# Patient Record
Sex: FEMALE | Race: WHITE | NOT HISPANIC OR LATINO | Employment: PART TIME | URBAN - METROPOLITAN AREA
[De-identification: names, ages, dates, MRNs, and addresses within clinical notes are randomized per-mention and may not be internally consistent; named-entity substitution may affect disease eponyms.]

---

## 2017-01-16 RX ORDER — TRAZODONE HYDROCHLORIDE 100 MG/1
TABLET ORAL
Qty: 180 TAB | Refills: 0 | Status: SHIPPED | OUTPATIENT
Start: 2017-01-16 | End: 2017-04-13 | Stop reason: SDUPTHER

## 2017-04-13 RX ORDER — TRAZODONE HYDROCHLORIDE 100 MG/1
TABLET ORAL
Qty: 180 TAB | Refills: 0 | Status: SHIPPED | OUTPATIENT
Start: 2017-04-13 | End: 2017-06-28 | Stop reason: SDUPTHER

## 2017-04-13 NOTE — TELEPHONE ENCOUNTER
Was the patient seen in the last year in this department? Yes     Does patient have an active prescription for medications requested? No     Received Request Via: Pharmacy      Pt met protocol?: Yes, OV 10/16.

## 2017-06-28 RX ORDER — TRAZODONE HYDROCHLORIDE 100 MG/1
TABLET ORAL
Qty: 180 TAB | Refills: 0 | Status: SHIPPED | OUTPATIENT
Start: 2017-06-28 | End: 2017-09-24 | Stop reason: SDUPTHER

## 2017-07-25 RX ORDER — SIMVASTATIN 20 MG
TABLET ORAL
Qty: 90 TAB | Refills: 0 | Status: SHIPPED | OUTPATIENT
Start: 2017-07-25 | End: 2017-10-23 | Stop reason: SDUPTHER

## 2017-07-25 NOTE — TELEPHONE ENCOUNTER
Was the patient seen in the last year in this department? Yes     Does patient have an active prescription for medications requested? No     Received Request Via: Pharmacy      Pt met protocol?: Yes, last ov 10/19/16   Lab Results   Component Value Date/Time    [facility] 76 07/29/2016 07:20 AM

## 2017-09-26 RX ORDER — TRAZODONE HYDROCHLORIDE 100 MG/1
TABLET ORAL
Qty: 180 TAB | Refills: 0 | Status: SHIPPED | OUTPATIENT
Start: 2017-09-26 | End: 2018-03-03 | Stop reason: SDUPTHER

## 2017-10-25 RX ORDER — SIMVASTATIN 20 MG
TABLET ORAL
Qty: 30 TAB | Refills: 0 | Status: SHIPPED | OUTPATIENT
Start: 2017-10-25 | End: 2017-11-20 | Stop reason: SDUPTHER

## 2017-10-25 NOTE — TELEPHONE ENCOUNTER
Pt was told 9/17 to make appt and that has not been done. Please advise pt only 30 days will be sent to pharmacy and next request will be denied.

## 2017-10-31 ENCOUNTER — HOSPITAL ENCOUNTER (OUTPATIENT)
Dept: RADIOLOGY | Facility: MEDICAL CENTER | Age: 57
End: 2017-10-31
Attending: NURSE PRACTITIONER
Payer: COMMERCIAL

## 2017-10-31 DIAGNOSIS — Z12.31 VISIT FOR SCREENING MAMMOGRAM: ICD-10-CM

## 2017-10-31 PROCEDURE — G0202 SCR MAMMO BI INCL CAD: HCPCS

## 2017-11-20 ENCOUNTER — OFFICE VISIT (OUTPATIENT)
Dept: MEDICAL GROUP | Facility: PHYSICIAN GROUP | Age: 57
End: 2017-11-20
Payer: COMMERCIAL

## 2017-11-20 VITALS
SYSTOLIC BLOOD PRESSURE: 126 MMHG | HEIGHT: 67 IN | OXYGEN SATURATION: 95 % | RESPIRATION RATE: 12 BRPM | HEART RATE: 80 BPM | WEIGHT: 118 LBS | BODY MASS INDEX: 18.52 KG/M2 | DIASTOLIC BLOOD PRESSURE: 72 MMHG | TEMPERATURE: 98.7 F

## 2017-11-20 DIAGNOSIS — F51.01 PRIMARY INSOMNIA: ICD-10-CM

## 2017-11-20 DIAGNOSIS — E78.2 MIXED HYPERLIPIDEMIA: ICD-10-CM

## 2017-11-20 DIAGNOSIS — M16.10 HIP ARTHRITIS: ICD-10-CM

## 2017-11-20 DIAGNOSIS — Z12.11 SCREENING FOR COLON CANCER: ICD-10-CM

## 2017-11-20 PROCEDURE — 99203 OFFICE O/P NEW LOW 30 MIN: CPT | Performed by: INTERNAL MEDICINE

## 2017-11-20 RX ORDER — SIMVASTATIN 20 MG
20 TABLET ORAL
Qty: 90 TAB | Refills: 2 | Status: SHIPPED | OUTPATIENT
Start: 2017-11-20 | End: 2018-09-09 | Stop reason: SDUPTHER

## 2017-11-20 ASSESSMENT — PATIENT HEALTH QUESTIONNAIRE - PHQ9: CLINICAL INTERPRETATION OF PHQ2 SCORE: 0

## 2017-11-20 NOTE — PROGRESS NOTES
PRIMARY CARE CLINIC NEW PATIENT H&P  Chief Complaint   Patient presents with   • Medication Refill     simvastatin     History of Present Illness     Insomnia  Has cut back on trazodone to 1 tablet of 100 mg dose at night since she started taking 2 tramadol at night to help with her hip pain.     Hip arthritis  Had an injection 2 years ago but it didn't work for longer than a day. Dr. Salgado prescribes her tramadol (takes 4 a day to be able to function pain free).     Hyperlipidemia  Just resumed statin again this year after her cholesterol increased, had been on a statin prior to this as well.     Current Outpatient Prescriptions   Medication Sig Dispense Refill   • simvastatin (ZOCOR) 20 MG Tab Take 1 Tab by mouth every bedtime. TAKE 1 TAB BY MOUTH EVERY EVENING. 90 Tab 2   • trazodone (DESYREL) 100 MG Tab TAKE 2 TABLETS BY MOUTH AT BEDTIME (Patient taking differently: once at HS) 180 Tab 0   • tramadol (ULTRAM) 50 MG Tab Take 1-2 Tabs by mouth every four hours as needed. 90 Tab 0   • magnesium gluconate (MAG-G) 500 MG tablet Take 500 mg by mouth 3 times a day.     • Calcium Carbonate-Vitamin D (CALCIUM + D PO) Take  by mouth.     • ibuprofen (MOTRIN) 800 MG TABS Take 1 Tab by mouth every 8 hours as needed for Mild Pain. 60 Each 3   • Coenzyme Q10 (CO Q 10 PO) Take  by mouth.     • Multiple Vitamin (MULTI-VITAMIN PO) Take  by mouth.     • fluticasone (CUTIVATE) 0.05 % cream Apply  to affected area(s) 2 times a day. Patient to apply to affected area BID 1 Tube 0     No current facility-administered medications for this visit.        Past Medical History:   Diagnosis Date   • Acute midline thoracic back pain 10/19/2016   • Hyperlipidemia 6/17/2013   • Insomnia    • Menopause    • Osteopenia      Past Surgical History:   Procedure Laterality Date   • UMBILICAL HERNIA REPAIR  10/21/2009    Performed by FLORIAN BHANDARI at [facility]   • OTHER ORTHOPEDIC SURGERY      hammertoe correction bilat   •  "OTHER ORTHOPEDIC SURGERY      lt arm   • ULNA ORIF      lt     Social History   Substance Use Topics   • Smoking status: Former Smoker     Packs/day: 0.25     Years: 15.00     Types: Cigarettes     Quit date: 8/12/1999   • Smokeless tobacco: Never Used   • Alcohol use No      Comment: former heavy use, 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       Family History   Problem Relation Age of Onset   • Adopted: Yes     Family Status   Relation Status   • Mother Other   • Father Other     Allergies: Patient has no known allergies.    ROS  Constitutional: Negative for fatigue/generalized weakness.   HEENT: Negative for  vision changes, hearing changes    Respiratory: Negative for shortness of breath  Cardiovascular: Negative for chest pain, palpitations  Gastrointestinal: Negative for blood in stool, constipation, diarrhea  Genitourinary: Negative for dysuria, polyuria  Musculoskeletal: positive for back and joint pain  Skin: Negative for rash  Neurological: Negative for numbness, tingling  Psychiatric/Behavioral: Negative for depression     Objective   Blood pressure 126/72, pulse 80, temperature 37.1 °C (98.7 °F), resp. rate 12, height 1.702 m (5' 7\"), weight 53.5 kg (118 lb), SpO2 95 %. Body mass index is 18.48 kg/m².    General: Alert, oriented. In no acute distress   HEET: EOMI, PERRL, conjunctiva non-injected, sclera non-icteric.  Nares patent with no significant congestion or drainage.  Marry pinnae, external auditory canals, TM pearly gray with normal light reflex bilaterally.Oral mucous membranes pink and moist with no lesions.  Neck: supple with no cervical, subclavicular lymphadenopathy, JVD, palpable thyroid nodules   Lungs: clear to auscultation bilaterally with good excursion.  CV: regular rate and rhythm.  Abdomen soft, non-distended, non-tender with normal bowel sounds. No hepatosplenomegaly, no masses palpated  Skin: " no lesions. Warm, dry   Psychiatric: appropriate mood and affect     Assessment and Plan   The following treatment plan was discussed     1. Primary insomnia  Stable on 1 tablet of trazodone 100 mg qHS.     2. Hip arthritis  Follows with Dr. Sena, has received injection of left hip without relief 2 years ago. Dr. Sena prescribes her tramadol and she requires about 4 a day to function.     3. Mixed hyperlipidemia  Lipids well controlled on current dose of statin. Will refill.   - simvastatin (ZOCOR) 20 MG Tab; Take 1 Tab by mouth every bedtime. TAKE 1 TAB BY MOUTH EVERY EVENING.  Dispense: 90 Tab; Refill: 2    4. Screening for colon cancer  Patient knows she is due 6/2018 and would like a referral now so she has time to select an in network provider.   - REFERRAL TO GI FOR COLONOSCOPY    Return in about 4 months (around 3/20/2018) for pap.    Health Maintenance      Health Maintenance Due   Topic Date Due   • PAP SMEAR  06/18/2016       Kalpesh Hylton MD  Internal Medicine  [facility]

## 2017-11-20 NOTE — ASSESSMENT & PLAN NOTE
Has cut back on trazodone to 1 tablet of 100 mg dose at night since she started taking 2 tramadol at night to help with her hip pain.

## 2017-11-20 NOTE — ASSESSMENT & PLAN NOTE
Just resumed statin again this year after her cholesterol increased, had been on a statin prior to this as well.

## 2017-11-20 NOTE — ASSESSMENT & PLAN NOTE
Had an injection 2 years ago but it didn't work for longer than a day. Dr. Salgado prescribes her tramadol (takes 4 a day to be able to function pain free).

## 2017-11-22 DIAGNOSIS — R52 PAIN: ICD-10-CM

## 2017-11-22 RX ORDER — TRAMADOL HYDROCHLORIDE 50 MG/1
50-100 TABLET ORAL EVERY 4 HOURS PRN
Qty: 90 TAB | Refills: 0
Start: 2017-11-22

## 2017-11-22 RX ORDER — SIMVASTATIN 20 MG
TABLET ORAL
Qty: 90 TAB | Refills: 0 | Status: SHIPPED | OUTPATIENT
Start: 2017-11-22 | End: 2018-01-17

## 2017-11-22 NOTE — TELEPHONE ENCOUNTER
Pt states she has not seen Dr Salgado in over a year and was told by their office to have filled by her primary

## 2017-11-27 DIAGNOSIS — R52 PAIN: ICD-10-CM

## 2017-11-27 RX ORDER — TRAMADOL HYDROCHLORIDE 50 MG/1
50-100 TABLET ORAL EVERY 4 HOURS PRN
Qty: 45 TAB | Refills: 0 | Status: ON HOLD
Start: 2017-11-27 | End: 2018-11-29

## 2017-11-27 NOTE — TELEPHONE ENCOUNTER
Inform pt that refill is for #45 only and that she needs to return to Dr Salgado for further refills.

## 2018-01-17 ENCOUNTER — OFFICE VISIT (OUTPATIENT)
Dept: MEDICAL GROUP | Facility: PHYSICIAN GROUP | Age: 58
End: 2018-01-17
Payer: COMMERCIAL

## 2018-01-17 VITALS
TEMPERATURE: 98.4 F | HEART RATE: 103 BPM | RESPIRATION RATE: 12 BRPM | HEIGHT: 67 IN | SYSTOLIC BLOOD PRESSURE: 110 MMHG | DIASTOLIC BLOOD PRESSURE: 70 MMHG | BODY MASS INDEX: 18.52 KG/M2 | WEIGHT: 118 LBS | OXYGEN SATURATION: 95 %

## 2018-01-17 DIAGNOSIS — M16.10 HIP ARTHRITIS: ICD-10-CM

## 2018-01-17 DIAGNOSIS — Z00.00 ROUTINE ADULT HEALTH MAINTENANCE: ICD-10-CM

## 2018-01-17 PROCEDURE — 99212 OFFICE O/P EST SF 10 MIN: CPT | Performed by: INTERNAL MEDICINE

## 2018-01-17 NOTE — ASSESSMENT & PLAN NOTE
Has been having ongoing left hip pain. Was getting tramadol from Dr. Salgado but now that practice isn't handling chronic pain medications. Has had a hip injection before but it didn't help. Dr. Salgado did discuss hip replacement with her but she's not prepared for that option yet. Has been taking several advil daily and also tramadol. Takes 2-3 tablets of tramadol a day. Takes 4-8 advil a day.

## 2018-01-19 ENCOUNTER — HOSPITAL ENCOUNTER (OUTPATIENT)
Dept: LAB | Facility: MEDICAL CENTER | Age: 58
End: 2018-01-19
Attending: INTERNAL MEDICINE
Payer: COMMERCIAL

## 2018-01-19 DIAGNOSIS — M16.10 HIP ARTHRITIS: ICD-10-CM

## 2018-01-19 DIAGNOSIS — Z00.00 ROUTINE ADULT HEALTH MAINTENANCE: ICD-10-CM

## 2018-01-19 LAB
25(OH)D3 SERPL-MCNC: 39 NG/ML (ref 30–100)
ALBUMIN SERPL BCP-MCNC: 4 G/DL (ref 3.2–4.9)
ALBUMIN/GLOB SERPL: 1.8 G/DL
ALP SERPL-CCNC: 36 U/L (ref 30–99)
ALT SERPL-CCNC: 18 U/L (ref 2–50)
ANION GAP SERPL CALC-SCNC: 4 MMOL/L (ref 0–11.9)
AST SERPL-CCNC: 23 U/L (ref 12–45)
BASOPHILS # BLD AUTO: 1.3 % (ref 0–1.8)
BASOPHILS # BLD: 0.06 K/UL (ref 0–0.12)
BILIRUB SERPL-MCNC: 0.5 MG/DL (ref 0.1–1.5)
BUN SERPL-MCNC: 16 MG/DL (ref 8–22)
CALCIUM SERPL-MCNC: 8.5 MG/DL (ref 8.5–10.5)
CHLORIDE SERPL-SCNC: 106 MMOL/L (ref 96–112)
CHOLEST SERPL-MCNC: 187 MG/DL (ref 100–199)
CO2 SERPL-SCNC: 29 MMOL/L (ref 20–33)
CREAT SERPL-MCNC: 0.8 MG/DL (ref 0.5–1.4)
EOSINOPHIL # BLD AUTO: 0.33 K/UL (ref 0–0.51)
EOSINOPHIL NFR BLD: 7.2 % (ref 0–6.9)
ERYTHROCYTE [DISTWIDTH] IN BLOOD BY AUTOMATED COUNT: 46.7 FL (ref 35.9–50)
GLOBULIN SER CALC-MCNC: 2.2 G/DL (ref 1.9–3.5)
GLUCOSE SERPL-MCNC: 84 MG/DL (ref 65–99)
HCT VFR BLD AUTO: 39.5 % (ref 37–47)
HDLC SERPL-MCNC: 73 MG/DL
HGB BLD-MCNC: 12.7 G/DL (ref 12–16)
IMM GRANULOCYTES # BLD AUTO: 0.01 K/UL (ref 0–0.11)
IMM GRANULOCYTES NFR BLD AUTO: 0.2 % (ref 0–0.9)
LDLC SERPL CALC-MCNC: 100 MG/DL
LYMPHOCYTES # BLD AUTO: 1.22 K/UL (ref 1–4.8)
LYMPHOCYTES NFR BLD: 26.8 % (ref 22–41)
MCH RBC QN AUTO: 28.9 PG (ref 27–33)
MCHC RBC AUTO-ENTMCNC: 32.2 G/DL (ref 33.6–35)
MCV RBC AUTO: 89.8 FL (ref 81.4–97.8)
MONOCYTES # BLD AUTO: 0.51 K/UL (ref 0–0.85)
MONOCYTES NFR BLD AUTO: 11.2 % (ref 0–13.4)
NEUTROPHILS # BLD AUTO: 2.43 K/UL (ref 2–7.15)
NEUTROPHILS NFR BLD: 53.3 % (ref 44–72)
NRBC # BLD AUTO: 0 K/UL
NRBC BLD-RTO: 0 /100 WBC
PLATELET # BLD AUTO: 262 K/UL (ref 164–446)
PMV BLD AUTO: 10.9 FL (ref 9–12.9)
POTASSIUM SERPL-SCNC: 4.4 MMOL/L (ref 3.6–5.5)
PROT SERPL-MCNC: 6.2 G/DL (ref 6–8.2)
RBC # BLD AUTO: 4.4 M/UL (ref 4.2–5.4)
SODIUM SERPL-SCNC: 139 MMOL/L (ref 135–145)
TRIGL SERPL-MCNC: 68 MG/DL (ref 0–149)
WBC # BLD AUTO: 4.6 K/UL (ref 4.8–10.8)

## 2018-01-19 PROCEDURE — 85025 COMPLETE CBC W/AUTO DIFF WBC: CPT

## 2018-01-19 PROCEDURE — 36415 COLL VENOUS BLD VENIPUNCTURE: CPT

## 2018-01-19 PROCEDURE — 82306 VITAMIN D 25 HYDROXY: CPT

## 2018-01-19 PROCEDURE — 80061 LIPID PANEL: CPT

## 2018-01-19 PROCEDURE — 80053 COMPREHEN METABOLIC PANEL: CPT

## 2018-03-05 RX ORDER — TRAZODONE HYDROCHLORIDE 100 MG/1
TABLET ORAL
Qty: 180 TAB | Refills: 1 | Status: ON HOLD | OUTPATIENT
Start: 2018-03-05 | End: 2018-11-29

## 2018-07-18 ENCOUNTER — APPOINTMENT (OUTPATIENT)
Dept: ADMISSIONS | Facility: MEDICAL CENTER | Age: 58
End: 2018-07-18
Attending: ORTHOPAEDIC SURGERY
Payer: COMMERCIAL

## 2018-07-18 DIAGNOSIS — Z01.810 PRE-OPERATIVE CARDIOVASCULAR EXAMINATION: ICD-10-CM

## 2018-07-18 LAB — EKG IMPRESSION: NORMAL

## 2018-07-18 RX ORDER — IBUPROFEN 200 MG
600 TABLET ORAL EVERY 6 HOURS PRN
Status: ON HOLD | COMMUNITY
End: 2018-11-29

## 2018-07-18 NOTE — OR NURSING
"Preadmit appointment: \" Preparing for your Procedure information\" sheet given to patient with verbal and written instructions. Patient instructed to continue prescribed medications through the day before surgery, instructed to take the following medications the day of surgery per anesthesia protocol: Tramadol  "

## 2018-07-23 ENCOUNTER — HOSPITAL ENCOUNTER (OUTPATIENT)
Facility: MEDICAL CENTER | Age: 58
End: 2018-07-23
Attending: ORTHOPAEDIC SURGERY | Admitting: ORTHOPAEDIC SURGERY
Payer: COMMERCIAL

## 2018-07-23 VITALS
WEIGHT: 113.54 LBS | DIASTOLIC BLOOD PRESSURE: 71 MMHG | RESPIRATION RATE: 14 BRPM | TEMPERATURE: 97.2 F | BODY MASS INDEX: 17.21 KG/M2 | HEIGHT: 68 IN | OXYGEN SATURATION: 92 % | SYSTOLIC BLOOD PRESSURE: 108 MMHG

## 2018-07-23 PROCEDURE — 160029 HCHG SURGERY MINUTES - 1ST 30 MINS LEVEL 4: Performed by: ORTHOPAEDIC SURGERY

## 2018-07-23 PROCEDURE — 160002 HCHG RECOVERY MINUTES (STAT): Performed by: ORTHOPAEDIC SURGERY

## 2018-07-23 PROCEDURE — C1713 ANCHOR/SCREW BN/BN,TIS/BN: HCPCS | Performed by: ORTHOPAEDIC SURGERY

## 2018-07-23 PROCEDURE — 160022 HCHG BLOCK: Performed by: ORTHOPAEDIC SURGERY

## 2018-07-23 PROCEDURE — 160009 HCHG ANES TIME/MIN: Performed by: ORTHOPAEDIC SURGERY

## 2018-07-23 PROCEDURE — 500028 HCHG ARTHROWAND TURBOVAC 3.5/90 SUCT.: Performed by: ORTHOPAEDIC SURGERY

## 2018-07-23 PROCEDURE — 502581 HCHG PACK, SHOULDER ARTHROSCOPY: Performed by: ORTHOPAEDIC SURGERY

## 2018-07-23 PROCEDURE — 501838 HCHG SUTURE GENERAL: Performed by: ORTHOPAEDIC SURGERY

## 2018-07-23 PROCEDURE — 160035 HCHG PACU - 1ST 60 MINS PHASE I: Performed by: ORTHOPAEDIC SURGERY

## 2018-07-23 PROCEDURE — 160025 RECOVERY II MINUTES (STATS): Performed by: ORTHOPAEDIC SURGERY

## 2018-07-23 PROCEDURE — 700111 HCHG RX REV CODE 636 W/ 250 OVERRIDE (IP)

## 2018-07-23 PROCEDURE — 500123 HCHG BOVIE, CONTROL W/BLADE: Performed by: ORTHOPAEDIC SURGERY

## 2018-07-23 PROCEDURE — 160048 HCHG OR STATISTICAL LEVEL 1-5: Performed by: ORTHOPAEDIC SURGERY

## 2018-07-23 PROCEDURE — 700101 HCHG RX REV CODE 250

## 2018-07-23 PROCEDURE — 700105 HCHG RX REV CODE 258: Performed by: ORTHOPAEDIC SURGERY

## 2018-07-23 PROCEDURE — 700102 HCHG RX REV CODE 250 W/ 637 OVERRIDE(OP)

## 2018-07-23 PROCEDURE — 160046 HCHG PACU - 1ST 60 MINS PHASE II: Performed by: ORTHOPAEDIC SURGERY

## 2018-07-23 PROCEDURE — A9270 NON-COVERED ITEM OR SERVICE: HCPCS

## 2018-07-23 PROCEDURE — 160036 HCHG PACU - EA ADDL 30 MINS PHASE I: Performed by: ORTHOPAEDIC SURGERY

## 2018-07-23 PROCEDURE — 500152 HCHG CANNULA, TIB TUNNEL: Performed by: ORTHOPAEDIC SURGERY

## 2018-07-23 PROCEDURE — 160041 HCHG SURGERY MINUTES - EA ADDL 1 MIN LEVEL 4: Performed by: ORTHOPAEDIC SURGERY

## 2018-07-23 DEVICE — SUTURE ANCHOR 2.8MM: Type: IMPLANTABLE DEVICE | Status: FUNCTIONAL

## 2018-07-23 RX ORDER — ACETAMINOPHEN 500 MG
TABLET ORAL
Status: COMPLETED
Start: 2018-07-23 | End: 2018-07-23

## 2018-07-23 RX ORDER — BUPIVACAINE HYDROCHLORIDE 5 MG/ML
INJECTION, SOLUTION EPIDURAL; INTRACAUDAL
Status: DISCONTINUED
Start: 2018-07-23 | End: 2018-07-23 | Stop reason: HOSPADM

## 2018-07-23 RX ORDER — MIDAZOLAM HYDROCHLORIDE 1 MG/ML
INJECTION INTRAMUSCULAR; INTRAVENOUS
Status: DISCONTINUED
Start: 2018-07-23 | End: 2018-07-23 | Stop reason: HOSPADM

## 2018-07-23 RX ORDER — CELECOXIB 200 MG/1
CAPSULE ORAL
Status: COMPLETED
Start: 2018-07-23 | End: 2018-07-23

## 2018-07-23 RX ORDER — ONDANSETRON 2 MG/ML
INJECTION INTRAMUSCULAR; INTRAVENOUS
Status: COMPLETED
Start: 2018-07-23 | End: 2018-07-23

## 2018-07-23 RX ORDER — SODIUM CHLORIDE, SODIUM LACTATE, POTASSIUM CHLORIDE, CALCIUM CHLORIDE 600; 310; 30; 20 MG/100ML; MG/100ML; MG/100ML; MG/100ML
INJECTION, SOLUTION INTRAVENOUS
Status: DISCONTINUED | OUTPATIENT
Start: 2018-07-23 | End: 2018-07-23 | Stop reason: HOSPADM

## 2018-07-23 RX ORDER — BUPIVACAINE HYDROCHLORIDE AND EPINEPHRINE 2.5; 5 MG/ML; UG/ML
INJECTION, SOLUTION EPIDURAL; INFILTRATION; INTRACAUDAL; PERINEURAL
Status: DISCONTINUED | OUTPATIENT
Start: 2018-07-23 | End: 2018-07-23 | Stop reason: HOSPADM

## 2018-07-23 RX ADMIN — ONDANSETRON 4 MG: 2 INJECTION INTRAMUSCULAR; INTRAVENOUS at 15:56

## 2018-07-23 RX ADMIN — CELECOXIB 400 MG: 200 CAPSULE ORAL at 11:53

## 2018-07-23 RX ADMIN — SODIUM CHLORIDE, POTASSIUM CHLORIDE, SODIUM LACTATE AND CALCIUM CHLORIDE: 600; 310; 30; 20 INJECTION, SOLUTION INTRAVENOUS at 11:47

## 2018-07-23 RX ADMIN — ACETAMINOPHEN 1000 MG: 500 TABLET, COATED ORAL at 11:53

## 2018-07-23 ASSESSMENT — PAIN SCALES - GENERAL
PAINLEVEL_OUTOF10: 0
PAINLEVEL_OUTOF10: 5

## 2018-07-23 NOTE — OR NURSING
1522: To PACU post left shoulder arthroscopy w/ interscalene block. Pt is extubated, breathing is spontaneous and unlabored. Unable to sense touch to hand/fingers or move same. Denies pain or nausea.  1545: No change.   1555: C/o nausea, see MAR. Report given to Jeanmarie MIJARES.

## 2018-07-23 NOTE — OR NURSING
2961- Report received from Yosi MIJARES.  Pt denies pain, c/o mild nausea.  Pt was medicated for nausea, will continue to monitor. L shoulder dressing cdi. Pt denies sensation to touch L hand, unable to wiggle L fingers.  Fingers warm and pink with brisk cap refill.  updated via phone.  8163- Report to Christina MIJRAES in stage 2.

## 2018-07-23 NOTE — DISCHARGE INSTRUCTIONS
:   ACTIVITY: Rest and take it easy for the first 24 hours.  A responsible adult is recommended to remain with you during that time.  It is normal to feel sleepy.  We encourage you to not do anything that requires balance, judgment or coordination.    MILD FLU-LIKE SYMPTOMS ARE NORMAL. YOU MAY EXPERIENCE GENERALIZED MUSCLE ACHES, THROAT IRRITATION, HEADACHE AND/OR SOME NAUSEA.    FOR 24 HOURS DO NOT:  Drive, operate machinery or run household appliances.  Drink beer or alcoholic beverages.   Make important decisions or sign legal documents.    SPECIAL INSTRUCTIONS: No weight bearing to operative extremity. Ice and elevate. Wear immobilizer at all times (except showering or performing codman exercises).    DIET: To avoid nausea, slowly advance diet as tolerated, avoiding spicy or greasy foods for the first day.  Add more substantial food to your diet according to your physician's instructions.   INCREASE FLUIDS AND FIBER TO AVOID CONSTIPATION.    SURGICAL DRESSING/BATHING: Keep dressings clean and dry. May remove dressings post op day # 2 (Wednesday) and shower with wounds uncovered. Apply band-aids after shower. Do not soak or submerge incisions for 3 weeks.    FOLLOW-UP APPOINTMENT:  A follow-up appointment should be arranged with your doctor in 7-10 days; call to schedule.    You should CALL YOUR PHYSICIAN if you develop:  Fever greater than 101 degrees F.  Pain not relieved by medication, or persistent nausea or vomiting.  Excessive bleeding (blood soaking through dressing) or unexpected drainage from the wound.  Extreme redness or swelling around the incision site, drainage of pus or foul smelling drainage.  Inability to urinate or empty your bladder within 8 hours.    You should call 911 if you develop problems with breathing or chest pain.    If you are unable to contact your doctor or surgical center, you should go to the nearest emergency room or urgent care center.      Physician's telephone #: Dr. Collins  [phone]    If any questions arise, call your doctor.  If your doctor is not available, please feel free to call the Surgical Center at [phone].  The Center is open Monday through Friday from 7AM to 7PM.      You can also call the HEALTH HOTLINE open 24 hours/day, 7 days/week and speak to a nurse at [phone], or toll free at [phone].    A registered nurse may call you a few days after your surgery to see how you are doing after your procedure.    MEDICATIONS: Resume taking daily medication.  Take prescribed pain medication with food.  If no medication is prescribed, you may take non-aspirin pain medication if needed.  PAIN MEDICATION CAN BE VERY CONSTIPATING.  Take a stool softener or laxative such as senokot, pericolace, or milk of magnesia if needed.    Prescriptions at home.      Last pain medication given: none.    If your physician has prescribed pain medication that includes Acetaminophen (Tylenol), do not take additional Acetaminophen (Tylenol) while taking the prescribed medication.    Peripheral Nerve Block Discharge Instructions from Same Day Surgery and Inpatient :    What to Expect - Upper Extremity  · You may experience numbness and weakness in shoulder, arm and hand  on the same side as your surgery  · This is normal. For some people, this may be an unpleasant sensation. Be very careful with your numb limb  · Ask for help when you need it  Shoulder Surgery Side Effects  · In addition to numbness and weakness you may experience other symptoms  · Other nerves that are close to those nerves injected can also be affected by local anesthesia  · You may experience a hoarseness in your voice  · Your breathing may feel different  · You may also notice drooping of your eyelid, pupil constriction, and decreased sweating, on the side of your surgery  · All of these side effects are normal and will resolve when the local anesthetic wears off   Prevent Injury  · Protect the limb like a  "baby  · Beware of exposing your limb to extreme heat or cold or trauma  · The limb may be injured without you noticing because it is numb  · Keep the limb elevated whenever possible  · Do not sleep on the limb  · Change the position of the limb regularly  · Avoid putting pressure on your surgical limb  Pain Control  · The initial block on the day of surgery will make your extremity feel \"numb\"  · Any consecutive injection including prior to discharge from the hospital will make your extremity feel \"numb\"  · You may feel an aching or burning when the local anesthesia starts to wear off  · Take pain pills as prescribed by your surgeon  · Call your surgeon or anesthesiologist if you do not have adequate pain control        Depression / Suicide Risk    As you are discharged from this [facility] facility, it is important to learn how to keep safe from harming yourself.    Recognize the warning signs:  · Abrupt changes in personality, positive or negative- including increase in energy   · Giving away possessions  · Change in eating patterns- significant weight changes-  positive or negative  · Change in sleeping patterns- unable to sleep or sleeping all the time   · Unwillingness or inability to communicate  · Depression  · Unusual sadness, discouragement and loneliness  · Talk of wanting to die  · Neglect of personal appearance   · Rebelliousness- reckless behavior  · Withdrawal from people/activities they love  · Confusion- inability to concentrate     If you or a loved one observes any of these behaviors or has concerns about self-harm, here's what you can do:  · Talk about it- your feelings and reasons for harming yourself  · Remove any means that you might use to hurt yourself (examples: pills, rope, extension cords, firearm)  · Get professional help from the community (Mental Health, Substance Abuse, psychological counseling)  · Do not be alone:Call your Safe Contact- someone whom you trust who will be there for " you.  · Call your local CRISIS HOTLINE 272-1950 or 586-062-7581  · Call your local Children's Mobile Crisis Response Team [place] (899) 767-4480 or www.Instablogs  · Call the toll free National Suicide Prevention Hotlines   · National Suicide Prevention Lifeline 030-526-JPKZ (5492)  National [employer] Line Network 800-SUICIDE (981-5998)

## 2018-07-23 NOTE — OP REPORT
DATE OF SERVICE:  07/23/2018    SURGEON:  Tuan Collins MD    ASSISTANT:  Beltran Damico PA-C    PREOPERATIVE DIAGNOSES:  1.  Left shoulder massive retracted rotator cuff including the subscapularis,   supraspinatus and infraspinatus.  2.  Left shoulder long head biceps tendon tear.  3.  Left shoulder subacromial impingement.  4.  Left shoulder superior labral anterior and posterior tear with remained   biceps stump.  5.  Left shoulder subacromial impingement.    POSTOPERATIVE DIAGNOSES:  2.  Left shoulder long head biceps tendon tear.  3.  Left shoulder subacromial impingement.  4.  Left shoulder superior labral anterior and posterior tear with remained   biceps stump.  5.  Left shoulder subacromial impingement.    PROCEDURES:  1.  Left shoulder open subscapularis tendon repair.  2.  Left shoulder open biceps tenodesis.  3.  Left shoulder arthroscopy with rotator cuff repair of the supra and   infraspinatus.  4.  Left shoulder arthroscopy with subacromial decompression.  5.  Left shoulder arthroscopy with limited debridement of glenohumeral joint.    ANESTHESIOLOGIST:  Jass Castañeda MD    ANESTHETIC:  General endotracheal anesthesia along with interscalene block for   postoperative pain control.    INDICATIONS:  The patient fell probably 8 months ago and sustained an injury   to her shoulder.  She tried to manage just nonoperatively, but continued to   have pain and never really regained her function, long over biceps tore more   recently.  She elected to proceed with operative intervention.  She really   wants her biceps long head retrieved and repaired.    PHYSICAL EXAMINATION:  GENERAL:  The patient appears stated age.  PSYCHIATRIC:  Mood is appropriate.  She is alert and oriented.  HEENT:  Normal.  PULMONARY:  Respirations unlabored.  MUSCULOSKELETAL:  The left shoulder revealed good range of motion, no evidence   of instability.    DESCRIPTION OF PROCEDURE:  The arm was then prepped and draped in the  usual   sterile fashion.  She was carefully placed in modified beach chair prior to   that.  Eyes, head and neck were maintained in neutral position and protected.    A posterior portal was established with knife and blunt trocar in the   glenohumeral space.  She had a retracted subscapularis tear to about the level   of joint.  It was relatively mobile.  The tissue was of pretty good   condition.  She also had a large supraspinatus tendon tear and infraspinatus   tear with some retraction.  The long head of the biceps had long stump into   the joint.  This was debrided back with a shaver.  The labrum was smoothed   out.  She had some very minor early articular cartilage wear.  The remainder   of the joint was normal.  At that point, I focused my attention on the   anterior aspect of the shoulder.  A deltopectoral approach retracted the   cephalic vein laterally and was able to identify the biceps retracted down and   freed up.  I pulled up it into the bottom part of the groove, roughened up   the groove, placed a Q-Fix 2.8 anchor and then did running locking stitches   and tied at appropriate tension pulling the biceps up and dissected.  The   subscap was pulled off and I roughened up the lesser tuberosity, placed 2   Q-Fix 2.8 anchors and did 4 Jaylen Chris sutures to repair the subscap on the   lesser tuberosity.  At that point, I really did not have access to the top of   the shoulder through the deltopectoral incision.  I elected to proceed with   arthroscopy.  I then closed loosely the deltopectoral interval and then   proceeded with an arthroscopy.  I went to the subacromial space.  She had a   hooked acromion and acromioplasty with a 5.5 resector.  I then mobilized and   roughened up the rotator cuff tear that was back into the infraspinatus.  It   had somewhat of a medial split.  This was mobilized and then the tuberosity   was debrided to bleeding bone, leaving the cortical shell.  I placed 3 more   Q-Fix  2.8 and 5 simple mattress posteriorly.  These were all tied securely   which pulled the rotator cuff onto the tuberosity.  At that point, I was happy   with the procedure.  I lavaged out the joint, suctioned out the fluid and the   bony debris.  I closed the portals with 3-0 Prolene in interrupted fashion,   closed the skin with 2-0 Vicryl and a 3-0 subcuticular Prolene.  Mastisol,   Steri-Strips, Xeroform, 4x4s, Medipore tape and UltraSling were applied.    ESTIMATED BLOOD LOSS:  Minimal.    COMPLICATIONS:  None.    Beltran Damico PA-C, was medically necessary for the case.  He helped with   instrumentation, retraction, positioning, anchor insertion, suture management   and closure.  Without his help, the case would not have been as technically   successful.    COMPONENTS USED:  Six [employer]hew Q-Fix 2.8 anchors.       ____________________________________     MD XAVIER GONZALES / JERRICA    DD:  07/23/2018 15:41:10  DT:  07/23/2018 16:18:23    D#:  [id number]  Job#:  [id number]

## 2018-07-23 NOTE — OR NURSING
1111: Brought patient back to pre-op and assumed care.  1202: Patient allergies and NPO status verified, home medication reconciliation completed and belongings secured. Patient verbalizes understanding of pain scale, expected course of stay and plan of care. Surgical site verified with patient. IV access established. Sequentials placed on legs.

## 2018-09-09 DIAGNOSIS — E78.2 MIXED HYPERLIPIDEMIA: ICD-10-CM

## 2018-09-11 RX ORDER — SIMVASTATIN 20 MG
TABLET ORAL
Qty: 90 TAB | Refills: 1 | Status: ON HOLD | OUTPATIENT
Start: 2018-09-11 | End: 2018-11-29

## 2018-09-11 NOTE — TELEPHONE ENCOUNTER
Was the patient seen in the last year in this department? Yes    Does patient have an active prescription for medications requested? No     Received Request Via: Pharmacy    Pt met protocol?: Yes     Last OV 01/2018    Lab Results  Component Value Date/Time   CHOLSTRLTOT 187 01/19/2018 0921   TRIGLYCERIDE 68 01/19/2018 0921   HDL 73 01/19/2018 0921    (H) 01/19/2018 0921

## 2018-09-11 NOTE — TELEPHONE ENCOUNTER
Pt has had OV within the 12 month protocol and lipid panel is current. 6 month supply sent to pharmacy.   Lab Results   Component Value Date/Time    CHOLSTRLTOT 187 01/19/2018 09:21 AM     (H) 01/19/2018 09:21 AM    HDL 73 01/19/2018 09:21 AM    TRIGLYCERIDE 68 01/19/2018 09:21 AM       Lab Results   Component Value Date/Time    SODIUM 139 01/19/2018 09:21 AM    POTASSIUM 4.4 01/19/2018 09:21 AM    CHLORIDE 106 01/19/2018 09:21 AM    CO2 29 01/19/2018 09:21 AM    GLUCOSE 84 01/19/2018 09:21 AM    BUN 16 01/19/2018 09:21 AM    CREATININE 0.80 01/19/2018 09:21 AM    CREATININE 0.78 10/11/2011 01:52 PM    BUNCREATRAT 18 07/29/2016 07:20 AM    BUNCREATRAT 18 10/11/2011 01:52 PM     Lab Results   Component Value Date/Time    ALKPHOSPHAT 36 01/19/2018 09:21 AM    ASTSGOT 23 01/19/2018 09:21 AM    ALTSGPT 18 01/19/2018 09:21 AM    TBILIRUBIN 0.5 01/19/2018 09:21 AM

## 2018-11-01 ENCOUNTER — HOSPITAL ENCOUNTER (OUTPATIENT)
Dept: RADIOLOGY | Facility: MEDICAL CENTER | Age: 58
End: 2018-11-01
Attending: INTERNAL MEDICINE
Payer: COMMERCIAL

## 2018-11-01 DIAGNOSIS — Z12.31 VISIT FOR SCREENING MAMMOGRAM: ICD-10-CM

## 2018-11-01 PROCEDURE — 77067 SCR MAMMO BI INCL CAD: CPT

## 2018-11-19 DIAGNOSIS — Z01.810 PRE-OPERATIVE CARDIOVASCULAR EXAMINATION: ICD-10-CM

## 2018-11-19 DIAGNOSIS — Z01.812 PRE-OPERATIVE LABORATORY EXAMINATION: ICD-10-CM

## 2018-11-19 LAB
ANION GAP SERPL CALC-SCNC: 9 MMOL/L (ref 0–11.9)
APPEARANCE UR: CLEAR
BASOPHILS # BLD AUTO: 0.9 % (ref 0–1.8)
BASOPHILS # BLD: 0.06 K/UL (ref 0–0.12)
BILIRUB UR QL STRIP.AUTO: NEGATIVE
BUN SERPL-MCNC: 13 MG/DL (ref 8–22)
CALCIUM SERPL-MCNC: 9.4 MG/DL (ref 8.5–10.5)
CHLORIDE SERPL-SCNC: 106 MMOL/L (ref 96–112)
CO2 SERPL-SCNC: 23 MMOL/L (ref 20–33)
COLOR UR: YELLOW
CREAT SERPL-MCNC: 0.78 MG/DL (ref 0.5–1.4)
EKG IMPRESSION: NORMAL
EOSINOPHIL # BLD AUTO: 0.13 K/UL (ref 0–0.51)
EOSINOPHIL NFR BLD: 1.9 % (ref 0–6.9)
ERYTHROCYTE [DISTWIDTH] IN BLOOD BY AUTOMATED COUNT: 45 FL (ref 35.9–50)
GLUCOSE SERPL-MCNC: 86 MG/DL (ref 65–99)
GLUCOSE UR STRIP.AUTO-MCNC: NEGATIVE MG/DL
HCT VFR BLD AUTO: 37.5 % (ref 37–47)
HGB BLD-MCNC: 12.2 G/DL (ref 12–16)
HIV 1+2 AB+HIV1 P24 AG SERPL QL IA: NON REACTIVE
IMM GRANULOCYTES # BLD AUTO: 0.01 K/UL (ref 0–0.11)
IMM GRANULOCYTES NFR BLD AUTO: 0.1 % (ref 0–0.9)
KETONES UR STRIP.AUTO-MCNC: NEGATIVE MG/DL
LEUKOCYTE ESTERASE UR QL STRIP.AUTO: NEGATIVE
LYMPHOCYTES # BLD AUTO: 1.73 K/UL (ref 1–4.8)
LYMPHOCYTES NFR BLD: 25.7 % (ref 22–41)
MCH RBC QN AUTO: 28.2 PG (ref 27–33)
MCHC RBC AUTO-ENTMCNC: 32.5 G/DL (ref 33.6–35)
MCV RBC AUTO: 86.8 FL (ref 81.4–97.8)
MICRO URNS: NORMAL
MONOCYTES # BLD AUTO: 0.61 K/UL (ref 0–0.85)
MONOCYTES NFR BLD AUTO: 9.1 % (ref 0–13.4)
NEUTROPHILS # BLD AUTO: 4.19 K/UL (ref 2–7.15)
NEUTROPHILS NFR BLD: 62.3 % (ref 44–72)
NITRITE UR QL STRIP.AUTO: NEGATIVE
NRBC # BLD AUTO: 0 K/UL
NRBC BLD-RTO: 0 /100 WBC
PH UR STRIP.AUTO: 5.5 [PH]
PLATELET # BLD AUTO: 273 K/UL (ref 164–446)
PMV BLD AUTO: 10.8 FL (ref 9–12.9)
POTASSIUM SERPL-SCNC: 4.1 MMOL/L (ref 3.6–5.5)
PROT UR QL STRIP: NEGATIVE MG/DL
RBC # BLD AUTO: 4.32 M/UL (ref 4.2–5.4)
RBC UR QL AUTO: NEGATIVE
SCCMEC + MECA PNL NOSE NAA+PROBE: NEGATIVE
SCCMEC + MECA PNL NOSE NAA+PROBE: POSITIVE
SODIUM SERPL-SCNC: 138 MMOL/L (ref 135–145)
SP GR UR STRIP.AUTO: 1.02
UROBILINOGEN UR STRIP.AUTO-MCNC: 0.2 MG/DL
WBC # BLD AUTO: 6.7 K/UL (ref 4.8–10.8)

## 2018-11-19 PROCEDURE — 87641 MR-STAPH DNA AMP PROBE: CPT

## 2018-11-19 PROCEDURE — 81003 URINALYSIS AUTO W/O SCOPE: CPT

## 2018-11-19 PROCEDURE — 36415 COLL VENOUS BLD VENIPUNCTURE: CPT

## 2018-11-19 PROCEDURE — 93010 ELECTROCARDIOGRAM REPORT: CPT | Performed by: INTERNAL MEDICINE

## 2018-11-19 PROCEDURE — 87389 HIV-1 AG W/HIV-1&-2 AB AG IA: CPT

## 2018-11-19 PROCEDURE — 87640 STAPH A DNA AMP PROBE: CPT

## 2018-11-19 PROCEDURE — 80048 BASIC METABOLIC PNL TOTAL CA: CPT

## 2018-11-19 PROCEDURE — 93005 ELECTROCARDIOGRAM TRACING: CPT

## 2018-11-19 PROCEDURE — 85025 COMPLETE CBC W/AUTO DIFF WBC: CPT

## 2018-11-19 NOTE — DISCHARGE PLANNING
DISCHARGE PLANNING NOTE - TOTAL JOINT     Procedure: Procedure(s):  HIP ARTHROPLASTY ANTERIOR TOTAL  Procedure Date: 11/29/2018  Insurance:  Payor: [facility] / Plan: [facility] INDIVIDUAL/FAMILY PPO / Product Type: *No Product type* /   Equipment currently available at home? none  Steps into the home? 2  Steps within the home? 15 total with landing   Toilet height? Standard  Type of shower? walk-in shower  Who will be with you during your recovery? spouse  Is Outpatient Physical Therapy set up after surgery? No   Did you take the Total Joint Class and where? No     Plan: Anticipate home with .  Recommended pt getting walker at care chest or picking up before surgery. Also recommended shower chair and toilet seat riser. 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

## 2018-11-19 NOTE — OR NURSING
Patient here for pre-admit appointment. Labs and testing done as ordered. Patient educated as what to expect if MRSA nasal swab result is positive vs. negative.

## 2018-11-29 ENCOUNTER — HOSPITAL ENCOUNTER (INPATIENT)
Facility: MEDICAL CENTER | Age: 58
LOS: 1 days | DRG: 470 | End: 2018-11-29
Attending: ORTHOPAEDIC SURGERY | Admitting: ORTHOPAEDIC SURGERY
Payer: COMMERCIAL

## 2018-11-29 ENCOUNTER — APPOINTMENT (OUTPATIENT)
Dept: RADIOLOGY | Facility: MEDICAL CENTER | Age: 58
DRG: 470 | End: 2018-11-29
Attending: ORTHOPAEDIC SURGERY
Payer: COMMERCIAL

## 2018-11-29 VITALS
WEIGHT: 113.98 LBS | DIASTOLIC BLOOD PRESSURE: 80 MMHG | RESPIRATION RATE: 17 BRPM | TEMPERATURE: 99.1 F | SYSTOLIC BLOOD PRESSURE: 120 MMHG | HEART RATE: 92 BPM | OXYGEN SATURATION: 95 % | HEIGHT: 68 IN | BODY MASS INDEX: 17.27 KG/M2

## 2018-11-29 DIAGNOSIS — G89.18 POST-OP PAIN: ICD-10-CM

## 2018-11-29 PROCEDURE — A9270 NON-COVERED ITEM OR SERVICE: HCPCS

## 2018-11-29 PROCEDURE — 97161 PT EVAL LOW COMPLEX 20 MIN: CPT

## 2018-11-29 PROCEDURE — 700102 HCHG RX REV CODE 250 W/ 637 OVERRIDE(OP)

## 2018-11-29 PROCEDURE — A6402 STERILE GAUZE <= 16 SQ IN: HCPCS | Performed by: ORTHOPAEDIC SURGERY

## 2018-11-29 PROCEDURE — 160002 HCHG RECOVERY MINUTES (STAT): Performed by: ORTHOPAEDIC SURGERY

## 2018-11-29 PROCEDURE — G8979 MOBILITY GOAL STATUS: HCPCS | Mod: CI

## 2018-11-29 PROCEDURE — 700101 HCHG RX REV CODE 250: Performed by: STUDENT IN AN ORGANIZED HEALTH CARE EDUCATION/TRAINING PROGRAM

## 2018-11-29 PROCEDURE — 700111 HCHG RX REV CODE 636 W/ 250 OVERRIDE (IP): Performed by: STUDENT IN AN ORGANIZED HEALTH CARE EDUCATION/TRAINING PROGRAM

## 2018-11-29 PROCEDURE — 160036 HCHG PACU - EA ADDL 30 MINS PHASE I: Performed by: ORTHOPAEDIC SURGERY

## 2018-11-29 PROCEDURE — 700101 HCHG RX REV CODE 250

## 2018-11-29 PROCEDURE — 160009 HCHG ANES TIME/MIN: Performed by: ORTHOPAEDIC SURGERY

## 2018-11-29 PROCEDURE — 500002 HCHG ADHESIVE, DERMABOND: Performed by: ORTHOPAEDIC SURGERY

## 2018-11-29 PROCEDURE — 160035 HCHG PACU - 1ST 60 MINS PHASE I: Performed by: ORTHOPAEDIC SURGERY

## 2018-11-29 PROCEDURE — A9270 NON-COVERED ITEM OR SERVICE: HCPCS | Performed by: ANESTHESIOLOGY

## 2018-11-29 PROCEDURE — 700102 HCHG RX REV CODE 250 W/ 637 OVERRIDE(OP): Performed by: STUDENT IN AN ORGANIZED HEALTH CARE EDUCATION/TRAINING PROGRAM

## 2018-11-29 PROCEDURE — A9270 NON-COVERED ITEM OR SERVICE: HCPCS | Performed by: STUDENT IN AN ORGANIZED HEALTH CARE EDUCATION/TRAINING PROGRAM

## 2018-11-29 PROCEDURE — 700111 HCHG RX REV CODE 636 W/ 250 OVERRIDE (IP)

## 2018-11-29 PROCEDURE — 501838 HCHG SUTURE GENERAL: Performed by: ORTHOPAEDIC SURGERY

## 2018-11-29 PROCEDURE — 160031 HCHG SURGERY MINUTES - 1ST 30 MINS LEVEL 5: Performed by: ORTHOPAEDIC SURGERY

## 2018-11-29 PROCEDURE — 770001 HCHG ROOM/CARE - MED/SURG/GYN PRIV*

## 2018-11-29 PROCEDURE — 160042 HCHG SURGERY MINUTES - EA ADDL 1 MIN LEVEL 5: Performed by: ORTHOPAEDIC SURGERY

## 2018-11-29 PROCEDURE — G8980 MOBILITY D/C STATUS: HCPCS | Mod: CI

## 2018-11-29 PROCEDURE — 700102 HCHG RX REV CODE 250 W/ 637 OVERRIDE(OP): Performed by: ANESTHESIOLOGY

## 2018-11-29 PROCEDURE — 502000 HCHG MISC OR IMPLANTS RC 0278: Performed by: ORTHOPAEDIC SURGERY

## 2018-11-29 PROCEDURE — 160048 HCHG OR STATISTICAL LEVEL 1-5: Performed by: ORTHOPAEDIC SURGERY

## 2018-11-29 PROCEDURE — G8978 MOBILITY CURRENT STATUS: HCPCS | Mod: CI

## 2018-11-29 PROCEDURE — 700111 HCHG RX REV CODE 636 W/ 250 OVERRIDE (IP): Performed by: ANESTHESIOLOGY

## 2018-11-29 PROCEDURE — 700105 HCHG RX REV CODE 258: Performed by: STUDENT IN AN ORGANIZED HEALTH CARE EDUCATION/TRAINING PROGRAM

## 2018-11-29 PROCEDURE — 502578 HCHG PACK, TOTAL HIP: Performed by: ORTHOPAEDIC SURGERY

## 2018-11-29 PROCEDURE — 0SRB03Z REPLACEMENT OF LEFT HIP JOINT WITH CERAMIC SYNTHETIC SUBSTITUTE, OPEN APPROACH: ICD-10-PCS | Performed by: ORTHOPAEDIC SURGERY

## 2018-11-29 DEVICE — IMPLANT REF THREADED HOLE COVER (1EA): Type: IMPLANTABLE DEVICE | Site: HIP | Status: FUNCTIONAL

## 2018-11-29 DEVICE — STEM POLAR CEMENTLESS WITH COLLAR 4 (1EA): Type: IMPLANTABLE DEVICE | Site: HIP | Status: FUNCTIONAL

## 2018-11-29 DEVICE — IMPLANTABLE DEVICE: Type: IMPLANTABLE DEVICE | Site: HIP | Status: FUNCTIONAL

## 2018-11-29 DEVICE — IMPLANT REF SPHER HEAD SCREW 35MM (1EA): Type: IMPLANTABLE DEVICE | Site: HIP | Status: FUNCTIONAL

## 2018-11-29 DEVICE — IMPLANT R3 3 HOLE ACET SHELL 54MM (1EA): Type: IMPLANTABLE DEVICE | Site: HIP | Status: FUNCTIONAL

## 2018-11-29 DEVICE — CABLE ACC 2.0MM COCR W/CLAMP: Type: IMPLANTABLE DEVICE | Site: HIP | Status: FUNCTIONAL

## 2018-11-29 DEVICE — IMPLANT REF SPHER HEAD SCREW 30MM (1EA): Type: IMPLANTABLE DEVICE | Site: HIP | Status: FUNCTIONAL

## 2018-11-29 RX ORDER — MAGNESIUM HYDROXIDE 1200 MG/15ML
LIQUID ORAL
Status: COMPLETED | OUTPATIENT
Start: 2018-11-29 | End: 2018-11-29

## 2018-11-29 RX ORDER — OXYCODONE HYDROCHLORIDE 10 MG/1
10 TABLET ORAL EVERY 4 HOURS PRN
Status: DISCONTINUED | OUTPATIENT
Start: 2018-11-29 | End: 2018-11-30 | Stop reason: HOSPADM

## 2018-11-29 RX ORDER — DOCUSATE SODIUM 100 MG/1
100 CAPSULE, LIQUID FILLED ORAL 2 TIMES DAILY
Status: DISCONTINUED | OUTPATIENT
Start: 2018-11-29 | End: 2018-11-30 | Stop reason: HOSPADM

## 2018-11-29 RX ORDER — OXYCODONE HYDROCHLORIDE 5 MG/1
5 TABLET ORAL EVERY 4 HOURS PRN
Qty: 40 TAB | Refills: 0 | Status: SHIPPED | OUTPATIENT
Start: 2018-11-29 | End: 2018-11-29

## 2018-11-29 RX ORDER — BISACODYL 10 MG
10 SUPPOSITORY, RECTAL RECTAL
Status: DISCONTINUED | OUTPATIENT
Start: 2018-11-29 | End: 2018-11-30 | Stop reason: HOSPADM

## 2018-11-29 RX ORDER — KETOROLAC TROMETHAMINE 30 MG/ML
30 INJECTION, SOLUTION INTRAMUSCULAR; INTRAVENOUS EVERY 6 HOURS
Status: DISCONTINUED | OUTPATIENT
Start: 2018-11-29 | End: 2018-11-30 | Stop reason: HOSPADM

## 2018-11-29 RX ORDER — OXYCODONE HYDROCHLORIDE 5 MG/1
5 TABLET ORAL EVERY 4 HOURS PRN
Status: DISCONTINUED | OUTPATIENT
Start: 2018-11-29 | End: 2018-11-30 | Stop reason: HOSPADM

## 2018-11-29 RX ORDER — CELECOXIB 200 MG/1
200 CAPSULE ORAL ONCE
Status: COMPLETED | OUTPATIENT
Start: 2018-11-29 | End: 2018-11-29

## 2018-11-29 RX ORDER — HYDROMORPHONE HYDROCHLORIDE 1 MG/ML
0.5 INJECTION, SOLUTION INTRAMUSCULAR; INTRAVENOUS; SUBCUTANEOUS
Status: DISCONTINUED | OUTPATIENT
Start: 2018-11-29 | End: 2018-11-30 | Stop reason: HOSPADM

## 2018-11-29 RX ORDER — ACETAMINOPHEN 500 MG
1000 TABLET ORAL ONCE
Status: COMPLETED | OUTPATIENT
Start: 2018-11-29 | End: 2018-11-29

## 2018-11-29 RX ORDER — ENEMA 19; 7 G/133ML; G/133ML
1 ENEMA RECTAL
Status: DISCONTINUED | OUTPATIENT
Start: 2018-11-29 | End: 2018-11-30 | Stop reason: HOSPADM

## 2018-11-29 RX ORDER — POLYETHYLENE GLYCOL 3350 17 G/17G
1 POWDER, FOR SOLUTION ORAL 2 TIMES DAILY PRN
Status: DISCONTINUED | OUTPATIENT
Start: 2018-11-29 | End: 2018-11-30 | Stop reason: HOSPADM

## 2018-11-29 RX ORDER — SODIUM CHLORIDE, SODIUM LACTATE, POTASSIUM CHLORIDE, CALCIUM CHLORIDE 600; 310; 30; 20 MG/100ML; MG/100ML; MG/100ML; MG/100ML
INJECTION, SOLUTION INTRAVENOUS ONCE
Status: COMPLETED | OUTPATIENT
Start: 2018-11-29 | End: 2018-11-29

## 2018-11-29 RX ORDER — HALOPERIDOL 5 MG/ML
1 INJECTION INTRAMUSCULAR EVERY 6 HOURS PRN
Status: DISCONTINUED | OUTPATIENT
Start: 2018-11-29 | End: 2018-11-30 | Stop reason: HOSPADM

## 2018-11-29 RX ORDER — MEPERIDINE HYDROCHLORIDE 25 MG/ML
INJECTION INTRAMUSCULAR; INTRAVENOUS; SUBCUTANEOUS
Status: COMPLETED
Start: 2018-11-29 | End: 2018-11-29

## 2018-11-29 RX ORDER — AMOXICILLIN 250 MG
1 CAPSULE ORAL
Status: DISCONTINUED | OUTPATIENT
Start: 2018-11-29 | End: 2018-11-30 | Stop reason: HOSPADM

## 2018-11-29 RX ORDER — MEPERIDINE HYDROCHLORIDE 25 MG/ML
6.25 INJECTION INTRAMUSCULAR; INTRAVENOUS; SUBCUTANEOUS
Status: COMPLETED | OUTPATIENT
Start: 2018-11-29 | End: 2018-11-29

## 2018-11-29 RX ORDER — HYDROMORPHONE HYDROCHLORIDE 1 MG/ML
0.1 INJECTION, SOLUTION INTRAMUSCULAR; INTRAVENOUS; SUBCUTANEOUS
Status: DISCONTINUED | OUTPATIENT
Start: 2018-11-29 | End: 2018-11-29 | Stop reason: HOSPADM

## 2018-11-29 RX ORDER — TAMSULOSIN HYDROCHLORIDE 0.4 MG/1
0.4 CAPSULE ORAL
Status: DISCONTINUED | OUTPATIENT
Start: 2018-11-29 | End: 2018-11-30 | Stop reason: HOSPADM

## 2018-11-29 RX ORDER — ONDANSETRON 2 MG/ML
4 INJECTION INTRAMUSCULAR; INTRAVENOUS
Status: DISCONTINUED | OUTPATIENT
Start: 2018-11-29 | End: 2018-11-29 | Stop reason: HOSPADM

## 2018-11-29 RX ORDER — SODIUM CHLORIDE, SODIUM LACTATE, POTASSIUM CHLORIDE, CALCIUM CHLORIDE 600; 310; 30; 20 MG/100ML; MG/100ML; MG/100ML; MG/100ML
INJECTION, SOLUTION INTRAVENOUS CONTINUOUS
Status: DISCONTINUED | OUTPATIENT
Start: 2018-11-29 | End: 2018-11-29 | Stop reason: HOSPADM

## 2018-11-29 RX ORDER — DEXAMETHASONE SODIUM PHOSPHATE 4 MG/ML
10 INJECTION, SOLUTION INTRA-ARTICULAR; INTRALESIONAL; INTRAMUSCULAR; INTRAVENOUS; SOFT TISSUE ONCE
Status: DISCONTINUED | OUTPATIENT
Start: 2018-11-30 | End: 2018-11-30 | Stop reason: HOSPADM

## 2018-11-29 RX ORDER — CHLORPROMAZINE HYDROCHLORIDE 10 MG/1
25 TABLET, FILM COATED ORAL EVERY 6 HOURS PRN
Status: DISCONTINUED | OUTPATIENT
Start: 2018-11-29 | End: 2018-11-30 | Stop reason: HOSPADM

## 2018-11-29 RX ORDER — HALOPERIDOL 5 MG/ML
1 INJECTION INTRAMUSCULAR
Status: DISCONTINUED | OUTPATIENT
Start: 2018-11-29 | End: 2018-11-29 | Stop reason: HOSPADM

## 2018-11-29 RX ORDER — OXYCODONE HYDROCHLORIDE 5 MG/1
10 TABLET ORAL
Status: DISCONTINUED | OUTPATIENT
Start: 2018-11-29 | End: 2018-11-29 | Stop reason: HOSPADM

## 2018-11-29 RX ORDER — ONDANSETRON 2 MG/ML
4 INJECTION INTRAMUSCULAR; INTRAVENOUS EVERY 4 HOURS PRN
Status: DISCONTINUED | OUTPATIENT
Start: 2018-11-29 | End: 2018-11-30 | Stop reason: HOSPADM

## 2018-11-29 RX ORDER — TRAZODONE HYDROCHLORIDE 150 MG/1
150 TABLET ORAL NIGHTLY
Status: DISCONTINUED | OUTPATIENT
Start: 2018-11-29 | End: 2018-11-30 | Stop reason: HOSPADM

## 2018-11-29 RX ORDER — LIDOCAINE HYDROCHLORIDE 10 MG/ML
INJECTION, SOLUTION INFILTRATION; PERINEURAL
Status: COMPLETED
Start: 2018-11-29 | End: 2018-11-29

## 2018-11-29 RX ORDER — DEXTROSE, SODIUM CHLORIDE, SODIUM LACTATE, POTASSIUM CHLORIDE, AND CALCIUM CHLORIDE 5; .6; .31; .03; .02 G/100ML; G/100ML; G/100ML; G/100ML; G/100ML
INJECTION, SOLUTION INTRAVENOUS CONTINUOUS
Status: DISPENSED | OUTPATIENT
Start: 2018-11-29 | End: 2018-11-29

## 2018-11-29 RX ORDER — KETOROLAC TROMETHAMINE 30 MG/ML
INJECTION, SOLUTION INTRAMUSCULAR; INTRAVENOUS
Status: DISCONTINUED | OUTPATIENT
Start: 2018-11-29 | End: 2018-11-29 | Stop reason: HOSPADM

## 2018-11-29 RX ORDER — GABAPENTIN 300 MG/1
300 CAPSULE ORAL ONCE
Status: COMPLETED | OUTPATIENT
Start: 2018-11-29 | End: 2018-11-29

## 2018-11-29 RX ORDER — TRAMADOL HYDROCHLORIDE 50 MG/1
50 TABLET ORAL EVERY 4 HOURS PRN
Status: DISCONTINUED | OUTPATIENT
Start: 2018-11-29 | End: 2018-11-30 | Stop reason: HOSPADM

## 2018-11-29 RX ORDER — HYDROMORPHONE HYDROCHLORIDE 1 MG/ML
0.4 INJECTION, SOLUTION INTRAMUSCULAR; INTRAVENOUS; SUBCUTANEOUS
Status: DISCONTINUED | OUTPATIENT
Start: 2018-11-29 | End: 2018-11-29 | Stop reason: HOSPADM

## 2018-11-29 RX ORDER — TRAZODONE HYDROCHLORIDE 100 MG/1
150 TABLET ORAL NIGHTLY
Status: ON HOLD | COMMUNITY
End: 2018-11-29

## 2018-11-29 RX ORDER — ACETAMINOPHEN 650 MG
TABLET, EXTENDED RELEASE ORAL
Status: DISCONTINUED | OUTPATIENT
Start: 2018-11-29 | End: 2018-11-29 | Stop reason: HOSPADM

## 2018-11-29 RX ORDER — SCOLOPAMINE TRANSDERMAL SYSTEM 1 MG/1
1 PATCH, EXTENDED RELEASE TRANSDERMAL
Status: DISCONTINUED | OUTPATIENT
Start: 2018-11-29 | End: 2018-11-30 | Stop reason: HOSPADM

## 2018-11-29 RX ORDER — OXYCODONE HYDROCHLORIDE 5 MG/1
5 TABLET ORAL
Status: DISCONTINUED | OUTPATIENT
Start: 2018-11-29 | End: 2018-11-29 | Stop reason: HOSPADM

## 2018-11-29 RX ORDER — LORAZEPAM 2 MG/ML
0.5 INJECTION INTRAMUSCULAR ONCE
Status: COMPLETED | OUTPATIENT
Start: 2018-11-29 | End: 2018-11-29

## 2018-11-29 RX ORDER — CELECOXIB 200 MG/1
200 CAPSULE ORAL 2 TIMES DAILY WITH MEALS
Status: DISCONTINUED | OUTPATIENT
Start: 2018-11-30 | End: 2018-11-30 | Stop reason: HOSPADM

## 2018-11-29 RX ORDER — IPRATROPIUM BROMIDE AND ALBUTEROL SULFATE 2.5; .5 MG/3ML; MG/3ML
3 SOLUTION RESPIRATORY (INHALATION)
Status: DISCONTINUED | OUTPATIENT
Start: 2018-11-29 | End: 2018-11-29 | Stop reason: HOSPADM

## 2018-11-29 RX ORDER — OXYCODONE HCL 5 MG/5 ML
5 SOLUTION, ORAL ORAL
Status: DISCONTINUED | OUTPATIENT
Start: 2018-11-29 | End: 2018-11-29 | Stop reason: HOSPADM

## 2018-11-29 RX ORDER — BUPIVACAINE HYDROCHLORIDE AND EPINEPHRINE 2.5; 5 MG/ML; UG/ML
INJECTION, SOLUTION INFILTRATION; PERINEURAL
Status: DISCONTINUED | OUTPATIENT
Start: 2018-11-29 | End: 2018-11-29 | Stop reason: HOSPADM

## 2018-11-29 RX ORDER — SCOLOPAMINE TRANSDERMAL SYSTEM 1 MG/1
PATCH, EXTENDED RELEASE TRANSDERMAL
Status: COMPLETED
Start: 2018-11-29 | End: 2018-11-29

## 2018-11-29 RX ORDER — CEFAZOLIN SODIUM 2 G/100ML
2 INJECTION, SOLUTION INTRAVENOUS EVERY 8 HOURS
Status: DISCONTINUED | OUTPATIENT
Start: 2018-11-29 | End: 2018-11-30 | Stop reason: HOSPADM

## 2018-11-29 RX ORDER — OXYCODONE HCL 5 MG/5 ML
10 SOLUTION, ORAL ORAL
Status: DISCONTINUED | OUTPATIENT
Start: 2018-11-29 | End: 2018-11-29 | Stop reason: HOSPADM

## 2018-11-29 RX ORDER — ONDANSETRON 4 MG/1
8 TABLET, ORALLY DISINTEGRATING ORAL EVERY 8 HOURS PRN
Status: DISCONTINUED | OUTPATIENT
Start: 2018-11-29 | End: 2018-11-30 | Stop reason: HOSPADM

## 2018-11-29 RX ORDER — HYDROMORPHONE HYDROCHLORIDE 1 MG/ML
0.2 INJECTION, SOLUTION INTRAMUSCULAR; INTRAVENOUS; SUBCUTANEOUS
Status: DISCONTINUED | OUTPATIENT
Start: 2018-11-29 | End: 2018-11-29 | Stop reason: HOSPADM

## 2018-11-29 RX ORDER — ACETAMINOPHEN 500 MG
1000 TABLET ORAL EVERY 6 HOURS
Status: DISCONTINUED | OUTPATIENT
Start: 2018-11-29 | End: 2018-11-30 | Stop reason: HOSPADM

## 2018-11-29 RX ORDER — DEXAMETHASONE SODIUM PHOSPHATE 4 MG/ML
4 INJECTION, SOLUTION INTRA-ARTICULAR; INTRALESIONAL; INTRAMUSCULAR; INTRAVENOUS; SOFT TISSUE
Status: DISCONTINUED | OUTPATIENT
Start: 2018-11-29 | End: 2018-11-30 | Stop reason: HOSPADM

## 2018-11-29 RX ORDER — CHLORPROMAZINE HYDROCHLORIDE 25 MG/ML
25 INJECTION INTRAMUSCULAR EVERY 6 HOURS PRN
Status: DISCONTINUED | OUTPATIENT
Start: 2018-11-29 | End: 2018-11-30 | Stop reason: HOSPADM

## 2018-11-29 RX ORDER — DIPHENHYDRAMINE HYDROCHLORIDE 50 MG/ML
25 INJECTION INTRAMUSCULAR; INTRAVENOUS EVERY 6 HOURS PRN
Status: DISCONTINUED | OUTPATIENT
Start: 2018-11-29 | End: 2018-11-30 | Stop reason: HOSPADM

## 2018-11-29 RX ORDER — SIMVASTATIN 20 MG
20 TABLET ORAL EVERY EVENING
Status: DISCONTINUED | OUTPATIENT
Start: 2018-11-29 | End: 2018-11-30 | Stop reason: HOSPADM

## 2018-11-29 RX ORDER — DIPHENHYDRAMINE HCL 25 MG
25 TABLET ORAL EVERY 6 HOURS PRN
Status: DISCONTINUED | OUTPATIENT
Start: 2018-11-29 | End: 2018-11-30 | Stop reason: HOSPADM

## 2018-11-29 RX ORDER — HYDROCODONE BITARTRATE AND ACETAMINOPHEN 7.5; 325 MG/1; MG/1
1-2 TABLET ORAL EVERY 4 HOURS PRN
Qty: 40 TAB | Refills: 0 | Status: SHIPPED | OUTPATIENT
Start: 2018-11-29 | End: 2018-12-09

## 2018-11-29 RX ORDER — AMOXICILLIN 250 MG
1 CAPSULE ORAL NIGHTLY
Status: DISCONTINUED | OUTPATIENT
Start: 2018-11-29 | End: 2018-11-30 | Stop reason: HOSPADM

## 2018-11-29 RX ADMIN — SODIUM CHLORIDE, SODIUM LACTATE, POTASSIUM CHLORIDE, CALCIUM CHLORIDE: 600; 310; 30; 20 INJECTION, SOLUTION INTRAVENOUS at 06:14

## 2018-11-29 RX ADMIN — KETOROLAC TROMETHAMINE 30 MG: 30 INJECTION, SOLUTION INTRAMUSCULAR at 15:26

## 2018-11-29 RX ADMIN — TRANEXAMIC ACID 1000 MG: 100 INJECTION, SOLUTION INTRAVENOUS at 09:54

## 2018-11-29 RX ADMIN — FENTANYL CITRATE 50 MCG: 50 INJECTION, SOLUTION INTRAMUSCULAR; INTRAVENOUS at 09:35

## 2018-11-29 RX ADMIN — CELECOXIB 200 MG: 200 CAPSULE ORAL at 06:14

## 2018-11-29 RX ADMIN — ACETAMINOPHEN 1000 MG: 500 TABLET, FILM COATED ORAL at 06:14

## 2018-11-29 RX ADMIN — OXYCODONE HYDROCHLORIDE 5 MG: 5 TABLET ORAL at 15:27

## 2018-11-29 RX ADMIN — MEPERIDINE HYDROCHLORIDE 6.5 MG: 25 INJECTION INTRAMUSCULAR; INTRAVENOUS; SUBCUTANEOUS at 09:43

## 2018-11-29 RX ADMIN — MEPERIDINE HYDROCHLORIDE 6.5 MG: 25 INJECTION INTRAMUSCULAR; INTRAVENOUS; SUBCUTANEOUS at 09:17

## 2018-11-29 RX ADMIN — MEPERIDINE HYDROCHLORIDE 6.5 MG: 25 INJECTION INTRAMUSCULAR; INTRAVENOUS; SUBCUTANEOUS at 09:37

## 2018-11-29 RX ADMIN — GABAPENTIN 300 MG: 300 CAPSULE ORAL at 06:14

## 2018-11-29 RX ADMIN — LORAZEPAM 0.5 MG: 2 INJECTION INTRAMUSCULAR; INTRAVENOUS at 10:05

## 2018-11-29 RX ADMIN — Medication 0.5 ML: at 06:15

## 2018-11-29 RX ADMIN — MEPERIDINE HYDROCHLORIDE 6.5 MG: 25 INJECTION INTRAMUSCULAR; INTRAVENOUS; SUBCUTANEOUS at 09:30

## 2018-11-29 RX ADMIN — CEFAZOLIN SODIUM 2 G: 2 INJECTION, SOLUTION INTRAVENOUS at 15:31

## 2018-11-29 RX ADMIN — FENTANYL CITRATE 50 MCG: 50 INJECTION, SOLUTION INTRAMUSCULAR; INTRAVENOUS at 09:46

## 2018-11-29 RX ADMIN — LIDOCAINE HYDROCHLORIDE 0.5 ML: 10 INJECTION, SOLUTION INFILTRATION; PERINEURAL at 06:15

## 2018-11-29 ASSESSMENT — COGNITIVE AND FUNCTIONAL STATUS - GENERAL
CLIMB 3 TO 5 STEPS WITH RAILING: A LITTLE
DAILY ACTIVITIY SCORE: 24
WALKING IN HOSPITAL ROOM: A LITTLE
MOVING FROM LYING ON BACK TO SITTING ON SIDE OF FLAT BED: A LITTLE
TURNING FROM BACK TO SIDE WHILE IN FLAT BAD: A LITTLE
SUGGESTED CMS G CODE MODIFIER MOBILITY: CJ
MOBILITY SCORE: 20
CLIMB 3 TO 5 STEPS WITH RAILING: A LITTLE
MOBILITY SCORE: 21
MOVING TO AND FROM BED TO CHAIR: A LITTLE
SUGGESTED CMS G CODE MODIFIER DAILY ACTIVITY: CH
MOVING FROM LYING ON BACK TO SITTING ON SIDE OF FLAT BED: A LITTLE
SUGGESTED CMS G CODE MODIFIER MOBILITY: CJ

## 2018-11-29 ASSESSMENT — PAIN SCALES - GENERAL
PAINLEVEL_OUTOF10: 4
PAINLEVEL_OUTOF10: 1
PAINLEVEL_OUTOF10: 3
PAINLEVEL_OUTOF10: 5
PAINLEVEL_OUTOF10: 0
PAINLEVEL_OUTOF10: 4
PAINLEVEL_OUTOF10: 3
PAINLEVEL_OUTOF10: 4
PAINLEVEL_OUTOF10: 0

## 2018-11-29 ASSESSMENT — LIFESTYLE VARIABLES
EVER_SMOKED: YES
ALCOHOL_USE: NO

## 2018-11-29 ASSESSMENT — COPD QUESTIONNAIRES
DO YOU EVER COUGH UP ANY MUCUS OR PHLEGM?: NO/ONLY WITH OCCASIONAL COLDS OR INFECTIONS
DURING THE PAST 4 WEEKS HOW MUCH DID YOU FEEL SHORT OF BREATH: NONE/LITTLE OF THE TIME
COPD SCREENING SCORE: 4
HAVE YOU SMOKED AT LEAST 100 CIGARETTES IN YOUR ENTIRE LIFE: YES

## 2018-11-29 ASSESSMENT — GAIT ASSESSMENTS
DISTANCE (FEET): 160
GAIT LEVEL OF ASSIST: STAND BY ASSIST
ASSISTIVE DEVICE: FRONT WHEEL WALKER
DEVIATION: DECREASED BASE OF SUPPORT;DECREASED HEEL STRIKE;DECREASED TOE OFF;OTHER (COMMENT)

## 2018-11-29 ASSESSMENT — PATIENT HEALTH QUESTIONNAIRE - PHQ9
SUM OF ALL RESPONSES TO PHQ9 QUESTIONS 1 AND 2: 0
1. LITTLE INTEREST OR PLEASURE IN DOING THINGS: NOT AT ALL
2. FEELING DOWN, DEPRESSED, IRRITABLE, OR HOPELESS: NOT AT ALL

## 2018-11-29 NOTE — OP REPORT
DIAGNOSIS: Osteoarthritis, left hip.    PROCEDURE: left Total hip arthroplasty.    ANESTHESIA: General.    COMPLICATIONS: None.    SURGEON: Julian Mendes MD.    ASSISTANT: Prasanna Mcfarlane    INDICATIONS: This is a patient with severe osteoarthritis causing pain,   having failed conservative treatments.    DESCRIPTION OF PROCEDURE: Patient was identified in the preop area, site was   marked, taken back to the operating room and underwent general anesthesia.   left lower extremity was prepped and draped in sterile manner. Preoperative   timeout was held and antibiotics were given. Incision was made coming off the  ASIS. Soft tissue dissected down to fascia. Fascia was split in line with   the tensor. Tensor was retracted laterally. Deep fascia was incised and   vessels were ligated. A capsulotomy was performed and then an osteotomy of   the femoral neck. The acetabulum was then reamed up to a 54 and a 54 R3 cluster   hole cup by Smith and Nephew was placed. A ceramic liner was placed for a 36 head.   Osteophytes were debrided and then the femur was externally rotated and   extended. This was then broached up to a size 4.  2 cables were placed due to a crack in the bone, which was very thin.  4 standard offset polar stem  by Smith and Nephew was placed. Final trialing showed equal leg lengths with  a +4 head, the +4 36 Ceramic head by Smith and Nephew was placed. Wound was   soaked with dilute Betadine solution and was injected with Marcaine. Vicryl   was used for the fascia, Monocryl soft tissue skin and Dermabond for the final  skin layer. Patient was woken up, taken back to PACU, will be weightbear as   tolerated.

## 2018-11-29 NOTE — OR NURSING
"Patient arrived in PACU shivering; medicated Demerol as ordered.  No results except patient states \"it is a little better\".  Dr Jones notified and order received for muscle relaxant (Lorazapam).  "

## 2018-11-30 NOTE — THERAPY
"Physical Therapy Evaluation completed.   Bed Mobility:  Supine to Sit:  (found up in acosta with nsg)  Transfers: Sit to Stand: Stand by Assist  Gait: Level Of Assist: Stand by Assist with Front-Wheel Walker       Plan of Care: Patient with no further skilled PT needs in the acute care setting at this time  Discharge Recommendations: Equipment: No Equipment Needed unless pt is unable to borrow FWW;     After initial evaluation and pt education pt has no further acute PT needs. She is able to demonstrate hallway ambulation with FWW as well as stairs with SBA with use of rail. SHe reports no concerns with functional abilty to dc to home once medically cleared and reports family will be able to assit wtih IADLs as needed. Would recommend continued PT after dc to home post acute/subacute healing for optimal functional progression if functional deficits or ROM limitations are not resolved at that time.     See \"Rehab Therapy-Acute\" Patient Summary Report for complete documentation.     "

## 2018-11-30 NOTE — DISCHARGE SUMMARY
Patient was admitted for a Total Hip Arthroplasty.  Had no complications during the surgery. Did well post-operatively.               There are no active hospital problems to display for this patient.      Uneventful hospital course.     Medication List      START taking these medications      Instructions   HYDROcodone-acetaminophen 7.5-325 MG per tablet  Commonly known as:  NORCO   Take 1-2 Tabs by mouth every four hours as needed for up to 10 days.  Dose:  1-2 Tab        STOP taking these medications    CALCIUM + D PO     CO Q 10 PO     ibuprofen 200 MG Tabs  Commonly known as:  MOTRIN     magnesium gluconate 500 MG tablet  Commonly known as:  MAG-G     MULTI-VITAMIN PO     mupirocin 2 % Oint  Commonly known as:  BACTROBAN     simvastatin 20 MG Tabs  Commonly known as:  ZOCOR     tramadol 50 MG Tabs  Commonly known as:  ULTRAM     traZODone 100 MG Tabs  Commonly known as:  DESYREL            Patient will be discharged home and follow up with Dr. Mendes clinic in 2 weeks, for which the patient already has scheduled.

## 2018-11-30 NOTE — CARE PLAN
Problem: Safety  Goal: Will remain free from injury    Intervention: Provide assistance with mobility   Staff present for pt ambulation. Pt has been up ambulating in acosta for with PT      Problem: Infection  Goal: Will remain free from infection    Intervention: Assess signs and symptoms of infection  Pt temp is in normal range. Incision site is pink, dressing dry and intact. No complaints of pain

## 2018-11-30 NOTE — PROGRESS NOTES
"Patient seen and examined  No complaint    Blood pressure 120/80, pulse 92, temperature 37.3 °C (99.1 °F), temperature source Temporal, resp. rate 17, height 1.727 m (5' 8\"), weight 51.7 kg (113 lb 15.7 oz), SpO2 95 %, not currently breastfeeding.          No acute distress  Dressing clean dry and intact  Neurovascularly intact      Plan:  Doing well  Discharge home.            "

## 2018-11-30 NOTE — DISCHARGE INSTRUCTIONS
Discharge Instructions    Discharged to home by car with relative. Discharged via wheelchair, hospital escort: Yes.  Special equipment needed: Not Applicable    Be sure to schedule a follow-up appointment with your primary care doctor or any specialists as instructed.     Discharge Plan:   Influenza Vaccine Indication: Not indicated: Previously immunized this influenza season and > 8 years of age    I understand that a diet low in cholesterol, fat, and sodium is recommended for good health. Unless I have been given specific instructions below for another diet, I accept this instruction as my diet prescription.   Other diet: Regular diet as tolerated    Special Instructions: Discharge instructions for the Orthopedic Patient    *Follow up with Dr. Mendes at scheduled appointment  *Weight bearing as tolerated                     *Activity as tolerated  *Use assistive device for all activity  *Continue exercises provided by physical therapy  *Elevate leg as needed  *Ice as needed (20 minutes every 1-2 hours)  *Keep dressing in place until 12/5/2018 postoperative day #5   *Starting 12/5/2018 remove dressing and shower. Do not soak or scrub incision, after shower pat dry and leave open to air.  *No soaking of the incision; no baths, hot tubs, or swimming until cleared by doctor  * Aspirin 81mg twice a day for blood clot prevention        *Take medications as prescribed by doctor  *Call doctor’s office with any questions or concerns     Follow up with Primary Care Physician within 2 weeks of discharge to home, regarding:  Review of medications and diagnostic testing.  Surveillance for medical complications.  Workup and treatment of osteoporosis, if appropriate.     -Is this a Joint Replacement patient? Yes   Total Joint Hip Replacement Discharge Instructions    Pain  - The goal is to slowly wean off the prescription pain medicine.  - Ice can be used for pain control.  20 minutes at a time is recommended, and never directly  against your skin or incision.  - Most patients are off the pain pills by 3 weeks; others may require a low level of pain medications for many months. If your pain continues to be severe, follow up with your physician.  Infection  Deep hip joint infections that require removal of the prostheses occur in less than 0.1% of patients. Lesser infections in the skin (cellulites) are more common and much more easily treated.  - Keep the incision as clean and dry as possible.  - Always wash your hands before touching your incision.  - Skin infections tend to develop around 7-10 days after surgery, most can be treated with oral antibiotics.  - Dental Care should be delayed for 3 months after surgery, your surgeon recommends taking a dose of antibiotics 1 hour prior to any dental procedure.  After 2 years, most surgeons recommend antibiotics only before an extensive procedure.  Ask your surgeon what he recommends.  - Signs and symptoms of infection can include:  low grade fever, redness, pain, swelling and drainage from your incision.  Notify your surgeon immediately if you develop any of these symptoms.  Post op Disturbances  - Bowel habits - constipation is extremely common and is caused by a combination of anesthesia, lack of mobility and pain medicine.  Use stool softeners or laxatives if necessary. It is important not to ignore this problem, as bowel obstructions can be a serious complication after joint replacement surgery.  - Mood/Energy Level - Many patients experience a lack of energy and endurance for up to 2-3 months after surgery.  Some may also feel down and can even become depressed.  This is likely due to the postoperative anemia, change in activity level, lack of sleep, pain medicine and just the emotional reaction to the surgery itself that is a big disruption in a person’s life.  This usually passes.  If symptoms persist, follow up with your primary physician.  - Returning to work - Your surgeon will give  you more specific instructions.  Generally, if you work a sedentary job requiring little standing or walking, most patients may return within 2-6 weeks.  Manual labor jobs involving walking, lifting and standing may take 3-4 months.  Your surgeon’s office can provide a release to part-time or light duty work early on in your recovery and progress you to full duty as able.  - Driving - You can begin driving an automatic shift car in 4 to 8 weeks, provided you are no longer taking narcotic pain medication. If you have a stick-shift car and your right hip was replaced, do not begin driving until your doctor says you can.   - Avoiding falls -  throw rugs and tack down loose carpeting.  Be aware of floor hazards such as pets, small objects or uneven surfaces.   -  Airport Metal Detectors - The sensitivity of metal detectors varies and it is likely that your prosthesis will cause an alarm. Inform the  that you have an artificial joint.  Diet  - Resume your normal diet as tolerated.  - It is important to achieve a healthy nutritional status by eating a well balanced diet on a regular basis.  - Your physician may recommend that you take iron and vitamin supplements.   - Continue to drink plenty of fluids.  Shower/Bathing  - You may shower as soon as you get home from the hospital unless otherwise instructed.  - Keep your incision out of water.  To keep the incision dry when showering, cover it with a plastic bag or plastic wrap.  - Pat incision dry if it gets wet.  Don’t rub.  - Do not submerge in a bath until staples are out and the incision is completely healed. (Approximately 6-8 weeks after surgery).  Dressing Change:  Procedure (if recommended by your physician)  - Wash hands.  - Open all dressing change materials.  - Remove old dressing and discard.  - Inspect incision for redness, increase in clear drainage, yellow/green drainage, odor and surrounding skin hot to touch.  -  ABD (large  gauze) pad by one corner and lay over the incision.  Be careful not to touch the inside of the dressing that will lay over the incision.  - Secure in place as instructed (Ace wrap or tape).    Swelling/Bruising  - Swelling is normal after hip replacement and can involve the thigh, knee, calf and foot.  - Swelling can last from 3-6 months.  - Elevate your leg higher than your heart while reclining.  The first week you are home you should elevate your leg an equal amount of time, as you are active.    - Anti-inflammatory pills can be taken once you have stopped the blood thinners.  - The swelling is usually worse after you go home since you are upright for longer periods of time.  - Bruising is common and can involve the entire leg including the thigh, calf and even foot.  Bruising often does not appear until after you arrive home and it can be quite dramatic- purple, black, green.  The bruising you can see is not usually concerning and will subside without any treatment.      Blood Clot Prevention  Blood clots in the legs and the less common, but frightening, clots that travel to the lungs are a real focus of our preventative. Most patients are at standard risk for them, but those patients who are at higher risk include people who have had previous clots, a family history of clotting, smoking, diabetes, obesity, advanced age, use of estrogen and a sedentary lifestyle.    - Signs of blood clots in legs - Swelling in thigh, calf or ankle that does not go down with elevation.  Pain, heat and tenderness in calf, back of calf or groin area.  NOTE: blood clots can occur in either leg.  - You have been receiving anticoagulant therapy (blood thinners) in the hospital and you may be instructed to continue at home depending on your risk factors.  - Your risk for developing a clot continues for up to 2-3 months after surgery.  You should avoid prolonged sitting and dehydration during that time (long air trips and car trips).   If you do take a trip during this time, please get up and move around every 1- 1.5 hours.  - If you are prescribed blood thinning medication for home, follow instructions as directed. (Handouts provided if applicable).      Activity    Once you get home, you should stay active. The key is not to overdo it! While you can expect some good days and some bad days, you should notice a gradual improvement over time you should notice a gradual improvement and a gradual increase in your endurance over the next 6 to 12 months.    - Weight Bearing - If you have undergone cemented or hybrid hip replacement, you can put some weight on the leg immediately using a cane or walker, and you should continue to use some support for 4 to 6 weeks to help the muscles recover.   - Sleeping Positions - Sleep on your back with your legs slightly apart or on your side with a regular pillow between your knees. Be sure to use the pillow for at least 6 weeks, or until your doctor says you can do without it. Sleeping on your stomach should be all right  - Sitting - For at least the first 3 months, sit only in chairs that have arms. Do not sit on low chairs, low stools, or reclining chairs. Do not cross your legs at the knees. The physical therapist will show you how to sit and stand from a chair, keeping your affected leg out in front of you. Get up and move around on a regular basis--at least once every hour.  - Walking - Walk as much as you like once your doctor gives you the go-ahead, but remember that walking is no substitute for your prescribed exercises. Walking with a pair of trekking poles is helpful and adds as much as 40% to the exercise you get when you walk  - Therapy may be needed in some cases, to strengthen your muscles and improve your gait (walking pattern).  This decision will be made at your post-operative appointment.  Follow your therapist recommended post-operative exercises (handout provided by Therapist).  - Swimming is  also recommended; you can begin as soon as the sutures have been removed and the wound is healed, approximately 6 to 8 weeks after surgery. Using a pair of training fins may make swimming a more enjoyable and effective exercise.  - Other activities - Lower impact activities are preferred.  If you have specific questions, consult your Surgeon.    - Sexual activity - Your surgeon can tell you when it should be safe to resume sexual activity.      When to Call the Doctor   Call the physician if:   - Fever over 100.5? F  - Increased pain, drainage, redness, odor or heat around the incision area  - Shaking chills  - Increased knee pain with activity and rest  - Increased pain in calf, tenderness or redness above or below the knee  - Increased swelling of calf, ankle, foot  - Sudden increased shortness of breath, sudden onset of chest pain, localized chest pain with coughing  - Incision opening  Or, if there are any questions or concerns about medications or care.       -Is this patient being discharged with medication to prevent blood clots?  Yes, Aspirin Aspirin, ASA oral tablets  What is this medicine?  ASPIRIN (AS pir in) is a pain reliever. It is used to treat mild pain and fever. This medicine is also used as directed by a doctor to prevent and to treat heart attacks, to prevent strokes, and to treat arthritis or inflammation.  This medicine may be used for other purposes; ask your health care provider or pharmacist if you have questions.  COMMON BRAND NAME(S): Aspir-Low, Aspir-Sana, Aspirtab, Kaycee Advanced Aspirin, Kaycee Aspirin, Kaycee Aspirin Extra Strength, Kaycee Aspirin Plus, Kaycee Extra Strength, Kaycee Extra Strength Plus, Kaycee Genuine Aspirin, Kaycee Womens Aspirin, Bufferin, Bufferin Extra Strength, Bufferin Low Dose  What should I tell my health care provider before I take this medicine?  They need to know if you have any of these conditions:  -anemia  -asthma  -bleeding problems  -child with chickenpox,  the flu, or other viral infection  -diabetes  -gout  -if you frequently drink alcohol containing drinks  -kidney disease  -liver disease  -low level of vitamin K  -lupus  -smoke tobacco  -stomach ulcers or other problems  -an unusual or allergic reaction to aspirin, tartrazine dye, other medicines, dyes, or preservatives  -pregnant or trying to get pregnant  -breast-feeding  How should I use this medicine?  Take this medicine by mouth with a glass of water. Follow the directions on the package or prescription label. You can take this medicine with or without food. If it upsets your stomach, take it with food. Do not take your medicine more often than directed.  Talk to your pediatrician regarding the use of this medicine in children. While this drug may be prescribed for children as young as 12 years of age for selected conditions, precautions do apply. Children and teenagers should not use this medicine to treat chicken pox or flu symptoms unless directed by a doctor.  Patients over 65 years old may have a stronger reaction and need a smaller dose.  Overdosage: If you think you have taken too much of this medicine contact a poison control center or emergency room at once.  NOTE: This medicine is only for you. Do not share this medicine with others.  What if I miss a dose?  If you are taking this medicine on a regular schedule and miss a dose, take it as soon as you can. If it is almost time for your next dose, take only that dose. Do not take double or extra doses.  What may interact with this medicine?  Do not take this medicine with any of the following medications:  -cidofovir  -ketorolac  -probenecid  This medicine may also interact with the following medications:  -alcohol  -alendronate  -bismuth subsalicylate  -flavocoxid  -herbal supplements like feverfew, garlic, petra, ginkgo biloba, horse chestnut  -medicines for diabetes or glaucoma like acetazolamide, methazolamide  -medicines for gout  -medicines that  treat or prevent blood clots like enoxaparin, heparin, ticlopidine, warfarin  -other aspirin and aspirin-like medicines  -NSAIDs, medicines for pain and inflammation, like ibuprofen or naproxen  -pemetrexed  -sulfinpyrazone  -varicella live vaccine  This list may not describe all possible interactions. Give your health care provider a list of all the medicines, herbs, non-prescription drugs, or dietary supplements you use. Also tell them if you smoke, drink alcohol, or use illegal drugs. Some items may interact with your medicine.  What should I watch for while using this medicine?  If you are treating yourself for pain, tell your doctor or health care professional if the pain lasts more than 10 days, if it gets worse, or if there is a new or different kind of pain. Tell your doctor if you see redness or swelling. Also, check with your doctor if you have a fever that lasts for more than 3 days. Only take this medicine to prevent heart attacks or blood clotting if prescribed by your doctor or health care professional.  Do not take aspirin or aspirin-like medicines with this medicine. Too much aspirin can be dangerous. Always read the labels carefully.  This medicine can irritate your stomach or cause bleeding problems. Do not smoke cigarettes or drink alcohol while taking this medicine. Do not lie down for 30 minutes after taking this medicine to prevent irritation to your throat.  If you are scheduled for any medical or dental procedure, tell your healthcare provider that you are taking this medicine. You may need to stop taking this medicine before the procedure.  This medicine may be used to treat migraines. If you take migraine medicines for 10 or more days a month, your migraines may get worse. Keep a diary of headache days and medicine use. Contact your healthcare professional if your migraine attacks occur more frequently.  What side effects may I notice from receiving this medicine?  Side effects that you  should report to your doctor or health care professional as soon as possible:  -allergic reactions like skin rash, itching or hives, swelling of the face, lips, or tongue  -breathing problems  -changes in hearing, ringing in the ears  -confusion  -general ill feeling or flu-like symptoms  -pain on swallowing  -redness, blistering, peeling or loosening of the skin, including inside the mouth or nose  -signs and symptoms of bleeding such as bloody or black, tarry stools; red or dark-brown urine; spitting up blood or brown material that looks like coffee grounds; red spots on the skin; unusual bruising or bleeding from the eye, gums, or nose  -trouble passing urine or change in the amount of urine  -unusually weak or tired  -yellowing of the eyes or skin  Side effects that usually do not require medical attention (report to your doctor or health care professional if they continue or are bothersome):  -diarrhea or constipation  -headache  -nausea, vomiting  -stomach gas, heartburn  This list may not describe all possible side effects. Call your doctor for medical advice about side effects. You may report side effects to FDA at 6-983-FDA-0059.  Where should I keep my medicine?  Keep out of the reach of children.  Store at room temperature between 15 and 30 degrees C (59 and 86 degrees F). Protect from heat and moisture. Do not use this medicine if it has a strong vinegar smell. Throw away any unused medicine after the expiration date.  NOTE: This sheet is a summary. It may not cover all possible information. If you have questions about this medicine, talk to your doctor, pharmacist, or health care provider.  © 2018 [employer]/Gold Standard (2014-08-19 11:30:31)      · Is patient discharged on Warfarin / Coumadin?   No     Depression / Suicide Risk    As you are discharged from this [facility] facility, it is important to learn how to keep safe from harming yourself.    Recognize the warning signs:  · Abrupt changes in  personality, positive or negative- including increase in energy   · Giving away possessions  · Change in eating patterns- significant weight changes-  positive or negative  · Change in sleeping patterns- unable to sleep or sleeping all the time   · Unwillingness or inability to communicate  · Depression  · Unusual sadness, discouragement and loneliness  · Talk of wanting to die  · Neglect of personal appearance   · Rebelliousness- reckless behavior  · Withdrawal from people/activities they love  · Confusion- inability to concentrate     If you or a loved one observes any of these behaviors or has concerns about self-harm, here's what you can do:  · Talk about it- your feelings and reasons for harming yourself  · Remove any means that you might use to hurt yourself (examples: pills, rope, extension cords, firearm)  · Get professional help from the community (Mental Health, Substance Abuse, psychological counseling)  · Do not be alone:Call your Safe Contact- someone whom you trust who will be there for you.  · Call your local CRISIS HOTLINE 215-4333 or 681-259-9087  · Call your local Children's Mobile Crisis Response Team [place] (300) 683-1408 or www.[employer]  · Call the toll free National Suicide Prevention Hotlines   · National Suicide Prevention Lifeline 066-031-ZOSY (4052)  · National Hope Line Network 800-SUICIDE (362-4866)

## 2018-11-30 NOTE — DISCHARGE PLANNING
Doing well, mobilizing with nursing and seen by PT.  Wants to go home.  Advised Dr. Mendes per RN.  Has FWW at home/no DME needs.

## 2018-12-04 NOTE — ADDENDUM NOTE
Encounter addended by: India Agarwal on: 12/4/2018 12:29 PM<BR>    Actions taken: Flowsheet accepted

## 2018-12-11 RX ORDER — TRAZODONE HYDROCHLORIDE 100 MG/1
TABLET ORAL
Qty: 180 TAB | Refills: 0 | Status: SHIPPED | OUTPATIENT
Start: 2018-12-11 | End: 2019-01-10 | Stop reason: SDUPTHER

## 2018-12-11 NOTE — TELEPHONE ENCOUNTER
Was the patient seen in the last year in this department? Yes    Does patient have an active prescription for medications requested? No     Received Request Via: Pharmacy      Pt met protocol?: Yes, medication dcd at pt discharge 11/30/18, ov 1/18

## 2018-12-15 ENCOUNTER — HOSPITAL ENCOUNTER (EMERGENCY)
Facility: MEDICAL CENTER | Age: 58
End: 2018-12-15
Attending: EMERGENCY MEDICINE
Payer: COMMERCIAL

## 2018-12-15 ENCOUNTER — APPOINTMENT (OUTPATIENT)
Dept: RADIOLOGY | Facility: MEDICAL CENTER | Age: 58
End: 2018-12-15
Attending: EMERGENCY MEDICINE
Payer: COMMERCIAL

## 2018-12-15 VITALS
WEIGHT: 112.43 LBS | RESPIRATION RATE: 19 BRPM | HEART RATE: 82 BPM | TEMPERATURE: 99.5 F | BODY MASS INDEX: 17.04 KG/M2 | DIASTOLIC BLOOD PRESSURE: 93 MMHG | SYSTOLIC BLOOD PRESSURE: 140 MMHG | HEIGHT: 68 IN | OXYGEN SATURATION: 94 %

## 2018-12-15 DIAGNOSIS — S72.112A CLOSED DISPLACED FRACTURE OF GREATER TROCHANTER OF LEFT FEMUR, INITIAL ENCOUNTER (HCC): ICD-10-CM

## 2018-12-15 PROCEDURE — 73502 X-RAY EXAM HIP UNI 2-3 VIEWS: CPT | Mod: LT

## 2018-12-15 PROCEDURE — 99284 EMERGENCY DEPT VISIT MOD MDM: CPT

## 2018-12-15 ASSESSMENT — PAIN SCALES - GENERAL: PAINLEVEL_OUTOF10: 6

## 2018-12-15 ASSESSMENT — PAIN DESCRIPTION - DESCRIPTORS: DESCRIPTORS: TEARING

## 2018-12-16 NOTE — DISCHARGE INSTRUCTIONS
You were seen in the emergency department after pain when descending from stairs.  Your physical exam was concerning for possible fracture.  Your x-ray shows a fracture of your greater trochanter of your left hip.  The case was discussed with orthopedics and you should follow-up with Dr. Mendes on Monday.  You may do toe-touch weightbearing to your left hip.    For pain you can take ibuprofen (Motrin), 600mg every 6 hours as needed for pain (take with food to avoid GI upset). You can also take acetaminophen (Tylenol), 1000mg every 8 hours as needed for pain. Do not take more than 3000mg of acetaminophen in any 24 hour period.  Taking these medications regularly during the day can be very effective in controlling pain.    Please return to the emergency department or seek medical attention if you develop:  Loss of sensation to the leg, worsening severe pain, other falls or injuries, or any other concerning findings.

## 2018-12-16 NOTE — ED NOTES
Pt was  admitted for a Total Hip Arthroplasty, the 29 th of this month.  As per surgery report she developed no sequela during the procedure, and faired well post-operatively.  She presents today complaining of frequent pain exacerbations.  Specifically, earlier this evening while descending a flight of stairs she heard a pop and developed acute onset of severe pain.

## 2018-12-16 NOTE — ED PROVIDER NOTES
ED Provider Note        History obtained from: Patient, medical record    CHIEF COMPLAINT  Chief Complaint   Patient presents with   • Hip Pain       HPI  Kemi Silva is a 58 y.o. female who presents with left hip pain.  The patient reports that she was descending stairs earlier today when she had a pop sensation and sudden pain to her left hip.  She reports difficulty ambulating due to pain.  She denies any fall or other injuries.  She recently did have a hip arthroplasty with Dr. Mendes.  She has been recovering well from the procedure until this occurred.  Denies any paresthesias, chest pain, shortness of breath, leg swelling.    REVIEW OF SYSTEMS    CONSTITUTIONAL:  No fever.  CARDIOVASCULAR:  No chest discomfort.  RESPIRATORY:  No pleuritic chest pain.  GASTROINTESTINAL:  No abdominal pain.    See HPI for further details.       PAST MEDICAL HISTORY  Past Medical History:   Diagnosis Date   • Acute midline thoracic back pain 10/19/2016   • Anesthesia 11/19/2018    post op nausea   • Arthritis     osteoarthritis   • Hip pain     left   • Hyperlipidemia 6/17/2013   • Insomnia    • Menopause    • Osteopenia        FAMILY HISTORY  Family History   Problem Relation Age of Onset   • Adopted: Yes       SOCIAL HISTORY   reports that she quit smoking about 19 years ago. Her smoking use included Cigarettes. She has a 5.00 pack-year smoking history. She has never used smokeless tobacco. She reports that she does not drink alcohol or use drugs.    SURGICAL HISTORY  Past Surgical History:   Procedure Laterality Date   • HIP ARTH ANTERIOR TOTAL Left 11/29/2018    Procedure: HIP ARTHROPLASTY ANTERIOR TOTAL;  Surgeon: Julian Mendes M.D.;  Location: SURGERY [facility];  Service: Orthopedics   • SHOULDER DECOMPRESSION ARTHROSCOPIC Left 7/23/2018    Procedure: SHOULDER DECOMPRESSION ARTHROSCOPIC- SUBACROMIAL;  Surgeon: Tuan Collins M.D.;  Location: SURGERY [facility];  Service: Orthopedics   • SHOULDER  "ARTHROSCOPY W/ ROTATOR CUFF REPAIR  7/23/2018    Procedure: SHOULDER ARTHROSCOPY W/ ROTATOR CUFF REPAIR- WITH OPEN BICEP TENODESIS AND SUBCAPULAR REPAIR;  Surgeon: Tuan Collins M.D.;  Location: SURGERY [facility];  Service: Orthopedics   • ULNA ORIF Left 2010    and radius   • UMBILICAL HERNIA REPAIR  10/21/2009    Performed by FLORIAN BHANDARI at SURGERY SAME DAY [facility]   • OTHER ORTHOPEDIC SURGERY Bilateral 2009    hammertoe correction bilat       CURRENT MEDICATIONS  Home Medications    **Home medications have not yet been reviewed for this encounter**         ALLERGIES  No Known Allergies    PHYSICAL EXAM  VITAL SIGNS: /89   Pulse 95   Temp 37.1 °C (98.7 °F) (Temporal)   Resp 18   Ht 1.727 m (5' 8\")   Wt 51 kg (112 lb 7 oz)   SpO2 97%   BMI 17.10 kg/m²    Gen: alert, no acute distress  HENT: ATNC  Eyes: normal conjuctiva  Resp: No resipiratory distress.   CV: No JVD   Abd: Non-distended, nontender  Extremities: No deformity.  Left leg without deformity.  No pain on passive range of motion of hip in flexion/extension.  Hip pain induced with rotation of the hip.  No knee or ankle pain.  Distal CSM intact.  Tenderness to palpation over lateral aspect of hip.          RADIOLOGY/PROCEDURES  DX-HIP-COMPLETE - UNILATERAL 2+ LEFT   Final Result      1.  LEFT hip prosthesis, normally located.   2.  Apparent acute fracture of the LEFT greater trochanter, extending into the intertrochanteric region, mildly displaced.              COURSE & MEDICAL DECISION MAKING  Pertinent Labs & Imaging studies reviewed. (See chart for details)    Patient presents with hip pain, atraumatic with a pop concerning for possible hip fracture.  She has a history of osteoporosis with recent hip surgery.  Will obtain x-rays.    Patient's x-rays demonstrated mildly displaced fracture of the greater trochanter of the left hip.  The case was discussed with Dr Sanchez, who is covering for Dr. Mendes.  He reports that the " patient may return home if she is able to do toe-touch weightbearing and her pain is adequately controlled.  This was discussed with the patient and she prefers to go home rather than admit for pain control.  She reports she has sufficient pain medications at home, and will be discharged in the care of her family.    FINAL IMPRESSION  1. Closed displaced fracture of greater trochanter of left femur, initial encounter ([facility])

## 2018-12-16 NOTE — ED NOTES
Pt given discharge paperwork, pt verbalized udnerstanding all information given. Pt ambulated out of the ER w/ FWW and family.

## 2018-12-28 ENCOUNTER — HOSPITAL ENCOUNTER (OUTPATIENT)
Dept: RADIOLOGY | Facility: MEDICAL CENTER | Age: 58
End: 2018-12-28
Attending: ORTHOPAEDIC SURGERY
Payer: COMMERCIAL

## 2018-12-28 DIAGNOSIS — M81.0 OSTEOPOROSIS, SENILE: ICD-10-CM

## 2018-12-28 PROCEDURE — 77080 DXA BONE DENSITY AXIAL: CPT

## 2019-01-10 ENCOUNTER — OFFICE VISIT (OUTPATIENT)
Dept: MEDICAL GROUP | Facility: PHYSICIAN GROUP | Age: 59
End: 2019-01-10
Payer: COMMERCIAL

## 2019-01-10 VITALS
DIASTOLIC BLOOD PRESSURE: 76 MMHG | SYSTOLIC BLOOD PRESSURE: 122 MMHG | HEART RATE: 90 BPM | OXYGEN SATURATION: 94 % | TEMPERATURE: 99.9 F | WEIGHT: 114.6 LBS | HEIGHT: 67 IN | RESPIRATION RATE: 12 BRPM | BODY MASS INDEX: 17.99 KG/M2

## 2019-01-10 DIAGNOSIS — E78.2 MIXED HYPERLIPIDEMIA: ICD-10-CM

## 2019-01-10 DIAGNOSIS — M85.80 OSTEOPENIA, UNSPECIFIED LOCATION: ICD-10-CM

## 2019-01-10 DIAGNOSIS — F51.01 PRIMARY INSOMNIA: ICD-10-CM

## 2019-01-10 DIAGNOSIS — F41.9 ANXIETY: ICD-10-CM

## 2019-01-10 PROCEDURE — 99214 OFFICE O/P EST MOD 30 MIN: CPT | Performed by: INTERNAL MEDICINE

## 2019-01-10 RX ORDER — IBANDRONATE SODIUM 150 MG/1
150 TABLET, FILM COATED ORAL
Qty: 3 TAB | Refills: 1 | Status: SHIPPED | OUTPATIENT
Start: 2019-01-10 | End: 2019-07-04 | Stop reason: SDUPTHER

## 2019-01-10 RX ORDER — SIMVASTATIN 20 MG
20 TABLET ORAL DAILY
Refills: 1 | COMMUNITY
Start: 2018-12-11 | End: 2019-01-10 | Stop reason: SDUPTHER

## 2019-01-10 RX ORDER — TRAZODONE HYDROCHLORIDE 100 MG/1
TABLET ORAL
Qty: 135 TAB | Refills: 1 | Status: SHIPPED | OUTPATIENT
Start: 2019-01-10 | End: 2019-04-29

## 2019-01-10 RX ORDER — SIMVASTATIN 20 MG
20 TABLET ORAL DAILY
Qty: 90 TAB | Refills: 1 | Status: SHIPPED | OUTPATIENT
Start: 2019-01-10 | End: 2019-08-28 | Stop reason: SDUPTHER

## 2019-01-10 RX ORDER — ALPRAZOLAM 0.25 MG/1
TABLET ORAL
Qty: 6 TAB | Refills: 0 | Status: SHIPPED | OUTPATIENT
Start: 2019-01-10 | End: 2019-04-10

## 2019-01-10 ASSESSMENT — PATIENT HEALTH QUESTIONNAIRE - PHQ9: CLINICAL INTERPRETATION OF PHQ2 SCORE: 0

## 2019-01-10 NOTE — ASSESSMENT & PLAN NOTE
Had a recent hip replacement 11/2018 and then fractured that femur while coming down the stairs. She has been taking a tramadol a night and is on crutches but planning to return to work. Recently had a bone density scan with her orthopaedic surgeon (Dr. Mendes). Had been on boniva at some time but she had come off of it since her bone density had improved. Her osteopenia has worsened on most recent bone density. She thinks she was last on boniva about 8-9 years ago.

## 2019-01-10 NOTE — ASSESSMENT & PLAN NOTE
Is an only child and her 96 yo mother lives in [place]. She visits her once a month. She will take 1/2 tablet of xanax 0.5 mg about once a month. Has tried deep breathing. Since her hip fracture she also hasn't been able to exercise as much which is a helpful stress relief for her.

## 2019-01-10 NOTE — PROGRESS NOTES
PRIMARY CARE CLINIC FOLLOW UP VISIT  Chief Complaint   Patient presents with   • Insomnia     f/v   • Osteopenia     f/v     History of Present Illness     Osteopenia  Had a recent hip replacement 11/2018 and then fractured that femur while coming down the stairs. She has been taking a tramadol a night and is on crutches but planning to return to work. Recently had a bone density scan with her orthopaedic surgeon (Dr. Mendes). Had been on boniva at some time but she had come off of it since her bone density had improved. Her osteopenia has worsened on most recent bone density. She thinks she was last on boniva about 8-9 years ago.     Insomnia  Takes 1.5 tablets of trazodone every night.     Anxiety  Is an only child and her 98 yo mother lives in [place]. She visits her once a month. She will take 1/2 tablet of xanax 0.5 mg about once a month. Has tried deep breathing. Since her hip fracture she also hasn't been able to exercise as much which is a helpful stress relief for her.     Hyperlipidemia  Is adopted and family history is unclear. However, she is on simvastatin and is inquiring about a CT cardiac score.     Current Outpatient Prescriptions   Medication Sig Dispense Refill   • ibandronate (BONIVA) 150 MG tablet Take 1 Tab by mouth every 30 days. 3 Tab 1   • traZODone (DESYREL) 100 MG Tab Take 1.5 tabs at bedtime 135 Tab 1   • simvastatin (ZOCOR) 20 MG Tab Take 1 Tab by mouth every day. 90 Tab 1   • ALPRAZolam (XANAX) 0.25 MG Tab Take once a month as needed 6 Tab 0     No current facility-administered medications for this visit.      Past Medical History:   Diagnosis Date   • Acute midline thoracic back pain 10/19/2016   • Anesthesia 11/19/2018    post op nausea   • Arthritis     osteoarthritis   • Hip pain     left   • Hyperlipidemia 6/17/2013   • Insomnia    • Menopause    • Osteopenia      Past Surgical History:   Procedure Laterality Date   • HIP ARTH ANTERIOR TOTAL Left 11/29/2018    Procedure: HIP  "ARTHROPLASTY ANTERIOR TOTAL;  Surgeon: Julian Mendes M.D.;  Location: SURGERY [facility];  Service: Orthopedics   • SHOULDER DECOMPRESSION ARTHROSCOPIC Left 7/23/2018    Procedure: SHOULDER DECOMPRESSION ARTHROSCOPIC- SUBACROMIAL;  Surgeon: Tuan Collins M.D.;  Location: SURGERY [facility];  Service: Orthopedics   • SHOULDER ARTHROSCOPY W/ ROTATOR CUFF REPAIR  7/23/2018    Procedure: SHOULDER ARTHROSCOPY W/ ROTATOR CUFF REPAIR- WITH OPEN BICEP TENODESIS AND SUBCAPULAR REPAIR;  Surgeon: Tuan Collins M.D.;  Location: SURGERY [facility];  Service: Orthopedics   • ULNA ORIF Left 2010    and radius   • UMBILICAL HERNIA REPAIR  10/21/2009    Performed by FLORIAN BHANDARI at SURGERY SAME DAY [facility]   • OTHER ORTHOPEDIC SURGERY Bilateral 2009    hammertoe correction bilat     Social History   Substance Use Topics   • Smoking status: Former Smoker     Packs/day: 0.25     Years: 20.00     Types: Cigarettes     Quit date: 8/12/1999   • Smokeless tobacco: Never Used   • Alcohol use No      Comment: former heavy use, 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       Family History   Problem Relation Age of Onset   • Adopted: Yes     Family Status   Relation Status   • Mo Other   • Fa Other     Allergies: Patient has no known allergies.    ROS  As per HPI above. All other systems reviewed and negative.        Objective   Blood pressure 122/76, pulse 90, temperature 37.7 °C (99.9 °F), temperature source Temporal, resp. rate 12, height 1.702 m (5' 7\"), weight 52 kg (114 lb 9.6 oz), SpO2 94 %, not currently breastfeeding. Body mass index is 17.95 kg/m².    General: alert and oriented, pleasant, cooperative  HEENT: Normocephalic, atraumatic. No thyroid masses. Oropharynx clear without exudate or injection.   Cardiovascular: regular rate and rhythm, normal S1/S2  Pulmonary: lungs clear to auscultation " bilaterally  Psychiatric: appropriate mood and affect. Good insight and appropriate judgment     Assessment and Plan   The following treatment plan was discussed     1. Primary insomnia  - traZODone (DESYREL) 100 MG Tab; Take 1.5 tabs at bedtime  Dispense: 135 Tab; Refill: 1    2. Osteopenia, unspecified location  Given recent fracture and worsening bone density, resume boniva and recheck dexa in 3 years or so.   - ibandronate (BONIVA) 150 MG tablet; Take 1 Tab by mouth every 30 days.  Dispense: 3 Tab; Refill: 1    3. Mixed hyperlipidemia  Check CT cardiac score, continue simvastatin for now.   - simvastatin (ZOCOR) 20 MG Tab; Take 1 Tab by mouth every day.  Dispense: 90 Tab; Refill: 1  - CT-CARDIAC SCORING; Future    4. Anxiety  Uses xanax sparingly, only about 0.25 mg monthly. Did discuss to be careful and not use concurrently with tramadol and she understands the risks. Says she is working to wean off of tramadol as she continues to heal from her recent fracture.   - ALPRAZolam (XANAX) 0.25 MG Tab; Take once a month as needed  Dispense: 6 Tab; Refill: 0      Healthcare maintenance     There are no preventive care reminders to display for this patient.    Return in about 6 months (around 7/10/2019).    Kalpesh Hylton MD  Internal Medicine  [facility]

## 2019-01-10 NOTE — ASSESSMENT & PLAN NOTE
Is adopted and family history is unclear. However, she is on simvastatin and is inquiring about a CT cardiac score.

## 2019-01-23 ENCOUNTER — HOSPITAL ENCOUNTER (OUTPATIENT)
Dept: RADIOLOGY | Facility: MEDICAL CENTER | Age: 59
End: 2019-01-23
Attending: INTERNAL MEDICINE
Payer: COMMERCIAL

## 2019-01-23 DIAGNOSIS — E78.2 MIXED HYPERLIPIDEMIA: ICD-10-CM

## 2019-01-23 PROCEDURE — 4410556 CT-CARDIAC SCORING

## 2019-04-29 RX ORDER — TRAZODONE HYDROCHLORIDE 100 MG/1
TABLET ORAL
Qty: 135 TAB | Refills: 1 | Status: SHIPPED | OUTPATIENT
Start: 2019-04-29 | End: 2019-08-28 | Stop reason: SDUPTHER

## 2019-06-01 DIAGNOSIS — F41.9 ANXIETY: ICD-10-CM

## 2019-06-03 RX ORDER — ALPRAZOLAM 0.25 MG/1
TABLET ORAL
Qty: 6 TAB | Refills: 0 | Status: SHIPPED
Start: 2019-06-03 | End: 2019-06-18 | Stop reason: SDUPTHER

## 2019-06-03 NOTE — TELEPHONE ENCOUNTER
Was the patient seen in the last year in this department? Yes (01/10/19)    Does patient have an active prescription for medications requested? No     Received Request Via: Pharmacy (In-Basket)

## 2019-06-18 ENCOUNTER — OFFICE VISIT (OUTPATIENT)
Dept: MEDICAL GROUP | Facility: PHYSICIAN GROUP | Age: 59
End: 2019-06-18
Payer: COMMERCIAL

## 2019-06-18 VITALS
SYSTOLIC BLOOD PRESSURE: 128 MMHG | WEIGHT: 118 LBS | OXYGEN SATURATION: 96 % | BODY MASS INDEX: 18.52 KG/M2 | HEIGHT: 67 IN | HEART RATE: 90 BPM | DIASTOLIC BLOOD PRESSURE: 74 MMHG | RESPIRATION RATE: 14 BRPM | TEMPERATURE: 99 F

## 2019-06-18 DIAGNOSIS — R00.0 HEART RATE FAST: ICD-10-CM

## 2019-06-18 DIAGNOSIS — E78.2 MIXED HYPERLIPIDEMIA: ICD-10-CM

## 2019-06-18 DIAGNOSIS — F41.9 ANXIETY: ICD-10-CM

## 2019-06-18 PROCEDURE — 93000 ELECTROCARDIOGRAM COMPLETE: CPT | Performed by: INTERNAL MEDICINE

## 2019-06-18 PROCEDURE — 99214 OFFICE O/P EST MOD 30 MIN: CPT | Performed by: INTERNAL MEDICINE

## 2019-06-18 RX ORDER — ALPRAZOLAM 0.25 MG/1
0.25 TABLET ORAL NIGHTLY PRN
Qty: 30 TAB | Refills: 0 | Status: SHIPPED | OUTPATIENT
Start: 2019-06-18 | End: 2019-07-18

## 2019-06-18 NOTE — ASSESSMENT & PLAN NOTE
"Has been noting that her heart rates are running up to 80-90s when they used to be resting heart rates in the 60s prior. 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 Feels like she does get tired. Has a hip surgery last fall and then a fracture after a fall. 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 Her son was arrested and incarcerated last week before the symptoms started. However, since she had a her hip surgery fall 2018 and then a subsequent fracture she's been having these symptoms.   "

## 2019-06-18 NOTE — PROGRESS NOTES
"PRIMARY CARE CLINIC FOLLOW UP VISIT  Chief Complaint   Patient presents with   • Tachycardia     heart rate concerns      History of Present Illness     Heart rate fast  Has been noting that her heart rates are running up to 80-90s when they used to be resting heart rates in the 60s prior. 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 Has a hip surgery last fall and then a fracture after a fall. 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 Her son was arrested and incarcerated last week before the symptoms started. However, since she had a her hip surgery fall 2018 and then a subsequent fracture she's been having these symptoms.     Anxiety  Requesting another refill of her xanax 0.25 mg, only takes it during the night when she does.     Current Outpatient Prescriptions   Medication Sig Dispense Refill   • ALPRAZolam (XANAX) 0.25 MG Tab Take 1 Tab by mouth at bedtime as needed for Sleep for up to 30 days. 30 Tab 0   • traZODone (DESYREL) 100 MG Tab Take 1.5 tablets by moth at bedtime 135 Tab 1   • ibandronate (BONIVA) 150 MG tablet Take 1 Tab by mouth every 30 days. 3 Tab 1   • simvastatin (ZOCOR) 20 MG Tab Take 1 Tab by mouth every day. 90 Tab 1     No current facility-administered medications for this visit.      Past Medical History:   Diagnosis Date   • Acute midline thoracic back pain 10/19/2016   • Anesthesia 11/19/2018    post op nausea   • Arthritis     osteoarthritis   • Hip pain     left   • Hyperlipidemia 6/17/2013   • Insomnia    • Menopause    • Osteopenia      Past Surgical History:   Procedure Laterality Date   • HIP ARTH ANTERIOR TOTAL Left 11/29/2018    Procedure: HIP ARTHROPLASTY ANTERIOR TOTAL;  Surgeon: Julian Mendes M.D.;  Location: SURGERY [facility];  Service: Orthopedics   • SHOULDER DECOMPRESSION ARTHROSCOPIC Left 7/23/2018    Procedure: SHOULDER DECOMPRESSION ARTHROSCOPIC- SUBACROMIAL;  Surgeon: Tuan Collins M.D.;  Location: " "SURGERY [facility];  Service: Orthopedics   • SHOULDER ARTHROSCOPY W/ ROTATOR CUFF REPAIR  7/23/2018    Procedure: SHOULDER ARTHROSCOPY W/ ROTATOR CUFF REPAIR- WITH OPEN BICEP TENODESIS AND SUBCAPULAR REPAIR;  Surgeon: Tuan Collins M.D.;  Location: SURGERY [facility];  Service: Orthopedics   • ULNA ORIF Left 2010    and radius   • UMBILICAL HERNIA REPAIR  10/21/2009    Performed by FLORIAN BHANDARI at SURGERY SAME DAY [facility]   • OTHER ORTHOPEDIC SURGERY Bilateral 2009    hammertoe correction bilat     Social History   Substance Use Topics   • Smoking status: Former Smoker     Packs/day: 0.25     Years: 20.00     Types: Cigarettes     Quit date: 8/12/1999   • Smokeless tobacco: Never Used   • Alcohol use No      Comment: former heavy use, 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       Family History   Problem Relation Age of Onset   • Adopted: Yes     Family Status   Relation Status   • Mo Other   • Fa Other     Allergies: Patient has no known allergies.    ROS  As per HPI above. All other systems reviewed and negative.        Objective   /74 (BP Cuff Size: Small adult)   Pulse 90   Temp 37.2 °C (99 °F)   Resp 14   Ht 1.702 m (5' 7\")   Wt 53.5 kg (118 lb)   SpO2 96%  Body mass index is 18.48 kg/m².    General: alert and oriented, pleasant, cooperative  HEENT: Normocephalic, atraumatic.   Cardiovascular: regular rate and rhythm, normal S1/S2  Pulmonary: lungs clear to auscultation bilaterally  Lymphatics: no cervical or supraclavicular lymphadenopathy   Skin: warm and dry, no lesions or rashes  Psychiatric: appropriate mood and affect. Good insight and appropriate judgment     Assessment and Plan   The following treatment plan was discussed     1. Heart rate fast  Her EKG was reviewed and demonstrated normal sinus rhythm, offered reassurance. Her resting heart rate is likely higher secondary to " stress, anxiety (she has been more anxious). Discussed stress management, adequate hydration.   - EKG; Future    2. Mixed hyperlipidemia  - Comp Metabolic Panel; Future  - CBC WITH DIFFERENTIAL; Future  - Lipid Profile; Future  - VITAMIN D,25 HYDROXY; Future    3. Anxiety   reviewed, last filled 6 tablets on 6/3/2019.   - ALPRAZolam (XANAX) 0.25 MG Tab; Take 1 Tab by mouth at bedtime as needed for Sleep for up to 30 days.  Dispense: 30 Tab; Refill: 0    Healthcare maintenance     Health Maintenance Due   Topic Date Due   • HEPATITIS C SCREENING  1960     No Follow-up on file.    Kalpesh Hylton MD  Internal Medicine  [facility]

## 2019-06-28 ENCOUNTER — HOSPITAL ENCOUNTER (OUTPATIENT)
Dept: LAB | Facility: MEDICAL CENTER | Age: 59
End: 2019-06-28
Attending: INTERNAL MEDICINE
Payer: COMMERCIAL

## 2019-06-28 DIAGNOSIS — E78.2 MIXED HYPERLIPIDEMIA: ICD-10-CM

## 2019-06-28 LAB
25(OH)D3 SERPL-MCNC: 36 NG/ML (ref 30–100)
ALBUMIN SERPL BCP-MCNC: 3.9 G/DL (ref 3.2–4.9)
ALBUMIN/GLOB SERPL: 1.5 G/DL
ALP SERPL-CCNC: 46 U/L (ref 30–99)
ALT SERPL-CCNC: 17 U/L (ref 2–50)
ANION GAP SERPL CALC-SCNC: 7 MMOL/L (ref 0–11.9)
AST SERPL-CCNC: 25 U/L (ref 12–45)
BASOPHILS # BLD AUTO: 0.9 % (ref 0–1.8)
BASOPHILS # BLD: 0.04 K/UL (ref 0–0.12)
BILIRUB SERPL-MCNC: 0.4 MG/DL (ref 0.1–1.5)
BUN SERPL-MCNC: 15 MG/DL (ref 8–22)
CALCIUM SERPL-MCNC: 9.3 MG/DL (ref 8.5–10.5)
CHLORIDE SERPL-SCNC: 106 MMOL/L (ref 96–112)
CHOLEST SERPL-MCNC: 180 MG/DL (ref 100–199)
CO2 SERPL-SCNC: 28 MMOL/L (ref 20–33)
CREAT SERPL-MCNC: 0.84 MG/DL (ref 0.5–1.4)
EOSINOPHIL # BLD AUTO: 0.2 K/UL (ref 0–0.51)
EOSINOPHIL NFR BLD: 4.4 % (ref 0–6.9)
ERYTHROCYTE [DISTWIDTH] IN BLOOD BY AUTOMATED COUNT: 45.7 FL (ref 35.9–50)
FASTING STATUS PATIENT QL REPORTED: NORMAL
GLOBULIN SER CALC-MCNC: 2.6 G/DL (ref 1.9–3.5)
GLUCOSE SERPL-MCNC: 90 MG/DL (ref 65–99)
HCT VFR BLD AUTO: 40.4 % (ref 37–47)
HDLC SERPL-MCNC: 68 MG/DL
HGB BLD-MCNC: 13.1 G/DL (ref 12–16)
IMM GRANULOCYTES # BLD AUTO: 0.01 K/UL (ref 0–0.11)
IMM GRANULOCYTES NFR BLD AUTO: 0.2 % (ref 0–0.9)
LDLC SERPL CALC-MCNC: 97 MG/DL
LYMPHOCYTES # BLD AUTO: 1.22 K/UL (ref 1–4.8)
LYMPHOCYTES NFR BLD: 26.9 % (ref 22–41)
MCH RBC QN AUTO: 28.6 PG (ref 27–33)
MCHC RBC AUTO-ENTMCNC: 32.4 G/DL (ref 33.6–35)
MCV RBC AUTO: 88.2 FL (ref 81.4–97.8)
MONOCYTES # BLD AUTO: 0.51 K/UL (ref 0–0.85)
MONOCYTES NFR BLD AUTO: 11.2 % (ref 0–13.4)
NEUTROPHILS # BLD AUTO: 2.56 K/UL (ref 2–7.15)
NEUTROPHILS NFR BLD: 56.4 % (ref 44–72)
NRBC # BLD AUTO: 0 K/UL
NRBC BLD-RTO: 0 /100 WBC
PLATELET # BLD AUTO: 283 K/UL (ref 164–446)
PMV BLD AUTO: 11 FL (ref 9–12.9)
POTASSIUM SERPL-SCNC: 4.2 MMOL/L (ref 3.6–5.5)
PROT SERPL-MCNC: 6.5 G/DL (ref 6–8.2)
RBC # BLD AUTO: 4.58 M/UL (ref 4.2–5.4)
SODIUM SERPL-SCNC: 141 MMOL/L (ref 135–145)
TRIGL SERPL-MCNC: 75 MG/DL (ref 0–149)
WBC # BLD AUTO: 4.5 K/UL (ref 4.8–10.8)

## 2019-06-28 PROCEDURE — 80053 COMPREHEN METABOLIC PANEL: CPT

## 2019-06-28 PROCEDURE — 85025 COMPLETE CBC W/AUTO DIFF WBC: CPT

## 2019-06-28 PROCEDURE — 82306 VITAMIN D 25 HYDROXY: CPT

## 2019-06-28 PROCEDURE — 80061 LIPID PANEL: CPT

## 2019-06-28 PROCEDURE — 36415 COLL VENOUS BLD VENIPUNCTURE: CPT

## 2019-07-04 DIAGNOSIS — M85.80 OSTEOPENIA, UNSPECIFIED LOCATION: ICD-10-CM

## 2019-07-05 RX ORDER — IBANDRONATE SODIUM 150 MG/1
150 TABLET, FILM COATED ORAL
Qty: 4 TAB | Refills: 1 | Status: SHIPPED | OUTPATIENT
Start: 2019-07-05 | End: 2019-08-28 | Stop reason: SDUPTHER

## 2019-07-05 NOTE — TELEPHONE ENCOUNTER
Was the patient seen in the last year in this department? Yes    Does patient have an active prescription for medications requested? No     Received Request Via: Pharmacy      Pt met protocol?: Yes   Pt last ov 6/2019   Lab Results   Component Value Date/Time    SODIUM 141 06/28/2019 06:43 AM    POTASSIUM 4.2 06/28/2019 06:43 AM    CHLORIDE 106 06/28/2019 06:43 AM    CO2 28 06/28/2019 06:43 AM    GLUCOSE 90 06/28/2019 06:43 AM    BUN 15 06/28/2019 06:43 AM    CREATININE 0.84 06/28/2019 06:43 AM    CREATININE 0.78 10/11/2011 01:52 PM    BUNCREATRAT 18 07/29/2016 07:20 AM    BUNCREATRAT 18 10/11/2011 01:52 PM

## 2019-08-28 ENCOUNTER — OFFICE VISIT (OUTPATIENT)
Dept: MEDICAL GROUP | Facility: PHYSICIAN GROUP | Age: 59
End: 2019-08-28
Payer: COMMERCIAL

## 2019-08-28 VITALS
HEART RATE: 63 BPM | OXYGEN SATURATION: 93 % | RESPIRATION RATE: 18 BRPM | WEIGHT: 119 LBS | TEMPERATURE: 98 F | HEIGHT: 68 IN | DIASTOLIC BLOOD PRESSURE: 66 MMHG | BODY MASS INDEX: 18.04 KG/M2 | SYSTOLIC BLOOD PRESSURE: 100 MMHG

## 2019-08-28 DIAGNOSIS — M25.559 GREATER TROCHANTERIC PAIN SYNDROME: ICD-10-CM

## 2019-08-28 DIAGNOSIS — E78.2 MIXED HYPERLIPIDEMIA: ICD-10-CM

## 2019-08-28 DIAGNOSIS — Z12.11 SCREENING FOR COLORECTAL CANCER: ICD-10-CM

## 2019-08-28 DIAGNOSIS — F41.9 ANXIETY: ICD-10-CM

## 2019-08-28 DIAGNOSIS — M85.80 OSTEOPENIA, UNSPECIFIED LOCATION: ICD-10-CM

## 2019-08-28 DIAGNOSIS — Z12.12 SCREENING FOR COLORECTAL CANCER: ICD-10-CM

## 2019-08-28 PROCEDURE — 99214 OFFICE O/P EST MOD 30 MIN: CPT | Performed by: FAMILY MEDICINE

## 2019-08-28 RX ORDER — IBANDRONATE SODIUM 150 MG/1
150 TABLET, FILM COATED ORAL
Qty: 4 TAB | Refills: 1 | Status: ON HOLD | OUTPATIENT
Start: 2019-08-28 | End: 2019-11-21

## 2019-08-28 RX ORDER — SIMVASTATIN 20 MG
20 TABLET ORAL DAILY
Qty: 90 TAB | Refills: 3 | Status: ON HOLD | OUTPATIENT
Start: 2019-08-28 | End: 2019-11-21

## 2019-08-28 RX ORDER — ALPRAZOLAM 0.25 MG/1
0.25 TABLET ORAL NIGHTLY PRN
Qty: 15 TAB | Refills: 0 | Status: SHIPPED | OUTPATIENT
Start: 2019-08-28 | End: 2019-10-30 | Stop reason: SDUPTHER

## 2019-08-28 RX ORDER — ALPRAZOLAM 0.25 MG/1
0.25 TABLET ORAL NIGHTLY PRN
COMMUNITY
End: 2019-08-28 | Stop reason: SDUPTHER

## 2019-08-28 RX ORDER — TRAZODONE HYDROCHLORIDE 100 MG/1
TABLET ORAL
Qty: 135 TAB | Refills: 3 | Status: SHIPPED | OUTPATIENT
Start: 2019-08-28 | End: 2019-09-16 | Stop reason: SDUPTHER

## 2019-08-28 NOTE — PROGRESS NOTES
"cc: anxiety       Subjective:     Kemi Silva is a 59 y.o. female presenting for the following:     Anxiety and insomnia- patient is currently caring for her 97-year-old mother who does not live in [place].  She did previously use Xanax 0.25 mg very occasionally, when traveling etc., but was given 30 tablets of the last 2 months to help deal with the stressful situation.  She only takes these at nighttime when she cannot stop worrying and sleep.  She has never had any falls or sleep walking with this medication.  She does not take it more than 3 times a week.    Patient with history of left hip fracture but did have a complicated recovery.  She has had some chronic pain in the hip since then. She is active and has been doing physical therapy without much improvement in this pain.  She is still able to function but cannot walk as long as she used to.  The pain is decreasing the amount that she would exercise.     Review of systems:  All others reviewed and are negative.       Current Outpatient Medications:   •  ALPRAZolam (XANAX) 0.25 MG Tab, Take 1 Tab by mouth at bedtime as needed for Sleep for up to 30 days., Disp: 15 Tab, Rfl: 0  •  traZODone (DESYREL) 100 MG Tab, Take 1.5 tablets by moth at bedtime, Disp: 135 Tab, Rfl: 3  •  simvastatin (ZOCOR) 20 MG Tab, Take 1 Tab by mouth every day., Disp: 90 Tab, Rfl: 3  •  ibandronate (BONIVA) 150 MG tablet, Take 1 Tab by mouth every 30 days., Disp: 4 Tab, Rfl: 1    Allergies, past medical history, past surgical history, family history, social history reviewed and updated    Objective:     Vitals: /66 (BP Location: Left arm, Patient Position: Sitting, BP Cuff Size: Adult)   Pulse 63   Temp 36.7 °C (98 °F) (Temporal)   Resp 18   Ht 1.727 m (5' 8\")   Wt 54 kg (119 lb)   SpO2 93%   BMI 18.09 kg/m²   General: Alert, pleasant, NAD  Heart: Regular rate and rhythm.  S1 and S2 normal.  No murmurs appreciated.  Respiratory: Normal respiratory effort.  Clear to " auscultation bilaterally.  Musculoskeletal: Left hip without deformities.  Well-healed surgical scar.  Tender to palpation over posterior greater trochanter.  Neurological:  CN2-12 grossly intact  Psych:  Affect is normal, judgement is good, memory is intact, grooming is appropriate.    Assessment/Plan:     Kemi was seen today for medication refill.    Diagnoses and all orders for this visit:    Screening for colorectal cancer: Patient agrees to screening for colon cancer.  Currently without symptoms- no abdominal pain, changes in bowel habit, weight loss, blood in stool, or melena. Denies history of polyps.   -     OCCULT BLOOD FECES IMMUNOASSAY (FIT); Future    Anxiety: Patient takes this only as needed.  She is not having side effect with this.  She is counseled today on the addictive nature of these medications and is aware to not take them and drink or drive.  -     ALPRAZolam (XANAX) 0.25 MG Tab; Take 1 Tab by mouth at bedtime as needed for Sleep for up to 30 days.    Mixed hyperlipidemia: Chronic well-controlled problem.  No side effect with simvastatin.  -     simvastatin (ZOCOR) 20 MG Tab; Take 1 Tab by mouth every day.    Osteopenia, unspecified location: Patient had been on a bisphosphonate for about 5 years and then off for about 2 before breaking her hip.  She was then restarted on Boniva.  -     ibandronate (BONIVA) 150 MG tablet; Take 1 Tab by mouth every 30 days.    Greater trochanteric pain syndrome: Pain consistent with greater trochanteric pain syndrome.  Patient is currently going to physical therapy.  If no improvement she may return to clinic for    Other orders  -     traZODone (DESYREL) 100 MG Tab; Take 1.5 tablets by moth at bedtime      Return in about 6 months (around 2/28/2020), or if symptoms worsen or fail to improve.

## 2019-09-05 ENCOUNTER — HOSPITAL ENCOUNTER (OUTPATIENT)
Facility: MEDICAL CENTER | Age: 59
End: 2019-09-05
Attending: FAMILY MEDICINE
Payer: COMMERCIAL

## 2019-09-05 PROCEDURE — 82274 ASSAY TEST FOR BLOOD FECAL: CPT

## 2019-09-06 DIAGNOSIS — Z12.11 SCREENING FOR COLORECTAL CANCER: ICD-10-CM

## 2019-09-06 DIAGNOSIS — Z12.12 SCREENING FOR COLORECTAL CANCER: ICD-10-CM

## 2019-09-06 LAB — HEMOCCULT STL QL IA: NEGATIVE

## 2019-09-16 ENCOUNTER — TELEPHONE (OUTPATIENT)
Dept: MEDICAL GROUP | Facility: PHYSICIAN GROUP | Age: 59
End: 2019-09-16

## 2019-09-16 RX ORDER — TRAZODONE HYDROCHLORIDE 100 MG/1
200 TABLET ORAL EVERY EVENING
Qty: 180 TAB | Refills: 3 | Status: ON HOLD | OUTPATIENT
Start: 2019-09-16 | End: 2019-11-21

## 2019-09-16 NOTE — TELEPHONE ENCOUNTER
Patient states that she is taking 2 trazodone instead 1.5 at night. She will need a new prescription for 2 at night. Please advise.

## 2019-10-14 ENCOUNTER — HOSPITAL ENCOUNTER (OUTPATIENT)
Dept: RADIOLOGY | Facility: MEDICAL CENTER | Age: 59
End: 2019-10-14
Attending: ORTHOPAEDIC SURGERY
Payer: COMMERCIAL

## 2019-10-14 DIAGNOSIS — M25.552 LEFT HIP PAIN: ICD-10-CM

## 2019-10-14 PROCEDURE — 73700 CT LOWER EXTREMITY W/O DYE: CPT | Mod: LT

## 2019-10-30 ENCOUNTER — OFFICE VISIT (OUTPATIENT)
Dept: MEDICAL GROUP | Facility: PHYSICIAN GROUP | Age: 59
End: 2019-10-30
Payer: COMMERCIAL

## 2019-10-30 VITALS
HEART RATE: 67 BPM | DIASTOLIC BLOOD PRESSURE: 70 MMHG | OXYGEN SATURATION: 96 % | WEIGHT: 123 LBS | SYSTOLIC BLOOD PRESSURE: 126 MMHG | BODY MASS INDEX: 19.3 KG/M2 | HEIGHT: 67 IN | TEMPERATURE: 98.3 F

## 2019-10-30 DIAGNOSIS — M25.552 CHRONIC LEFT HIP PAIN: ICD-10-CM

## 2019-10-30 DIAGNOSIS — Z12.31 ENCOUNTER FOR SCREENING MAMMOGRAM FOR BREAST CANCER: ICD-10-CM

## 2019-10-30 DIAGNOSIS — F41.9 ANXIETY: ICD-10-CM

## 2019-10-30 DIAGNOSIS — Z00.00 WELL ADULT EXAM: Primary | ICD-10-CM

## 2019-10-30 DIAGNOSIS — G89.29 CHRONIC LEFT HIP PAIN: ICD-10-CM

## 2019-10-30 PROBLEM — R00.0 HEART RATE FAST: Status: RESOLVED | Noted: 2019-06-18 | Resolved: 2019-10-30

## 2019-10-30 PROCEDURE — 99396 PREV VISIT EST AGE 40-64: CPT | Performed by: FAMILY MEDICINE

## 2019-10-30 RX ORDER — ALPRAZOLAM 0.25 MG/1
0.25 TABLET ORAL NIGHTLY PRN
Qty: 20 TAB | Refills: 0 | Status: ON HOLD | OUTPATIENT
Start: 2019-10-30 | End: 2019-11-21

## 2019-10-30 NOTE — PROGRESS NOTES
"cc: Annual exam      Subjective:     Kemi Silva is a 59 y.o. female presenting for the following:     Patient feels well today.  Is taking her simvastatin and Boniva regularly.  Does take trazodone at night. Patient denies any chest pain, shortness of breath, palpitations, swelling, or lightheadedness.    Left hip fracture: patient with continued pain in greater trochanter, and it was decided that she would have another surgery due to difficulty with healing.  She is excited about this as she has been in pain and has been limiting her functioning since the initial incident. No changes or worsening to this recently. No SOB, CP, history of DVT.     Anxiety: averaging 5 xanax per month.  She only takes this when she is having panic attack or really can't sleep.  Denies any oversedation, nighttime activity.    Review of systems:  All others reviewed and are negative.       Current Outpatient Medications:   •  ALPRAZolam (XANAX) 0.25 MG Tab, Take 1 Tab by mouth at bedtime as needed for Sleep for up to 30 days., Disp: 20 Tab, Rfl: 0  •  traZODone (DESYREL) 100 MG Tab, Take 2 Tabs by mouth every evening., Disp: 180 Tab, Rfl: 3  •  simvastatin (ZOCOR) 20 MG Tab, Take 1 Tab by mouth every day., Disp: 90 Tab, Rfl: 3  •  ibandronate (BONIVA) 150 MG tablet, Take 1 Tab by mouth every 30 days., Disp: 4 Tab, Rfl: 1    Allergies, past medical history, past surgical history, family history, social history reviewed and updated    Objective:     Vitals: /70 (BP Location: Left arm, Patient Position: Sitting)   Pulse 67   Temp 36.8 °C (98.3 °F) (Temporal)   Ht 1.702 m (5' 7\")   Wt 55.8 kg (123 lb)   SpO2 96%   BMI 19.26 kg/m²   General: Alert, pleasant, NAD  HEENT: Normocephalic.   EOMI, no icterus or pallor.   Neck supple.  No thyromegaly or masses palpated. No cervical or supraclavicular lymphadenopathy.  Heart: Regular rate and rhythm.  S1 and S2 normal.  No murmurs appreciated.  Respiratory: Normal respiratory " effort.  Clear to auscultation bilaterally.  Skin: Warm, dry, no rashes.  Musculoskeletal: Moves all extremities well.  Extremities: No leg edema.    Neurological: No tremors,  CN2-12 grossly intact  Psych:  Affect is normal,  grooming is appropriate.    Assessment/Plan:     Kemi was seen today for establish care.    Diagnoses and all orders for this visit:    Chronic hip pain: Patient can forward to revision surgery.  Low risk surgical patient.    Anxiety: Only takes very occasionally.  Patient is well aware of the risks of addiction and oversedation.  Does not take with alcohol or drive will refill medication.  Patient not interested in a daily medication  -     ALPRAZolam (XANAX) 0.25 MG Tab; Take 1 Tab by mouth at bedtime as needed for Sleep for up to 30 days.    Encounter for screening mammogram for breast cancer Denies breast pain or masses, breast skin changes, or nipple discharge.   -     MA-SCREENING MAMMO BILAT W/TOMOSYNTHESIS W/CAD; Future      Return in about 6 months (around 4/30/2020), or if symptoms worsen or fail to improve.

## 2019-11-04 ENCOUNTER — APPOINTMENT (OUTPATIENT)
Dept: ADMISSIONS | Facility: MEDICAL CENTER | Age: 59
DRG: 482 | End: 2019-11-04
Payer: COMMERCIAL

## 2019-11-06 PROBLEM — M25.552 CHRONIC LEFT HIP PAIN: Status: ACTIVE | Noted: 2019-11-06

## 2019-11-06 PROBLEM — G89.29 CHRONIC LEFT HIP PAIN: Status: ACTIVE | Noted: 2019-11-06

## 2019-11-11 ENCOUNTER — TELEPHONE (OUTPATIENT)
Dept: MEDICAL GROUP | Facility: PHYSICIAN GROUP | Age: 59
End: 2019-11-11

## 2019-11-11 NOTE — TELEPHONE ENCOUNTER
ALPRAZolam (XANAX) 0.25 MG Tab [[id number]]     Order Details   Dose: 0.25 mg Route: Oral Frequency: NIGHTLY PRN for Sleep   Dispense Quantity: 20 Tab Refills: 0 Fills remaining: --           Sig: Take 1 Tab by mouth at bedtime as needed for Sleep for up to 30 days.        Pt is requesting to get script filled in [facility]. She attempted to walk the hard copy and they did not accept it. Can I call it in for her? (after we check )

## 2019-11-11 NOTE — TELEPHONE ENCOUNTER
Last seen by PCP 1/18. Will send 6 months to pharmacy.    
Discontinue Regimen: \\n
Continue Regimen: Aczone (pt using as a spot treatment)\\nDoxycycline 20mg BID
Otc Regimen: Panoxyl 10% Acne Wash, daily to back in the shower
Detail Level: Zone

## 2019-11-18 DIAGNOSIS — Z01.812 PRE-OPERATIVE LABORATORY EXAMINATION: ICD-10-CM

## 2019-11-18 LAB
ANION GAP SERPL CALC-SCNC: 8 MMOL/L (ref 0–11.9)
BASOPHILS # BLD AUTO: 0.5 % (ref 0–1.8)
BASOPHILS # BLD: 0.03 K/UL (ref 0–0.12)
BUN SERPL-MCNC: 11 MG/DL (ref 8–22)
CALCIUM SERPL-MCNC: 9.1 MG/DL (ref 8.5–10.5)
CHLORIDE SERPL-SCNC: 106 MMOL/L (ref 96–112)
CO2 SERPL-SCNC: 26 MMOL/L (ref 20–33)
CREAT SERPL-MCNC: 0.76 MG/DL (ref 0.5–1.4)
EOSINOPHIL # BLD AUTO: 0.1 K/UL (ref 0–0.51)
EOSINOPHIL NFR BLD: 1.5 % (ref 0–6.9)
ERYTHROCYTE [DISTWIDTH] IN BLOOD BY AUTOMATED COUNT: 44.9 FL (ref 35.9–50)
GLUCOSE SERPL-MCNC: 88 MG/DL (ref 65–99)
HCT VFR BLD AUTO: 38.9 % (ref 37–47)
HGB BLD-MCNC: 12.6 G/DL (ref 12–16)
HIV 1+2 AB+HIV1 P24 AG SERPL QL IA: NON REACTIVE
IMM GRANULOCYTES # BLD AUTO: 0.02 K/UL (ref 0–0.11)
IMM GRANULOCYTES NFR BLD AUTO: 0.3 % (ref 0–0.9)
LYMPHOCYTES # BLD AUTO: 1.29 K/UL (ref 1–4.8)
LYMPHOCYTES NFR BLD: 19.6 % (ref 22–41)
MCH RBC QN AUTO: 28.9 PG (ref 27–33)
MCHC RBC AUTO-ENTMCNC: 32.4 G/DL (ref 33.6–35)
MCV RBC AUTO: 89.2 FL (ref 81.4–97.8)
MONOCYTES # BLD AUTO: 0.63 K/UL (ref 0–0.85)
MONOCYTES NFR BLD AUTO: 9.6 % (ref 0–13.4)
NEUTROPHILS # BLD AUTO: 4.5 K/UL (ref 2–7.15)
NEUTROPHILS NFR BLD: 68.5 % (ref 44–72)
NRBC # BLD AUTO: 0 K/UL
NRBC BLD-RTO: 0 /100 WBC
PLATELET # BLD AUTO: 235 K/UL (ref 164–446)
PMV BLD AUTO: 10.1 FL (ref 9–12.9)
POTASSIUM SERPL-SCNC: 4.3 MMOL/L (ref 3.6–5.5)
RBC # BLD AUTO: 4.36 M/UL (ref 4.2–5.4)
SCCMEC + MECA PNL NOSE NAA+PROBE: NEGATIVE
SCCMEC + MECA PNL NOSE NAA+PROBE: POSITIVE
SODIUM SERPL-SCNC: 140 MMOL/L (ref 135–145)
WBC # BLD AUTO: 6.6 K/UL (ref 4.8–10.8)

## 2019-11-18 PROCEDURE — 87641 MR-STAPH DNA AMP PROBE: CPT

## 2019-11-18 PROCEDURE — 87389 HIV-1 AG W/HIV-1&-2 AB AG IA: CPT

## 2019-11-18 PROCEDURE — 36415 COLL VENOUS BLD VENIPUNCTURE: CPT

## 2019-11-18 PROCEDURE — 87640 STAPH A DNA AMP PROBE: CPT

## 2019-11-18 PROCEDURE — 80048 BASIC METABOLIC PNL TOTAL CA: CPT

## 2019-11-18 PROCEDURE — 85025 COMPLETE CBC W/AUTO DIFF WBC: CPT

## 2019-11-18 RX ORDER — MAGNESIUM OXIDE 400 MG/1
400 TABLET ORAL DAILY
Status: ON HOLD | COMMUNITY
End: 2019-11-21

## 2019-11-20 ENCOUNTER — ANESTHESIA EVENT (OUTPATIENT)
Dept: SURGERY | Facility: MEDICAL CENTER | Age: 59
DRG: 482 | End: 2019-11-20
Payer: COMMERCIAL

## 2019-11-21 ENCOUNTER — APPOINTMENT (OUTPATIENT)
Dept: RADIOLOGY | Facility: MEDICAL CENTER | Age: 59
DRG: 482 | End: 2019-11-21
Attending: ORTHOPAEDIC SURGERY
Payer: COMMERCIAL

## 2019-11-21 ENCOUNTER — ANESTHESIA (OUTPATIENT)
Dept: SURGERY | Facility: MEDICAL CENTER | Age: 59
DRG: 482 | End: 2019-11-21
Payer: COMMERCIAL

## 2019-11-21 ENCOUNTER — HOSPITAL ENCOUNTER (INPATIENT)
Facility: MEDICAL CENTER | Age: 59
LOS: 1 days | DRG: 482 | End: 2019-11-21
Attending: ORTHOPAEDIC SURGERY | Admitting: ORTHOPAEDIC SURGERY
Payer: COMMERCIAL

## 2019-11-21 VITALS
OXYGEN SATURATION: 95 % | DIASTOLIC BLOOD PRESSURE: 52 MMHG | HEART RATE: 88 BPM | SYSTOLIC BLOOD PRESSURE: 102 MMHG | RESPIRATION RATE: 18 BRPM | BODY MASS INDEX: 17.94 KG/M2 | HEIGHT: 68 IN | TEMPERATURE: 97.5 F | WEIGHT: 118.39 LBS

## 2019-11-21 DIAGNOSIS — G89.29 CHRONIC LEFT HIP PAIN: ICD-10-CM

## 2019-11-21 DIAGNOSIS — M25.552 CHRONIC LEFT HIP PAIN: ICD-10-CM

## 2019-11-21 PROCEDURE — 700102 HCHG RX REV CODE 250 W/ 637 OVERRIDE(OP): Performed by: ANESTHESIOLOGY

## 2019-11-21 PROCEDURE — 700101 HCHG RX REV CODE 250: Performed by: ANESTHESIOLOGY

## 2019-11-21 PROCEDURE — 700102 HCHG RX REV CODE 250 W/ 637 OVERRIDE(OP): Performed by: ORTHOPAEDIC SURGERY

## 2019-11-21 PROCEDURE — C1713 ANCHOR/SCREW BN/BN,TIS/BN: HCPCS | Performed by: ORTHOPAEDIC SURGERY

## 2019-11-21 PROCEDURE — 700101 HCHG RX REV CODE 250: Performed by: ORTHOPAEDIC SURGERY

## 2019-11-21 PROCEDURE — 72170 X-RAY EXAM OF PELVIS: CPT

## 2019-11-21 PROCEDURE — 160046 HCHG PACU - 1ST 60 MINS PHASE II: Performed by: ORTHOPAEDIC SURGERY

## 2019-11-21 PROCEDURE — 160025 RECOVERY II MINUTES (STATS): Performed by: ORTHOPAEDIC SURGERY

## 2019-11-21 PROCEDURE — 160002 HCHG RECOVERY MINUTES (STAT): Performed by: ORTHOPAEDIC SURGERY

## 2019-11-21 PROCEDURE — 160036 HCHG PACU - EA ADDL 30 MINS PHASE I: Performed by: ORTHOPAEDIC SURGERY

## 2019-11-21 PROCEDURE — 160041 HCHG SURGERY MINUTES - EA ADDL 1 MIN LEVEL 4: Performed by: ORTHOPAEDIC SURGERY

## 2019-11-21 PROCEDURE — 700111 HCHG RX REV CODE 636 W/ 250 OVERRIDE (IP): Performed by: ANESTHESIOLOGY

## 2019-11-21 PROCEDURE — 700105 HCHG RX REV CODE 258: Performed by: ORTHOPAEDIC SURGERY

## 2019-11-21 PROCEDURE — 700111 HCHG RX REV CODE 636 W/ 250 OVERRIDE (IP)

## 2019-11-21 PROCEDURE — A6402 STERILE GAUZE <= 16 SQ IN: HCPCS | Performed by: ORTHOPAEDIC SURGERY

## 2019-11-21 PROCEDURE — 500002 HCHG ADHESIVE, DERMABOND: Performed by: ORTHOPAEDIC SURGERY

## 2019-11-21 PROCEDURE — 501838 HCHG SUTURE GENERAL: Performed by: ORTHOPAEDIC SURGERY

## 2019-11-21 PROCEDURE — 700111 HCHG RX REV CODE 636 W/ 250 OVERRIDE (IP): Performed by: ORTHOPAEDIC SURGERY

## 2019-11-21 PROCEDURE — 97161 PT EVAL LOW COMPLEX 20 MIN: CPT

## 2019-11-21 PROCEDURE — A4314 CATH W/DRAINAGE 2-WAY LATEX: HCPCS | Performed by: ORTHOPAEDIC SURGERY

## 2019-11-21 PROCEDURE — 306637 HCHG MISC ORTHO ITEM RC 0274

## 2019-11-21 PROCEDURE — A9270 NON-COVERED ITEM OR SERVICE: HCPCS | Performed by: ORTHOPAEDIC SURGERY

## 2019-11-21 PROCEDURE — 160035 HCHG PACU - 1ST 60 MINS PHASE I: Performed by: ORTHOPAEDIC SURGERY

## 2019-11-21 PROCEDURE — A9270 NON-COVERED ITEM OR SERVICE: HCPCS | Performed by: ANESTHESIOLOGY

## 2019-11-21 PROCEDURE — 160029 HCHG SURGERY MINUTES - 1ST 30 MINS LEVEL 4: Performed by: ORTHOPAEDIC SURGERY

## 2019-11-21 PROCEDURE — 0QH704Z INSERTION OF INTERNAL FIXATION DEVICE INTO LEFT UPPER FEMUR, OPEN APPROACH: ICD-10-PCS | Performed by: ORTHOPAEDIC SURGERY

## 2019-11-21 PROCEDURE — 0QP704Z REMOVAL OF INTERNAL FIXATION DEVICE FROM LEFT UPPER FEMUR, OPEN APPROACH: ICD-10-PCS | Performed by: ORTHOPAEDIC SURGERY

## 2019-11-21 PROCEDURE — 160009 HCHG ANES TIME/MIN: Performed by: ORTHOPAEDIC SURGERY

## 2019-11-21 PROCEDURE — 502578 HCHG PACK, TOTAL HIP: Performed by: ORTHOPAEDIC SURGERY

## 2019-11-21 PROCEDURE — 160048 HCHG OR STATISTICAL LEVEL 1-5: Performed by: ORTHOPAEDIC SURGERY

## 2019-11-21 DEVICE — SCREW SN 3.5X20MM CRTX ST LP - (2SFX5+2DRX8+1SFSCX5=31): Type: IMPLANTABLE DEVICE | Site: HIP | Status: FUNCTIONAL

## 2019-11-21 DEVICE — SCREW SN 3.5X28MM CRTX ST LP - (2SFX5+1SFSCX5=15): Type: IMPLANTABLE DEVICE | Site: HIP | Status: FUNCTIONAL

## 2019-11-21 DEVICE — SCREW SN 3.5X10MM CRTX ST LP - (2SFX5+2DRX8+1SFSCX5=31): Type: IMPLANTABLE DEVICE | Site: HIP | Status: FUNCTIONAL

## 2019-11-21 DEVICE — IMPLANTABLE DEVICE: Type: IMPLANTABLE DEVICE | Site: HIP | Status: FUNCTIONAL

## 2019-11-21 RX ORDER — AMOXICILLIN 250 MG
1 CAPSULE ORAL
Status: CANCELLED | OUTPATIENT
Start: 2019-11-21

## 2019-11-21 RX ORDER — VANCOMYCIN HYDROCHLORIDE 1 G/20ML
INJECTION, POWDER, LYOPHILIZED, FOR SOLUTION INTRAVENOUS PRN
Status: DISCONTINUED | OUTPATIENT
Start: 2019-11-21 | End: 2019-11-21 | Stop reason: SURG

## 2019-11-21 RX ORDER — LIDOCAINE HYDROCHLORIDE 10 MG/ML
INJECTION, SOLUTION EPIDURAL; INFILTRATION; INTRACAUDAL; PERINEURAL
Status: COMPLETED
Start: 2019-11-21 | End: 2019-11-21

## 2019-11-21 RX ORDER — IBANDRONATE SODIUM 150 MG/1
150 TABLET, FILM COATED ORAL
Status: CANCELLED | OUTPATIENT
Start: 2019-11-21

## 2019-11-21 RX ORDER — DEXAMETHASONE SODIUM PHOSPHATE 4 MG/ML
4 INJECTION, SOLUTION INTRA-ARTICULAR; INTRALESIONAL; INTRAMUSCULAR; INTRAVENOUS; SOFT TISSUE
Status: CANCELLED | OUTPATIENT
Start: 2019-11-21

## 2019-11-21 RX ORDER — BISACODYL 10 MG
10 SUPPOSITORY, RECTAL RECTAL
Status: CANCELLED | OUTPATIENT
Start: 2019-11-21

## 2019-11-21 RX ORDER — SODIUM CHLORIDE, SODIUM LACTATE, POTASSIUM CHLORIDE, CALCIUM CHLORIDE 600; 310; 30; 20 MG/100ML; MG/100ML; MG/100ML; MG/100ML
INJECTION, SOLUTION INTRAVENOUS CONTINUOUS
Status: DISCONTINUED | OUTPATIENT
Start: 2019-11-21 | End: 2019-11-21 | Stop reason: HOSPADM

## 2019-11-21 RX ORDER — HYDROMORPHONE HYDROCHLORIDE 1 MG/ML
0.1 INJECTION, SOLUTION INTRAMUSCULAR; INTRAVENOUS; SUBCUTANEOUS
Status: DISCONTINUED | OUTPATIENT
Start: 2019-11-21 | End: 2019-11-21 | Stop reason: HOSPADM

## 2019-11-21 RX ORDER — CEFAZOLIN SODIUM 2 G/100ML
2 INJECTION, SOLUTION INTRAVENOUS EVERY 8 HOURS
Status: CANCELLED | OUTPATIENT
Start: 2019-11-21 | End: 2019-11-22

## 2019-11-21 RX ORDER — CHLORPROMAZINE HYDROCHLORIDE 25 MG/ML
25 INJECTION INTRAMUSCULAR EVERY 6 HOURS PRN
Status: CANCELLED | OUTPATIENT
Start: 2019-11-21

## 2019-11-21 RX ORDER — HALOPERIDOL 5 MG/ML
1 INJECTION INTRAMUSCULAR
Status: DISCONTINUED | OUTPATIENT
Start: 2019-11-21 | End: 2019-11-21 | Stop reason: HOSPADM

## 2019-11-21 RX ORDER — OXYCODONE HCL 5 MG/5 ML
10 SOLUTION, ORAL ORAL
Status: COMPLETED | OUTPATIENT
Start: 2019-11-21 | End: 2019-11-21

## 2019-11-21 RX ORDER — GABAPENTIN 300 MG/1
600 CAPSULE ORAL
Status: COMPLETED | OUTPATIENT
Start: 2019-11-21 | End: 2019-11-21

## 2019-11-21 RX ORDER — CEFAZOLIN SODIUM 2 G/100ML
2 INJECTION, SOLUTION INTRAVENOUS
Status: DISCONTINUED | OUTPATIENT
Start: 2019-11-21 | End: 2019-11-21 | Stop reason: HOSPADM

## 2019-11-21 RX ORDER — POLYETHYLENE GLYCOL 3350 17 G/17G
1 POWDER, FOR SOLUTION ORAL 2 TIMES DAILY PRN
Status: CANCELLED | OUTPATIENT
Start: 2019-11-21

## 2019-11-21 RX ORDER — CHLORPROMAZINE HYDROCHLORIDE 25 MG/1
25 TABLET, FILM COATED ORAL EVERY 6 HOURS PRN
Status: CANCELLED | OUTPATIENT
Start: 2019-11-21

## 2019-11-21 RX ORDER — GABAPENTIN 300 MG/1
300 CAPSULE ORAL 3 TIMES DAILY
Status: CANCELLED | OUTPATIENT
Start: 2019-11-21

## 2019-11-21 RX ORDER — MEPERIDINE HYDROCHLORIDE 25 MG/ML
6.25 INJECTION INTRAMUSCULAR; INTRAVENOUS; SUBCUTANEOUS
Status: DISCONTINUED | OUTPATIENT
Start: 2019-11-21 | End: 2019-11-21 | Stop reason: HOSPADM

## 2019-11-21 RX ORDER — CEFAZOLIN SODIUM 1 G/3ML
INJECTION, POWDER, FOR SOLUTION INTRAMUSCULAR; INTRAVENOUS PRN
Status: DISCONTINUED | OUTPATIENT
Start: 2019-11-21 | End: 2019-11-21 | Stop reason: SURG

## 2019-11-21 RX ORDER — DIPHENHYDRAMINE HYDROCHLORIDE 50 MG/ML
25 INJECTION INTRAMUSCULAR; INTRAVENOUS EVERY 6 HOURS PRN
Status: CANCELLED | OUTPATIENT
Start: 2019-11-21

## 2019-11-21 RX ORDER — HYDROMORPHONE HYDROCHLORIDE 1 MG/ML
0.2 INJECTION, SOLUTION INTRAMUSCULAR; INTRAVENOUS; SUBCUTANEOUS
Status: DISCONTINUED | OUTPATIENT
Start: 2019-11-21 | End: 2019-11-21 | Stop reason: HOSPADM

## 2019-11-21 RX ORDER — HYDROCODONE BITARTRATE AND ACETAMINOPHEN 5; 325 MG/1; MG/1
1-2 TABLET ORAL EVERY 4 HOURS PRN
Qty: 30 TAB | Refills: 0 | Status: SHIPPED | OUTPATIENT
Start: 2019-11-21 | End: 2019-11-28

## 2019-11-21 RX ORDER — DEXAMETHASONE SODIUM PHOSPHATE 4 MG/ML
INJECTION, SOLUTION INTRA-ARTICULAR; INTRALESIONAL; INTRAMUSCULAR; INTRAVENOUS; SOFT TISSUE PRN
Status: DISCONTINUED | OUTPATIENT
Start: 2019-11-21 | End: 2019-11-21 | Stop reason: SURG

## 2019-11-21 RX ORDER — OXYCODONE HYDROCHLORIDE 10 MG/1
10 TABLET ORAL
Status: CANCELLED | OUTPATIENT
Start: 2019-11-21

## 2019-11-21 RX ORDER — OXYCODONE HCL 5 MG/5 ML
5 SOLUTION, ORAL ORAL
Status: COMPLETED | OUTPATIENT
Start: 2019-11-21 | End: 2019-11-21

## 2019-11-21 RX ORDER — SIMVASTATIN 20 MG
20 TABLET ORAL DAILY
Status: CANCELLED | OUTPATIENT
Start: 2019-11-21

## 2019-11-21 RX ORDER — DOCUSATE SODIUM 100 MG/1
100 CAPSULE, LIQUID FILLED ORAL 2 TIMES DAILY
Status: CANCELLED | OUTPATIENT
Start: 2019-11-21

## 2019-11-21 RX ORDER — TRAZODONE HYDROCHLORIDE 100 MG/1
200 TABLET ORAL EVERY EVENING
Status: CANCELLED | OUTPATIENT
Start: 2019-11-21

## 2019-11-21 RX ORDER — ONDANSETRON 2 MG/ML
INJECTION INTRAMUSCULAR; INTRAVENOUS PRN
Status: DISCONTINUED | OUTPATIENT
Start: 2019-11-21 | End: 2019-11-21 | Stop reason: SURG

## 2019-11-21 RX ORDER — TRAMADOL HYDROCHLORIDE 50 MG/1
50 TABLET ORAL EVERY 4 HOURS PRN
Qty: 40 TAB | Refills: 0 | Status: SHIPPED | OUTPATIENT
Start: 2019-11-21 | End: 2019-12-01

## 2019-11-21 RX ORDER — TRAMADOL HYDROCHLORIDE 50 MG/1
50 TABLET ORAL EVERY 4 HOURS PRN
Status: CANCELLED | OUTPATIENT
Start: 2019-11-21

## 2019-11-21 RX ORDER — DIPHENHYDRAMINE HYDROCHLORIDE 50 MG/ML
12.5 INJECTION INTRAMUSCULAR; INTRAVENOUS
Status: DISCONTINUED | OUTPATIENT
Start: 2019-11-21 | End: 2019-11-21 | Stop reason: HOSPADM

## 2019-11-21 RX ORDER — DEXAMETHASONE SODIUM PHOSPHATE 4 MG/ML
10 INJECTION, SOLUTION INTRA-ARTICULAR; INTRALESIONAL; INTRAMUSCULAR; INTRAVENOUS; SOFT TISSUE ONCE
Status: CANCELLED | OUTPATIENT
Start: 2019-11-22 | End: 2019-11-22

## 2019-11-21 RX ORDER — LABETALOL HYDROCHLORIDE 5 MG/ML
5 INJECTION, SOLUTION INTRAVENOUS
Status: DISCONTINUED | OUTPATIENT
Start: 2019-11-21 | End: 2019-11-21 | Stop reason: HOSPADM

## 2019-11-21 RX ORDER — HYDRALAZINE HYDROCHLORIDE 20 MG/ML
5 INJECTION INTRAMUSCULAR; INTRAVENOUS
Status: DISCONTINUED | OUTPATIENT
Start: 2019-11-21 | End: 2019-11-21 | Stop reason: HOSPADM

## 2019-11-21 RX ORDER — ENEMA 19; 7 G/133ML; G/133ML
1 ENEMA RECTAL
Status: CANCELLED | OUTPATIENT
Start: 2019-11-21

## 2019-11-21 RX ORDER — CELECOXIB 200 MG/1
200 CAPSULE ORAL ONCE
Status: COMPLETED | OUTPATIENT
Start: 2019-11-21 | End: 2019-11-21

## 2019-11-21 RX ORDER — AMOXICILLIN 250 MG
1 CAPSULE ORAL NIGHTLY
Status: CANCELLED | OUTPATIENT
Start: 2019-11-21

## 2019-11-21 RX ORDER — ACETAMINOPHEN 500 MG
1000 TABLET ORAL EVERY 6 HOURS
Status: CANCELLED | OUTPATIENT
Start: 2019-11-21 | End: 2019-11-26

## 2019-11-21 RX ORDER — DIPHENHYDRAMINE HCL 25 MG
25 TABLET ORAL EVERY 6 HOURS PRN
Status: CANCELLED | OUTPATIENT
Start: 2019-11-21

## 2019-11-21 RX ORDER — ALPRAZOLAM 0.25 MG/1
0.25 TABLET ORAL NIGHTLY PRN
Status: CANCELLED | OUTPATIENT
Start: 2019-11-21

## 2019-11-21 RX ORDER — TRANEXAMIC ACID 100 MG/ML
INJECTION, SOLUTION INTRAVENOUS PRN
Status: DISCONTINUED | OUTPATIENT
Start: 2019-11-21 | End: 2019-11-21 | Stop reason: SURG

## 2019-11-21 RX ORDER — HYDROMORPHONE HYDROCHLORIDE 1 MG/ML
0.5 INJECTION, SOLUTION INTRAMUSCULAR; INTRAVENOUS; SUBCUTANEOUS
Status: CANCELLED | OUTPATIENT
Start: 2019-11-21

## 2019-11-21 RX ORDER — SCOLOPAMINE TRANSDERMAL SYSTEM 1 MG/1
1 PATCH, EXTENDED RELEASE TRANSDERMAL
Status: CANCELLED | OUTPATIENT
Start: 2019-11-21

## 2019-11-21 RX ORDER — DIPHENHYDRAMINE HCL 25 MG
25 TABLET ORAL NIGHTLY PRN
Status: CANCELLED | OUTPATIENT
Start: 2019-11-22

## 2019-11-21 RX ORDER — BUPIVACAINE HYDROCHLORIDE AND EPINEPHRINE 2.5; 5 MG/ML; UG/ML
INJECTION, SOLUTION EPIDURAL; INFILTRATION; INTRACAUDAL; PERINEURAL
Status: DISCONTINUED | OUTPATIENT
Start: 2019-11-21 | End: 2019-11-21 | Stop reason: HOSPADM

## 2019-11-21 RX ORDER — ONDANSETRON 2 MG/ML
4 INJECTION INTRAMUSCULAR; INTRAVENOUS EVERY 4 HOURS PRN
Status: CANCELLED | OUTPATIENT
Start: 2019-11-21

## 2019-11-21 RX ORDER — HYDROMORPHONE HYDROCHLORIDE 2 MG/ML
INJECTION, SOLUTION INTRAMUSCULAR; INTRAVENOUS; SUBCUTANEOUS PRN
Status: DISCONTINUED | OUTPATIENT
Start: 2019-11-21 | End: 2019-11-21 | Stop reason: SURG

## 2019-11-21 RX ORDER — MIDAZOLAM HYDROCHLORIDE 1 MG/ML
INJECTION INTRAMUSCULAR; INTRAVENOUS PRN
Status: DISCONTINUED | OUTPATIENT
Start: 2019-11-21 | End: 2019-11-21 | Stop reason: SURG

## 2019-11-21 RX ORDER — HALOPERIDOL 5 MG/ML
1 INJECTION INTRAMUSCULAR EVERY 6 HOURS PRN
Status: CANCELLED | OUTPATIENT
Start: 2019-11-21

## 2019-11-21 RX ORDER — LIDOCAINE HYDROCHLORIDE 10 MG/ML
INJECTION, SOLUTION EPIDURAL; INFILTRATION; INTRACAUDAL; PERINEURAL
Status: DISCONTINUED
Start: 2019-11-21 | End: 2019-11-21 | Stop reason: HOSPADM

## 2019-11-21 RX ORDER — KETOROLAC TROMETHAMINE 30 MG/ML
INJECTION, SOLUTION INTRAMUSCULAR; INTRAVENOUS
Status: DISCONTINUED | OUTPATIENT
Start: 2019-11-21 | End: 2019-11-21 | Stop reason: HOSPADM

## 2019-11-21 RX ORDER — HYDROMORPHONE HYDROCHLORIDE 1 MG/ML
0.4 INJECTION, SOLUTION INTRAMUSCULAR; INTRAVENOUS; SUBCUTANEOUS
Status: DISCONTINUED | OUTPATIENT
Start: 2019-11-21 | End: 2019-11-21 | Stop reason: HOSPADM

## 2019-11-21 RX ORDER — ONDANSETRON 2 MG/ML
4 INJECTION INTRAMUSCULAR; INTRAVENOUS
Status: COMPLETED | OUTPATIENT
Start: 2019-11-21 | End: 2019-11-21

## 2019-11-21 RX ORDER — KETAMINE HYDROCHLORIDE 50 MG/ML
INJECTION, SOLUTION INTRAMUSCULAR; INTRAVENOUS PRN
Status: DISCONTINUED | OUTPATIENT
Start: 2019-11-21 | End: 2019-11-21 | Stop reason: SURG

## 2019-11-21 RX ORDER — MIDAZOLAM HYDROCHLORIDE 1 MG/ML
1 INJECTION INTRAMUSCULAR; INTRAVENOUS
Status: DISCONTINUED | OUTPATIENT
Start: 2019-11-21 | End: 2019-11-21 | Stop reason: HOSPADM

## 2019-11-21 RX ORDER — MAGNESIUM OXIDE 400 MG/1
400 TABLET ORAL DAILY
Status: CANCELLED | OUTPATIENT
Start: 2019-11-21

## 2019-11-21 RX ORDER — OXYCODONE HYDROCHLORIDE 5 MG/1
5 TABLET ORAL
Status: CANCELLED | OUTPATIENT
Start: 2019-11-21

## 2019-11-21 RX ORDER — ACETAMINOPHEN 500 MG
1000 TABLET ORAL ONCE
Status: COMPLETED | OUTPATIENT
Start: 2019-11-21 | End: 2019-11-21

## 2019-11-21 RX ADMIN — SODIUM CHLORIDE, POTASSIUM CHLORIDE, SODIUM LACTATE AND CALCIUM CHLORIDE: 600; 310; 30; 20 INJECTION, SOLUTION INTRAVENOUS at 06:39

## 2019-11-21 RX ADMIN — CELECOXIB 200 MG: 200 CAPSULE ORAL at 06:11

## 2019-11-21 RX ADMIN — TRANEXAMIC ACID 1000 MG: 100 INJECTION, SOLUTION INTRAVENOUS at 08:11

## 2019-11-21 RX ADMIN — CEFAZOLIN 2 G: 330 INJECTION, POWDER, FOR SOLUTION INTRAMUSCULAR; INTRAVENOUS at 07:04

## 2019-11-21 RX ADMIN — TRANEXAMIC ACID 1000 MG: 100 INJECTION, SOLUTION INTRAVENOUS at 07:04

## 2019-11-21 RX ADMIN — FENTANYL CITRATE 25 MCG: 50 INJECTION, SOLUTION INTRAMUSCULAR; INTRAVENOUS at 09:27

## 2019-11-21 RX ADMIN — LIDOCAINE HYDROCHLORIDE 0.3 ML: 10 INJECTION, SOLUTION EPIDURAL; INFILTRATION; INTRACAUDAL; PERINEURAL at 06:39

## 2019-11-21 RX ADMIN — FENTANYL CITRATE 25 MCG: 50 INJECTION, SOLUTION INTRAMUSCULAR; INTRAVENOUS at 09:14

## 2019-11-21 RX ADMIN — ONDANSETRON 4 MG: 2 INJECTION INTRAMUSCULAR; INTRAVENOUS at 12:42

## 2019-11-21 RX ADMIN — FENTANYL CITRATE 25 MCG: 50 INJECTION, SOLUTION INTRAMUSCULAR; INTRAVENOUS at 09:05

## 2019-11-21 RX ADMIN — HYDROMORPHONE HYDROCHLORIDE 1 MG: 2 INJECTION, SOLUTION INTRAMUSCULAR; INTRAVENOUS; SUBCUTANEOUS at 07:04

## 2019-11-21 RX ADMIN — ONDANSETRON 4 MG: 2 INJECTION INTRAMUSCULAR; INTRAVENOUS at 07:44

## 2019-11-21 RX ADMIN — PROPOFOL 50 MG: 10 INJECTION, EMULSION INTRAVENOUS at 07:40

## 2019-11-21 RX ADMIN — DEXAMETHASONE SODIUM PHOSPHATE 4 MG: 4 INJECTION, SOLUTION INTRA-ARTICULAR; INTRALESIONAL; INTRAMUSCULAR; INTRAVENOUS; SOFT TISSUE at 07:04

## 2019-11-21 RX ADMIN — ACETAMINOPHEN 1000 MG: 500 TABLET ORAL at 06:11

## 2019-11-21 RX ADMIN — FENTANYL CITRATE 25 MCG: 50 INJECTION, SOLUTION INTRAMUSCULAR; INTRAVENOUS at 10:12

## 2019-11-21 RX ADMIN — Medication 0.3 ML: at 06:39

## 2019-11-21 RX ADMIN — OXYCODONE HYDROCHLORIDE 10 MG: 5 SOLUTION ORAL at 09:06

## 2019-11-21 RX ADMIN — KETAMINE HYDROCHLORIDE 25 MG: 50 INJECTION, SOLUTION INTRAMUSCULAR; INTRAVENOUS at 07:04

## 2019-11-21 RX ADMIN — GABAPENTIN 600 MG: 300 CAPSULE ORAL at 06:11

## 2019-11-21 RX ADMIN — VANCOMYCIN HYDROCHLORIDE 1 G: 1 INJECTION, POWDER, LYOPHILIZED, FOR SOLUTION INTRAVENOUS at 06:50

## 2019-11-21 RX ADMIN — TRANEXAMIC ACID 1000 MG: 100 INJECTION, SOLUTION INTRAVENOUS at 09:46

## 2019-11-21 RX ADMIN — MIDAZOLAM 2 MG: 1 INJECTION INTRAMUSCULAR; INTRAVENOUS at 07:00

## 2019-11-21 RX ADMIN — PROPOFOL 150 MG: 10 INJECTION, EMULSION INTRAVENOUS at 07:04

## 2019-11-21 RX ADMIN — EPHEDRINE SULFATE 20 MG: 50 INJECTION, SOLUTION INTRAVENOUS at 08:00

## 2019-11-21 ASSESSMENT — PAIN SCALES - GENERAL: PAIN_LEVEL: 2

## 2019-11-21 ASSESSMENT — GAIT ASSESSMENTS
ASSISTIVE DEVICE: FRONT WHEEL WALKER
GAIT LEVEL OF ASSIST: SUPERVISED
DISTANCE (FEET): 40

## 2019-11-21 ASSESSMENT — COGNITIVE AND FUNCTIONAL STATUS - GENERAL
MOBILITY SCORE: 24
SUGGESTED CMS G CODE MODIFIER MOBILITY: CH

## 2019-11-21 NOTE — ANESTHESIA PREPROCEDURE EVALUATION
54 yo for Left RUDDY    Relevant Problems   Other   (+) Acute midline thoracic back pain   (+) Anxiety   (+) Chronic left hip pain   (+) Hyperlipidemia   (+) Insomnia   (+) Menopause   (+) Osteopenia       Physical Exam    Airway   Mallampati: II  TM distance: >3 FB  Neck ROM: full       Cardiovascular - normal exam  Rhythm: regular  Rate: normal  (-) murmur     Dental - normal exam         Pulmonary - normal exam  Breath sounds clear to auscultation     Abdominal   (+) scaphoid  Abdomen: soft  Bowel sounds: normal   Neurological - normal exam               Anesthesia Plan    ASA 2       Plan - general       Airway plan will be LMA        Induction: intravenous    Postoperative Plan: Postoperative administration of opioids is intended.    Pertinent diagnostic labs and testing reviewed    Informed Consent:    Anesthetic plan and risks discussed with patient.    Use of blood products discussed with: patient whom consented to blood products.

## 2019-11-21 NOTE — THERAPY
"Physical Therapy Evaluation    Bed Mobility:  Supine to Sit: Independent  Transfers: Sit to Stand: Independent  Gait: Level Of Assist: Supervised with ADIA x40ft in the PACU hallway   Plan of Care: no further acute care PT warranted  Discharge Recommendations: Pt has all necessary DME and is safe to discharge home with her spouse    pt admitted for an elective surgery for hardware removal to the [facility] after having a THR 1 year ago. She was evaluated in the PACU due to being ready for discharge from recovery. On arrival she was awake and alert, spouse at the bedside. Pt did not demonstrate any movement impairments that warrant ongoing PT in the acute setting. She will likely return to her baseline spontaneously with adequate healing time. Pt has all DME and is safe to return home from a PT standpoint    Tara Madera, PT  Pager [phone]    See \"Rehab Therapy-Acute\" Patient Summary Report for complete documentation.     "

## 2019-11-21 NOTE — DISCHARGE INSTRUCTIONS
ACTIVITY: Rest and take it easy for the first 24 hours.  A responsible adult is recommended to remain with you during that time.  It is normal to feel sleepy.  We encourage you to not do anything that requires balance, judgment or coordination.    MILD FLU-LIKE SYMPTOMS ARE NORMAL. YOU MAY EXPERIENCE GENERALIZED MUSCLE ACHES, THROAT IRRITATION, HEADACHE AND/OR SOME NAUSEA.    FOR 24 HOURS DO NOT:  Drive, operate machinery or run household appliances.  Drink beer or alcoholic beverages.   Make important decisions or sign legal documents.    SPECIAL INSTRUCTIONS:   Weight bearing as tolerated on operative extremity   Remove dressing in 7 days    DIET: To avoid nausea, slowly advance diet as tolerated, avoiding spicy or greasy foods for the first day.  Add more substantial food to your diet according to your physician's instructions.  INCREASE FLUIDS AND FIBER TO AVOID CONSTIPATION.    SURGICAL DRESSING/BATHING: Remove dressing in 7 days. You may shower.    FOLLOW-UP APPOINTMENT:  A follow-up appointment should be arranged with your doctor; call to schedule.    You should CALL YOUR PHYSICIAN if you develop:  Fever greater than 101 degrees F.  Pain not relieved by medication, or persistent nausea or vomiting.  Excessive bleeding (blood soaking through dressing) or unexpected drainage from the wound.  Extreme redness or swelling around the incision site, drainage of pus or foul smelling drainage.  Inability to urinate or empty your bladder within 8 hours.  Problems with breathing or chest pain.    You should call 911 if you develop problems with breathing or chest pain.  If you are unable to contact your doctor or surgical center, you should go to the nearest emergency room or urgent care center.  Physician's telephone #: [phone]    If any questions arise, call your doctor.  If your doctor is not available, please feel free to call the Surgical Center at [phone].  The Center is open Monday through Friday  from 7AM to 7PM.  You can also call the HEALTH HOTLINE open 24 hours/day, 7 days/week and speak to a nurse at [phone], or toll free at [phone].    A registered nurse may call you a few days after your surgery to see how you are doing after your procedure.    MEDICATIONS: Resume taking daily medication.  Take prescribed pain medication with food.  If no medication is prescribed, you may take non-aspirin pain medication if needed.  PAIN MEDICATION CAN BE VERY CONSTIPATING.  Take a stool softener or laxative such as senokot, pericolace, or milk of magnesia if needed.    Prescription given for Ultram and Norco.  Last pain medication (10 mg oxycodone) given at 9:06 am.     If your physician has prescribed pain medication that includes Acetaminophen (Tylenol), do not take additional Acetaminophen (Tylenol) while taking the prescribed medication.    Depression / Suicide Risk    As you are discharged from this [facility] facility, it is important to learn how to keep safe from harming yourself.    Recognize the warning signs:  · Abrupt changes in personality, positive or negative- including increase in energy   · Giving away possessions  · Change in eating patterns- significant weight changes-  positive or negative  · Change in sleeping patterns- unable to sleep or sleeping all the time   · Unwillingness or inability to communicate  · Depression  · Unusual sadness, discouragement and loneliness  · Talk of wanting to die  · Neglect of personal appearance   · Rebelliousness- reckless behavior  · Withdrawal from people/activities they love  · Confusion- inability to concentrate     If you or a loved one observes any of these behaviors or has concerns about self-harm, here's what you can do:  · Talk about it- your feelings and reasons for harming yourself  · Remove any means that you might use to hurt yourself (examples: pills, rope, extension cords, firearm)  · Get professional help from the community (Mental  Health, Substance Abuse, psychological counseling)  · Do not be alone:Call your Safe Contact- someone whom you trust who will be there for you.  · Call your local CRISIS HOTLINE 711-7357 or 318-005-8597  · Call your local Children's Mobile Crisis Response Team [place] (519) 948-9103 or www.[employer]  · Call the toll free National Suicide Prevention Hotlines   · National Suicide Prevention Lifeline 012-627-ZJXN (6665)  · National Hope Line Network 800-SUICIDE (223-2669)

## 2019-11-21 NOTE — ANESTHESIA POSTPROCEDURE EVALUATION
Patient: Kemi Silva    Procedure Summary     Date:  11/21/19 Room / Location:  [place] [address] / SURGERY [facility]    Anesthesia Start:  0700 Anesthesia Stop:  0849    Procedure:  ORIF, HIP greater trochanter (Left Hip) Diagnosis:  (Fracture of the left greater  trochanter)    Surgeon:  Julian Mendes M.D. Responsible Provider:  Jass Castañeda M.D.    Anesthesia Type:  general ASA Status:  2          Final Anesthesia Type: general  Last vitals  BP   Blood Pressure: 101/65, NIBP: 123/80    Temp   36.2 °C (97.1 °F)    Pulse   Pulse: 61   Resp   (!) 5    SpO2   100 %      Anesthesia Post Evaluation    Patient location during evaluation: PACU  Patient participation: complete - patient participated  Level of consciousness: sleepy but conscious  Pain score: 2    Airway patency: patent  Anesthetic complications: no  Cardiovascular status: hemodynamically stable  Respiratory status: acceptable  Hydration status: euvolemic    PONV: none

## 2019-11-21 NOTE — ANESTHESIA TIME REPORT
Anesthesia Start and Stop Event Times     Date Time Event    11/21/2019 0641 Ready for Procedure     0700 Anesthesia Start     0849 Anesthesia Stop        Responsible Staff  11/21/19    Name Role Begin End    Jass Castañeda M.D. Anesth 0700 0849        Preop Diagnosis (Free Text):  Pre-op Diagnosis     Fracture of the left greater trochanter        Preop Diagnosis (Codes):    Post op Diagnosis  Displaced fracture of greater trochanter of left femur, initial encounter for closed fracture      Premium Reason  Non-Premium    Comments:

## 2019-11-21 NOTE — ANESTHESIA PROCEDURE NOTES
Airway  Date/Time: 11/21/2019 7:05 AM  Performed by: Jass Castañeda M.D.  Authorized by: Jass Castañeda M.D.     Location:  OR  Urgency:  Elective  Indications for Airway Management:  Anesthesia  Spontaneous Ventilation: absent    Sedation Level:  Deep  Preoxygenated: Yes    MILS Maintained Throughout: No    Mask Difficulty Assessment:  1 - vent by mask  Final Airway Type:  Supraglottic airway  Final Supraglottic Airway:  Standard LMA  SGA Size:  3  Number of Attempts at Approach:  1

## 2019-11-21 NOTE — PROGRESS NOTES
1247- Patient arrived in PACU II alert and oriented. Vital signs are within normal limits for the patient. Patient reports pain 4/10 and it is tolerable at this time. Dressing is clean, dry, and intact. Discussed discharge plan of care and patient expressed understanding. All needs met at this time.    1255- Provided patient is with discharge education with , Myron, at bedside. All questions answered.    1300- Patient feels ready to be discharged and meets discharge criteria set by Dr. Mendes. Patient is going to get dressed.    1310- PIV removed and patient discharged home via wheelchair.

## 2019-11-21 NOTE — OR NURSING
updated by phone.Belongings taken from locker and placed on gurney with pt. 1-bag and 1 walker.Glasses on pt.  
Abnormal labs called and faxed to dr michael  
Amb with PT and walker, steady gait.Dr Mendes in. OK to DC home.  
Taking PO juice. LR @ 100 ml hr. Ice to left hip.  
The pt has eaten box lunch. PT has been called to see pt here in PACU.  
No

## 2020-02-21 DIAGNOSIS — Z01.812 PRE-OPERATIVE LABORATORY EXAMINATION: Primary | ICD-10-CM

## 2020-02-21 DIAGNOSIS — Z01.810 PRE-OPERATIVE CARDIOVASCULAR EXAMINATION: ICD-10-CM

## 2020-02-21 LAB
ANION GAP SERPL CALC-SCNC: 8 MMOL/L (ref 0–11.9)
BASOPHILS # BLD AUTO: 0.7 % (ref 0–1.8)
BASOPHILS # BLD: 0.05 K/UL (ref 0–0.12)
BUN SERPL-MCNC: 19 MG/DL (ref 8–22)
CALCIUM SERPL-MCNC: 9 MG/DL (ref 8.5–10.5)
CHLORIDE SERPL-SCNC: 104 MMOL/L (ref 96–112)
CO2 SERPL-SCNC: 26 MMOL/L (ref 20–33)
CREAT SERPL-MCNC: 0.86 MG/DL (ref 0.5–1.4)
EOSINOPHIL # BLD AUTO: 0.22 K/UL (ref 0–0.51)
EOSINOPHIL NFR BLD: 3.2 % (ref 0–6.9)
ERYTHROCYTE [DISTWIDTH] IN BLOOD BY AUTOMATED COUNT: 47.8 FL (ref 35.9–50)
GLUCOSE SERPL-MCNC: 106 MG/DL (ref 65–99)
HCT VFR BLD AUTO: 36.7 % (ref 37–47)
HGB BLD-MCNC: 12 G/DL (ref 12–16)
IMM GRANULOCYTES # BLD AUTO: 0.03 K/UL (ref 0–0.11)
IMM GRANULOCYTES NFR BLD AUTO: 0.4 % (ref 0–0.9)
LYMPHOCYTES # BLD AUTO: 1.35 K/UL (ref 1–4.8)
LYMPHOCYTES NFR BLD: 19.9 % (ref 22–41)
MCH RBC QN AUTO: 28 PG (ref 27–33)
MCHC RBC AUTO-ENTMCNC: 32.7 G/DL (ref 33.6–35)
MCV RBC AUTO: 85.5 FL (ref 81.4–97.8)
MONOCYTES # BLD AUTO: 0.63 K/UL (ref 0–0.85)
MONOCYTES NFR BLD AUTO: 9.3 % (ref 0–13.4)
NEUTROPHILS # BLD AUTO: 4.5 K/UL (ref 2–7.15)
NEUTROPHILS NFR BLD: 66.5 % (ref 44–72)
NRBC # BLD AUTO: 0 K/UL
NRBC BLD-RTO: 0 /100 WBC
PLATELET # BLD AUTO: 249 K/UL (ref 164–446)
PMV BLD AUTO: 10 FL (ref 9–12.9)
POTASSIUM SERPL-SCNC: 4 MMOL/L (ref 3.6–5.5)
RBC # BLD AUTO: 4.29 M/UL (ref 4.2–5.4)
SODIUM SERPL-SCNC: 138 MMOL/L (ref 135–145)
WBC # BLD AUTO: 6.8 K/UL (ref 4.8–10.8)

## 2020-02-21 PROCEDURE — 80048 BASIC METABOLIC PNL TOTAL CA: CPT

## 2020-02-21 PROCEDURE — 93005 ELECTROCARDIOGRAM TRACING: CPT

## 2020-02-21 PROCEDURE — 36415 COLL VENOUS BLD VENIPUNCTURE: CPT

## 2020-02-21 PROCEDURE — 85025 COMPLETE CBC W/AUTO DIFF WBC: CPT

## 2020-02-21 RX ORDER — M-VIT,TX,IRON,MINS/CALC/FOLIC 27MG-0.4MG
1 TABLET ORAL EVERY EVENING
COMMUNITY

## 2020-02-21 RX ORDER — TRAMADOL HYDROCHLORIDE 50 MG/1
50 TABLET ORAL EVERY 4 HOURS PRN
COMMUNITY
End: 2020-10-28

## 2020-02-21 RX ORDER — SIMVASTATIN 20 MG
20 TABLET ORAL NIGHTLY
COMMUNITY
End: 2020-09-11 | Stop reason: SDUPTHER

## 2020-02-21 RX ORDER — IBUPROFEN 200 MG
800 TABLET ORAL EVERY 6 HOURS PRN
COMMUNITY

## 2020-02-21 RX ORDER — TRAZODONE HYDROCHLORIDE 100 MG/1
100 TABLET ORAL NIGHTLY PRN
COMMUNITY
End: 2020-09-11 | Stop reason: SDUPTHER

## 2020-02-22 LAB — EKG IMPRESSION: NORMAL

## 2020-02-22 PROCEDURE — 93010 ELECTROCARDIOGRAM REPORT: CPT | Performed by: INTERNAL MEDICINE

## 2020-03-05 ENCOUNTER — ANESTHESIA (OUTPATIENT)
Dept: SURGERY | Facility: MEDICAL CENTER | Age: 60
DRG: 481 | End: 2020-03-05
Payer: COMMERCIAL

## 2020-03-05 ENCOUNTER — APPOINTMENT (OUTPATIENT)
Dept: RADIOLOGY | Facility: MEDICAL CENTER | Age: 60
DRG: 481 | End: 2020-03-05
Attending: ORTHOPAEDIC SURGERY
Payer: COMMERCIAL

## 2020-03-05 ENCOUNTER — ANESTHESIA EVENT (OUTPATIENT)
Dept: SURGERY | Facility: MEDICAL CENTER | Age: 60
DRG: 481 | End: 2020-03-05
Payer: COMMERCIAL

## 2020-03-05 ENCOUNTER — HOSPITAL ENCOUNTER (INPATIENT)
Facility: MEDICAL CENTER | Age: 60
LOS: 1 days | DRG: 481 | End: 2020-03-05
Attending: ORTHOPAEDIC SURGERY | Admitting: ORTHOPAEDIC SURGERY
Payer: COMMERCIAL

## 2020-03-05 VITALS
TEMPERATURE: 98.4 F | DIASTOLIC BLOOD PRESSURE: 76 MMHG | OXYGEN SATURATION: 94 % | BODY MASS INDEX: 16.77 KG/M2 | HEIGHT: 68 IN | SYSTOLIC BLOOD PRESSURE: 133 MMHG | HEART RATE: 93 BPM | WEIGHT: 110.67 LBS | RESPIRATION RATE: 18 BRPM

## 2020-03-05 DIAGNOSIS — M25.552 CHRONIC LEFT HIP PAIN: ICD-10-CM

## 2020-03-05 DIAGNOSIS — G89.29 CHRONIC LEFT HIP PAIN: ICD-10-CM

## 2020-03-05 PROCEDURE — 500367 HCHG DRAIN KIT, HEMOVAC: Performed by: ORTHOPAEDIC SURGERY

## 2020-03-05 PROCEDURE — 700111 HCHG RX REV CODE 636 W/ 250 OVERRIDE (IP): Performed by: STUDENT IN AN ORGANIZED HEALTH CARE EDUCATION/TRAINING PROGRAM

## 2020-03-05 PROCEDURE — 500447 HCHG DRESSING, TEGADERM 8X12: Performed by: ORTHOPAEDIC SURGERY

## 2020-03-05 PROCEDURE — 700101 HCHG RX REV CODE 250: Performed by: ORTHOPAEDIC SURGERY

## 2020-03-05 PROCEDURE — 501838 HCHG SUTURE GENERAL: Performed by: ORTHOPAEDIC SURGERY

## 2020-03-05 PROCEDURE — 700105 HCHG RX REV CODE 258: Performed by: ORTHOPAEDIC SURGERY

## 2020-03-05 PROCEDURE — 0QP704Z REMOVAL OF INTERNAL FIXATION DEVICE FROM LEFT UPPER FEMUR, OPEN APPROACH: ICD-10-PCS | Performed by: ORTHOPAEDIC SURGERY

## 2020-03-05 PROCEDURE — 700102 HCHG RX REV CODE 250 W/ 637 OVERRIDE(OP): Performed by: ORTHOPAEDIC SURGERY

## 2020-03-05 PROCEDURE — A9270 NON-COVERED ITEM OR SERVICE: HCPCS | Performed by: STUDENT IN AN ORGANIZED HEALTH CARE EDUCATION/TRAINING PROGRAM

## 2020-03-05 PROCEDURE — 0QS704Z REPOSITION LEFT UPPER FEMUR WITH INTERNAL FIXATION DEVICE, OPEN APPROACH: ICD-10-PCS | Performed by: ORTHOPAEDIC SURGERY

## 2020-03-05 PROCEDURE — 160035 HCHG PACU - 1ST 60 MINS PHASE I: Performed by: ORTHOPAEDIC SURGERY

## 2020-03-05 PROCEDURE — 700102 HCHG RX REV CODE 250 W/ 637 OVERRIDE(OP): Performed by: STUDENT IN AN ORGANIZED HEALTH CARE EDUCATION/TRAINING PROGRAM

## 2020-03-05 PROCEDURE — A9270 NON-COVERED ITEM OR SERVICE: HCPCS | Performed by: ORTHOPAEDIC SURGERY

## 2020-03-05 PROCEDURE — 160029 HCHG SURGERY MINUTES - 1ST 30 MINS LEVEL 4: Performed by: ORTHOPAEDIC SURGERY

## 2020-03-05 PROCEDURE — 72170 X-RAY EXAM OF PELVIS: CPT

## 2020-03-05 PROCEDURE — C1713 ANCHOR/SCREW BN/BN,TIS/BN: HCPCS | Performed by: ORTHOPAEDIC SURGERY

## 2020-03-05 PROCEDURE — 700111 HCHG RX REV CODE 636 W/ 250 OVERRIDE (IP): Performed by: ORTHOPAEDIC SURGERY

## 2020-03-05 PROCEDURE — 500891 HCHG PACK, ORTHO MAJOR: Performed by: ORTHOPAEDIC SURGERY

## 2020-03-05 PROCEDURE — A6223 GAUZE >16<=48 NO W/SAL W/O B: HCPCS | Performed by: ORTHOPAEDIC SURGERY

## 2020-03-05 PROCEDURE — 306637 HCHG MISC ORTHO ITEM RC 0274

## 2020-03-05 PROCEDURE — 160025 RECOVERY II MINUTES (STATS): Performed by: ORTHOPAEDIC SURGERY

## 2020-03-05 PROCEDURE — 160036 HCHG PACU - EA ADDL 30 MINS PHASE I: Performed by: ORTHOPAEDIC SURGERY

## 2020-03-05 PROCEDURE — 160046 HCHG PACU - 1ST 60 MINS PHASE II: Performed by: ORTHOPAEDIC SURGERY

## 2020-03-05 PROCEDURE — 502000 HCHG MISC OR IMPLANTS RC 0278: Performed by: ORTHOPAEDIC SURGERY

## 2020-03-05 PROCEDURE — 160041 HCHG SURGERY MINUTES - EA ADDL 1 MIN LEVEL 4: Performed by: ORTHOPAEDIC SURGERY

## 2020-03-05 PROCEDURE — 160009 HCHG ANES TIME/MIN: Performed by: ORTHOPAEDIC SURGERY

## 2020-03-05 PROCEDURE — 500423 HCHG DRESSING, ABD COMBINE: Performed by: ORTHOPAEDIC SURGERY

## 2020-03-05 PROCEDURE — 700101 HCHG RX REV CODE 250: Performed by: STUDENT IN AN ORGANIZED HEALTH CARE EDUCATION/TRAINING PROGRAM

## 2020-03-05 PROCEDURE — 160002 HCHG RECOVERY MINUTES (STAT): Performed by: ORTHOPAEDIC SURGERY

## 2020-03-05 PROCEDURE — 160048 HCHG OR STATISTICAL LEVEL 1-5: Performed by: ORTHOPAEDIC SURGERY

## 2020-03-05 PROCEDURE — 500382 HCHG DRAIN, PENROSE 1X18: Performed by: ORTHOPAEDIC SURGERY

## 2020-03-05 DEVICE — IMPLANTABLE DEVICE: Type: IMPLANTABLE DEVICE | Site: HIP | Status: FUNCTIONAL

## 2020-03-05 DEVICE — POSITIONING PIN THRD CERCLAGE - 4.5MM: Type: IMPLANTABLE DEVICE | Site: HIP | Status: FUNCTIONAL

## 2020-03-05 DEVICE — CABLE CERCLAGE 1.7 X 780MM: Type: IMPLANTABLE DEVICE | Site: HIP | Status: FUNCTIONAL

## 2020-03-05 RX ORDER — CELECOXIB 200 MG/1
200 CAPSULE ORAL 2 TIMES DAILY
Status: CANCELLED | OUTPATIENT
Start: 2020-03-05 | End: 2020-03-10

## 2020-03-05 RX ORDER — DIPHENHYDRAMINE HCL 25 MG
25 TABLET ORAL EVERY 6 HOURS PRN
Status: CANCELLED | OUTPATIENT
Start: 2020-03-05

## 2020-03-05 RX ORDER — ONDANSETRON 2 MG/ML
INJECTION INTRAMUSCULAR; INTRAVENOUS PRN
Status: DISCONTINUED | OUTPATIENT
Start: 2020-03-05 | End: 2020-03-05 | Stop reason: SURG

## 2020-03-05 RX ORDER — ALPRAZOLAM 0.25 MG/1
0.25 TABLET ORAL NIGHTLY PRN
Status: ON HOLD | COMMUNITY
End: 2020-03-31

## 2020-03-05 RX ORDER — OXYCODONE HYDROCHLORIDE 5 MG/1
5 TABLET ORAL EVERY 4 HOURS PRN
Qty: 42 TAB | Refills: 0 | Status: SHIPPED | OUTPATIENT
Start: 2020-03-05 | End: 2020-03-12

## 2020-03-05 RX ORDER — OXYCODONE HYDROCHLORIDE 10 MG/1
10 TABLET ORAL
Status: CANCELLED | OUTPATIENT
Start: 2020-03-05

## 2020-03-05 RX ORDER — GABAPENTIN 300 MG/1
600 CAPSULE ORAL ONCE
Status: COMPLETED | OUTPATIENT
Start: 2020-03-05 | End: 2020-03-05

## 2020-03-05 RX ORDER — DEXAMETHASONE SODIUM PHOSPHATE 4 MG/ML
4 INJECTION, SOLUTION INTRA-ARTICULAR; INTRALESIONAL; INTRAMUSCULAR; INTRAVENOUS; SOFT TISSUE
Status: CANCELLED | OUTPATIENT
Start: 2020-03-05

## 2020-03-05 RX ORDER — CHLORPROMAZINE HYDROCHLORIDE 25 MG/1
25 TABLET, FILM COATED ORAL EVERY 6 HOURS PRN
Status: CANCELLED | OUTPATIENT
Start: 2020-03-05

## 2020-03-05 RX ORDER — MEPERIDINE HYDROCHLORIDE 25 MG/ML
12.5 INJECTION INTRAMUSCULAR; INTRAVENOUS; SUBCUTANEOUS
Status: DISCONTINUED | OUTPATIENT
Start: 2020-03-05 | End: 2020-03-05 | Stop reason: HOSPADM

## 2020-03-05 RX ORDER — SODIUM CHLORIDE, SODIUM LACTATE, POTASSIUM CHLORIDE, CALCIUM CHLORIDE 600; 310; 30; 20 MG/100ML; MG/100ML; MG/100ML; MG/100ML
INJECTION, SOLUTION INTRAVENOUS CONTINUOUS
Status: DISCONTINUED | OUTPATIENT
Start: 2020-03-05 | End: 2020-03-05 | Stop reason: HOSPADM

## 2020-03-05 RX ORDER — AMOXICILLIN 250 MG
1 CAPSULE ORAL
Status: CANCELLED | OUTPATIENT
Start: 2020-03-05

## 2020-03-05 RX ORDER — CELECOXIB 200 MG/1
200 CAPSULE ORAL ONCE
Status: COMPLETED | OUTPATIENT
Start: 2020-03-05 | End: 2020-03-05

## 2020-03-05 RX ORDER — ACETAMINOPHEN 500 MG
1000 TABLET ORAL EVERY 6 HOURS
Status: CANCELLED | OUTPATIENT
Start: 2020-03-05 | End: 2020-03-10

## 2020-03-05 RX ORDER — OXYCODONE HCL 5 MG/5 ML
5 SOLUTION, ORAL ORAL
Status: COMPLETED | OUTPATIENT
Start: 2020-03-05 | End: 2020-03-05

## 2020-03-05 RX ORDER — HYDROMORPHONE HYDROCHLORIDE 1 MG/ML
0.2 INJECTION, SOLUTION INTRAMUSCULAR; INTRAVENOUS; SUBCUTANEOUS
Status: DISCONTINUED | OUTPATIENT
Start: 2020-03-05 | End: 2020-03-05 | Stop reason: HOSPADM

## 2020-03-05 RX ORDER — DIPHENHYDRAMINE HYDROCHLORIDE 50 MG/ML
25 INJECTION INTRAMUSCULAR; INTRAVENOUS EVERY 6 HOURS PRN
Status: CANCELLED | OUTPATIENT
Start: 2020-03-05

## 2020-03-05 RX ORDER — DEXAMETHASONE SODIUM PHOSPHATE 4 MG/ML
10 INJECTION, SOLUTION INTRA-ARTICULAR; INTRALESIONAL; INTRAMUSCULAR; INTRAVENOUS; SOFT TISSUE ONCE
Status: CANCELLED | OUTPATIENT
Start: 2020-03-06 | End: 2020-03-06

## 2020-03-05 RX ORDER — BUPIVACAINE HYDROCHLORIDE 2.5 MG/ML
INJECTION, SOLUTION EPIDURAL; INFILTRATION; INTRACAUDAL
Status: DISCONTINUED | OUTPATIENT
Start: 2020-03-05 | End: 2020-03-05 | Stop reason: HOSPADM

## 2020-03-05 RX ORDER — HYDROMORPHONE HYDROCHLORIDE 1 MG/ML
0.5 INJECTION, SOLUTION INTRAMUSCULAR; INTRAVENOUS; SUBCUTANEOUS
Status: CANCELLED | OUTPATIENT
Start: 2020-03-05

## 2020-03-05 RX ORDER — ACETAMINOPHEN 500 MG
500 TABLET ORAL ONCE
Status: COMPLETED | OUTPATIENT
Start: 2020-03-05 | End: 2020-03-05

## 2020-03-05 RX ORDER — MAGNESIUM SULFATE HEPTAHYDRATE 500 MG/ML
INJECTION, SOLUTION INTRAMUSCULAR; INTRAVENOUS PRN
Status: DISCONTINUED | OUTPATIENT
Start: 2020-03-05 | End: 2020-03-05 | Stop reason: SURG

## 2020-03-05 RX ORDER — POLYETHYLENE GLYCOL 3350 17 G/17G
1 POWDER, FOR SOLUTION ORAL 2 TIMES DAILY PRN
Status: CANCELLED | OUTPATIENT
Start: 2020-03-05

## 2020-03-05 RX ORDER — AMOXICILLIN 250 MG
1 CAPSULE ORAL NIGHTLY
Status: CANCELLED | OUTPATIENT
Start: 2020-03-05

## 2020-03-05 RX ORDER — ONDANSETRON 2 MG/ML
4 INJECTION INTRAMUSCULAR; INTRAVENOUS EVERY 4 HOURS PRN
Status: CANCELLED | OUTPATIENT
Start: 2020-03-05

## 2020-03-05 RX ORDER — ENEMA 19; 7 G/133ML; G/133ML
1 ENEMA RECTAL
Status: CANCELLED | OUTPATIENT
Start: 2020-03-05

## 2020-03-05 RX ORDER — HYDROMORPHONE HYDROCHLORIDE 1 MG/ML
0.1 INJECTION, SOLUTION INTRAMUSCULAR; INTRAVENOUS; SUBCUTANEOUS
Status: DISCONTINUED | OUTPATIENT
Start: 2020-03-05 | End: 2020-03-05 | Stop reason: HOSPADM

## 2020-03-05 RX ORDER — KETOROLAC TROMETHAMINE 30 MG/ML
INJECTION, SOLUTION INTRAMUSCULAR; INTRAVENOUS
Status: DISCONTINUED | OUTPATIENT
Start: 2020-03-05 | End: 2020-03-05 | Stop reason: HOSPADM

## 2020-03-05 RX ORDER — IBANDRONATE SODIUM 150 MG/1
150 TABLET, FILM COATED ORAL
Status: DISCONTINUED | OUTPATIENT
Start: 2020-03-05 | End: 2020-03-05 | Stop reason: HOSPADM

## 2020-03-05 RX ORDER — OXYCODONE HCL 5 MG/5 ML
10 SOLUTION, ORAL ORAL
Status: COMPLETED | OUTPATIENT
Start: 2020-03-05 | End: 2020-03-05

## 2020-03-05 RX ORDER — TRANEXAMIC ACID 100 MG/ML
INJECTION, SOLUTION INTRAVENOUS PRN
Status: DISCONTINUED | OUTPATIENT
Start: 2020-03-05 | End: 2020-03-05 | Stop reason: SURG

## 2020-03-05 RX ORDER — CEFAZOLIN SODIUM 1 G/3ML
INJECTION, POWDER, FOR SOLUTION INTRAMUSCULAR; INTRAVENOUS PRN
Status: DISCONTINUED | OUTPATIENT
Start: 2020-03-05 | End: 2020-03-05 | Stop reason: SURG

## 2020-03-05 RX ORDER — DIPHENHYDRAMINE HCL 25 MG
25 TABLET ORAL NIGHTLY PRN
Status: CANCELLED | OUTPATIENT
Start: 2020-03-06

## 2020-03-05 RX ORDER — PHENYLEPHRINE HCL IN 0.9% NACL 0.5 MG/5ML
SYRINGE (ML) INTRAVENOUS PRN
Status: DISCONTINUED | OUTPATIENT
Start: 2020-03-05 | End: 2020-03-05 | Stop reason: SURG

## 2020-03-05 RX ORDER — SCOLOPAMINE TRANSDERMAL SYSTEM 1 MG/1
1 PATCH, EXTENDED RELEASE TRANSDERMAL
Status: CANCELLED | OUTPATIENT
Start: 2020-03-05

## 2020-03-05 RX ORDER — LIDOCAINE HYDROCHLORIDE 20 MG/ML
INJECTION, SOLUTION EPIDURAL; INFILTRATION; INTRACAUDAL; PERINEURAL PRN
Status: DISCONTINUED | OUTPATIENT
Start: 2020-03-05 | End: 2020-03-05 | Stop reason: SURG

## 2020-03-05 RX ORDER — BISACODYL 10 MG
10 SUPPOSITORY, RECTAL RECTAL
Status: CANCELLED | OUTPATIENT
Start: 2020-03-05

## 2020-03-05 RX ORDER — ALPRAZOLAM 0.25 MG/1
0.25 TABLET ORAL NIGHTLY PRN
Status: DISCONTINUED | OUTPATIENT
Start: 2020-03-05 | End: 2020-03-05 | Stop reason: HOSPADM

## 2020-03-05 RX ORDER — CEFAZOLIN SODIUM 2 G/100ML
2 INJECTION, SOLUTION INTRAVENOUS ONCE
Status: DISCONTINUED | OUTPATIENT
Start: 2020-03-05 | End: 2020-03-05 | Stop reason: HOSPADM

## 2020-03-05 RX ORDER — GABAPENTIN 300 MG/1
300 CAPSULE ORAL 3 TIMES DAILY
Status: CANCELLED | OUTPATIENT
Start: 2020-03-05

## 2020-03-05 RX ORDER — TRAZODONE HYDROCHLORIDE 100 MG/1
100 TABLET ORAL NIGHTLY PRN
Status: DISCONTINUED | OUTPATIENT
Start: 2020-03-05 | End: 2020-03-05 | Stop reason: HOSPADM

## 2020-03-05 RX ORDER — ONDANSETRON 2 MG/ML
4 INJECTION INTRAMUSCULAR; INTRAVENOUS
Status: DISCONTINUED | OUTPATIENT
Start: 2020-03-05 | End: 2020-03-05 | Stop reason: HOSPADM

## 2020-03-05 RX ORDER — CEFAZOLIN SODIUM 2 G/100ML
2 INJECTION, SOLUTION INTRAVENOUS EVERY 8 HOURS
Status: CANCELLED | OUTPATIENT
Start: 2020-03-05 | End: 2020-03-06

## 2020-03-05 RX ORDER — DIPHENHYDRAMINE HYDROCHLORIDE 50 MG/ML
12.5 INJECTION INTRAMUSCULAR; INTRAVENOUS
Status: DISCONTINUED | OUTPATIENT
Start: 2020-03-05 | End: 2020-03-05 | Stop reason: HOSPADM

## 2020-03-05 RX ORDER — HYDROMORPHONE HYDROCHLORIDE 1 MG/ML
0.4 INJECTION, SOLUTION INTRAMUSCULAR; INTRAVENOUS; SUBCUTANEOUS
Status: DISCONTINUED | OUTPATIENT
Start: 2020-03-05 | End: 2020-03-05 | Stop reason: HOSPADM

## 2020-03-05 RX ORDER — IBANDRONATE SODIUM 150 MG/1
150 TABLET, FILM COATED ORAL
COMMUNITY
Start: 2019-12-21 | End: 2020-06-08

## 2020-03-05 RX ORDER — DEXAMETHASONE SODIUM PHOSPHATE 4 MG/ML
INJECTION, SOLUTION INTRA-ARTICULAR; INTRALESIONAL; INTRAMUSCULAR; INTRAVENOUS; SOFT TISSUE PRN
Status: DISCONTINUED | OUTPATIENT
Start: 2020-03-05 | End: 2020-03-05 | Stop reason: SURG

## 2020-03-05 RX ORDER — SIMVASTATIN 20 MG
20 TABLET ORAL NIGHTLY
Status: DISCONTINUED | OUTPATIENT
Start: 2020-03-05 | End: 2020-03-05 | Stop reason: HOSPADM

## 2020-03-05 RX ORDER — CHLORPROMAZINE HYDROCHLORIDE 25 MG/ML
25 INJECTION INTRAMUSCULAR EVERY 6 HOURS PRN
Status: CANCELLED | OUTPATIENT
Start: 2020-03-05

## 2020-03-05 RX ORDER — SODIUM CHLORIDE 9 MG/ML
INJECTION, SOLUTION INTRAMUSCULAR; INTRAVENOUS; SUBCUTANEOUS
Status: DISCONTINUED | OUTPATIENT
Start: 2020-03-05 | End: 2020-03-05 | Stop reason: HOSPADM

## 2020-03-05 RX ORDER — TRAMADOL HYDROCHLORIDE 50 MG/1
50 TABLET ORAL EVERY 4 HOURS PRN
Status: CANCELLED | OUTPATIENT
Start: 2020-03-05

## 2020-03-05 RX ORDER — OXYCODONE HYDROCHLORIDE 5 MG/1
5 TABLET ORAL
Status: CANCELLED | OUTPATIENT
Start: 2020-03-05

## 2020-03-05 RX ORDER — DOCUSATE SODIUM 100 MG/1
100 CAPSULE, LIQUID FILLED ORAL 2 TIMES DAILY
Status: CANCELLED | OUTPATIENT
Start: 2020-03-05

## 2020-03-05 RX ORDER — MIDAZOLAM HYDROCHLORIDE 1 MG/ML
INJECTION INTRAMUSCULAR; INTRAVENOUS PRN
Status: DISCONTINUED | OUTPATIENT
Start: 2020-03-05 | End: 2020-03-05 | Stop reason: SURG

## 2020-03-05 RX ORDER — HALOPERIDOL 5 MG/ML
1 INJECTION INTRAMUSCULAR EVERY 6 HOURS PRN
Status: CANCELLED | OUTPATIENT
Start: 2020-03-05

## 2020-03-05 RX ORDER — HALOPERIDOL 5 MG/ML
1 INJECTION INTRAMUSCULAR
Status: DISCONTINUED | OUTPATIENT
Start: 2020-03-05 | End: 2020-03-05 | Stop reason: HOSPADM

## 2020-03-05 RX ADMIN — MEPERIDINE HYDROCHLORIDE 12.5 MG: 25 INJECTION INTRAMUSCULAR; INTRAVENOUS; SUBCUTANEOUS at 17:13

## 2020-03-05 RX ADMIN — FENTANYL CITRATE 25 MCG: 50 INJECTION, SOLUTION INTRAMUSCULAR; INTRAVENOUS at 16:30

## 2020-03-05 RX ADMIN — SODIUM CHLORIDE, POTASSIUM CHLORIDE, SODIUM LACTATE AND CALCIUM CHLORIDE: 600; 310; 30; 20 INJECTION, SOLUTION INTRAVENOUS at 12:50

## 2020-03-05 RX ADMIN — FENTANYL CITRATE 25 MCG: 50 INJECTION, SOLUTION INTRAMUSCULAR; INTRAVENOUS at 16:49

## 2020-03-05 RX ADMIN — MEPERIDINE HYDROCHLORIDE 12.5 MG: 25 INJECTION INTRAMUSCULAR; INTRAVENOUS; SUBCUTANEOUS at 16:27

## 2020-03-05 RX ADMIN — FENTANYL CITRATE 50 MCG: 50 INJECTION, SOLUTION INTRAMUSCULAR; INTRAVENOUS at 14:18

## 2020-03-05 RX ADMIN — PROPOFOL 150 MG: 10 INJECTION, EMULSION INTRAVENOUS at 14:18

## 2020-03-05 RX ADMIN — EPHEDRINE SULFATE 10 MG: 50 INJECTION, SOLUTION INTRAVENOUS at 14:48

## 2020-03-05 RX ADMIN — CELECOXIB 200 MG: 200 CAPSULE ORAL at 12:26

## 2020-03-05 RX ADMIN — FENTANYL CITRATE 25 MCG: 50 INJECTION, SOLUTION INTRAMUSCULAR; INTRAVENOUS at 16:23

## 2020-03-05 RX ADMIN — HYDROMORPHONE HYDROCHLORIDE 0.4 MG: 1 INJECTION, SOLUTION INTRAMUSCULAR; INTRAVENOUS; SUBCUTANEOUS at 16:56

## 2020-03-05 RX ADMIN — CEFAZOLIN 2 G: 330 INJECTION, POWDER, FOR SOLUTION INTRAMUSCULAR; INTRAVENOUS at 14:24

## 2020-03-05 RX ADMIN — MIDAZOLAM HYDROCHLORIDE 2 MG: 1 INJECTION, SOLUTION INTRAMUSCULAR; INTRAVENOUS at 14:13

## 2020-03-05 RX ADMIN — ONDANSETRON 4 MG: 2 INJECTION INTRAMUSCULAR; INTRAVENOUS at 15:39

## 2020-03-05 RX ADMIN — LIDOCAINE HYDROCHLORIDE 60 MG: 20 INJECTION, SOLUTION EPIDURAL; INFILTRATION; INTRACAUDAL at 14:18

## 2020-03-05 RX ADMIN — FENTANYL CITRATE 25 MCG: 50 INJECTION, SOLUTION INTRAMUSCULAR; INTRAVENOUS at 16:29

## 2020-03-05 RX ADMIN — GABAPENTIN 600 MG: 300 CAPSULE ORAL at 12:26

## 2020-03-05 RX ADMIN — ACETAMINOPHEN 500 MG: 500 TABLET ORAL at 12:26

## 2020-03-05 RX ADMIN — MAGNESIUM SULFATE HEPTAHYDRATE 2 G: 500 INJECTION, SOLUTION INTRAMUSCULAR; INTRAVENOUS at 14:27

## 2020-03-05 RX ADMIN — OXYCODONE HYDROCHLORIDE 10 MG: 5 SOLUTION ORAL at 16:29

## 2020-03-05 RX ADMIN — FENTANYL CITRATE 50 MCG: 50 INJECTION, SOLUTION INTRAMUSCULAR; INTRAVENOUS at 15:53

## 2020-03-05 RX ADMIN — DEXAMETHASONE SODIUM PHOSPHATE 4 MG: 4 INJECTION, SOLUTION INTRA-ARTICULAR; INTRALESIONAL; INTRAMUSCULAR; INTRAVENOUS; SOFT TISSUE at 14:33

## 2020-03-05 RX ADMIN — TRANEXAMIC ACID 1000 MG: 100 INJECTION, SOLUTION INTRAVENOUS at 16:48

## 2020-03-05 RX ADMIN — FENTANYL CITRATE 50 MCG: 50 INJECTION, SOLUTION INTRAMUSCULAR; INTRAVENOUS at 17:08

## 2020-03-05 RX ADMIN — Medication 100 MCG: at 14:53

## 2020-03-05 RX ADMIN — FENTANYL CITRATE 100 MCG: 50 INJECTION, SOLUTION INTRAMUSCULAR; INTRAVENOUS at 14:35

## 2020-03-05 RX ADMIN — EPHEDRINE SULFATE 10 MG: 50 INJECTION, SOLUTION INTRAVENOUS at 14:40

## 2020-03-05 RX ADMIN — TRANEXAMIC ACID 1000 MG: 100 INJECTION, SOLUTION INTRAVENOUS at 14:24

## 2020-03-05 ASSESSMENT — FIBROSIS 4 INDEX: FIB4 SCORE: 1.51

## 2020-03-05 NOTE — ANESTHESIA PREPROCEDURE EVALUATION
Relevant Problems   No relevant active problems       Physical Exam    Airway   Mallampati: I  TM distance: >3 FB  Neck ROM: full       Cardiovascular - normal exam  Rhythm: regular  Rate: normal  (-) murmur     Dental - normal exam         Pulmonary - normal exam  Breath sounds clear to auscultation     Abdominal    Neurological - normal exam                 Anesthesia Plan    ASA 2       Plan - general       Airway plan will be ETT        Induction: intravenous    Postoperative Plan: Postoperative administration of opioids is intended.    Pertinent diagnostic labs and testing reviewed    Informed Consent:    Anesthetic plan and risks discussed with patient.    Use of blood products discussed with: patient whom consented to blood products.

## 2020-03-06 NOTE — OP REPORT
DATE OF SERVICE:  03/05/2020    INDICATIONS:  Patient with nonunion of greater trochanter fracture, left side.    PREOPERATIVE DIAGNOSIS:  Nonunion, left greater trochanter fracture.    POSTOPERATIVE DIAGNOSIS:  Nonunion, left greater trochanter fracture.    PROCEDURE:  ORIF of greater trochanter fracture.    ANESTHESIA:  General.    COMPLICATIONS:  None.    SURGEON:  Julian Mendes MD    ASSISTANT:  Rony Savage MD    DESCRIPTION OF PROCEDURE:  Patient was identified in the preoperative area,   site was marked, taken back to the operating room and underwent general   anesthesia.  Left lower extremity prepped and draped in sterile manner.    Preoperative timeout was held and antibiotics were given.  The old incision   was excised.  Soft tissue dissected down to fascia.  Fascia was split in line   with the IT band and gluteus fascia.  All the synovitis was removed and then   the greater trochanter plate that was loose was removed.  There was a fibrous   union that was taken down and then the fracture edges were freshened up and   brought together with the Synthes greater trochanter plate.  Three cables were   used as well as two  screws, showed very good fixation of the fracture.    Wound was then soaked with dilute Betadine solution and was thoroughly   irrigated out.  Vicryl was used for the fascia, Monocryl for soft tissue and   skin and Dermabond for the final skin layer.  Patient was woken up, taken back   to PACU, will be on greater trochanter precautions, weightbear as tolerated.       ____________________________________     Julian Mendes MD    JJ / NTS    DD:  03/05/2020 16:06:25  DT:  03/05/2020 17:38:05    D#:  [id number]  Job#:  [id number]

## 2020-03-06 NOTE — OR NURSING
Arrived from PACU  vss DRSG TO L Hip clean and dry. D/c orders received. IV dc'd. Pt changed into clothing with assistance. Pt up and ambulated to BR, steady gait, voided adequately.      1736  Discharge instructions given as well as pain management handout; pt and family verbalized understanding and questions answered. Patient states ready to d/c home. Prescriptions given. Pt dc'd in w/c with CNA in stable condition.

## 2020-03-06 NOTE — DISCHARGE INSTRUCTIONS
ACTIVITY: Rest and take it easy for the first 24 hours.  A responsible adult is recommended to remain with you during that time.  It is normal to feel sleepy.  We encourage you to not do anything that requires balance, judgment or coordination.    MILD FLU-LIKE SYMPTOMS ARE NORMAL. YOU MAY EXPERIENCE GENERALIZED MUSCLE ACHES, THROAT IRRITATION, HEADACHE AND/OR SOME NAUSEA.    FOR 24 HOURS DO NOT:  Drive, operate machinery or run household appliances.  Drink beer or alcoholic beverages.   Make important decisions or sign legal documents.        DIET: To avoid nausea, slowly advance diet as tolerated, avoiding spicy or greasy foods for the first day.  Add more substantial food to your diet according to your physician's instructions. INCREASE FLUIDS AND FIBER TO AVOID CONSTIPATION.    SURGICAL DRESSING/BATHING: follow MD instructions    FOLLOW-UP APPOINTMENT:  A follow-up appointment should be arranged with your doctor in 1-2 weeks; call to schedule.    You should CALL YOUR PHYSICIAN if you develop:  Fever greater than 101 degrees F.  Pain not relieved by medication, or persistent nausea or vomiting.  Excessive bleeding (blood soaking through dressing) or unexpected drainage from the wound.  Extreme redness or swelling around the incision site, drainage of pus or foul smelling drainage.  Inability to urinate or empty your bladder within 8 hours.  Problems with breathing or chest pain.    You should call 911 if you develop problems with breathing or chest pain.  If you are unable to contact your doctor or surgical center, you should go to the nearest emergency room or urgent care center.  Physician's telephone #: [phone]    If any questions arise, call your doctor.  If your doctor is not available, please feel free to call the Surgical Center at {Surgical Dept Numbers:[id number]}.  The Center is open Monday through Friday from 7AM to 7PM.  You can also call the [employer] HOTLINE open 24 hours/day, 7 days/week and speak  to a nurse at [phone], or toll free at [phone].    A registered nurse may call you a few days after your surgery to see how you are doing after your procedure.    MEDICATIONS: Resume taking daily medication.  Take prescribed pain medication with food.  If no medication is prescribed, you may take non-aspirin pain medication if needed.  PAIN MEDICATION CAN BE VERY CONSTIPATING.  Take a stool softener or laxative such as senokot, pericolace, or milk of magnesia if needed.    Prescription given to family.  Last pain medication given at 4:29    If your physician has prescribed pain medication that includes Acetaminophen (Tylenol), do not take additional Acetaminophen (Tylenol) while taking the prescribed medication.    Depression / Suicide Risk    As you are discharged from this [facility] facility, it is important to learn how to keep safe from harming yourself.    Recognize the warning signs:  · Abrupt changes in personality, positive or negative- including increase in energy   · Giving away possessions  · Change in eating patterns- significant weight changes-  positive or negative  · Change in sleeping patterns- unable to sleep or sleeping all the time   · Unwillingness or inability to communicate  · Depression  · Unusual sadness, discouragement and loneliness  · Talk of wanting to die  · Neglect of personal appearance   · Rebelliousness- reckless behavior  · Withdrawal from people/activities they love  · Confusion- inability to concentrate     If you or a loved one observes any of these behaviors or has concerns about self-harm, here's what you can do:  · Talk about it- your feelings and reasons for harming yourself  · Remove any means that you might use to hurt yourself (examples: pills, rope, extension cords, firearm)  · Get professional help from the community (Mental Health, Substance Abuse, psychological counseling)  · Do not be alone:Call your Safe Contact- someone whom you trust who will be  there for you.  · Call your local CRISIS HOTLINE 338-7942 or 417-354-9416  · Call your local Children's Mobile Crisis Response Team [place] (084) 749-5647 or www.[employer]  · Call the toll free National Suicide Prevention Hotlines   · National Suicide Prevention Lifeline 205-104-JHQZ (0044)  · National [employer] Line Network 800-SUICIDE (606-7005)

## 2020-03-06 NOTE — ANESTHESIA TIME REPORT
Anesthesia Start and Stop Event Times     Date Time Event    3/5/2020 1403 Ready for Procedure     1412 Anesthesia Start     1604 Anesthesia Stop        Responsible Staff  03/05/20    Name Role Begin End    Joaquin Jennings M.D. Anesth 1412 1604        Preop Diagnosis (Free Text):  Pre-op Diagnosis     GREATER TROCHANTERIC BURSITIS LEFT        Preop Diagnosis (Codes):    Post op Diagnosis  Trochanteric fracture of femur (HCC)      Premium Reason  A. 3PM - 7AM    Comments:

## 2020-03-06 NOTE — ANESTHESIA POSTPROCEDURE EVALUATION
Patient: Kemi Silva    Procedure Summary     Date:  03/05/20 Room / Location:  [place] [address] / SURGERY [facility]    Anesthesia Start:  1412 Anesthesia Stop:  1604    Procedure:  ORIF, HIP (Left Hip) Diagnosis:  (GREATER TROCHANTERIC BURSITIS LEFT)    Surgeon:  Julian Mendes M.D. Responsible Provider:  Joaquin Jennings M.D.    Anesthesia Type:  general ASA Status:  2          Final Anesthesia Type: general  Last vitals  BP   Blood Pressure: 133/76    Temp   36.9 °C (98.4 °F)    Pulse   Pulse: 93   Resp   18    SpO2   94 %      Anesthesia Post Evaluation    Patient location during evaluation: PACU  Patient participation: complete - patient participated  Level of consciousness: awake and alert    Airway patency: patent  Anesthetic complications: no  Cardiovascular status: hemodynamically stable  Respiratory status: acceptable  Hydration status: euvolemic    PONV: none           Nurse Pain Score: 2 (NPRS)

## 2020-03-31 ENCOUNTER — HOSPITAL ENCOUNTER (INPATIENT)
Facility: MEDICAL CENTER | Age: 60
LOS: 3 days | DRG: 464 | End: 2020-04-03
Attending: ORTHOPAEDIC SURGERY | Admitting: ORTHOPAEDIC SURGERY
Payer: COMMERCIAL

## 2020-03-31 ENCOUNTER — ANESTHESIA EVENT (OUTPATIENT)
Dept: SURGERY | Facility: MEDICAL CENTER | Age: 60
DRG: 464 | End: 2020-03-31
Payer: COMMERCIAL

## 2020-03-31 ENCOUNTER — APPOINTMENT (OUTPATIENT)
Dept: RADIOLOGY | Facility: MEDICAL CENTER | Age: 60
DRG: 464 | End: 2020-03-31
Attending: ORTHOPAEDIC SURGERY
Payer: COMMERCIAL

## 2020-03-31 ENCOUNTER — ANESTHESIA (OUTPATIENT)
Dept: SURGERY | Facility: MEDICAL CENTER | Age: 60
DRG: 464 | End: 2020-03-31
Payer: COMMERCIAL

## 2020-03-31 LAB
ALBUMIN SERPL BCP-MCNC: 2.9 G/DL (ref 3.2–4.9)
ALBUMIN/GLOB SERPL: 1.1 G/DL
ALP SERPL-CCNC: 89 U/L (ref 30–99)
ALT SERPL-CCNC: 16 U/L (ref 2–50)
ANION GAP SERPL CALC-SCNC: 9 MMOL/L (ref 7–16)
AST SERPL-CCNC: 17 U/L (ref 12–45)
BILIRUB SERPL-MCNC: <0.2 MG/DL (ref 0.1–1.5)
BUN SERPL-MCNC: 13 MG/DL (ref 8–22)
CALCIUM SERPL-MCNC: 8.1 MG/DL (ref 8.5–10.5)
CHLORIDE SERPL-SCNC: 102 MMOL/L (ref 96–112)
CO2 SERPL-SCNC: 24 MMOL/L (ref 20–33)
CREAT SERPL-MCNC: 0.74 MG/DL (ref 0.5–1.4)
GLOBULIN SER CALC-MCNC: 2.7 G/DL (ref 1.9–3.5)
GLUCOSE SERPL-MCNC: 184 MG/DL (ref 65–99)
GRAM STN SPEC: NORMAL
GRAM STN SPEC: NORMAL
POTASSIUM SERPL-SCNC: 4.7 MMOL/L (ref 3.6–5.5)
PROT SERPL-MCNC: 5.6 G/DL (ref 6–8.2)
SIGNIFICANT IND 70042: NORMAL
SIGNIFICANT IND 70042: NORMAL
SITE SITE: NORMAL
SITE SITE: NORMAL
SODIUM SERPL-SCNC: 135 MMOL/L (ref 135–145)
SOURCE SOURCE: NORMAL
SOURCE SOURCE: NORMAL

## 2020-03-31 PROCEDURE — 0JBM0ZZ EXCISION OF LEFT UPPER LEG SUBCUTANEOUS TISSUE AND FASCIA, OPEN APPROACH: ICD-10-PCS | Performed by: ORTHOPAEDIC SURGERY

## 2020-03-31 PROCEDURE — 700101 HCHG RX REV CODE 250: Performed by: ORTHOPAEDIC SURGERY

## 2020-03-31 PROCEDURE — 160036 HCHG PACU - EA ADDL 30 MINS PHASE I: Performed by: ORTHOPAEDIC SURGERY

## 2020-03-31 PROCEDURE — 160048 HCHG OR STATISTICAL LEVEL 1-5: Performed by: ORTHOPAEDIC SURGERY

## 2020-03-31 PROCEDURE — 160028 HCHG SURGERY MINUTES - 1ST 30 MINS LEVEL 3: Performed by: ORTHOPAEDIC SURGERY

## 2020-03-31 PROCEDURE — 87075 CULTR BACTERIA EXCEPT BLOOD: CPT

## 2020-03-31 PROCEDURE — 700101 HCHG RX REV CODE 250: Performed by: ANESTHESIOLOGY

## 2020-03-31 PROCEDURE — 160039 HCHG SURGERY MINUTES - EA ADDL 1 MIN LEVEL 3: Performed by: ORTHOPAEDIC SURGERY

## 2020-03-31 PROCEDURE — 87015 SPECIMEN INFECT AGNT CONCNTJ: CPT

## 2020-03-31 PROCEDURE — 160009 HCHG ANES TIME/MIN: Performed by: ORTHOPAEDIC SURGERY

## 2020-03-31 PROCEDURE — A9270 NON-COVERED ITEM OR SERVICE: HCPCS | Performed by: ANESTHESIOLOGY

## 2020-03-31 PROCEDURE — 700105 HCHG RX REV CODE 258: Performed by: ORTHOPAEDIC SURGERY

## 2020-03-31 PROCEDURE — 160002 HCHG RECOVERY MINUTES (STAT): Performed by: ORTHOPAEDIC SURGERY

## 2020-03-31 PROCEDURE — 500891 HCHG PACK, ORTHO MAJOR: Performed by: ORTHOPAEDIC SURGERY

## 2020-03-31 PROCEDURE — 500447 HCHG DRESSING, TEGADERM 8X12: Performed by: ORTHOPAEDIC SURGERY

## 2020-03-31 PROCEDURE — A6402 STERILE GAUZE <= 16 SQ IN: HCPCS | Performed by: ORTHOPAEDIC SURGERY

## 2020-03-31 PROCEDURE — 501838 HCHG SUTURE GENERAL: Performed by: ORTHOPAEDIC SURGERY

## 2020-03-31 PROCEDURE — 87205 SMEAR GRAM STAIN: CPT

## 2020-03-31 PROCEDURE — 700105 HCHG RX REV CODE 258: Performed by: ANESTHESIOLOGY

## 2020-03-31 PROCEDURE — 700111 HCHG RX REV CODE 636 W/ 250 OVERRIDE (IP): Performed by: ANESTHESIOLOGY

## 2020-03-31 PROCEDURE — 160035 HCHG PACU - 1ST 60 MINS PHASE I: Performed by: ORTHOPAEDIC SURGERY

## 2020-03-31 PROCEDURE — 87070 CULTURE OTHR SPECIMN AEROBIC: CPT

## 2020-03-31 PROCEDURE — A9270 NON-COVERED ITEM OR SERVICE: HCPCS | Performed by: ORTHOPAEDIC SURGERY

## 2020-03-31 PROCEDURE — 500367 HCHG DRAIN KIT, HEMOVAC: Performed by: ORTHOPAEDIC SURGERY

## 2020-03-31 PROCEDURE — 94760 N-INVAS EAR/PLS OXIMETRY 1: CPT

## 2020-03-31 PROCEDURE — 87186 SC STD MICRODIL/AGAR DIL: CPT

## 2020-03-31 PROCEDURE — 36415 COLL VENOUS BLD VENIPUNCTURE: CPT

## 2020-03-31 PROCEDURE — 80053 COMPREHEN METABOLIC PANEL: CPT

## 2020-03-31 PROCEDURE — 700111 HCHG RX REV CODE 636 W/ 250 OVERRIDE (IP): Performed by: ORTHOPAEDIC SURGERY

## 2020-03-31 PROCEDURE — 87077 CULTURE AEROBIC IDENTIFY: CPT

## 2020-03-31 PROCEDURE — 87147 CULTURE TYPE IMMUNOLOGIC: CPT

## 2020-03-31 PROCEDURE — 700102 HCHG RX REV CODE 250 W/ 637 OVERRIDE(OP): Performed by: ORTHOPAEDIC SURGERY

## 2020-03-31 PROCEDURE — 770001 HCHG ROOM/CARE - MED/SURG/GYN PRIV*

## 2020-03-31 PROCEDURE — 700102 HCHG RX REV CODE 250 W/ 637 OVERRIDE(OP): Performed by: ANESTHESIOLOGY

## 2020-03-31 RX ORDER — BISACODYL 10 MG
10 SUPPOSITORY, RECTAL RECTAL
Status: DISCONTINUED | OUTPATIENT
Start: 2020-03-31 | End: 2020-04-03 | Stop reason: HOSPADM

## 2020-03-31 RX ORDER — HYDRALAZINE HYDROCHLORIDE 20 MG/ML
5 INJECTION INTRAMUSCULAR; INTRAVENOUS
Status: DISCONTINUED | OUTPATIENT
Start: 2020-03-31 | End: 2020-03-31 | Stop reason: HOSPADM

## 2020-03-31 RX ORDER — HYDROMORPHONE HYDROCHLORIDE 1 MG/ML
0.5 INJECTION, SOLUTION INTRAMUSCULAR; INTRAVENOUS; SUBCUTANEOUS
Status: DISCONTINUED | OUTPATIENT
Start: 2020-03-31 | End: 2020-04-03 | Stop reason: HOSPADM

## 2020-03-31 RX ORDER — ONDANSETRON 2 MG/ML
4 INJECTION INTRAMUSCULAR; INTRAVENOUS EVERY 4 HOURS PRN
Status: DISCONTINUED | OUTPATIENT
Start: 2020-03-31 | End: 2020-04-03 | Stop reason: HOSPADM

## 2020-03-31 RX ORDER — DIPHENHYDRAMINE HYDROCHLORIDE 50 MG/ML
25 INJECTION INTRAMUSCULAR; INTRAVENOUS EVERY 6 HOURS PRN
Status: DISCONTINUED | OUTPATIENT
Start: 2020-03-31 | End: 2020-04-03 | Stop reason: HOSPADM

## 2020-03-31 RX ORDER — CEFAZOLIN SODIUM 1 G/3ML
INJECTION, POWDER, FOR SOLUTION INTRAMUSCULAR; INTRAVENOUS PRN
Status: DISCONTINUED | OUTPATIENT
Start: 2020-03-31 | End: 2020-03-31 | Stop reason: SURG

## 2020-03-31 RX ORDER — OXYCODONE HYDROCHLORIDE 5 MG/1
5 TABLET ORAL
Status: DISCONTINUED | OUTPATIENT
Start: 2020-03-31 | End: 2020-04-03 | Stop reason: HOSPADM

## 2020-03-31 RX ORDER — CHLORPROMAZINE HYDROCHLORIDE 10 MG/1
25 TABLET, FILM COATED ORAL EVERY 6 HOURS PRN
Status: DISCONTINUED | OUTPATIENT
Start: 2020-03-31 | End: 2020-04-03 | Stop reason: HOSPADM

## 2020-03-31 RX ORDER — DEXAMETHASONE SODIUM PHOSPHATE 4 MG/ML
INJECTION, SOLUTION INTRA-ARTICULAR; INTRALESIONAL; INTRAMUSCULAR; INTRAVENOUS; SOFT TISSUE PRN
Status: DISCONTINUED | OUTPATIENT
Start: 2020-03-31 | End: 2020-03-31 | Stop reason: SURG

## 2020-03-31 RX ORDER — ONDANSETRON 2 MG/ML
INJECTION INTRAMUSCULAR; INTRAVENOUS PRN
Status: DISCONTINUED | OUTPATIENT
Start: 2020-03-31 | End: 2020-03-31 | Stop reason: SURG

## 2020-03-31 RX ORDER — CHLORPROMAZINE HYDROCHLORIDE 25 MG/ML
25 INJECTION INTRAMUSCULAR EVERY 6 HOURS PRN
Status: DISCONTINUED | OUTPATIENT
Start: 2020-03-31 | End: 2020-04-03 | Stop reason: HOSPADM

## 2020-03-31 RX ORDER — MAGNESIUM SULFATE HEPTAHYDRATE 40 MG/ML
INJECTION, SOLUTION INTRAVENOUS PRN
Status: DISCONTINUED | OUTPATIENT
Start: 2020-03-31 | End: 2020-03-31 | Stop reason: SURG

## 2020-03-31 RX ORDER — CELECOXIB 200 MG/1
200 CAPSULE ORAL 2 TIMES DAILY
Status: DISCONTINUED | OUTPATIENT
Start: 2020-03-31 | End: 2020-04-03 | Stop reason: HOSPADM

## 2020-03-31 RX ORDER — GABAPENTIN 300 MG/1
300 CAPSULE ORAL ONCE
Status: COMPLETED | OUTPATIENT
Start: 2020-03-31 | End: 2020-03-31

## 2020-03-31 RX ORDER — AMOXICILLIN 250 MG
1 CAPSULE ORAL
Status: DISCONTINUED | OUTPATIENT
Start: 2020-03-31 | End: 2020-04-03 | Stop reason: HOSPADM

## 2020-03-31 RX ORDER — SODIUM CHLORIDE, SODIUM LACTATE, POTASSIUM CHLORIDE, CALCIUM CHLORIDE 600; 310; 30; 20 MG/100ML; MG/100ML; MG/100ML; MG/100ML
INJECTION, SOLUTION INTRAVENOUS CONTINUOUS
Status: ACTIVE | OUTPATIENT
Start: 2020-03-31 | End: 2020-03-31

## 2020-03-31 RX ORDER — ACETAMINOPHEN 500 MG
1000 TABLET ORAL EVERY 6 HOURS
Status: DISCONTINUED | OUTPATIENT
Start: 2020-03-31 | End: 2020-04-03 | Stop reason: HOSPADM

## 2020-03-31 RX ORDER — TRANEXAMIC ACID 100 MG/ML
INJECTION, SOLUTION INTRAVENOUS PRN
Status: DISCONTINUED | OUTPATIENT
Start: 2020-03-31 | End: 2020-03-31 | Stop reason: SURG

## 2020-03-31 RX ORDER — AMOXICILLIN 250 MG
1 CAPSULE ORAL NIGHTLY
Status: DISCONTINUED | OUTPATIENT
Start: 2020-03-31 | End: 2020-04-03 | Stop reason: HOSPADM

## 2020-03-31 RX ORDER — MEPERIDINE HYDROCHLORIDE 25 MG/ML
12.5 INJECTION INTRAMUSCULAR; INTRAVENOUS; SUBCUTANEOUS
Status: DISCONTINUED | OUTPATIENT
Start: 2020-03-31 | End: 2020-03-31 | Stop reason: HOSPADM

## 2020-03-31 RX ORDER — HYDROMORPHONE HYDROCHLORIDE 1 MG/ML
0.2 INJECTION, SOLUTION INTRAMUSCULAR; INTRAVENOUS; SUBCUTANEOUS
Status: DISCONTINUED | OUTPATIENT
Start: 2020-03-31 | End: 2020-03-31 | Stop reason: HOSPADM

## 2020-03-31 RX ORDER — ENEMA 19; 7 G/133ML; G/133ML
1 ENEMA RECTAL
Status: DISCONTINUED | OUTPATIENT
Start: 2020-03-31 | End: 2020-04-03 | Stop reason: HOSPADM

## 2020-03-31 RX ORDER — DIPHENHYDRAMINE HCL 25 MG
25 TABLET ORAL NIGHTLY PRN
Status: DISCONTINUED | OUTPATIENT
Start: 2020-04-01 | End: 2020-04-03 | Stop reason: HOSPADM

## 2020-03-31 RX ORDER — POLYETHYLENE GLYCOL 3350 17 G/17G
1 POWDER, FOR SOLUTION ORAL 2 TIMES DAILY PRN
Status: DISCONTINUED | OUTPATIENT
Start: 2020-03-31 | End: 2020-04-03 | Stop reason: HOSPADM

## 2020-03-31 RX ORDER — METOCLOPRAMIDE HYDROCHLORIDE 5 MG/ML
INJECTION INTRAMUSCULAR; INTRAVENOUS PRN
Status: DISCONTINUED | OUTPATIENT
Start: 2020-03-31 | End: 2020-03-31 | Stop reason: SURG

## 2020-03-31 RX ORDER — ONDANSETRON 2 MG/ML
4 INJECTION INTRAMUSCULAR; INTRAVENOUS
Status: DISCONTINUED | OUTPATIENT
Start: 2020-03-31 | End: 2020-03-31 | Stop reason: HOSPADM

## 2020-03-31 RX ORDER — TRAZODONE HYDROCHLORIDE 50 MG/1
100 TABLET ORAL NIGHTLY PRN
Status: DISCONTINUED | OUTPATIENT
Start: 2020-03-31 | End: 2020-04-03 | Stop reason: HOSPADM

## 2020-03-31 RX ORDER — DEXAMETHASONE SODIUM PHOSPHATE 4 MG/ML
4 INJECTION, SOLUTION INTRA-ARTICULAR; INTRALESIONAL; INTRAMUSCULAR; INTRAVENOUS; SOFT TISSUE
Status: DISCONTINUED | OUTPATIENT
Start: 2020-03-31 | End: 2020-04-03 | Stop reason: HOSPADM

## 2020-03-31 RX ORDER — HALOPERIDOL 5 MG/ML
1 INJECTION INTRAMUSCULAR
Status: DISCONTINUED | OUTPATIENT
Start: 2020-03-31 | End: 2020-03-31 | Stop reason: HOSPADM

## 2020-03-31 RX ORDER — OXYCODONE HYDROCHLORIDE 10 MG/1
10 TABLET ORAL
Status: DISCONTINUED | OUTPATIENT
Start: 2020-03-31 | End: 2020-04-03 | Stop reason: HOSPADM

## 2020-03-31 RX ORDER — SODIUM CHLORIDE, SODIUM LACTATE, POTASSIUM CHLORIDE, CALCIUM CHLORIDE 600; 310; 30; 20 MG/100ML; MG/100ML; MG/100ML; MG/100ML
INJECTION, SOLUTION INTRAVENOUS CONTINUOUS
Status: DISPENSED | OUTPATIENT
Start: 2020-03-31 | End: 2020-03-31

## 2020-03-31 RX ORDER — SCOLOPAMINE TRANSDERMAL SYSTEM 1 MG/1
1 PATCH, EXTENDED RELEASE TRANSDERMAL
Status: DISCONTINUED | OUTPATIENT
Start: 2020-03-31 | End: 2020-04-03 | Stop reason: HOSPADM

## 2020-03-31 RX ORDER — GABAPENTIN 300 MG/1
300 CAPSULE ORAL 3 TIMES DAILY
Status: DISCONTINUED | OUTPATIENT
Start: 2020-03-31 | End: 2020-04-03 | Stop reason: HOSPADM

## 2020-03-31 RX ORDER — MAGNESIUM HYDROXIDE 1200 MG/15ML
LIQUID ORAL
Status: DISCONTINUED | OUTPATIENT
Start: 2020-03-31 | End: 2020-03-31 | Stop reason: HOSPADM

## 2020-03-31 RX ORDER — SIMVASTATIN 20 MG
20 TABLET ORAL NIGHTLY
Status: DISCONTINUED | OUTPATIENT
Start: 2020-03-31 | End: 2020-04-03 | Stop reason: HOSPADM

## 2020-03-31 RX ORDER — HYDROMORPHONE HYDROCHLORIDE 1 MG/ML
0.1 INJECTION, SOLUTION INTRAMUSCULAR; INTRAVENOUS; SUBCUTANEOUS
Status: DISCONTINUED | OUTPATIENT
Start: 2020-03-31 | End: 2020-03-31 | Stop reason: HOSPADM

## 2020-03-31 RX ORDER — TRAMADOL HYDROCHLORIDE 50 MG/1
50 TABLET ORAL EVERY 4 HOURS PRN
Status: DISCONTINUED | OUTPATIENT
Start: 2020-03-31 | End: 2020-04-03 | Stop reason: HOSPADM

## 2020-03-31 RX ORDER — HYDROMORPHONE HYDROCHLORIDE 1 MG/ML
0.4 INJECTION, SOLUTION INTRAMUSCULAR; INTRAVENOUS; SUBCUTANEOUS
Status: DISCONTINUED | OUTPATIENT
Start: 2020-03-31 | End: 2020-03-31 | Stop reason: HOSPADM

## 2020-03-31 RX ORDER — OXYCODONE HCL 5 MG/5 ML
10 SOLUTION, ORAL ORAL
Status: COMPLETED | OUTPATIENT
Start: 2020-03-31 | End: 2020-03-31

## 2020-03-31 RX ORDER — SODIUM CHLORIDE, SODIUM LACTATE, POTASSIUM CHLORIDE, CALCIUM CHLORIDE 600; 310; 30; 20 MG/100ML; MG/100ML; MG/100ML; MG/100ML
INJECTION, SOLUTION INTRAVENOUS CONTINUOUS
Status: DISCONTINUED | OUTPATIENT
Start: 2020-03-31 | End: 2020-03-31 | Stop reason: HOSPADM

## 2020-03-31 RX ORDER — MAGNESIUM HYDROXIDE 1200 MG/15ML
LIQUID ORAL
Status: COMPLETED | OUTPATIENT
Start: 2020-03-31 | End: 2020-03-31

## 2020-03-31 RX ORDER — OXYCODONE HCL 5 MG/5 ML
5 SOLUTION, ORAL ORAL
Status: COMPLETED | OUTPATIENT
Start: 2020-03-31 | End: 2020-03-31

## 2020-03-31 RX ORDER — DIPHENHYDRAMINE HCL 25 MG
25 TABLET ORAL EVERY 6 HOURS PRN
Status: DISCONTINUED | OUTPATIENT
Start: 2020-03-31 | End: 2020-04-03 | Stop reason: HOSPADM

## 2020-03-31 RX ORDER — SCOLOPAMINE TRANSDERMAL SYSTEM 1 MG/1
1 PATCH, EXTENDED RELEASE TRANSDERMAL ONCE
Status: COMPLETED | OUTPATIENT
Start: 2020-03-31 | End: 2020-04-03

## 2020-03-31 RX ORDER — HALOPERIDOL 5 MG/ML
1 INJECTION INTRAMUSCULAR EVERY 6 HOURS PRN
Status: DISCONTINUED | OUTPATIENT
Start: 2020-03-31 | End: 2020-04-03 | Stop reason: HOSPADM

## 2020-03-31 RX ORDER — DOCUSATE SODIUM 100 MG/1
100 CAPSULE, LIQUID FILLED ORAL 2 TIMES DAILY
Status: DISCONTINUED | OUTPATIENT
Start: 2020-03-31 | End: 2020-04-03 | Stop reason: HOSPADM

## 2020-03-31 RX ORDER — ACETAMINOPHEN 500 MG
1000 TABLET ORAL ONCE
Status: COMPLETED | OUTPATIENT
Start: 2020-03-31 | End: 2020-03-31

## 2020-03-31 RX ADMIN — CEFAZOLIN 2 G: 330 INJECTION, POWDER, FOR SOLUTION INTRAMUSCULAR; INTRAVENOUS at 09:18

## 2020-03-31 RX ADMIN — FENTANYL CITRATE 25 MCG: 50 INJECTION, SOLUTION INTRAMUSCULAR; INTRAVENOUS at 09:16

## 2020-03-31 RX ADMIN — FENTANYL CITRATE 25 MCG: 50 INJECTION, SOLUTION INTRAMUSCULAR; INTRAVENOUS at 10:31

## 2020-03-31 RX ADMIN — CEFEPIME 2 G: 2 INJECTION, POWDER, FOR SOLUTION INTRAVENOUS at 15:01

## 2020-03-31 RX ADMIN — MAGNESIUM SULFATE IN WATER 2 G: 40 INJECTION, SOLUTION INTRAVENOUS at 09:04

## 2020-03-31 RX ADMIN — LIDOCAINE HYDROCHLORIDE 40 MG: 20 INJECTION, SOLUTION INTRAVENOUS at 09:04

## 2020-03-31 RX ADMIN — FENTANYL CITRATE 50 MCG: 50 INJECTION, SOLUTION INTRAMUSCULAR; INTRAVENOUS at 10:04

## 2020-03-31 RX ADMIN — SODIUM CHLORIDE, POTASSIUM CHLORIDE, SODIUM LACTATE AND CALCIUM CHLORIDE: 600; 310; 30; 20 INJECTION, SOLUTION INTRAVENOUS at 07:51

## 2020-03-31 RX ADMIN — SIMVASTATIN 20 MG: 20 TABLET, FILM COATED ORAL at 22:05

## 2020-03-31 RX ADMIN — SUGAMMADEX 200 MG: 100 INJECTION, SOLUTION INTRAVENOUS at 10:02

## 2020-03-31 RX ADMIN — ACETAMINOPHEN 1000 MG: 500 TABLET, FILM COATED ORAL at 15:01

## 2020-03-31 RX ADMIN — ASPIRIN 81 MG: 81 TABLET, COATED ORAL at 22:30

## 2020-03-31 RX ADMIN — GABAPENTIN 300 MG: 300 CAPSULE ORAL at 07:54

## 2020-03-31 RX ADMIN — TRAZODONE HYDROCHLORIDE 100 MG: 50 TABLET ORAL at 22:30

## 2020-03-31 RX ADMIN — FENTANYL CITRATE 25 MCG: 50 INJECTION, SOLUTION INTRAMUSCULAR; INTRAVENOUS at 10:22

## 2020-03-31 RX ADMIN — FENTANYL CITRATE 25 MCG: 50 INJECTION, SOLUTION INTRAMUSCULAR; INTRAVENOUS at 09:45

## 2020-03-31 RX ADMIN — ACETAMINOPHEN 1000 MG: 500 TABLET ORAL at 07:54

## 2020-03-31 RX ADMIN — SODIUM CHLORIDE, POTASSIUM CHLORIDE, SODIUM LACTATE AND CALCIUM CHLORIDE: 600; 310; 30; 20 INJECTION, SOLUTION INTRAVENOUS at 10:23

## 2020-03-31 RX ADMIN — OXYCODONE HYDROCHLORIDE 5 MG: 5 TABLET ORAL at 21:45

## 2020-03-31 RX ADMIN — LIDOCAINE HYDROCHLORIDE 0.5 ML: 10 INJECTION, SOLUTION EPIDURAL; INFILTRATION; INTRACAUDAL at 07:51

## 2020-03-31 RX ADMIN — CELECOXIB 200 MG: 200 CAPSULE ORAL at 17:13

## 2020-03-31 RX ADMIN — SODIUM CHLORIDE, POTASSIUM CHLORIDE, SODIUM LACTATE AND CALCIUM CHLORIDE: 600; 310; 30; 20 INJECTION, SOLUTION INTRAVENOUS at 11:10

## 2020-03-31 RX ADMIN — FENTANYL CITRATE 25 MCG: 50 INJECTION, SOLUTION INTRAMUSCULAR; INTRAVENOUS at 09:50

## 2020-03-31 RX ADMIN — FENTANYL CITRATE 100 MCG: 50 INJECTION, SOLUTION INTRAMUSCULAR; INTRAVENOUS at 10:06

## 2020-03-31 RX ADMIN — FENTANYL CITRATE 25 MCG: 50 INJECTION, SOLUTION INTRAMUSCULAR; INTRAVENOUS at 09:30

## 2020-03-31 RX ADMIN — SENNOSIDES AND DOCUSATE SODIUM 1 TABLET: 8.6; 5 TABLET ORAL at 21:45

## 2020-03-31 RX ADMIN — METOCLOPRAMIDE 10 MG: 5 INJECTION, SOLUTION INTRAMUSCULAR; INTRAVENOUS at 09:40

## 2020-03-31 RX ADMIN — DEXAMETHASONE SODIUM PHOSPHATE 8 MG: 4 INJECTION, SOLUTION INTRA-ARTICULAR; INTRALESIONAL; INTRAMUSCULAR; INTRAVENOUS; SOFT TISSUE at 09:04

## 2020-03-31 RX ADMIN — GABAPENTIN 300 MG: 300 CAPSULE ORAL at 21:44

## 2020-03-31 RX ADMIN — MEPERIDINE HYDROCHLORIDE 12.5 MG: 25 INJECTION INTRAMUSCULAR; INTRAVENOUS; SUBCUTANEOUS at 10:17

## 2020-03-31 RX ADMIN — TRANEXAMIC ACID 1000 MG: 100 INJECTION, SOLUTION INTRAVENOUS at 09:04

## 2020-03-31 RX ADMIN — OXYCODONE HYDROCHLORIDE 5 MG: 5 SOLUTION ORAL at 10:17

## 2020-03-31 RX ADMIN — FENTANYL CITRATE 25 MCG: 50 INJECTION, SOLUTION INTRAMUSCULAR; INTRAVENOUS at 10:38

## 2020-03-31 RX ADMIN — ROCURONIUM BROMIDE 100 MG: 10 INJECTION, SOLUTION INTRAVENOUS at 09:04

## 2020-03-31 RX ADMIN — ONDANSETRON 4 MG: 2 INJECTION INTRAMUSCULAR; INTRAVENOUS at 09:40

## 2020-03-31 RX ADMIN — SCOPALAMINE 1 PATCH: 1 PATCH, EXTENDED RELEASE TRANSDERMAL at 07:54

## 2020-03-31 RX ADMIN — GABAPENTIN 300 MG: 300 CAPSULE ORAL at 15:01

## 2020-03-31 RX ADMIN — OXYCODONE HYDROCHLORIDE 5 MG: 5 TABLET ORAL at 17:13

## 2020-03-31 RX ADMIN — VANCOMYCIN HYDROCHLORIDE 1300 MG: 500 INJECTION, POWDER, LYOPHILIZED, FOR SOLUTION INTRAVENOUS at 17:05

## 2020-03-31 RX ADMIN — PROPOFOL 200 MG: 10 INJECTION, EMULSION INTRAVENOUS at 09:04

## 2020-03-31 RX ADMIN — TRANEXAMIC ACID 1000 MG: 100 INJECTION, SOLUTION INTRAVENOUS at 11:01

## 2020-03-31 RX ADMIN — FENTANYL CITRATE 50 MCG: 50 INJECTION, SOLUTION INTRAMUSCULAR; INTRAVENOUS at 09:59

## 2020-03-31 ASSESSMENT — COGNITIVE AND FUNCTIONAL STATUS - GENERAL
DRESSING REGULAR LOWER BODY CLOTHING: A LITTLE
PERSONAL GROOMING: A LITTLE
HELP NEEDED FOR BATHING: A LITTLE
MOVING FROM LYING ON BACK TO SITTING ON SIDE OF FLAT BED: A LITTLE
EATING MEALS: A LITTLE
WALKING IN HOSPITAL ROOM: A LITTLE
SUGGESTED CMS G CODE MODIFIER MOBILITY: CK
MOVING TO AND FROM BED TO CHAIR: A LITTLE
DAILY ACTIVITIY SCORE: 18
STANDING UP FROM CHAIR USING ARMS: A LITTLE
TOILETING: A LITTLE
DRESSING REGULAR UPPER BODY CLOTHING: A LITTLE
CLIMB 3 TO 5 STEPS WITH RAILING: A LITTLE
TURNING FROM BACK TO SIDE WHILE IN FLAT BAD: A LITTLE
MOBILITY SCORE: 18
SUGGESTED CMS G CODE MODIFIER DAILY ACTIVITY: CK

## 2020-03-31 ASSESSMENT — PATIENT HEALTH QUESTIONNAIRE - PHQ9
SUM OF ALL RESPONSES TO PHQ9 QUESTIONS 1 AND 2: 0
2. FEELING DOWN, DEPRESSED, IRRITABLE, OR HOPELESS: NOT AT ALL
1. LITTLE INTEREST OR PLEASURE IN DOING THINGS: NOT AT ALL

## 2020-03-31 ASSESSMENT — LIFESTYLE VARIABLES
ON A TYPICAL DAY WHEN YOU DRINK ALCOHOL HOW MANY DRINKS DO YOU HAVE: 0
HAVE YOU EVER FELT YOU SHOULD CUT DOWN ON YOUR DRINKING: NO
DOES PATIENT WANT TO STOP DRINKING: NO
TOTAL SCORE: 0
TOTAL SCORE: 0
CONSUMPTION TOTAL: NEGATIVE
AVERAGE NUMBER OF DAYS PER WEEK YOU HAVE A DRINK CONTAINING ALCOHOL: 0
HAVE PEOPLE ANNOYED YOU BY CRITICIZING YOUR DRINKING: NO
EVER FELT BAD OR GUILTY ABOUT YOUR DRINKING: NO
ALCOHOL_USE: NO
HOW MANY TIMES IN THE PAST YEAR HAVE YOU HAD 5 OR MORE DRINKS IN A DAY: 0
TOTAL SCORE: 0
EVER HAD A DRINK FIRST THING IN THE MORNING TO STEADY YOUR NERVES TO GET RID OF A HANGOVER: NO

## 2020-03-31 ASSESSMENT — FIBROSIS 4 INDEX: FIB4 SCORE: 1.51

## 2020-03-31 NOTE — ANESTHESIA TIME REPORT
Anesthesia Start and Stop Event Times     Date Time Event    3/31/2020 0846 Ready for Procedure     0858 Anesthesia Start     1010 Anesthesia Stop        Responsible Staff  03/31/20    Name Role Begin End    Axel Corona M.D. Anesth 0858 1010        Preop Diagnosis (Free Text):  Pre-op Diagnosis     OA HIP LEFT, PRESENCE OF LEFT ARTIFICIAL HIP JOINT        Preop Diagnosis (Codes):    Post op Diagnosis  Prosthetic hip infection (HCC)      Premium Reason  Non-Premium    Comments:

## 2020-03-31 NOTE — PROGRESS NOTES
Patient admitted to room 326 from PACU. Patient alert and oriented x3. Had I/D this morning of left hip. Oriented to unit, call bell within reach. Will continue to monitor as needs arise.

## 2020-03-31 NOTE — PROGRESS NOTES
2 RN skin check complete.   Devices in place: ice packs to Left hip   Skin assessed under devices yes ( surgical dressing in place) did not undo dressing   Confirmed pressure ulcers found on: n/a  New potential pressure ulcers noted on n/a Wound consult placed: n/a  The following interventions in place  : Patient educated on turning/repositioning. Left surgical incisions clean dry and intact.

## 2020-03-31 NOTE — ANESTHESIA PROCEDURE NOTES
Airway  Date/Time: 3/31/2020 9:05 AM  Performed by: Axel Corona M.D.  Authorized by: Axel Corona M.D.     Location:  OR  Urgency:  Elective  Difficult Airway: No    Indications for Airway Management:  Anesthesia      Spontaneous Ventilation: absent    Sedation Level:  Deep  Preoxygenated: Yes    Patient Position:  Sniffing  Final Airway Type:  Endotracheal airway  Final Endotracheal Airway:  ETT  Cuffed: Yes    Technique Used for Successful ETT Placement:  Direct laryngoscopy  Devices/Methods Used in Placement:  Intubating stylet  Insertion Site:  Oral  Blade Type:  Ender  Laryngoscope Blade/Videolaryngoscope Blade Size:  3  ETT Size (mm):  7.0  Measured from:  Teeth  ETT to Teeth (cm):  23  Placement Verified by: auscultation and capnometry    Cormack-Lehane Classification:  Grade I - full view of glottis  Number of Attempts at Approach:  1

## 2020-03-31 NOTE — ANESTHESIA PREPROCEDURE EVALUATION
Infected L hip    Relevant Problems   Other   (+) Acute midline thoracic back pain   (+) Anxiety   (+) Chronic left hip pain   (+) Hyperlipidemia       Physical Exam    Airway   Mallampati: I  TM distance: >3 FB  Neck ROM: full       Cardiovascular - normal exam  Rhythm: regular  Rate: normal  (-) murmur     Dental - normal exam         Pulmonary - normal exam  Breath sounds clear to auscultation     Abdominal    Neurological - normal exam                 Anesthesia Plan    ASA 2       Plan - general       Airway plan will be ETT        Induction: intravenous      Pertinent diagnostic labs and testing reviewed    Informed Consent:    Anesthetic plan and risks discussed with patient.

## 2020-03-31 NOTE — PROGRESS NOTES
Approximately 130 PACU RN saw that pt was in bigeminy on the monitor, RN immediately assessed pt and pt returned to SR. Dr. Corona notified via phone, no orders received.

## 2020-03-31 NOTE — ANESTHESIA POSTPROCEDURE EVALUATION
Patient: Kemi Silva    Procedure Summary     Date:  03/31/20 Room / Location:  [place] [id number] / SURGERY [facility]    Anesthesia Start:  0858 Anesthesia Stop:  1010    Procedure:  IRRIGATION AND DEBRIDEMENT, HIP (Left Hip) Diagnosis:  (OA HIP LEFT, PRESENCE OF LEFT ARTIFICIAL HIP JOINT)    Surgeon:  Julian Mendes M.D. Responsible Provider:  Axel Corona M.D.    Anesthesia Type:  general ASA Status:  2          Final Anesthesia Type: general  Last vitals  BP   Blood Pressure: (!) 98/43    Temp   36.7 °C (98.1 °F)    Pulse   Pulse: 89   Resp   17    SpO2   96 %      Anesthesia Post Evaluation    Patient location during evaluation: PACU  Patient participation: complete - patient participated  Level of consciousness: awake and alert    Airway patency: patent  Anesthetic complications: no  Cardiovascular status: hemodynamically stable  Respiratory status: acceptable  Hydration status: euvolemic    PONV: none           Nurse Pain Score: 5 (NPRS)

## 2020-03-31 NOTE — OP REPORT
DATE OF SERVICE:  03/31/2020    INDICATIONS:  Patient with draining wound after ORIF of greater trochanter.      PREOPERATIVE DIAGNOSIS:  Infected wound, left hip.      POSTOPERATIVE DIAGNOSIS:  Infected wound, left hip.      PROCEDURE PERFORMED:  Irrigation and debridement of left hip wound, deep.      SURGEON:  Julian Mendes MD     ASSISTANT:  Rony Savage MD    DESCRIPTION OF PROCEDURE:  The patient was identified in the preoperative   area, site was marked, taken back to the operating room and underwent general   anesthesia.  Left lower extremity was prepped and draped in sterile manner.    Preoperative time-out was held.  Antibiotics were given.  Incision was opened   up, soft tissue dissected down through the fascia, it was found and did appear   to be infected.  It did not appear to go past the hardware, did not find any   continuity with the joint.  The wound was thoroughly debrided, it was   irrigated out with 9 liters of normal saline, Betadine and hydrogen peroxide   and a 50:50 mixture with sterile water.  The Vicryl was then used for the   fascia, Monocryl soft tissue and skin.  Deep drain was placed prior to   closure.       ____________________________________     Julian Mendes MD    JJ / NTS    DD:  03/31/2020 10:08:11  DT:  03/31/2020 10:34:25    D#:  [id number]  Job#:  [id number]

## 2020-04-01 ENCOUNTER — APPOINTMENT (OUTPATIENT)
Dept: RADIOLOGY | Facility: MEDICAL CENTER | Age: 60
DRG: 464 | End: 2020-04-01
Attending: INTERNAL MEDICINE
Payer: COMMERCIAL

## 2020-04-01 LAB
ERYTHROCYTE [DISTWIDTH] IN BLOOD BY AUTOMATED COUNT: 46.7 FL (ref 35.9–50)
HCT VFR BLD AUTO: 22 % (ref 37–47)
HGB BLD-MCNC: 7.2 G/DL (ref 12–16)
MCH RBC QN AUTO: 27.7 PG (ref 27–33)
MCHC RBC AUTO-ENTMCNC: 32.7 G/DL (ref 33.6–35)
MCV RBC AUTO: 84.6 FL (ref 81.4–97.8)
PLATELET # BLD AUTO: 380 K/UL (ref 164–446)
PMV BLD AUTO: 9.1 FL (ref 9–12.9)
RBC # BLD AUTO: 2.6 M/UL (ref 4.2–5.4)
WBC # BLD AUTO: 12.5 K/UL (ref 4.8–10.8)

## 2020-04-01 PROCEDURE — 36415 COLL VENOUS BLD VENIPUNCTURE: CPT

## 2020-04-01 PROCEDURE — 700102 HCHG RX REV CODE 250 W/ 637 OVERRIDE(OP): Performed by: ORTHOPAEDIC SURGERY

## 2020-04-01 PROCEDURE — 36573 INSJ PICC RS&I 5 YR+: CPT

## 2020-04-01 PROCEDURE — 700105 HCHG RX REV CODE 258: Performed by: ORTHOPAEDIC SURGERY

## 2020-04-01 PROCEDURE — 700105 HCHG RX REV CODE 258

## 2020-04-01 PROCEDURE — 700112 HCHG RX REV CODE 229: Performed by: ORTHOPAEDIC SURGERY

## 2020-04-01 PROCEDURE — A9270 NON-COVERED ITEM OR SERVICE: HCPCS | Performed by: ORTHOPAEDIC SURGERY

## 2020-04-01 PROCEDURE — 97161 PT EVAL LOW COMPLEX 20 MIN: CPT

## 2020-04-01 PROCEDURE — 700111 HCHG RX REV CODE 636 W/ 250 OVERRIDE (IP): Performed by: ORTHOPAEDIC SURGERY

## 2020-04-01 PROCEDURE — 97165 OT EVAL LOW COMPLEX 30 MIN: CPT

## 2020-04-01 PROCEDURE — 02HV33Z INSERTION OF INFUSION DEVICE INTO SUPERIOR VENA CAVA, PERCUTANEOUS APPROACH: ICD-10-PCS | Performed by: INTERNAL MEDICINE

## 2020-04-01 PROCEDURE — 770001 HCHG ROOM/CARE - MED/SURG/GYN PRIV*

## 2020-04-01 PROCEDURE — 85027 COMPLETE CBC AUTOMATED: CPT

## 2020-04-01 PROCEDURE — B548ZZA ULTRASONOGRAPHY OF SUPERIOR VENA CAVA, GUIDANCE: ICD-10-PCS | Performed by: INTERNAL MEDICINE

## 2020-04-01 RX ORDER — SODIUM CHLORIDE 9 MG/ML
INJECTION, SOLUTION INTRAVENOUS
Status: COMPLETED
Start: 2020-04-01 | End: 2020-04-01

## 2020-04-01 RX ADMIN — SIMVASTATIN 20 MG: 20 TABLET, FILM COATED ORAL at 21:34

## 2020-04-01 RX ADMIN — ACETAMINOPHEN 1000 MG: 500 TABLET, FILM COATED ORAL at 04:35

## 2020-04-01 RX ADMIN — ACETAMINOPHEN 1000 MG: 500 TABLET, FILM COATED ORAL at 11:51

## 2020-04-01 RX ADMIN — CEFEPIME 2 G: 2 INJECTION, POWDER, FOR SOLUTION INTRAVENOUS at 00:33

## 2020-04-01 RX ADMIN — ASPIRIN 81 MG: 81 TABLET, COATED ORAL at 10:23

## 2020-04-01 RX ADMIN — VANCOMYCIN HYDROCHLORIDE 1000 MG: 500 INJECTION, POWDER, LYOPHILIZED, FOR SOLUTION INTRAVENOUS at 17:16

## 2020-04-01 RX ADMIN — ACETAMINOPHEN 1000 MG: 500 TABLET, FILM COATED ORAL at 17:15

## 2020-04-01 RX ADMIN — CELECOXIB 200 MG: 200 CAPSULE ORAL at 04:34

## 2020-04-01 RX ADMIN — TRAMADOL HYDROCHLORIDE 50 MG: 50 TABLET, FILM COATED ORAL at 17:25

## 2020-04-01 RX ADMIN — SODIUM CHLORIDE 500 ML: 9 INJECTION, SOLUTION INTRAVENOUS at 00:35

## 2020-04-01 RX ADMIN — GABAPENTIN 300 MG: 300 CAPSULE ORAL at 11:51

## 2020-04-01 RX ADMIN — GABAPENTIN 300 MG: 300 CAPSULE ORAL at 04:35

## 2020-04-01 RX ADMIN — TRAZODONE HYDROCHLORIDE 100 MG: 50 TABLET ORAL at 21:35

## 2020-04-01 RX ADMIN — CEFEPIME 2 G: 2 INJECTION, POWDER, FOR SOLUTION INTRAVENOUS at 14:10

## 2020-04-01 RX ADMIN — ASPIRIN 81 MG: 81 TABLET, COATED ORAL at 21:34

## 2020-04-01 RX ADMIN — GABAPENTIN 300 MG: 300 CAPSULE ORAL at 17:15

## 2020-04-01 RX ADMIN — DOCUSATE SODIUM 100 MG: 100 CAPSULE, LIQUID FILLED ORAL at 04:35

## 2020-04-01 RX ADMIN — ACETAMINOPHEN 1000 MG: 500 TABLET, FILM COATED ORAL at 00:33

## 2020-04-01 RX ADMIN — TRAMADOL HYDROCHLORIDE 50 MG: 50 TABLET, FILM COATED ORAL at 21:34

## 2020-04-01 RX ADMIN — CELECOXIB 200 MG: 200 CAPSULE ORAL at 17:15

## 2020-04-01 RX ADMIN — OXYCODONE HYDROCHLORIDE 5 MG: 5 TABLET ORAL at 04:35

## 2020-04-01 RX ADMIN — DOCUSATE SODIUM 100 MG: 100 CAPSULE, LIQUID FILLED ORAL at 17:16

## 2020-04-01 RX ADMIN — TRAMADOL HYDROCHLORIDE 50 MG: 50 TABLET, FILM COATED ORAL at 11:51

## 2020-04-01 ASSESSMENT — GAIT ASSESSMENTS
DEVIATION: DECREASED BASE OF SUPPORT;ANTALGIC
DISTANCE (FEET): 200
ASSISTIVE DEVICE: SINGLE POINT CANE
GAIT LEVEL OF ASSIST: SUPERVISED

## 2020-04-01 ASSESSMENT — COGNITIVE AND FUNCTIONAL STATUS - GENERAL
TURNING FROM BACK TO SIDE WHILE IN FLAT BAD: A LITTLE
WALKING IN HOSPITAL ROOM: A LITTLE
MOBILITY SCORE: 19
MOVING TO AND FROM BED TO CHAIR: A LITTLE
SUGGESTED CMS G CODE MODIFIER MOBILITY: CK
STANDING UP FROM CHAIR USING ARMS: A LITTLE
CLIMB 3 TO 5 STEPS WITH RAILING: A LITTLE
DAILY ACTIVITIY SCORE: 23
SUGGESTED CMS G CODE MODIFIER DAILY ACTIVITY: CI
HELP NEEDED FOR BATHING: A LITTLE

## 2020-04-01 ASSESSMENT — ACTIVITIES OF DAILY LIVING (ADL): TOILETING: INDEPENDENT

## 2020-04-01 NOTE — PROGRESS NOTES
"Pharmacy Kinetics 60 y.o. female on vancomycin day # 1 3/31/2020    Currently on Vancomycin 1300 mg iv x1 loading dose  (1705)  Provider specified end date: TBD    Indication for Treatment: hip w/ hardware infection    Pertinent history per medical record: Admitted on 3/31/2020 for draining wound after ORIF of greater trochanter. Irrigation and debridement of left hip wound performed 3/31. The infection did not appear to go past the hardware or into the joint per surgery. Empiric antibiotic started.    Other antibiotics: Cefepime 2g IV q12h     Allergies: Patient has no known allergies.     List concerns for renal function: low wt (BMI 17); no labs to assess**    Pertinent cultures to date:   3/31/20: wound - in process    MRSA nares swab if pneumonia is a concern: N/A    No results for input(s): WBC, NEUTSPOLYS, BANDSSTABS in the last 72 hours.  No results for input(s): BUN, CREATININE, ALBUMIN in the last 72 hours.  No results for input(s): VANCOTROUGH, VANCOPEAK, VANCORANDOM in the last 72 hours.    Intake/Output Summary (Last 24 hours) at 3/31/2020 1750  Last data filed at 3/31/2020 1015  Gross per 24 hour   Intake 2200 ml   Output 75 ml   Net 2125 ml      BP (!) 90/58   Pulse 74   Temp 36.7 °C (98 °F) (Temporal)   Resp 17   Ht 1.727 m (5' 7.99\")   Wt 51.3 kg (113 lb 1.5 oz)   SpO2 96%  Temp (24hrs), Av.1 °C (98.8 °F), Min:36.4 °C (97.6 °F), Max:37.6 °C (99.7 °F)      A/P   1. Vancomycin dose change: 1000mg IV q24h  (1700)  2. Next vancomycin level: ~1-2 days  (not ordered)  3. Goal trough: 16-20 mcg/mL  4. Comments: No labs to assess WBC or renal function. Limited information to assess accumulation. Given age, will dose q24h. Start ~20mg/kg per protocol. Recommend a trough level in a few days to assess accumulation and dose appropriateness. Pharmacy to monitor cultures for possible de-escalation.      Marisabel Parks, PharmD., BCPS        "

## 2020-04-01 NOTE — CARE PLAN
Problem: Safety  Goal: Will remain free from falls  Outcome: PROGRESSING AS EXPECTED  Intervention: Assess risk factors for falls  Note: Assess LOC, assess environment. Maintain a clutter-free environment, bed alarm on, bed lowered and locked, non-skid socks in use, call light in place, personal belongings within reach. Offered toileting. Fall precautions in place.      Problem: Pain Management  Goal: Pain level will decrease to patient's comfort goal  Outcome: PROGRESSING AS EXPECTED  Intervention: Follow pain managment plan developed in collaboration with patient and Interdisciplinary Team  Note: Educate use of pain rating scale from 0-10. Assess oxygen, RR level and other V/S. Assess pain level per protocol.  Administer pain medications as ordered. Re-evaluate as necessary.    Intervention: Educate and implement non-pharmacologic comfort measures. Examples: relaxation, distration, play therapy, activity therapy, massage, etc.  Note:   Demonstrate non-pharmacological ways to control pain like deep breathing, ice packs, repositioning.

## 2020-04-01 NOTE — PROCEDURES
Vascular Access Team     Date of Insertion: 4/1/2020  Arm Circumference: 21  Internal length: 41  External Length: 0  Vein Occupancy %: 36   Reason for PICC: antibiotic therapy   Labs: WBC 12.5, , BUN 13, Cr 0.74, GFR >60, INR na     Consents confirmed, vessel patency confirmed with ultrasound. Risks and benefits of procedure explained to patient and education regarding central line associated bloodstream infections provided. Questions answered.      PICC placed in LUE per licensed provider order with ultrasound guidance.  4 Fr, single lumen PICC placed in basilic vein after 1 attempt(s). 2 mL of 1% lidocaine injected intradermally, 21 gauge microintroducer needle and modified Seldinger technique used. 41 cm catheter inserted with good blood return. Secured at 0 cm marker. Each lumen flushed without resistance with 10 mL 0.9% normal saline. PICC line secured with Biopatch and Tegaderm.     PICC tip placement location is confirmed by nurse to be in the Superior Vena Cava (SVC) utilizing 3CG technology. PICC line is appropriate for use at this time. Patient tolerated procedure well, without complications.  Patient condition relayed to unit RN or ordering physician via this post procedure note in the EMR.      Ultrasound images uploaded to PACS and viewable in the EMR - yes  Ultrasound imaged printed and placed in paper chart - no     BARD Power PICC ref # [id number], Lot # [id number], Expiration Date 12/31/2020

## 2020-04-01 NOTE — PROGRESS NOTES
"Patient seen and examined  No complaints    BP (!) 87/49   Pulse 63   Temp 36.8 °C (98.2 °F) (Temporal)   Resp 17   Ht 1.727 m (5' 7.99\")   Wt 51.3 kg (113 lb 1.5 oz)   SpO2 98%     Recent Labs     04/01/20  0548   WBC 12.5*   RBC 2.60*   HEMOGLOBIN 7.2*   HEMATOCRIT 22.0*   MCV 84.6   MCH 27.7   MCHC 32.7*   RDW 46.7   PLATELETCT 380   MPV 9.1       No acute distress  Dressing clean dry and intact  Neurovascularly intact      Plan:  Doing well  Discharge home when IV antibiotics arranged.  Drain 40cc  Will remove tomorrow.          "

## 2020-04-01 NOTE — PROGRESS NOTES
1902 Received report from MICHAEL Olmstead RN discussed. Call light in place, bed lowered and locked. Encourage pt to call if needed anything.

## 2020-04-01 NOTE — THERAPY
"Physical Therapy Evaluation completed.   Bed Mobility: NT- up self in room upon PT arrival    Transfers:  Superved  Gait: Level of Assist: Supervised  with Single Point Cane       Plan of Care: : Patient will not be actively followed for physical therapy services at this time, however may be seen if requested by physician for 1 more visit within 30 days to address any discharge or equipment needs.  Discharge Recommendations: Equipment: No Equipment Needed. Post-acute therapy: Recommend outpatient physical therapy services to address higher level deficits.    Pt is 59 y/o F presenting for I&D of L hip wound. Pt has PMH of L RUDDY and ORIF. She reports precautions currently are no L hip ABD. Orders for LLE WBAT. Pt amb with fair balance using SPC. She demonstrates some slight impulsivity and required some cues to slow cristofer and be more cautious with gait. Pt able to safely ascent/descent FOS by leading with RLE for ascent and LLE for descent as she lives in 2-story home. Pt reports she is a  so knows what strengthening to do but will accept LE HEP, provided to the patient.     See \"Rehab Therapy-Acute\" Patient Summary Report for complete documentation.     "

## 2020-04-01 NOTE — PROGRESS NOTES
"Pharmacy Kinetics 60 y.o. female on vancomycin day # 2 2020    Currently on Vancomycin 1000mg IV q24h  (1700)  Provider specified end date: TBD, anticipate 4 weeks    Indication for Treatment:hip w/ hardware infection     Pertinent history per medical record: Admitted on 3/31/2020 for draining wound after ORIF of greater trochanter. Irrigation and debridement of left hip wound performed 3/31. The infection did not appear to go past the hardware or into the joint per surgery. Empiric antibiotic started. ID consulted.      Other antibiotics: Cefepime 2g IV q12h      Allergies: Patient has no known allergies.      List concerns for renal function: low wt (BMI 17); low albumin     Pertinent cultures to date:   3/31/20: wound - Staph aureus      MRSA nares swab if pneumonia is a concern: N/A    Recent Labs     20  0548   WBC 12.5*     Recent Labs     20  1841   BUN 13   CREATININE 0.74   ALBUMIN 2.9*     No results for input(s): VANCOTROUGH, VANCOPEAK, VANCORANDOM in the last 72 hours.    Intake/Output Summary (Last 24 hours) at 2020 1339  Last data filed at 2020 0458  Gross per 24 hour   Intake 820 ml   Output 40 ml   Net 780 ml      /68   Pulse 75   Temp 37 °C (98.6 °F) (Temporal)   Resp 16   Ht 1.727 m (5' 7.99\")   Wt 51.3 kg (113 lb 1.5 oz)   SpO2 92%  Temp (24hrs), Av.2 °C (98.9 °F), Min:36.7 °C (98 °F), Max:37.8 °C (100.1 °F)      A/P   1. Vancomycin dose change: none  2. Next vancomycin level: tomorrow, 20, at 1630  3. Goal trough: 16-20 mcg/mL  4. Comments: Slight leukocytosis but afebrile. Cultures are growing staph aureus. Renal function appears stable per renal indices. Continue current dose with a trough level ordered for prior to 3rd dose to assess accumulation and dose appropriateness. ID has been consulted and anticipates 30 days of abx. Pharmacy to continue to follow ID recommendations.      Marisabel Parks, PharmD., BCPS        "

## 2020-04-01 NOTE — DIETARY
"Nutrition services: Day 1 of admit.  Kemi Silva is a 60 y.o. female with admitting DX of OA HIP LEFT, PRESENCE OF LEFT ARTIFICIAL HIP JOINT    RD alerted to pt with BMI <19.    Assessment:  Height: 172.7 cm (5' 7.99\")  Weight: 51.3 kg (113 lb 1.5 oz)  Body mass index is 17.2 kg/m²., BMI classification: Underweight    Diet/Intake: Regular diet in place. PO intake recorded in ADLs as 25-50% of lunch 3/31, increased to % of dinner 3/31 and breakfast today.    Evaluation:   · S/p Irrigation and debridement of left hip wound, deep (3/31).  · Spoke with pt today to address low BMI. Pt said her UBW was 118 lb, decreased to 111 lb due to surgeries and complications; this represents a 6% loss, although not able to verify time frame. Weight now up to 113 lb (4% less than UBW). Pt says she feels like her appetite is improving/returning. Pt agreeable to adding snacks in between meals BID. Pt says that @ home she has been drinking 2-3 [employer] Instant Breakfast supplements per week; pt plans to increase the frequency of supplements to daily to help gain weight. Offered Boost supplements while admitted, but pt politely declined.   · Nutrition Representative speaking with pt daily for menu options.  · No additional RD interventions @ this time.    Malnutrition Risk: 4-6% acute weight loss secondary to surgeries/complications.    Recommendations/Plan:  1. Encourage intake of meals and snacks.  2. Document all PO intake in ADLs to provide interdisciplinary communication across all shifts.   3. Monitor weight.  4. Nutrition Rep will continue to see pt for ongoing meal and snack preferences.   5. Advise daily intake of pt's preferred nutrition supplements @ home for weight gain.     RD available as further indicated.            "

## 2020-04-01 NOTE — DISCHARGE PLANNING
Care Transition Team Assessment  In the case of an emergency, pt's legal NOK is Suhail Silva     LSW  called pt and obtained the information used in this assessment. Pt verified accuracy of facesheet. Pt lives in a tri-level home with spouse and adult son.  Pt uses [facility] on [facility]. Prior to current hospitalization, pt was completely independent in ADLS/IADLS. Pt drives and is able to attend necessary MD appointments.  Pt has a good support system. Pt denies any hx of substance use and denies any dx of mh. Discharge plans undetermined.      Information Source:pt  Orientation : Oriented x 4  Information Given By: Patient  Informant's Name: (Kemi)  Who is responsible for making decisions for patient? : Patient         Elopement Risk  Legal Hold: No  Ambulatory or Self Mobile in Wheelchair: Yes  Disoriented: No  Psychiatric Symptoms: None  Elopement Risk: Not at Risk for Elopement    Interdisciplinary Discharge Planning  Does Admitting Nurse Feel This Could be a Complex Discharge?: No  Primary Care Physician: (Dr. Ibrahim)  Lives with - Patient's Self Care Capacity: Spouse, Adult Children  Patient or legal guardian wants to designate a caregiver (see row info): No  Support Systems: Family Member(s), Friends / Neighbors  Housing / Facility: 3 Ward House  Do You Take your Prescribed Medications Regularly: Yes  Mobility Issues: Yes  Prior Services: None  Durable Medical Equipment: Walker    Discharge Preparedness  What is your plan after discharge?: Home with help  What are your discharge supports?: Child, Spouse  Prior Functional Level: Ambulatory, Drives Self, Independent with Activities of Daily Living, Independent with Medication Management  Difficulity with ADLs: None  Difficulity with IADLs: None    Functional Assesment  Prior Functional Level: Ambulatory, Drives Self, Independent with Activities of Daily Living, Independent with Medication Management         Vision / Hearing  Impairment  Vision Impairment : Yes  Right Eye Vision: Wears Glasses  Left Eye Vision: Wears Glasses              Domestic Abuse  Have you ever been the victim of abuse or violence?: No  Physical Abuse or Sexual Abuse: No  Verbal Abuse or Emotional Abuse: No  Possible Abuse Reported to:: Not Applicable    Psychological Assessment  History of Substance Abuse: None  History of Psychiatric Problems: No         Anticipated Discharge Information  Anticipated discharge disposition: Home

## 2020-04-01 NOTE — CONSULTS
DATE OF SERVICE:  04/01/2020    INFECTIOUS DISEASE CONSULTATION    HISTORY OF PRESENT ILLNESS:  This is a 60-year-old female.  The patient is   postoperative incision and drainage of a left hip surgery site.  The patient   is 4 weeks postoperative internal fixation of a left hip fracture syndrome.    She started to develop a draining wound 1 month since that surgery.  The   patient has had 3 prior surgeries in that area -- one of which is a left total   hip arthroplasty.  She now is postoperative wound revision surgery.  At   present, the patient denies any nausea, vomiting, fever, chills, or diarrhea.    She is having mild pain.  The patient otherwise does well and has no chronic   illnesses to include diabetes, hypertension or renal disease.  Consultation is   requested at this time for postoperative antibiotic advice as well as any   further diagnostic or therapeutic recommendations.    ALLERGIES:  None known to antibiotics.    REVIEW OF SYSTEMS:  Negative, other than above.    SOCIAL HISTORY:  The patient is , with 2 children who are alive and   well; the patient is employed as a  at this time.  The   patient is a RN.    PHYSICAL EXAMINATION:  VITAL SIGNS:  Temperature 98, pulse 63, respirations 17, BP 87/49.  GENERAL:  A 60-year-old female who is thin -- underweight, lying quietly in   bed.  The patient is alert and oriented.  SKIN:  No obvious rashes.  HEENT:  Negative.  NECK:  Supple.  LYMPHATIC:  No adenopathy.  LUNGS:  Clear.  HEART:  Regular rhythm.  No murmurs noted.  ABDOMEN:  Deferred.  EXTREMITIES:  Operative site in the left hip area intact -- VAC dressing in   place; no new erythema or drainage appreciated.    RECENT LABORATORY DATA:  Microbiology -- Staph aureus with sensitivities   pending growing from the left hip area.  WBC 12,500.  Potassium 4.7,   creatinine 0.74.    IMPRESSION:  Stable postoperative wound revision of a left hip fracture   syndrome -- infected hardware  defined growing a staphylococcus aureus   organism.  No systemic toxicity from sepsis at this time.    RECOMMENDATIONS:  1.  Continue perioperative cefepime/vancomycin until sensitivities are found.  2.  Percutaneous central line placement -- the patient will require   approximately 30 days of intravenous antibiotics -- we will arrange home   intravenous antibiotic program with [facility].  3.  We will continue to follow with you.       ____________________________________     RUT CHAUHAN MD RLS / JERRICA    DD:  04/01/2020 12:26:59  DT:  04/01/2020 12:41:59    D#:  [id number]  Job#:  [id number]

## 2020-04-01 NOTE — THERAPY
"Occupational Therapy Evaluation completed.   Functional Status: spv for bed mobility, spv for toileting, spv for LE dressing, spv for standing grooming at sink  Plan of Care: Patient with no further skilled OT needs in the acute care setting at this time  Discharge Recommendations:  Equipment: No Equipment Needed. Post-acute therapy Anticipate that the patient will have no further occupational therapy needs after discharge from the hospital. Patient will not be actively followed for occupational therapy services at this time, however may be seen if requested by physician for 1 more visit within 30 days to address any discharge or equipment needs.     Pt is a 60 y.o. female s/p L hip I&D. Pt has PMHx including but not limited to L anterior RUDDY in Nov 2019 and is 4 weeks postoperative internal fixation of a left hip fracture syndrome. Pt is WBAT LLE. Pt lives with  and son in 3SH. Pts primary bed/bath is on 2nd floor, however pt is able to stay on first floor if needed. Pts PLOF was [place] for all ADLs/IADLs. Pt has been using a cane for functional mobility. Pt currently functioning at baseline, and has no further acute occupational therapy needs at this time. Will follow for d/c needs.     See \"Rehab Therapy-Acute\" Patient Summary Report for complete documentation.    "

## 2020-04-02 LAB
BACTERIA TISS AEROBE CULT: ABNORMAL
BACTERIA TISS AEROBE CULT: ABNORMAL
BACTERIA WND AEROBE CULT: ABNORMAL
BACTERIA WND AEROBE CULT: ABNORMAL
GRAM STN SPEC: ABNORMAL
GRAM STN SPEC: ABNORMAL
SIGNIFICANT IND 70042: ABNORMAL
SIGNIFICANT IND 70042: ABNORMAL
SITE SITE: ABNORMAL
SITE SITE: ABNORMAL
SOURCE SOURCE: ABNORMAL
SOURCE SOURCE: ABNORMAL
VANCOMYCIN TROUGH SERPL-MCNC: <4 UG/ML (ref 10–20)

## 2020-04-02 PROCEDURE — 700112 HCHG RX REV CODE 229: Performed by: ORTHOPAEDIC SURGERY

## 2020-04-02 PROCEDURE — 700105 HCHG RX REV CODE 258: Performed by: ORTHOPAEDIC SURGERY

## 2020-04-02 PROCEDURE — A9270 NON-COVERED ITEM OR SERVICE: HCPCS | Performed by: ORTHOPAEDIC SURGERY

## 2020-04-02 PROCEDURE — 700102 HCHG RX REV CODE 250 W/ 637 OVERRIDE(OP): Performed by: ORTHOPAEDIC SURGERY

## 2020-04-02 PROCEDURE — 770001 HCHG ROOM/CARE - MED/SURG/GYN PRIV*

## 2020-04-02 PROCEDURE — 700111 HCHG RX REV CODE 636 W/ 250 OVERRIDE (IP): Performed by: ORTHOPAEDIC SURGERY

## 2020-04-02 PROCEDURE — 80202 ASSAY OF VANCOMYCIN: CPT

## 2020-04-02 RX ADMIN — CEFEPIME 2 G: 2 INJECTION, POWDER, FOR SOLUTION INTRAVENOUS at 02:24

## 2020-04-02 RX ADMIN — GABAPENTIN 300 MG: 300 CAPSULE ORAL at 17:12

## 2020-04-02 RX ADMIN — GABAPENTIN 300 MG: 300 CAPSULE ORAL at 04:24

## 2020-04-02 RX ADMIN — SENNOSIDES AND DOCUSATE SODIUM 1 TABLET: 8.6; 5 TABLET ORAL at 23:05

## 2020-04-02 RX ADMIN — TRAMADOL HYDROCHLORIDE 50 MG: 50 TABLET, FILM COATED ORAL at 23:07

## 2020-04-02 RX ADMIN — TRAZODONE HYDROCHLORIDE 100 MG: 50 TABLET ORAL at 23:04

## 2020-04-02 RX ADMIN — GABAPENTIN 300 MG: 300 CAPSULE ORAL at 12:31

## 2020-04-02 RX ADMIN — CELECOXIB 200 MG: 200 CAPSULE ORAL at 17:12

## 2020-04-02 RX ADMIN — ACETAMINOPHEN 1000 MG: 500 TABLET, FILM COATED ORAL at 12:31

## 2020-04-02 RX ADMIN — ASPIRIN 81 MG: 81 TABLET, COATED ORAL at 10:04

## 2020-04-02 RX ADMIN — ACETAMINOPHEN 1000 MG: 500 TABLET, FILM COATED ORAL at 23:05

## 2020-04-02 RX ADMIN — ACETAMINOPHEN 1000 MG: 500 TABLET, FILM COATED ORAL at 04:24

## 2020-04-02 RX ADMIN — TRAMADOL HYDROCHLORIDE 50 MG: 50 TABLET, FILM COATED ORAL at 10:04

## 2020-04-02 RX ADMIN — VANCOMYCIN HYDROCHLORIDE 1000 MG: 500 INJECTION, POWDER, LYOPHILIZED, FOR SOLUTION INTRAVENOUS at 23:05

## 2020-04-02 RX ADMIN — CEFEPIME 2 G: 2 INJECTION, POWDER, FOR SOLUTION INTRAVENOUS at 12:31

## 2020-04-02 RX ADMIN — ASPIRIN 81 MG: 81 TABLET, COATED ORAL at 23:04

## 2020-04-02 RX ADMIN — SIMVASTATIN 20 MG: 20 TABLET, FILM COATED ORAL at 23:04

## 2020-04-02 RX ADMIN — ACETAMINOPHEN 1000 MG: 500 TABLET, FILM COATED ORAL at 17:12

## 2020-04-02 RX ADMIN — CELECOXIB 200 MG: 200 CAPSULE ORAL at 04:24

## 2020-04-02 RX ADMIN — DOCUSATE SODIUM 100 MG: 100 CAPSULE, LIQUID FILLED ORAL at 04:24

## 2020-04-02 RX ADMIN — VANCOMYCIN HYDROCHLORIDE 1000 MG: 500 INJECTION, POWDER, LYOPHILIZED, FOR SOLUTION INTRAVENOUS at 18:19

## 2020-04-02 RX ADMIN — TRAMADOL HYDROCHLORIDE 50 MG: 50 TABLET, FILM COATED ORAL at 17:12

## 2020-04-02 NOTE — CARE PLAN
Problem: Infection  Goal: Will remain free from infection  Outcome: PROGRESSING AS EXPECTED    -IV abx maintained. CHG bath complete due to Pt having PICC. Monitoring vital signs and labs.     Problem: Pain Management  Goal: Pain level will decrease to patient's comfort goal  Outcome: PROGRESSING AS EXPECTED    -Pain controlled with scheduled and PRN medications. Pt ambulating around unit.

## 2020-04-02 NOTE — PROGRESS NOTES
".  Patient seen and examined  Doing well    /62   Pulse 85   Temp 35.9 °C (96.6 °F) (Temporal)   Resp 17   Ht 1.727 m (5' 7.99\")   Wt 51.3 kg (113 lb 1.5 oz)   SpO2 96%     Recent Labs     04/01/20  0548   WBC 12.5*   RBC 2.60*   HEMOGLOBIN 7.2*   HEMATOCRIT 22.0*   MCV 84.6   MCH 27.7   MCHC 32.7*   RDW 46.7   PLATELETCT 380   MPV 9.1       No acute distress  Dressing clean dry and intact  Neurovascularly intact  HV - 80 cc    Plan:  Remove HV tomorrow  Discharge home when IV antibiotics are arranged.            "

## 2020-04-02 NOTE — DISCHARGE PLANNING
Received call from Juhi at [facility], she spoke  to Dr. Morales and received orders. Juhi will teach pt tomorrow. 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.

## 2020-04-02 NOTE — CARE PLAN
Problem: Communication  Goal: The ability to communicate needs accurately and effectively will improve  Outcome: PROGRESSING AS EXPECTED  Note: Pt able to make needs known. Able to communicate effectively with staff.     Problem: Pain Management  Goal: Pain level will decrease to patient's comfort goal  Outcome: PROGRESSING AS EXPECTED  Note: PRN medication given as needed and requested. Tolerating Tramadol dosage.      Problem: Fluid Volume:  Goal: Will maintain balanced intake and output  Outcome: PROGRESSING AS EXPECTED  Note: Monitoring drain outputs, recorded in flowsheets.

## 2020-04-02 NOTE — DISCHARGE PLANNING
Per Sr. Morales' notes, plan is for home IVABX for 30 days with [facility]. Called [facility], they have not yet received orders. Spoke to Juhi at [facility], she will contact Dr. Morales. Spoke to pt, she is aware of plan and chose [facility], has [place] them [place] [facility] Will coordinate home infusion when orders received.

## 2020-04-02 NOTE — PROGRESS NOTES
This writer attempted 4 times to reach and page Dr Mendes through DAVINA number listed, no connection ever made despite asking  to forward to different line. Pt concerned about blood counts and requesting a lab draw for tomorrow morning, H/H on 4/01 was 7.2/22.0, Hemovac outputs significant and no blood draws ordered for today (4/02). No other changes.

## 2020-04-02 NOTE — DISCHARGE PLANNING
Received Choice form at 1013  Agency/Facility Name: [facility]  Referral sent per Choice form @ 1693

## 2020-04-03 VITALS
HEART RATE: 85 BPM | RESPIRATION RATE: 17 BRPM | DIASTOLIC BLOOD PRESSURE: 60 MMHG | SYSTOLIC BLOOD PRESSURE: 102 MMHG | TEMPERATURE: 98.3 F | HEIGHT: 68 IN | BODY MASS INDEX: 17.14 KG/M2 | OXYGEN SATURATION: 98 % | WEIGHT: 113.1 LBS

## 2020-04-03 LAB
BACTERIA SPEC ANAEROBE CULT: NORMAL
BACTERIA SPEC ANAEROBE CULT: NORMAL
SIGNIFICANT IND 70042: NORMAL
SIGNIFICANT IND 70042: NORMAL
SITE SITE: NORMAL
SITE SITE: NORMAL
SOURCE SOURCE: NORMAL
SOURCE SOURCE: NORMAL

## 2020-04-03 PROCEDURE — 700102 HCHG RX REV CODE 250 W/ 637 OVERRIDE(OP): Performed by: ORTHOPAEDIC SURGERY

## 2020-04-03 PROCEDURE — 700111 HCHG RX REV CODE 636 W/ 250 OVERRIDE (IP): Performed by: ORTHOPAEDIC SURGERY

## 2020-04-03 PROCEDURE — A9270 NON-COVERED ITEM OR SERVICE: HCPCS | Performed by: ORTHOPAEDIC SURGERY

## 2020-04-03 PROCEDURE — 700105 HCHG RX REV CODE 258: Performed by: ORTHOPAEDIC SURGERY

## 2020-04-03 PROCEDURE — 700112 HCHG RX REV CODE 229: Performed by: ORTHOPAEDIC SURGERY

## 2020-04-03 RX ADMIN — ACETAMINOPHEN 1000 MG: 500 TABLET, FILM COATED ORAL at 12:07

## 2020-04-03 RX ADMIN — GABAPENTIN 300 MG: 300 CAPSULE ORAL at 04:50

## 2020-04-03 RX ADMIN — DOCUSATE SODIUM 100 MG: 100 CAPSULE, LIQUID FILLED ORAL at 04:50

## 2020-04-03 RX ADMIN — GABAPENTIN 300 MG: 300 CAPSULE ORAL at 12:07

## 2020-04-03 RX ADMIN — VANCOMYCIN HYDROCHLORIDE 1000 MG: 500 INJECTION, POWDER, LYOPHILIZED, FOR SOLUTION INTRAVENOUS at 12:00

## 2020-04-03 RX ADMIN — CELECOXIB 200 MG: 200 CAPSULE ORAL at 04:50

## 2020-04-03 RX ADMIN — CEFEPIME 2 G: 2 INJECTION, POWDER, FOR SOLUTION INTRAVENOUS at 01:45

## 2020-04-03 RX ADMIN — ACETAMINOPHEN 1000 MG: 500 TABLET, FILM COATED ORAL at 04:50

## 2020-04-03 RX ADMIN — ASPIRIN 81 MG: 81 TABLET, COATED ORAL at 10:33

## 2020-04-03 RX ADMIN — TRAMADOL HYDROCHLORIDE 50 MG: 50 TABLET, FILM COATED ORAL at 10:32

## 2020-04-03 NOTE — PROGRESS NOTES
"Pharmacy Kinetics 60 y.o. female on vancomycin day # 4     4/3/2020    Currently on Vancomycin 1000mg IV q12h  (0000 1200)  Provider specified end date: TBD, anticipate 4 weeks     Indication for Treatment:hip w/ hardware infection     Pertinent history per medical record: Admitted on 3/31/2020 for draining wound after ORIF of greater trochanter. Irrigation and debridement of left hip wound performed 3/31. The infection did not appear to go past the hardware or into the joint per surgery. Empiric antibiotic started. ID consulted.      Other antibiotics: Cefepime 2g IV q12h      Allergies: Patient has no known allergies.      List concerns for renal function: low wt (BMI 17); low albumin     Pertinent cultures to date:   20:Lt Hip,TISS:MSSA  20:Lt Hip,WND:MSSA       MRSA nares swab if pneumonia is a concern: N/A    Recent Labs     20  0548   WBC 12.5*     Recent Labs     20  1841   BUN 13   CREATININE 0.74   ALBUMIN 2.9*     Recent Labs     20  1704   VANCOTROUGH <4.0*       Intake/Output Summary (Last 24 hours) at 4/3/2020 0904  Last data filed at 4/3/2020 0500  Gross per 24 hour   Intake 600 ml   Output 168 ml   Net 432 ml      /67   Pulse 62   Temp 36.9 °C (98.4 °F) (Temporal)   Resp 16   Ht 1.727 m (5' 7.99\")   Wt 51.3 kg (113 lb 1.5 oz)   SpO2 95%  Temp (24hrs), Av.3 °C (99.1 °F), Min:36.9 °C (98.4 °F), Max:37.7 °C (99.8 °F)      A/P   1. Vancomycin dose change: None  2. Next vancomycin level: 20@1130  3. Goal trough: 16-20 mcg/mL   4. Comments: No new CBC. Afebrile. Last renal indices wnl, ordered BMP daily while on vancomycin. ID following, awaiting updated progress note regarding abx plan.     Jose A Ferrari PharmD BCPS   "

## 2020-04-03 NOTE — PROGRESS NOTES
Dr. Morales at bedside, discharge plan and antibiotic treatment discussed with patient. Discharge order placed per ID MD.

## 2020-04-03 NOTE — DISCHARGE INSTRUCTIONS
Discharge Instructions    Discharged to home by car with escort. Discharged via wheelchair, hospital escort: Yes.  Special equipment needed: Cane    Be sure to schedule a follow-up appointment with your primary care doctor or any specialists as instructed.     Discharge Plan:   Diet Plan: Discussed  Activity Level: Discussed  Confirmed Follow up Appointment: Patient to Call and Schedule Appointment  Confirmed Symptoms Management: Discussed  Medication Reconciliation Updated: Yes  Influenza Vaccine Indication: Patient Refuses(previously vaccinated)    I understand that a diet low in cholesterol, fat, and sodium is recommended for good health. Unless I have been given specific instructions below for another diet, I accept this instruction as my diet prescription.   Other diet: Regular    Special Instructions: Discharge instructions for the Orthopedic Patient    Follow up with Primary Care Physician within 2 weeks of discharge to home, regarding:  Review of medications and diagnostic testing.  Surveillance for medical complications.  Workup and treatment of osteoporosis, if appropriate.     -Is this a Hip/Knee/Shoulder Joint Replacement patient? No    -Is this patient being discharged with medication to prevent blood clots?  Yes, Aspirin Aspirin, ASA oral tablets  What is this medicine?  ASPIRIN (AS pir in) is a pain reliever. It is used to treat mild pain and fever. This medicine is also used as directed by a doctor to prevent and to treat heart attacks, to prevent strokes, and to treat arthritis or inflammation.  This medicine may be used for other purposes; ask your health care provider or pharmacist if you have questions.  COMMON BRAND NAME(S): Aspir-Low, Aspir-Sana, Aspirtab, Kaycee Advanced Aspirin, Kaycee Aspirin, Kaycee Aspirin Extra Strength, Kaycee Aspirin Plus, Kaycee Extra Strength, Kaycee Extra Strength Plus, Kaycee Genuine Aspirin, Kaycee Womens Aspirin, Bufferin, Bufferin Extra Strength, Bufferin Low Dose  What  should I tell my health care provider before I take this medicine?  They need to know if you have any of these conditions:  -anemia  -asthma  -bleeding problems  -child with chickenpox, the flu, or other viral infection  -diabetes  -gout  -if you frequently drink alcohol containing drinks  -kidney disease  -liver disease  -low level of vitamin K  -lupus  -smoke tobacco  -stomach ulcers or other problems  -an unusual or allergic reaction to aspirin, tartrazine dye, other medicines, dyes, or preservatives  -pregnant or trying to get pregnant  -breast-feeding  How should I use this medicine?  Take this medicine by mouth with a glass of water. Follow the directions on the package or prescription label. You can take this medicine with or without food. If it upsets your stomach, take it with food. Do not take your medicine more often than directed.  Talk to your pediatrician regarding the use of this medicine in children. While this drug may be prescribed for children as young as 12 years of age for selected conditions, precautions do apply. Children and teenagers should not use this medicine to treat chicken pox or flu symptoms unless directed by a doctor.  Patients over 65 years old may have a stronger reaction and need a smaller dose.  Overdosage: If you think you have taken too much of this medicine contact a poison control center or emergency room at once.  NOTE: This medicine is only for you. Do not share this medicine with others.  What if I miss a dose?  If you are taking this medicine on a regular schedule and miss a dose, take it as soon as you can. If it is almost time for your next dose, take only that dose. Do not take double or extra doses.  What may interact with this medicine?  Do not take this medicine with any of the following medications:  -cidofovir  -ketorolac  -probenecid  This medicine may also interact with the following medications:  -alcohol  -alendronate  -bismuth  subsalicylate  -flavocoxid  -herbal supplements like feverfew, garlic, petra, ginkgo biloba, horse chestnut  -medicines for diabetes or glaucoma like acetazolamide, methazolamide  -medicines for gout  -medicines that treat or prevent blood clots like enoxaparin, heparin, ticlopidine, warfarin  -other aspirin and aspirin-like medicines  -NSAIDs, medicines for pain and inflammation, like ibuprofen or naproxen  -pemetrexed  -sulfinpyrazone  -varicella live vaccine  This list may not describe all possible interactions. Give your health care provider a list of all the medicines, herbs, non-prescription drugs, or dietary supplements you use. Also tell them if you smoke, drink alcohol, or use illegal drugs. Some items may interact with your medicine.  What should I watch for while using this medicine?  If you are treating yourself for pain, tell your doctor or health care professional if the pain lasts more than 10 days, if it gets worse, or if there is a new or different kind of pain. Tell your doctor if you see redness or swelling. Also, check with your doctor if you have a fever that lasts for more than 3 days. Only take this medicine to prevent heart attacks or blood clotting if prescribed by your doctor or health care professional.  Do not take aspirin or aspirin-like medicines with this medicine. Too much aspirin can be dangerous. Always read the labels carefully.  This medicine can irritate your stomach or cause bleeding problems. Do not smoke cigarettes or drink alcohol while taking this medicine. Do not lie down for 30 minutes after taking this medicine to prevent irritation to your throat.  If you are scheduled for any medical or dental procedure, tell your healthcare provider that you are taking this medicine. You may need to stop taking this medicine before the procedure.  This medicine may be used to treat migraines. If you take migraine medicines for 10 or more days a month, your migraines may get worse.  Keep a diary of headache days and medicine use. Contact your healthcare professional if your migraine attacks occur more frequently.  What side effects may I notice from receiving this medicine?  Side effects that you should report to your doctor or health care professional as soon as possible:  -allergic reactions like skin rash, itching or hives, swelling of the face, lips, or tongue  -breathing problems  -changes in hearing, ringing in the ears  -confusion  -general ill feeling or flu-like symptoms  -pain on swallowing  -redness, blistering, peeling or loosening of the skin, including inside the mouth or nose  -signs and symptoms of bleeding such as bloody or black, tarry stools; red or dark-brown urine; spitting up blood or brown material that looks like coffee grounds; red spots on the skin; unusual bruising or bleeding from the eye, gums, or nose  -trouble passing urine or change in the amount of urine  -unusually weak or tired  -yellowing of the eyes or skin  Side effects that usually do not require medical attention (report to your doctor or health care professional if they continue or are bothersome):  -diarrhea or constipation  -headache  -nausea, vomiting  -stomach gas, heartburn  This list may not describe all possible side effects. Call your doctor for medical advice about side effects. You may report side effects to FDA at 5-729-FDA-7017.  Where should I keep my medicine?  Keep out of the reach of children.  Store at room temperature between 15 and 30 degrees C (59 and 86 degrees F). Protect from heat and moisture. Do not use this medicine if it has a strong vinegar smell. Throw away any unused medicine after the expiration date.  NOTE: This sheet is a summary. It may not cover all possible information. If you have questions about this medicine, talk to your doctor, pharmacist, or health care provider.  © 2018 Elsevier/Gold Standard (2014-08-19 11:30:31)      · Is patient discharged on Warfarin /  Coumadin?   No       Surgical Wound Debridement  Introduction  Surgical wound debridement is a procedure that removes dead or infected tissue and other substances from a wound. To heal, a wound must be clean. It also must have an adequate blood supply. Anything that prevents this must be taken out of the wound. This may include:  · Dead tissue.  · Scar tissue.  · Fluid that has built up.  · Debris from outside of the body.  You may need this procedure if you have a wound that has not healed (chronic wound).  Tell a health care provider about:  · Any allergies you have.  · All medicines you are taking, including vitamins, herbs, eye drops, creams, and over-the-counter medicines.  · Any problems you or family members have had with anesthetic medicines.  · Any blood disorders you have.  · Any surgeries you have had.  · Any medical conditions you have.  · Whether you are pregnant or may be pregnant.  What are the risks?  Generally, this is a safe procedure. However, problems may occur, including:  · Bleeding.  · Infection.  · Damage to nerves, blood vessels, or healthy tissue inside the wound.  · Scarring and loss of function.  What happens before the procedure?  · Ask your health care provider about:  ¨ Changing or stopping your regular medicines. This is especially important if you are taking diabetes medicines or blood thinners.  ¨ Taking medicines such as aspirin and ibuprofen. These medicines can thin your blood. Do not take these medicines before your procedure if your health care provider instructs you not to.  · Follow instructions from your health care provider about eating or drinking restrictions.  · You may be given antibiotic medicine to help prevent or treat infection.  · You may have blood tests.  · Plan to have someone take you home after the procedure.  What happens during the procedure?  · To reduce your risk of infection:  ¨ Your health care team will wash or sanitize their hands.  ¨ Your skin will be  washed with soap.  · You may have small monitors placed on your body. These are used to check your heart, blood pressure, and oxygen level.  · An IV will be put in your hand or arm.  · You will be given one or more of the following:  ¨ A medicine to help you relax (sedative).  ¨ A medicine to numb the area (local anesthetic).  ¨ A medicine to make you fall asleep (general anesthetic).  ¨ A medicine that is injected into your spine to numb the area below and slightly above the injection site (spinal anesthetic).  ¨ A medicine that is injected into an area of your body to numb everything below the injection site (regional anesthetic).  · Your health care provider will clean your wound with a sterile salt-water (saline) solution.  · Your health care provider will use scissors, surgical knives (scalpels) and surgical tweezers (forceps), or a laser to remove dead tissue. Your health care provider will also remove any other material that should not be in the wound.  · After the tissue and other materials have been removed from the wound, your health care provider will clean the wound again and apply a bandage (dressing).  The procedure may vary among health care providers and hospitals.  What happens after the procedure?  · Your blood pressure, heart rate, breathing rate, and blood oxygen level will be monitored often until the medicines you were given have worn off.  · You will be given medicine for pain.  · You will continue to receive antibiotic medicine if it was started before your procedure.  This information is not intended to replace advice given to you by your health care provider. Make sure you discuss any questions you have with your health care provider.  Document Released: 03/14/2011 Document Revised: 05/25/2017 Document Reviewed: 04/27/2016  © 2017 Elsevier    Cefazolin injection  What is this medicine?  CEFAZOLIN (joe sanders) is a cephalosprin antibiotic. It is used to treat or prevent certain kinds of  bacterial infections. It will not work for colds, flu, or other viral infections.  This medicine may be used for other purposes; ask your health care provider or pharmacist if you have questions.  COMMON BRAND NAME(S): Donny Carson  What should I tell my health care provider before I take this medicine?  They need to know if you have any of these conditions:  -bleeding problems  -diarrhea  -kidney disease  -liver disease  -stomach or intestine problems (especially colitis)  -an unusual or allergic reaction to cefazolin, other cephalosporin antibiotics, penicillin, penicillamine, other foods, dyes or preservatives  -pregnant or trying to get pregnant  -breast-feeding  How should I use this medicine?  This medicine is infused into a vein. Do not use your medicine more often than directed. Finish the full course prescribed by your doctor or health care professional even if you feel better. Do not stop using except on your doctor's advice.  Talk to your pediatrician regarding the use of this medicine in children. Special care may be needed. This medicine had been used in children as young as 1 month old.  Overdosage: If you think you have taken too much of this medicine contact a poison control center or emergency room at once.  NOTE: This medicine is only for you. Do not share this medicine with others.  What if I miss a dose?  If you miss a dose, use it as soon as you can. If it is almost time for your next dose, use only that dose. Do not use double or extra doses.  What may interact with this medicine?  -birth control pills  -blood thinners  -other antibiotics  -probenecid  This list may not describe all possible interactions. Give your health care provider a list of all the medicines, herbs, non-prescription drugs, or dietary supplements you use. Also tell them if you smoke, drink alcohol, or use illegal drugs. Some items may interact with your medicine.  What should I watch for while using this medicine?  Tell  your doctor or health care professional if your symptoms do not get better in a few days.  If you have diabetes you might get a false-positive result for sugar in your urine. Check with your doctor or health care professional before you change your diet or the dose of your diabetes medicine.  What side effects may I notice from receiving this medicine?  Side effects that you should report to your doctor or health care professional as soon as possible:  -allergic reactions like skin rash, itching or hives, swelling of the face, lips, or tongue  -breathing problems  -fever or chills  -redness, blistering, peeling or loosening of the skin, including inside the mouth  -seizures  -severe or watery diarrhea  -sore throat  -stomach pain or cramps  -trouble passing urine or change in the amount of urine  -unusual bleeding or bruising  Side effects that usually do not require medical attention (report to your doctor or health care professional if they continue or are bothersome):  -diarrhea  -genital or anal irritation  -loss of appetite  -nausea, vomiting  -pain or redness where injected  This list may not describe all possible side effects. Call your doctor for medical advice about side effects. You may report side effects to FDA at 9-454-FDA-5785.  Where should I keep my medicine?  You will be instructed on how to store this medicine. Keep out of the reach of children.  NOTE: This sheet is a summary. It may not cover all possible information. If you have questions about this medicine, talk to your doctor, pharmacist, or health care provider.  © 2018 Elsevier/Gold Standard (2014-07-24 15:50:01)      PICC Line Instructions    How to Care for your PICC  • Do not take a bath, swim, or use hot tubs when you have a PICC. Cover PICC line with clear plastic wrap and tape to keep it dry while showering  • Check the PICC insertion site daily for leakage, redness, swelling, or pain.  • Flush the PICC as directed by your health care  "provider. Let your health care provider know right away if the PICC is difficult to flush or does not flush. Do not use force to flush the PICC.  • Do not use a syringe that is less than 10 mL to flush the PICC  • Avoid blood pressure checks on the arm with the PICC.  • Do not take the PICC out yourself. Only a trained clinical professional should remove the PICC  • Make sure you or anyone who access your PICC Line washes their hands before using the line  • Make sure the hub of the line is \"scrubbed\" prior to using the line  Dressing Changes  • Change the PICC dressing as instructed by your health care provider.  • Change your PICC dressing if it becomes loose or wet.  When to seek medical attention  • PICC is accidentally pulled all the way out  • There is any type of drainage, redness, or swelling where the PICC enters the skin.  • You cannot flush the PICC, it is difficult to flush, or the PICC leaks around the insertion site when it is flushed.  • You hear a \"flushing\" sound when the PICC is flushed.  • You notice a hole or tear in the PICC.  • You develop chills or a fever        Depression / Suicide Risk    As you are discharged from this [facility] facility, it is important to learn how to keep safe from harming yourself.    Recognize the warning signs:  · Abrupt changes in personality, positive or negative- including increase in energy   · Giving away possessions  · Change in eating patterns- significant weight changes-  positive or negative  · Change in sleeping patterns- unable to sleep or sleeping all the time   · Unwillingness or inability to communicate  · Depression  · Unusual sadness, discouragement and loneliness  · Talk of wanting to die  · Neglect of personal appearance   · Rebelliousness- reckless behavior  · Withdrawal from people/activities they love  · Confusion- inability to concentrate     If you or a loved one observes any of these behaviors or has concerns about self-harm, here's what " you can do:  · Talk about it- your feelings and reasons for harming yourself  · Remove any means that you might use to hurt yourself (examples: pills, rope, extension cords, firearm)  · Get professional help from the community (Mental Health, Substance Abuse, psychological counseling)  · Do not be alone:Call your Safe Contact- someone whom you trust who will be there for you.  · Call your local CRISIS HOTLINE 799-3039 or 987-097-1124  · Call your local Children's Mobile Crisis Response Team [place] (934) 633-4194 or www.Lenddo  · Call the toll free National Suicide Prevention Hotlines   · National Suicide Prevention Lifeline 430-626-WBLH (5934)  · National Hope Line Network 800-SUICIDE (680-5111)

## 2020-04-03 NOTE — DISCHARGE SUMMARY
Patient was admitted for a I&D wound infection.  Had no complications during the surgery. Did well post-operatively. ID consult obtained.    Recent Labs     03/31/20  1841   SODIUM 135   POTASSIUM 4.7   CHLORIDE 102   CO2 24   GLUCOSE 184*   BUN 13   CREATININE 0.74   CALCIUM 8.1*     Recent Labs     04/01/20  0548   WBC 12.5*   RBC 2.60*   HEMOGLOBIN 7.2*   HEMATOCRIT 22.0*   MCV 84.6   MCH 27.7   MCHC 32.7*   RDW 46.7   PLATELETCT 380   MPV 9.1       There are no active hospital problems to display for this patient.      Uneventful hospital course.     Medication List      CONTINUE taking these medications      Instructions   CALCIUM PO   Take 1 Tab by mouth every evening.  Dose:  1 Tab     COQ10 PO   Take 1 Tab by mouth every evening.  Dose:  1 Tab     ibandronate 150 MG tablet  Commonly known as:  BONIVA   Take 150 mg by mouth every 30 days.  Dose:  150 mg     ibuprofen 200 MG Tabs  Commonly known as:  MOTRIN   Take 800 mg by mouth every 6 hours as needed.  Dose:  800 mg     MAGNESIUM PO   Take 2 Tabs by mouth every evening.  Dose:  2 Tab     simvastatin 20 MG Tabs  Commonly known as:  ZOCOR   Take 20 mg by mouth every evening.  Dose:  20 mg     therapeutic multivitamin-minerals Tabs   Take 1 Tab by mouth every evening.  Dose:  1 Tab     tramadol 50 MG Tabs  Commonly known as:  ULTRAM   Take 50 mg by mouth every four hours as needed.  Dose:  50 mg     traZODone 100 MG Tabs  Commonly known as:  DESYREL   Take 100 mg by mouth at bedtime as needed for Sleep.  Dose:  100 mg     TYLENOL PO   Take 2 Tabs by mouth 2 times a day as needed.  Dose:  2 Tab            Patient will be discharged home and follow up with Dr. Mendes clinic in 2 weeks, for which the patient already has scheduled. IV antibiotics for 4 weeks.

## 2020-04-03 NOTE — PROGRESS NOTES
Vanco trough came back low, Pharmacy called to verify next due dose, Per Pharmacist, give dose due at 1700.

## 2020-04-03 NOTE — PROGRESS NOTES
"Pharmacy Kinetics Addendum:    60 y.o. female on vancomycin day # 3 4/2/2020    Currently on Vancomycin 1000 mg IV every 24 hours    Indication for Treatment: Septic joint with hardware    Recent Labs     03/31/20  1841   BUN 13   CREATININE 0.74   ALBUMIN 2.9*     Recent Labs     04/02/20  1704   VANCOTROUGH <4.0*       A/P   1. Vancomycin dose change: Increased to 1000 mg every 12 hours with first dose 6 hours after current dose as \"mini-load\"  2. Next vancomycin level: In 1-2 days  3. Goal trough: 16-20 mcg/mL  4. Comments: Vancomycin trough resulted as undetectable.  Dosing will be increased accordingly.  Pharmacy to continue following daily.      Thank you,  Miladis Hogue, PharmD, PharmD    "

## 2020-04-03 NOTE — DISCHARGE PLANNING
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  Spoke with pt by phone and relayed this information.  MARYANA Cárdenas notified.

## 2020-04-03 NOTE — CARE PLAN
Problem: Safety  Goal: Will remain free from injury  Note: Call light and belongings within reach, bed down and locked, hourly rounding in progress. Treaded socks in use, pt up self at this time. pt calls appropriately.       Problem: Infection  Goal: Will remain free from infection  Note: Standard precautions in place.      Problem: Pain Management  Goal: Pain level will decrease to patient's comfort goal  Note: PRN pain medications in use for pain control.

## 2020-04-03 NOTE — PROGRESS NOTES
"Patient seen and examined  Doing well    /60   Pulse 85   Temp 36.8 °C (98.3 °F) (Temporal)   Resp 17   Ht 1.727 m (5' 7.99\")   Wt 51.3 kg (113 lb 1.5 oz)   SpO2 98%     Recent Labs     04/01/20  0548   WBC 12.5*   RBC 2.60*   HEMOGLOBIN 7.2*   HEMATOCRIT 22.0*   MCV 84.6   MCH 27.7   MCHC 32.7*   RDW 46.7   PLATELETCT 380   MPV 9.1       No acute distress  Dressing clean dry and intact  Neurovascularly intact  Drain out today    Plan:  Discharge home when IV antibiotics arranged.            "

## 2020-04-03 NOTE — PROGRESS NOTES
"Pharmacy Kinetics 60 y.o. female on vancomycin day # 3 2020    Currently on Vancomycin 1000mg IV q24h  (1700)  Provider specified end date: TBD, anticipate 4 weeks     Indication for Treatment:hip w/ hardware infection     Pertinent history per medical record: Admitted on 3/31/2020 for draining wound after ORIF of greater trochanter. Irrigation and debridement of left hip wound performed 3/31. The infection did not appear to go past the hardware or into the joint per surgery. Empiric antibiotic started. ID consulted.      Other antibiotics: Cefepime 2g IV q12h      Allergies: Patient has no known allergies.      List concerns for renal function: low wt (BMI 17); low albumin     Pertinent cultures to date:   3/31/20: wound - MSSA     MRSA nares swab if pneumonia is a concern: N/A    Recent Labs     20  0548   WBC 12.5*     Recent Labs     20  1841   BUN 13   CREATININE 0.74   ALBUMIN 2.9*     No results for input(s): VANCOTROUGH, VANCOPEAK, VANCORANDOM in the last 72 hours.    Intake/Output Summary (Last 24 hours) at 2020 1720  Last data filed at 2020 1351  Gross per 24 hour   Intake 1200 ml   Output 83 ml   Net 1117 ml      /67   Pulse 77   Temp 37.7 °C (99.8 °F) (Temporal)   Resp 16   Ht 1.727 m (5' 7.99\")   Wt 51.3 kg (113 lb 1.5 oz)   SpO2 95%  Temp (24hrs), Av.1 °C (98.7 °F), Min:35.9 °C (96.6 °F), Max:37.7 °C (99.8 °F)      A/P   1. Vancomycin dose change: none  2. Next vancomycin level: ~2-3 days  (not ordered)  3. Goal trough: 16-20 mcg/mL  4. Comments: NNL to assess WBC or renal function. Cultures are growing MSSA - following ID. Trough level drawn today ~1700. Continue current dose with trough assessment tomorrow. Pharmacy to continue to follow ID recommendations.      Marisabel Parks, PharmD., BCPS        "

## 2020-04-03 NOTE — PROGRESS NOTES
DATE OF SERVICE:    SUBJECTIVE:  Patient today denies any new fever, chills, excess pain.    PHYSICAL EXAMINATION:  VITAL SIGNS:  Stable.  Patient is afebrile.  GENERAL:  Patient is awake, alert, in no distress.  ENT:  No oral thrush present.  EXTREMITIES:  IV site left arm nontender.  Left leg, no new erythema or gross   drainage.    RECENT LABORATORY:  Staph aureus sensitive to oxacillin found on culture of   the recent left hip wound revision site -- widely sensitive.    IMPRESSION:  Stable postoperative wound revision for infection of a left hip   fracture syndrome with internal fixation -- Staphylococcus aureus defined   pathogen.    RECOMMENDATIONS:  1.  Change antibiotics to cefazolin 1 g IV q. 8 hours -- this to be started as   an outpatient on a home intravenous antibiotic program -- she will be on this   medication for approximately 30 days.  2.  Follow up in my office in 1 week.  3.  Discharge today if okay with orthopedics.       ____________________________________     RUT CHAUHAN MD RLS / JERRICA    DD:  04/03/2020 13:23:19  DT:  04/03/2020 13:38:07    D#:  [id number]  Job#:  [id number]

## 2020-04-08 ENCOUNTER — HOSPITAL ENCOUNTER (OUTPATIENT)
Dept: LAB | Facility: MEDICAL CENTER | Age: 60
End: 2020-04-08
Attending: INTERNAL MEDICINE
Payer: COMMERCIAL

## 2020-04-08 LAB
ALBUMIN SERPL BCP-MCNC: 3.3 G/DL (ref 3.2–4.9)
ALBUMIN/GLOB SERPL: 1.1 G/DL
ALP SERPL-CCNC: 97 U/L (ref 30–99)
ALT SERPL-CCNC: 10 U/L (ref 2–50)
ANION GAP SERPL CALC-SCNC: 12 MMOL/L (ref 7–16)
AST SERPL-CCNC: 17 U/L (ref 12–45)
BASOPHILS # BLD AUTO: 0.4 % (ref 0–1.8)
BASOPHILS # BLD: 0.05 K/UL (ref 0–0.12)
BILIRUB SERPL-MCNC: <0.2 MG/DL (ref 0.1–1.5)
BUN SERPL-MCNC: 13 MG/DL (ref 8–22)
CALCIUM SERPL-MCNC: 8.7 MG/DL (ref 8.5–10.5)
CHLORIDE SERPL-SCNC: 103 MMOL/L (ref 96–112)
CO2 SERPL-SCNC: 24 MMOL/L (ref 20–33)
CREAT SERPL-MCNC: 0.63 MG/DL (ref 0.5–1.4)
EOSINOPHIL # BLD AUTO: 0.57 K/UL (ref 0–0.51)
EOSINOPHIL NFR BLD: 4.8 % (ref 0–6.9)
ERYTHROCYTE [DISTWIDTH] IN BLOOD BY AUTOMATED COUNT: 51.5 FL (ref 35.9–50)
GLOBULIN SER CALC-MCNC: 3.1 G/DL (ref 1.9–3.5)
GLUCOSE SERPL-MCNC: 83 MG/DL (ref 65–99)
HCT VFR BLD AUTO: 26.5 % (ref 37–47)
HGB BLD-MCNC: 8.2 G/DL (ref 12–16)
IMM GRANULOCYTES # BLD AUTO: 0.07 K/UL (ref 0–0.11)
IMM GRANULOCYTES NFR BLD AUTO: 0.6 % (ref 0–0.9)
LYMPHOCYTES # BLD AUTO: 2.23 K/UL (ref 1–4.8)
LYMPHOCYTES NFR BLD: 18.7 % (ref 22–41)
MCH RBC QN AUTO: 27.5 PG (ref 27–33)
MCHC RBC AUTO-ENTMCNC: 30.9 G/DL (ref 33.6–35)
MCV RBC AUTO: 88.9 FL (ref 81.4–97.8)
MONOCYTES # BLD AUTO: 1.16 K/UL (ref 0–0.85)
MONOCYTES NFR BLD AUTO: 9.7 % (ref 0–13.4)
NEUTROPHILS # BLD AUTO: 7.87 K/UL (ref 2–7.15)
NEUTROPHILS NFR BLD: 65.8 % (ref 44–72)
NRBC # BLD AUTO: 0 K/UL
NRBC BLD-RTO: 0 /100 WBC
PLATELET # BLD AUTO: 517 K/UL (ref 164–446)
PMV BLD AUTO: 10 FL (ref 9–12.9)
POTASSIUM SERPL-SCNC: 4.5 MMOL/L (ref 3.6–5.5)
PROT SERPL-MCNC: 6.4 G/DL (ref 6–8.2)
RBC # BLD AUTO: 2.98 M/UL (ref 4.2–5.4)
SODIUM SERPL-SCNC: 139 MMOL/L (ref 135–145)
WBC # BLD AUTO: 12 K/UL (ref 4.8–10.8)

## 2020-04-08 PROCEDURE — 85025 COMPLETE CBC W/AUTO DIFF WBC: CPT

## 2020-04-08 PROCEDURE — 80053 COMPREHEN METABOLIC PANEL: CPT

## 2020-04-14 ENCOUNTER — HOSPITAL ENCOUNTER (OUTPATIENT)
Dept: LAB | Facility: MEDICAL CENTER | Age: 60
End: 2020-04-14
Attending: INTERNAL MEDICINE
Payer: COMMERCIAL

## 2020-04-14 LAB
ALBUMIN SERPL BCP-MCNC: 3.7 G/DL (ref 3.2–4.9)
ALBUMIN/GLOB SERPL: 1.3 G/DL
ALP SERPL-CCNC: 97 U/L (ref 30–99)
ALT SERPL-CCNC: 6 U/L (ref 2–50)
ANION GAP SERPL CALC-SCNC: 10 MMOL/L (ref 7–16)
AST SERPL-CCNC: 24 U/L (ref 12–45)
BASOPHILS # BLD AUTO: 0.7 % (ref 0–1.8)
BASOPHILS # BLD: 0.07 K/UL (ref 0–0.12)
BILIRUB SERPL-MCNC: <0.2 MG/DL (ref 0.1–1.5)
BUN SERPL-MCNC: 15 MG/DL (ref 8–22)
CALCIUM SERPL-MCNC: 8.7 MG/DL (ref 8.5–10.5)
CHLORIDE SERPL-SCNC: 100 MMOL/L (ref 96–112)
CO2 SERPL-SCNC: 25 MMOL/L (ref 20–33)
CREAT SERPL-MCNC: 0.62 MG/DL (ref 0.5–1.4)
EOSINOPHIL # BLD AUTO: 0.35 K/UL (ref 0–0.51)
EOSINOPHIL NFR BLD: 3.7 % (ref 0–6.9)
ERYTHROCYTE [DISTWIDTH] IN BLOOD BY AUTOMATED COUNT: 48 FL (ref 35.9–50)
GLOBULIN SER CALC-MCNC: 2.9 G/DL (ref 1.9–3.5)
GLUCOSE SERPL-MCNC: 86 MG/DL (ref 65–99)
HCT VFR BLD AUTO: 27.4 % (ref 37–47)
HGB BLD-MCNC: 8.4 G/DL (ref 12–16)
IMM GRANULOCYTES # BLD AUTO: 0.04 K/UL (ref 0–0.11)
IMM GRANULOCYTES NFR BLD AUTO: 0.4 % (ref 0–0.9)
LYMPHOCYTES # BLD AUTO: 1.84 K/UL (ref 1–4.8)
LYMPHOCYTES NFR BLD: 19.6 % (ref 22–41)
MCH RBC QN AUTO: 26.5 PG (ref 27–33)
MCHC RBC AUTO-ENTMCNC: 30.7 G/DL (ref 33.6–35)
MCV RBC AUTO: 86.4 FL (ref 81.4–97.8)
MONOCYTES # BLD AUTO: 0.9 K/UL (ref 0–0.85)
MONOCYTES NFR BLD AUTO: 9.6 % (ref 0–13.4)
NEUTROPHILS # BLD AUTO: 6.18 K/UL (ref 2–7.15)
NEUTROPHILS NFR BLD: 66 % (ref 44–72)
NRBC # BLD AUTO: 0 K/UL
NRBC BLD-RTO: 0 /100 WBC
PLATELET # BLD AUTO: 360 K/UL (ref 164–446)
PMV BLD AUTO: 10.3 FL (ref 9–12.9)
POTASSIUM SERPL-SCNC: 4.5 MMOL/L (ref 3.6–5.5)
PROT SERPL-MCNC: 6.6 G/DL (ref 6–8.2)
RBC # BLD AUTO: 3.17 M/UL (ref 4.2–5.4)
SODIUM SERPL-SCNC: 135 MMOL/L (ref 135–145)
WBC # BLD AUTO: 9.4 K/UL (ref 4.8–10.8)

## 2020-04-14 PROCEDURE — 85025 COMPLETE CBC W/AUTO DIFF WBC: CPT

## 2020-04-14 PROCEDURE — 80053 COMPREHEN METABOLIC PANEL: CPT

## 2020-04-21 ENCOUNTER — HOSPITAL ENCOUNTER (OUTPATIENT)
Dept: LAB | Facility: MEDICAL CENTER | Age: 60
End: 2020-04-21
Attending: INTERNAL MEDICINE
Payer: COMMERCIAL

## 2020-04-21 LAB
ALBUMIN SERPL BCP-MCNC: 3.8 G/DL (ref 3.2–4.9)
ALBUMIN/GLOB SERPL: 1.2 G/DL
ALP SERPL-CCNC: 103 U/L (ref 30–99)
ALT SERPL-CCNC: 5 U/L (ref 2–50)
ANION GAP SERPL CALC-SCNC: 11 MMOL/L (ref 7–16)
AST SERPL-CCNC: 16 U/L (ref 12–45)
BASOPHILS # BLD AUTO: 0.6 % (ref 0–1.8)
BASOPHILS # BLD: 0.04 K/UL (ref 0–0.12)
BILIRUB SERPL-MCNC: <0.2 MG/DL (ref 0.1–1.5)
BUN SERPL-MCNC: 14 MG/DL (ref 8–22)
CALCIUM SERPL-MCNC: 9.1 MG/DL (ref 8.5–10.5)
CHLORIDE SERPL-SCNC: 101 MMOL/L (ref 96–112)
CO2 SERPL-SCNC: 24 MMOL/L (ref 20–33)
CREAT SERPL-MCNC: 0.58 MG/DL (ref 0.5–1.4)
EOSINOPHIL # BLD AUTO: 0.25 K/UL (ref 0–0.51)
EOSINOPHIL NFR BLD: 3.6 % (ref 0–6.9)
ERYTHROCYTE [DISTWIDTH] IN BLOOD BY AUTOMATED COUNT: 45.3 FL (ref 35.9–50)
GLOBULIN SER CALC-MCNC: 3.1 G/DL (ref 1.9–3.5)
GLUCOSE SERPL-MCNC: 87 MG/DL (ref 65–99)
HCT VFR BLD AUTO: 28.1 % (ref 37–47)
HGB BLD-MCNC: 8.9 G/DL (ref 12–16)
IMM GRANULOCYTES # BLD AUTO: 0.02 K/UL (ref 0–0.11)
IMM GRANULOCYTES NFR BLD AUTO: 0.3 % (ref 0–0.9)
LYMPHOCYTES # BLD AUTO: 1.57 K/UL (ref 1–4.8)
LYMPHOCYTES NFR BLD: 22.9 % (ref 22–41)
MCH RBC QN AUTO: 26.7 PG (ref 27–33)
MCHC RBC AUTO-ENTMCNC: 31.7 G/DL (ref 33.6–35)
MCV RBC AUTO: 84.4 FL (ref 81.4–97.8)
MONOCYTES # BLD AUTO: 0.91 K/UL (ref 0–0.85)
MONOCYTES NFR BLD AUTO: 13.3 % (ref 0–13.4)
NEUTROPHILS # BLD AUTO: 4.06 K/UL (ref 2–7.15)
NEUTROPHILS NFR BLD: 59.3 % (ref 44–72)
NRBC # BLD AUTO: 0 K/UL
NRBC BLD-RTO: 0 /100 WBC
PLATELET # BLD AUTO: 356 K/UL (ref 164–446)
PMV BLD AUTO: 10.3 FL (ref 9–12.9)
POTASSIUM SERPL-SCNC: 4.8 MMOL/L (ref 3.6–5.5)
PROT SERPL-MCNC: 6.9 G/DL (ref 6–8.2)
RBC # BLD AUTO: 3.33 M/UL (ref 4.2–5.4)
SODIUM SERPL-SCNC: 136 MMOL/L (ref 135–145)
WBC # BLD AUTO: 6.9 K/UL (ref 4.8–10.8)

## 2020-04-21 PROCEDURE — 80053 COMPREHEN METABOLIC PANEL: CPT

## 2020-04-21 PROCEDURE — 85025 COMPLETE CBC W/AUTO DIFF WBC: CPT

## 2020-04-28 ENCOUNTER — HOSPITAL ENCOUNTER (OUTPATIENT)
Dept: LAB | Facility: MEDICAL CENTER | Age: 60
End: 2020-04-28
Attending: INTERNAL MEDICINE
Payer: COMMERCIAL

## 2020-04-28 LAB
ALBUMIN SERPL BCP-MCNC: 3.7 G/DL (ref 3.2–4.9)
ALBUMIN/GLOB SERPL: 1.3 G/DL
ALP SERPL-CCNC: 108 U/L (ref 30–99)
ALT SERPL-CCNC: <5 U/L (ref 2–50)
ANION GAP SERPL CALC-SCNC: 10 MMOL/L (ref 7–16)
AST SERPL-CCNC: 18 U/L (ref 12–45)
BASOPHILS # BLD AUTO: 0.5 % (ref 0–1.8)
BASOPHILS # BLD: 0.02 K/UL (ref 0–0.12)
BILIRUB SERPL-MCNC: <0.2 MG/DL (ref 0.1–1.5)
BUN SERPL-MCNC: 15 MG/DL (ref 8–22)
CALCIUM SERPL-MCNC: 9.2 MG/DL (ref 8.5–10.5)
CHLORIDE SERPL-SCNC: 105 MMOL/L (ref 96–112)
CO2 SERPL-SCNC: 25 MMOL/L (ref 20–33)
CREAT SERPL-MCNC: 0.65 MG/DL (ref 0.5–1.4)
EOSINOPHIL # BLD AUTO: 0.34 K/UL (ref 0–0.51)
EOSINOPHIL NFR BLD: 8.4 % (ref 0–6.9)
ERYTHROCYTE [DISTWIDTH] IN BLOOD BY AUTOMATED COUNT: 45.1 FL (ref 35.9–50)
GLOBULIN SER CALC-MCNC: 2.9 G/DL (ref 1.9–3.5)
GLUCOSE SERPL-MCNC: 89 MG/DL (ref 65–99)
HCT VFR BLD AUTO: 31 % (ref 37–47)
HGB BLD-MCNC: 9.3 G/DL (ref 12–16)
IMM GRANULOCYTES # BLD AUTO: 0.02 K/UL (ref 0–0.11)
IMM GRANULOCYTES NFR BLD AUTO: 0.5 % (ref 0–0.9)
LYMPHOCYTES # BLD AUTO: 0.49 K/UL (ref 1–4.8)
LYMPHOCYTES NFR BLD: 12.1 % (ref 22–41)
MCH RBC QN AUTO: 25.7 PG (ref 27–33)
MCHC RBC AUTO-ENTMCNC: 30 G/DL (ref 33.6–35)
MCV RBC AUTO: 85.6 FL (ref 81.4–97.8)
MONOCYTES # BLD AUTO: 0.55 K/UL (ref 0–0.85)
MONOCYTES NFR BLD AUTO: 13.6 % (ref 0–13.4)
NEUTROPHILS # BLD AUTO: 2.62 K/UL (ref 2–7.15)
NEUTROPHILS NFR BLD: 64.9 % (ref 44–72)
NRBC # BLD AUTO: 0 K/UL
NRBC BLD-RTO: 0 /100 WBC
PLATELET # BLD AUTO: 346 K/UL (ref 164–446)
PMV BLD AUTO: 10.1 FL (ref 9–12.9)
POTASSIUM SERPL-SCNC: 4.4 MMOL/L (ref 3.6–5.5)
PROT SERPL-MCNC: 6.6 G/DL (ref 6–8.2)
RBC # BLD AUTO: 3.62 M/UL (ref 4.2–5.4)
SODIUM SERPL-SCNC: 140 MMOL/L (ref 135–145)
WBC # BLD AUTO: 4 K/UL (ref 4.8–10.8)

## 2020-04-28 PROCEDURE — 85025 COMPLETE CBC W/AUTO DIFF WBC: CPT

## 2020-04-28 PROCEDURE — 80053 COMPREHEN METABOLIC PANEL: CPT

## 2020-06-03 ENCOUNTER — HOSPITAL ENCOUNTER (OUTPATIENT)
Dept: LAB | Facility: MEDICAL CENTER | Age: 60
End: 2020-06-03
Attending: INTERNAL MEDICINE
Payer: COMMERCIAL

## 2020-06-03 LAB
ALBUMIN SERPL BCP-MCNC: 3.9 G/DL (ref 3.2–4.9)
ALBUMIN/GLOB SERPL: 1.2 G/DL
ALP SERPL-CCNC: 107 U/L (ref 30–99)
ALT SERPL-CCNC: 12 U/L (ref 2–50)
ANION GAP SERPL CALC-SCNC: 11 MMOL/L (ref 7–16)
AST SERPL-CCNC: 19 U/L (ref 12–45)
BASOPHILS # BLD AUTO: 0.6 % (ref 0–1.8)
BASOPHILS # BLD: 0.05 K/UL (ref 0–0.12)
BILIRUB SERPL-MCNC: <0.2 MG/DL (ref 0.1–1.5)
BUN SERPL-MCNC: 15 MG/DL (ref 8–22)
CALCIUM SERPL-MCNC: 9.2 MG/DL (ref 8.5–10.5)
CHLORIDE SERPL-SCNC: 100 MMOL/L (ref 96–112)
CO2 SERPL-SCNC: 25 MMOL/L (ref 20–33)
CREAT SERPL-MCNC: 0.99 MG/DL (ref 0.5–1.4)
EOSINOPHIL # BLD AUTO: 0.13 K/UL (ref 0–0.51)
EOSINOPHIL NFR BLD: 1.7 % (ref 0–6.9)
ERYTHROCYTE [DISTWIDTH] IN BLOOD BY AUTOMATED COUNT: 44.6 FL (ref 35.9–50)
FASTING STATUS PATIENT QL REPORTED: NORMAL
GLOBULIN SER CALC-MCNC: 3.3 G/DL (ref 1.9–3.5)
GLUCOSE SERPL-MCNC: 126 MG/DL (ref 65–99)
HCT VFR BLD AUTO: 30.7 % (ref 37–47)
HGB BLD-MCNC: 9.3 G/DL (ref 12–16)
IMM GRANULOCYTES # BLD AUTO: 0.01 K/UL (ref 0–0.11)
IMM GRANULOCYTES NFR BLD AUTO: 0.1 % (ref 0–0.9)
LYMPHOCYTES # BLD AUTO: 1.41 K/UL (ref 1–4.8)
LYMPHOCYTES NFR BLD: 18.3 % (ref 22–41)
MCH RBC QN AUTO: 24 PG (ref 27–33)
MCHC RBC AUTO-ENTMCNC: 30.3 G/DL (ref 33.6–35)
MCV RBC AUTO: 79.3 FL (ref 81.4–97.8)
MONOCYTES # BLD AUTO: 0.79 K/UL (ref 0–0.85)
MONOCYTES NFR BLD AUTO: 10.2 % (ref 0–13.4)
NEUTROPHILS # BLD AUTO: 5.33 K/UL (ref 2–7.15)
NEUTROPHILS NFR BLD: 69.1 % (ref 44–72)
NRBC # BLD AUTO: 0 K/UL
NRBC BLD-RTO: 0 /100 WBC
PLATELET # BLD AUTO: 448 K/UL (ref 164–446)
PMV BLD AUTO: 10.5 FL (ref 9–12.9)
POTASSIUM SERPL-SCNC: 4.5 MMOL/L (ref 3.6–5.5)
PROT SERPL-MCNC: 7.2 G/DL (ref 6–8.2)
RBC # BLD AUTO: 3.87 M/UL (ref 4.2–5.4)
SODIUM SERPL-SCNC: 136 MMOL/L (ref 135–145)
WBC # BLD AUTO: 7.7 K/UL (ref 4.8–10.8)

## 2020-06-03 PROCEDURE — 85025 COMPLETE CBC W/AUTO DIFF WBC: CPT

## 2020-06-03 PROCEDURE — 80053 COMPREHEN METABOLIC PANEL: CPT

## 2020-06-03 PROCEDURE — 36415 COLL VENOUS BLD VENIPUNCTURE: CPT

## 2020-06-08 RX ORDER — IBANDRONATE SODIUM 150 MG/1
TABLET, FILM COATED ORAL
Qty: 3 TAB | Refills: 1 | Status: SHIPPED | OUTPATIENT
Start: 2020-06-08 | End: 2021-03-11 | Stop reason: SDUPTHER

## 2020-06-15 NOTE — TELEPHONE ENCOUNTER
Was the patient seen in the last year in this department? Yes 11/20/17    Does patient have an active prescription for medications requested? No     Received Request Via: Patient   stretcher

## 2020-08-21 ENCOUNTER — HOSPITAL ENCOUNTER (OUTPATIENT)
Dept: LAB | Facility: MEDICAL CENTER | Age: 60
End: 2020-08-21
Attending: INTERNAL MEDICINE
Payer: COMMERCIAL

## 2020-08-21 LAB
ALBUMIN SERPL BCP-MCNC: 3.7 G/DL (ref 3.2–4.9)
ALBUMIN/GLOB SERPL: 1.3 G/DL
ALP SERPL-CCNC: 89 U/L (ref 30–99)
ALT SERPL-CCNC: 12 U/L (ref 2–50)
ANION GAP SERPL CALC-SCNC: 10 MMOL/L (ref 7–16)
ANISOCYTOSIS BLD QL SMEAR: ABNORMAL
AST SERPL-CCNC: 13 U/L (ref 12–45)
BASOPHILS # BLD AUTO: 0.7 % (ref 0–1.8)
BASOPHILS # BLD: 0.04 K/UL (ref 0–0.12)
BILIRUB SERPL-MCNC: <0.2 MG/DL (ref 0.1–1.5)
BUN SERPL-MCNC: 13 MG/DL (ref 8–22)
BURR CELLS BLD QL SMEAR: NORMAL
CALCIUM SERPL-MCNC: 9.2 MG/DL (ref 8.5–10.5)
CHLORIDE SERPL-SCNC: 98 MMOL/L (ref 96–112)
CO2 SERPL-SCNC: 27 MMOL/L (ref 20–33)
COMMENT 1642: NORMAL
CREAT SERPL-MCNC: 0.88 MG/DL (ref 0.5–1.4)
EOSINOPHIL # BLD AUTO: 0.21 K/UL (ref 0–0.51)
EOSINOPHIL NFR BLD: 3.7 % (ref 0–6.9)
ERYTHROCYTE [DISTWIDTH] IN BLOOD BY AUTOMATED COUNT: 49.7 FL (ref 35.9–50)
GLOBULIN SER CALC-MCNC: 2.9 G/DL (ref 1.9–3.5)
GLUCOSE SERPL-MCNC: 96 MG/DL (ref 65–99)
HCT VFR BLD AUTO: 29 % (ref 37–47)
HGB BLD-MCNC: 8.6 G/DL (ref 12–16)
IMM GRANULOCYTES # BLD AUTO: 0.02 K/UL (ref 0–0.11)
IMM GRANULOCYTES NFR BLD AUTO: 0.3 % (ref 0–0.9)
LYMPHOCYTES # BLD AUTO: 1.22 K/UL (ref 1–4.8)
LYMPHOCYTES NFR BLD: 21.3 % (ref 22–41)
MCH RBC QN AUTO: 22.1 PG (ref 27–33)
MCHC RBC AUTO-ENTMCNC: 29.7 G/DL (ref 33.6–35)
MCV RBC AUTO: 74.4 FL (ref 81.4–97.8)
MICROCYTES BLD QL SMEAR: ABNORMAL
MONOCYTES # BLD AUTO: 0.66 K/UL (ref 0–0.85)
MONOCYTES NFR BLD AUTO: 11.5 % (ref 0–13.4)
MORPHOLOGY BLD-IMP: NORMAL
NEUTROPHILS # BLD AUTO: 3.59 K/UL (ref 2–7.15)
NEUTROPHILS NFR BLD: 62.5 % (ref 44–72)
NRBC # BLD AUTO: 0 K/UL
NRBC BLD-RTO: 0 /100 WBC
OVALOCYTES BLD QL SMEAR: NORMAL
PLATELET # BLD AUTO: 405 K/UL (ref 164–446)
PLATELET BLD QL SMEAR: NORMAL
PMV BLD AUTO: 9.9 FL (ref 9–12.9)
POIKILOCYTOSIS BLD QL SMEAR: NORMAL
POTASSIUM SERPL-SCNC: 4.4 MMOL/L (ref 3.6–5.5)
PROT SERPL-MCNC: 6.6 G/DL (ref 6–8.2)
RBC # BLD AUTO: 3.9 M/UL (ref 4.2–5.4)
RBC BLD AUTO: PRESENT
SCHISTOCYTES BLD QL SMEAR: NORMAL
SODIUM SERPL-SCNC: 135 MMOL/L (ref 135–145)
WBC # BLD AUTO: 5.7 K/UL (ref 4.8–10.8)

## 2020-08-21 PROCEDURE — 36415 COLL VENOUS BLD VENIPUNCTURE: CPT

## 2020-08-21 PROCEDURE — 80053 COMPREHEN METABOLIC PANEL: CPT

## 2020-08-21 PROCEDURE — 85025 COMPLETE CBC W/AUTO DIFF WBC: CPT

## 2020-09-11 ENCOUNTER — OFFICE VISIT (OUTPATIENT)
Dept: MEDICAL GROUP | Facility: PHYSICIAN GROUP | Age: 60
End: 2020-09-11
Payer: COMMERCIAL

## 2020-09-11 ENCOUNTER — HOSPITAL ENCOUNTER (OUTPATIENT)
Dept: LAB | Facility: MEDICAL CENTER | Age: 60
End: 2020-09-11
Attending: PHYSICIAN ASSISTANT
Payer: COMMERCIAL

## 2020-09-11 VITALS
SYSTOLIC BLOOD PRESSURE: 112 MMHG | HEART RATE: 75 BPM | HEIGHT: 67 IN | RESPIRATION RATE: 12 BRPM | WEIGHT: 113 LBS | OXYGEN SATURATION: 94 % | TEMPERATURE: 97.7 F | DIASTOLIC BLOOD PRESSURE: 62 MMHG | BODY MASS INDEX: 17.74 KG/M2

## 2020-09-11 DIAGNOSIS — G89.29 CHRONIC LEFT HIP PAIN: ICD-10-CM

## 2020-09-11 DIAGNOSIS — D64.9 ANEMIA, UNSPECIFIED TYPE: ICD-10-CM

## 2020-09-11 DIAGNOSIS — Z23 NEED FOR VACCINATION: ICD-10-CM

## 2020-09-11 DIAGNOSIS — Z12.39 SCREENING FOR BREAST CANCER: ICD-10-CM

## 2020-09-11 DIAGNOSIS — M85.80 OSTEOPENIA, UNSPECIFIED LOCATION: ICD-10-CM

## 2020-09-11 DIAGNOSIS — Z12.11 COLON CANCER SCREENING: ICD-10-CM

## 2020-09-11 DIAGNOSIS — F51.01 PRIMARY INSOMNIA: ICD-10-CM

## 2020-09-11 DIAGNOSIS — Z12.11 SCREENING FOR COLON CANCER: ICD-10-CM

## 2020-09-11 DIAGNOSIS — E78.2 MIXED HYPERLIPIDEMIA: ICD-10-CM

## 2020-09-11 DIAGNOSIS — M25.552 CHRONIC LEFT HIP PAIN: ICD-10-CM

## 2020-09-11 LAB
FERRITIN SERPL-MCNC: 37.7 NG/ML (ref 10–291)
IRON SATN MFR SERPL: 5 % (ref 15–55)
IRON SERPL-MCNC: 15 UG/DL (ref 40–170)
TIBC SERPL-MCNC: 285 UG/DL (ref 250–450)
UIBC SERPL-MCNC: 270 UG/DL (ref 110–370)

## 2020-09-11 PROCEDURE — 83550 IRON BINDING TEST: CPT

## 2020-09-11 PROCEDURE — 82728 ASSAY OF FERRITIN: CPT

## 2020-09-11 PROCEDURE — 36415 COLL VENOUS BLD VENIPUNCTURE: CPT

## 2020-09-11 PROCEDURE — 99213 OFFICE O/P EST LOW 20 MIN: CPT | Performed by: PHYSICIAN ASSISTANT

## 2020-09-11 PROCEDURE — 83540 ASSAY OF IRON: CPT

## 2020-09-11 RX ORDER — SIMVASTATIN 20 MG
20 TABLET ORAL EVERY EVENING
Qty: 90 TAB | Refills: 3 | Status: SHIPPED | OUTPATIENT
Start: 2020-09-11 | End: 2021-08-31

## 2020-09-11 RX ORDER — ALPRAZOLAM 0.25 MG/1
0.25 TABLET ORAL NIGHTLY PRN
Qty: 6 TAB | Refills: 0 | Status: SHIPPED | OUTPATIENT
Start: 2020-09-11 | End: 2020-12-10

## 2020-09-11 RX ORDER — SULFAMETHOXAZOLE AND TRIMETHOPRIM 800; 160 MG/1; MG/1
1 TABLET ORAL 2 TIMES DAILY
COMMUNITY
Start: 2020-08-12 | End: 2021-05-13

## 2020-09-11 RX ORDER — TRAZODONE HYDROCHLORIDE 100 MG/1
100 TABLET ORAL NIGHTLY PRN
Qty: 90 TAB | Refills: 1 | Status: SHIPPED | OUTPATIENT
Start: 2020-09-11 | End: 2020-10-28 | Stop reason: SDUPTHER

## 2020-09-11 ASSESSMENT — FIBROSIS 4 INDEX: FIB4 SCORE: 0.57

## 2020-09-11 ASSESSMENT — PATIENT HEALTH QUESTIONNAIRE - PHQ9: CLINICAL INTERPRETATION OF PHQ2 SCORE: 0

## 2020-09-11 NOTE — ASSESSMENT & PLAN NOTE
This is a   chronic condition.   Latest Labs:   Lab Results   Component Value Date/Time    CHOLSTRLTOT 180 06/28/2019 06:43 AM    LDL 97 06/28/2019 06:43 AM    HDL 68 06/28/2019 06:43 AM    TRIGLYCERIDE 75 06/28/2019 06:43 AM      Medications: Simvastatin 20 mg  Medication side effects: None  Diet/Exercise:   Family History of high cholesterol or heart disease? None known  Risk calculator: The 10-year ASCVD risk score (Donald TAVERAS Jr., et al., 2013) is: 2.6%

## 2020-09-11 NOTE — ASSESSMENT & PLAN NOTE
Seeing Dr. Mendes, s/p hip fracture surgery. Has had to do 2 surgeries. Had non-healing fracture, also had complication of non-healing wound. Is following up closely with Dr. Mendes- plan is to take out hardware of left hip before the end of the year. She is taking Advil and tramadol multiple times a day for the chronic pain at this time.     On bactrim chronically until hardware is taken out.

## 2020-09-11 NOTE — ASSESSMENT & PLAN NOTE
Uses Trazadone 100 mg daily. Works well, has been on for years, no adverse effects.     Rarely has to use Ativan - would get like 8 pills a year.

## 2020-09-11 NOTE — ASSESSMENT & PLAN NOTE
Chronic anemia last ortho surgery March, 2020, anemia slightly worse, should be improving if post-op- get iron level. No dark tarry stool, no belly pain, no vaginal bleeding.

## 2020-09-11 NOTE — ASSESSMENT & PLAN NOTE
Chronic condition.  Patient on Boniva 150 mg once a month. Bone density scan from December 28, 2018 showed osteopenia

## 2020-09-11 NOTE — PROGRESS NOTES
CC:   Chief Complaint   Patient presents with   • Establish Care   • Hyperlipidemia          HISTORY OF PRESENT ILLNESS: Patient is a 60 y.o. female established patient who presents today to establish care with me and discuss following conditions:    Health Maintenance: Completed  Hx of chickepox, recommend shingrix.   Pap due- she will schedule next year.   cologuard will order. No FH colon cancer.       Hyperlipidemia  This is a   chronic condition.   Latest Labs:   Lab Results   Component Value Date/Time    CHOLSTRLTOT 180 06/28/2019 06:43 AM    LDL 97 06/28/2019 06:43 AM    HDL 68 06/28/2019 06:43 AM    TRIGLYCERIDE 75 06/28/2019 06:43 AM      Medications: Simvastatin 20 mg  Medication side effects: None  Diet/Exercise:   Family History of high cholesterol or heart disease? None known  Risk calculator: The 10-year ASCVD risk score (Donald NITIN Jr., et al., 2013) is: 2.6%       Osteopenia  Chronic condition.  Patient on Boniva 150 mg once a month. Bone density scan from December 28, 2018 showed osteopenia    Chronic left hip pain  Seeing Dr. Mendes, s/p hip fracture surgery. Has had to do 2 surgeries. Had non-healing fracture, also had complication of non-healing wound. Is following up closely with Dr. Mendes- plan is to take out hardware of left hip before the end of the year. She is taking Advil and tramadol multiple times a day for the chronic pain at this time.     On bactrim chronically until hardware is taken out.     Anemia  Chronic anemia last ortho surgery March, 2020, anemia slightly worse, should be improving if post-op- get iron level. No dark tarry stool, no belly pain, no vaginal bleeding.     Insomnia  Uses Trazadone 100 mg daily. Works well, has been on for years, no adverse effects.     Rarely has to use Ativan - would get like 8 pills a year.       Patient Active Problem List    Diagnosis Date Noted   • Anemia 09/11/2020   • Chronic left hip pain 11/06/2019   • Anxiety 01/10/2019   • Acute midline  thoracic back pain 10/19/2016   • Hyperlipidemia 2013   • Menopause    • Osteopenia    • Insomnia       Allergies:Patient has no known allergies.    Current Outpatient Medications   Medication Sig Dispense Refill   • Zoster Vac Recomb Adjuvanted (SHINGRIX) 50 MCG/0.5ML Recon Susp 0.5 mL by Intramuscular route Once for 1 dose. 0.5 mL 0   • traZODone (DESYREL) 100 MG Tab Take 1 Tab by mouth at bedtime as needed for Sleep. 90 Tab 1   • ALPRAZolam (XANAX) 0.25 MG Tab Take 1 Tab by mouth at bedtime as needed for Sleep for up to 90 days. 6 Tab 0   • simvastatin (ZOCOR) 20 MG Tab Take 1 Tab by mouth every evening. 90 Tab 3   • ibandronate (BONIVA) 150 MG tablet TAKE 1 TAB BY MOUTH EVERY 30 DAYS. 3 Tab 1   • Acetaminophen (TYLENOL PO) Take 2 Tabs by mouth 2 times a day as needed.     • tramadol (ULTRAM) 50 MG Tab Take 50 mg by mouth every four hours as needed.     • Coenzyme Q10 (COQ10 PO) Take 1 Tab by mouth every evening.     • therapeutic multivitamin-minerals (THERAGRAN-M) Tab Take 1 Tab by mouth every evening.     • CALCIUM PO Take 1 Tab by mouth every evening.     • ibuprofen (MOTRIN) 200 MG Tab Take 800 mg by mouth every 6 hours as needed.     • sulfamethoxazole-trimethoprim (BACTRIM DS) 800-160 MG tablet Take 1 Tab by mouth.       No current facility-administered medications for this visit.        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         Family History   Adopted: Yes   Family history unknown: Yes       Review of Systems:    Constitutional: No Fevers, Chills  Eyes: No  "vision changes  ENT: No hearing changes  Resp: No Shortness of breath  CV: No Chest pain  GI: No Nausea/Vomiting  MSK: No weakness  Skin: No rashes  Neuro: No Headaches  Psych: No Suicidal ideations    All remaining systems reviewed and found to be negative, except as stated above.    Exam:    /62   Pulse 75   Temp 36.5 °C (97.7 °F) (Temporal)   Resp 12   Ht 1.702 m (5' 7\")   Wt 51.3 kg (113 lb)   SpO2 94%  Body mass index is 17.7 kg/m².    General:  Well nourished, well developed female in NAD, thin  HENT: Atraumatic, normocephalic  EYES: Extraocular movements intact  NECK: Supple, FROM  CHEST: No deformities, Equal chest expansion  RESP: Unlabored, no stridor or audible wheeze  HEART: Regular Rate and rhythm.   ABD: Soft, Nontender, Non-Distended  Extremities: No Clubbing, Cyanosis, or Edema  Skin: Warm/dry, without rashes  Neuro: A/O x 4, due to COVID-19- did not have patient remove face mask to test cranial nerves.  Motor/sensory grossly intact  Psych: Normal behavior, normal affect      Lab review:  Labs are reviewed and discussed with a patient  Lab Results   Component Value Date/Time    CHOLSTRLTOT 180 06/28/2019 06:43 AM    LDL 97 06/28/2019 06:43 AM    HDL 68 06/28/2019 06:43 AM    TRIGLYCERIDE 75 06/28/2019 06:43 AM       Lab Results   Component Value Date/Time    SODIUM 135 08/21/2020 11:54 AM    POTASSIUM 4.4 08/21/2020 11:54 AM    CHLORIDE 98 08/21/2020 11:54 AM    CO2 27 08/21/2020 11:54 AM    GLUCOSE 96 08/21/2020 11:54 AM    BUN 13 08/21/2020 11:54 AM    CREATININE 0.88 08/21/2020 11:54 AM    CREATININE 0.78 10/11/2011 01:52 PM    BUNCREATRAT 18 07/29/2016 07:20 AM    BUNCREATRAT 18 10/11/2011 01:52 PM     Lab Results   Component Value Date/Time    ALKPHOSPHAT 89 08/21/2020 11:54 AM    ASTSGOT 13 08/21/2020 11:54 AM    ALTSGPT 12 08/21/2020 11:54 AM    TBILIRUBIN <0.2 08/21/2020 11:54 AM      No results found for: HBA1C  No results found for: TSH  No results found for: FREET4    Lab " Results   Component Value Date/Time    WBC 5.7 08/21/2020 11:54 AM    WBC 5.7 10/11/2011 01:52 PM    RBC 3.90 (L) 08/21/2020 11:54 AM    RBC 4.43 10/11/2011 01:52 PM    HEMOGLOBIN 8.6 (L) 08/21/2020 11:54 AM    HEMATOCRIT 29.0 (L) 08/21/2020 11:54 AM    MCV 74.4 (L) 08/21/2020 11:54 AM    MCV 87 10/11/2011 01:52 PM    MCH 22.1 (L) 08/21/2020 11:54 AM    MCH 29.1 10/11/2011 01:52 PM    MCHC 29.7 (L) 08/21/2020 11:54 AM    MPV 9.9 08/21/2020 11:54 AM    NEUTSPOLYS 62.50 08/21/2020 11:54 AM    LYMPHOCYTES 21.30 (L) 08/21/2020 11:54 AM    MONOCYTES 11.50 08/21/2020 11:54 AM    EOSINOPHILS 3.70 08/21/2020 11:54 AM    BASOPHILS 0.70 08/21/2020 11:54 AM    HYPOCHROMIA 1+ 02/20/2013 11:34 AM    ANISOCYTOSIS 1+ 08/21/2020 11:54 AM        Assessment/Plan:  1. Osteopenia, unspecified location  Continue Boniva 150 mg once a months.  2. Chronic left hip pain  Follow-up with Dr. Mendes as scheduled.  Patient to have surgery to remove hardware by the end of the year.  Patient is on tramadol and Advil daily for chronic pain.  Medications managed to Dr. Mendes.  3. Anemia, unspecified type  - FERRITIN; Future  - IRON/TOTAL IRON BIND; Future  Chronic condition, patient believes this was status post her Ortho surgeries.  Last Ortho third surgery was in March 2020, most recent labs show worsening anemia.  Would suspect that these should be improving.  She states she does have a poor appetite and does not eat many calories on a daily basis.  Could be dietary iron deficiency.  Getting iron panel now.  Will start supplementation once we get the labs back.  Would recommend improving her anemia prior to neck surgery.  4. Need for vaccination  - Zoster Vac Recomb Adjuvanted (SHINGRIX) 50 MCG/0.5ML Recon Susp; 0.5 mL by Intramuscular route Once for 1 dose.  Dispense: 0.5 mL; Refill: 0    5. Colon cancer screening  - COLOGUARD (FIT DNA)    6. Mixed hyperlipidemia  Chronic condition.  Stable.  Continue simvastatin 20 mg.  We will get labs in 6  months.  7. Primary insomnia  - ALPRAZolam (XANAX) 0.25 MG Tab; Take 1 Tab by mouth at bedtime as needed for Sleep for up to 90 days.  Dispense: 6 Tab; Refill: 0  Patient rarely takes alprazolam maybe 6 pills in a whole year.  Uses for occasional insomnia bouts where trazodone is ineffective and she has not slept in 3 days.  Discussed in detail today that she should not use her benzodiazepine with opioid pain medication.  She is getting tramadol from Dr. Mendes.  She also needs to confirm that Dr. Mendes is okay with her taking alprazolam.  Again it shows a rare occasion she uses this.   reviewed today.  8. Screening for breast cancer  - MA-DIAGNOSTIC MAMMO BILAT W/TOMOSYNTHESIS W/CAD; Future    9. Screening for colon cancer  Cologuard test ordered.  Other orders  - sulfamethoxazole-trimethoprim (BACTRIM DS) 800-160 MG tablet; Take 1 Tab by mouth.  - traZODone (DESYREL) 100 MG Tab; Take 1 Tab by mouth at bedtime as needed for Sleep.  Dispense: 90 Tab; Refill: 1  - simvastatin (ZOCOR) 20 MG Tab; Take 1 Tab by mouth every evening.  Dispense: 90 Tab; Refill: 3       Follow-up: Return in about 6 months (around 3/11/2021) for F/U, sooner pending labs..    Please note that this dictation was created using voice recognition software. I have made every reasonable attempt to correct obvious errors, but I expect that there are errors of grammar and possibly content that I did not discover before finalizing the note.

## 2020-09-14 ENCOUNTER — TELEPHONE (OUTPATIENT)
Dept: MEDICAL GROUP | Facility: PHYSICIAN GROUP | Age: 60
End: 2020-09-14

## 2020-09-14 DIAGNOSIS — D50.8 IRON DEFICIENCY ANEMIA SECONDARY TO INADEQUATE DIETARY IRON INTAKE: ICD-10-CM

## 2020-09-14 NOTE — TELEPHONE ENCOUNTER
----- Message from Rafaela Meyers P.A.-C. sent at 9/14/2020  1:02 PM PDT -----  Please call patient about their results.     Results showed:     Iron is very low, please start supplementing ferrous sulfate 325 mg once a day with 500 mg of vitamin C.  We should repeat labs in about 6 weeks and follow-up after that.    If you have any questions or concerns, do not hesitate to contact me or my Medical Assistant. Thank you for your time today.     Respectfully,     Rafaela Meyers PA-C

## 2020-09-14 NOTE — PROGRESS NOTES
Suspected iron deficiency on recent labs confirmed, starting iron supplementation and repeat labs for 6 weeks.

## 2020-10-07 DIAGNOSIS — Z12.11 COLON CANCER SCREENING: ICD-10-CM

## 2020-10-28 RX ORDER — TRAZODONE HYDROCHLORIDE 100 MG/1
200 TABLET ORAL NIGHTLY PRN
Qty: 180 TAB | Refills: 0 | Status: SHIPPED | OUTPATIENT
Start: 2020-10-28 | End: 2021-01-26

## 2020-10-28 NOTE — TELEPHONE ENCOUNTER
Kemi called into the clinic.    Kemi stated that she takes 2 tablets of Trazodone, not 1 tablet.    Kemi stated that she is out of medication.

## 2020-10-28 NOTE — TELEPHONE ENCOUNTER
I spoke to Kemi.    Kemi advised of [facility] in full.    Approved Prescriptions     traZODone (DESYREL) 100 MG Tab         Sig: Take 2 Tabs by mouth at bedtime as needed for Sleep for up to 90 days.    Disp:  180 Tab    Refills:  0    Start: 10/28/2020 - 1/26/2021    Class: Normal    Authorized by: JAMI KamaraAShannanCDelfino    Non-formulary        To be filled at: [facility]/pharmacy #[id number] - [place], [place] - [address].

## 2020-11-06 ENCOUNTER — HOSPITAL ENCOUNTER (OUTPATIENT)
Dept: RADIOLOGY | Facility: MEDICAL CENTER | Age: 60
End: 2020-11-06
Attending: ORTHOPAEDIC SURGERY
Payer: COMMERCIAL

## 2020-11-06 DIAGNOSIS — Z96.642 PRESENCE OF LEFT ARTIFICIAL HIP JOINT: ICD-10-CM

## 2020-11-06 PROCEDURE — 73700 CT LOWER EXTREMITY W/O DYE: CPT | Mod: LT

## 2020-11-13 ENCOUNTER — PRE-ADMISSION TESTING (OUTPATIENT)
Dept: ADMISSIONS | Facility: MEDICAL CENTER | Age: 60
End: 2020-11-13
Attending: ORTHOPAEDIC SURGERY
Payer: COMMERCIAL

## 2020-11-13 DIAGNOSIS — Z01.812 PRE-OPERATIVE LABORATORY EXAMINATION: ICD-10-CM

## 2020-11-13 DIAGNOSIS — Z01.810 PRE-OPERATIVE CARDIOVASCULAR EXAMINATION: ICD-10-CM

## 2020-11-13 LAB
COVID ORDER STATUS COVID19: NORMAL
EKG IMPRESSION: NORMAL
ERYTHROCYTE [DISTWIDTH] IN BLOOD BY AUTOMATED COUNT: 49.1 FL (ref 35.9–50)
HCT VFR BLD AUTO: 31.9 % (ref 37–47)
HGB BLD-MCNC: 9.8 G/DL (ref 12–16)
MCH RBC QN AUTO: 23.2 PG (ref 27–33)
MCHC RBC AUTO-ENTMCNC: 30.7 G/DL (ref 33.6–35)
MCV RBC AUTO: 75.4 FL (ref 81.4–97.8)
PLATELET # BLD AUTO: 401 K/UL (ref 164–446)
PMV BLD AUTO: 9.1 FL (ref 9–12.9)
RBC # BLD AUTO: 4.23 M/UL (ref 4.2–5.4)
WBC # BLD AUTO: 7.6 K/UL (ref 4.8–10.8)

## 2020-11-13 PROCEDURE — C9803 HOPD COVID-19 SPEC COLLECT: HCPCS

## 2020-11-13 PROCEDURE — 85027 COMPLETE CBC AUTOMATED: CPT

## 2020-11-13 PROCEDURE — 93010 ELECTROCARDIOGRAM REPORT: CPT | Performed by: INTERNAL MEDICINE

## 2020-11-13 PROCEDURE — 93005 ELECTROCARDIOGRAM TRACING: CPT

## 2020-11-13 PROCEDURE — U0003 INFECTIOUS AGENT DETECTION BY NUCLEIC ACID (DNA OR RNA); SEVERE ACUTE RESPIRATORY SYNDROME CORONAVIRUS 2 (SARS-COV-2) (CORONAVIRUS DISEASE [COVID-19]), AMPLIFIED PROBE TECHNIQUE, MAKING USE OF HIGH THROUGHPUT TECHNOLOGIES AS DESCRIBED BY CMS-2020-01-R: HCPCS

## 2020-11-13 PROCEDURE — 36415 COLL VENOUS BLD VENIPUNCTURE: CPT

## 2020-11-13 RX ORDER — TRAMADOL HYDROCHLORIDE 50 MG/1
50 TABLET ORAL EVERY 4 HOURS PRN
Status: ON HOLD | COMMUNITY
End: 2020-11-16 | Stop reason: SDUPTHER

## 2020-11-13 ASSESSMENT — FIBROSIS 4 INDEX: FIB4 SCORE: 0.57

## 2020-11-13 NOTE — OR NURSING
"Preparing for your Procedure information\" sheet given to patient with verbal and written instructions. Patient instructed to continue prescribed medications through the day before surgery, instructed to take the following medications the day of surgery per anesthesia protocol, bactrim, ultram tylenol and xanax if needed. Pt instructed to self isolate after COVID test today, agrees.    "

## 2020-11-14 LAB
SARS-COV-2 RNA RESP QL NAA+PROBE: NOTDETECTED
SPECIMEN SOURCE: NORMAL

## 2020-11-16 ENCOUNTER — APPOINTMENT (OUTPATIENT)
Dept: RADIOLOGY | Facility: MEDICAL CENTER | Age: 60
End: 2020-11-16
Attending: ORTHOPAEDIC SURGERY
Payer: COMMERCIAL

## 2020-11-16 ENCOUNTER — HOSPITAL ENCOUNTER (OUTPATIENT)
Facility: MEDICAL CENTER | Age: 60
End: 2020-11-16
Attending: ORTHOPAEDIC SURGERY | Admitting: ORTHOPAEDIC SURGERY
Payer: COMMERCIAL

## 2020-11-16 ENCOUNTER — ANESTHESIA (OUTPATIENT)
Dept: SURGERY | Facility: MEDICAL CENTER | Age: 60
End: 2020-11-16
Payer: COMMERCIAL

## 2020-11-16 ENCOUNTER — APPOINTMENT (OUTPATIENT)
Dept: ADMISSIONS | Facility: MEDICAL CENTER | Age: 60
End: 2020-11-16
Payer: COMMERCIAL

## 2020-11-16 ENCOUNTER — ANESTHESIA EVENT (OUTPATIENT)
Dept: SURGERY | Facility: MEDICAL CENTER | Age: 60
End: 2020-11-16
Payer: COMMERCIAL

## 2020-11-16 VITALS
BODY MASS INDEX: 17.24 KG/M2 | HEIGHT: 68 IN | SYSTOLIC BLOOD PRESSURE: 109 MMHG | TEMPERATURE: 96.8 F | RESPIRATION RATE: 16 BRPM | HEART RATE: 86 BPM | WEIGHT: 113.76 LBS | DIASTOLIC BLOOD PRESSURE: 59 MMHG | OXYGEN SATURATION: 95 %

## 2020-11-16 DIAGNOSIS — T84.84XA PAINFUL ORTHOPAEDIC HARDWARE (HCC): ICD-10-CM

## 2020-11-16 PROCEDURE — 700111 HCHG RX REV CODE 636 W/ 250 OVERRIDE (IP): Performed by: ANESTHESIOLOGY

## 2020-11-16 PROCEDURE — 160039 HCHG SURGERY MINUTES - EA ADDL 1 MIN LEVEL 3: Performed by: ORTHOPAEDIC SURGERY

## 2020-11-16 PROCEDURE — 160035 HCHG PACU - 1ST 60 MINS PHASE I: Performed by: ORTHOPAEDIC SURGERY

## 2020-11-16 PROCEDURE — 501838 HCHG SUTURE GENERAL: Performed by: ORTHOPAEDIC SURGERY

## 2020-11-16 PROCEDURE — 160009 HCHG ANES TIME/MIN: Performed by: ORTHOPAEDIC SURGERY

## 2020-11-16 PROCEDURE — A9270 NON-COVERED ITEM OR SERVICE: HCPCS | Performed by: ANESTHESIOLOGY

## 2020-11-16 PROCEDURE — 700105 HCHG RX REV CODE 258: Performed by: ANESTHESIOLOGY

## 2020-11-16 PROCEDURE — 503339 HCHG DRESSSING PICO: Performed by: ORTHOPAEDIC SURGERY

## 2020-11-16 PROCEDURE — 700101 HCHG RX REV CODE 250: Performed by: ANESTHESIOLOGY

## 2020-11-16 PROCEDURE — 160028 HCHG SURGERY MINUTES - 1ST 30 MINS LEVEL 3: Performed by: ORTHOPAEDIC SURGERY

## 2020-11-16 PROCEDURE — 160048 HCHG OR STATISTICAL LEVEL 1-5: Performed by: ORTHOPAEDIC SURGERY

## 2020-11-16 PROCEDURE — 160002 HCHG RECOVERY MINUTES (STAT): Performed by: ORTHOPAEDIC SURGERY

## 2020-11-16 PROCEDURE — 700102 HCHG RX REV CODE 250 W/ 637 OVERRIDE(OP): Performed by: ANESTHESIOLOGY

## 2020-11-16 PROCEDURE — 160036 HCHG PACU - EA ADDL 30 MINS PHASE I: Performed by: ORTHOPAEDIC SURGERY

## 2020-11-16 RX ORDER — TRANEXAMIC ACID 100 MG/ML
INJECTION, SOLUTION INTRAVENOUS PRN
Status: DISCONTINUED | OUTPATIENT
Start: 2020-11-16 | End: 2020-11-16 | Stop reason: SURG

## 2020-11-16 RX ORDER — SODIUM CHLORIDE, SODIUM LACTATE, POTASSIUM CHLORIDE, CALCIUM CHLORIDE 600; 310; 30; 20 MG/100ML; MG/100ML; MG/100ML; MG/100ML
INJECTION, SOLUTION INTRAVENOUS
Status: DISCONTINUED | OUTPATIENT
Start: 2020-11-16 | End: 2020-11-16 | Stop reason: SURG

## 2020-11-16 RX ORDER — HALOPERIDOL 5 MG/ML
1 INJECTION INTRAMUSCULAR
Status: DISCONTINUED | OUTPATIENT
Start: 2020-11-16 | End: 2020-11-16 | Stop reason: HOSPADM

## 2020-11-16 RX ORDER — HYDROMORPHONE HYDROCHLORIDE 1 MG/ML
0.1 INJECTION, SOLUTION INTRAMUSCULAR; INTRAVENOUS; SUBCUTANEOUS
Status: DISCONTINUED | OUTPATIENT
Start: 2020-11-16 | End: 2020-11-16 | Stop reason: HOSPADM

## 2020-11-16 RX ORDER — ONDANSETRON 2 MG/ML
INJECTION INTRAMUSCULAR; INTRAVENOUS PRN
Status: DISCONTINUED | OUTPATIENT
Start: 2020-11-16 | End: 2020-11-16 | Stop reason: SURG

## 2020-11-16 RX ORDER — MEPERIDINE HYDROCHLORIDE 25 MG/ML
12.5 INJECTION INTRAMUSCULAR; INTRAVENOUS; SUBCUTANEOUS
Status: DISCONTINUED | OUTPATIENT
Start: 2020-11-16 | End: 2020-11-16 | Stop reason: HOSPADM

## 2020-11-16 RX ORDER — LABETALOL HYDROCHLORIDE 5 MG/ML
5 INJECTION, SOLUTION INTRAVENOUS
Status: DISCONTINUED | OUTPATIENT
Start: 2020-11-16 | End: 2020-11-16 | Stop reason: HOSPADM

## 2020-11-16 RX ORDER — OXYCODONE HCL 5 MG/5 ML
10 SOLUTION, ORAL ORAL
Status: COMPLETED | OUTPATIENT
Start: 2020-11-16 | End: 2020-11-16

## 2020-11-16 RX ORDER — HYDROMORPHONE HYDROCHLORIDE 1 MG/ML
0.4 INJECTION, SOLUTION INTRAMUSCULAR; INTRAVENOUS; SUBCUTANEOUS
Status: DISCONTINUED | OUTPATIENT
Start: 2020-11-16 | End: 2020-11-16 | Stop reason: HOSPADM

## 2020-11-16 RX ORDER — HYDROMORPHONE HYDROCHLORIDE 1 MG/ML
0.2 INJECTION, SOLUTION INTRAMUSCULAR; INTRAVENOUS; SUBCUTANEOUS
Status: DISCONTINUED | OUTPATIENT
Start: 2020-11-16 | End: 2020-11-16 | Stop reason: HOSPADM

## 2020-11-16 RX ORDER — TRAMADOL HYDROCHLORIDE 50 MG/1
50-100 TABLET ORAL EVERY 6 HOURS PRN
Qty: 40 TAB | Refills: 0 | Status: SHIPPED | OUTPATIENT
Start: 2020-11-16 | End: 2020-11-23

## 2020-11-16 RX ORDER — DEXAMETHASONE SODIUM PHOSPHATE 4 MG/ML
INJECTION, SOLUTION INTRA-ARTICULAR; INTRALESIONAL; INTRAMUSCULAR; INTRAVENOUS; SOFT TISSUE PRN
Status: DISCONTINUED | OUTPATIENT
Start: 2020-11-16 | End: 2020-11-16 | Stop reason: SURG

## 2020-11-16 RX ORDER — MEPERIDINE HYDROCHLORIDE 25 MG/ML
INJECTION INTRAMUSCULAR; INTRAVENOUS; SUBCUTANEOUS PRN
Status: DISCONTINUED | OUTPATIENT
Start: 2020-11-16 | End: 2020-11-16 | Stop reason: SURG

## 2020-11-16 RX ORDER — METOPROLOL TARTRATE 1 MG/ML
1 INJECTION, SOLUTION INTRAVENOUS
Status: DISCONTINUED | OUTPATIENT
Start: 2020-11-16 | End: 2020-11-16 | Stop reason: HOSPADM

## 2020-11-16 RX ORDER — ONDANSETRON 2 MG/ML
4 INJECTION INTRAMUSCULAR; INTRAVENOUS
Status: DISCONTINUED | OUTPATIENT
Start: 2020-11-16 | End: 2020-11-16 | Stop reason: HOSPADM

## 2020-11-16 RX ORDER — METOCLOPRAMIDE HYDROCHLORIDE 5 MG/ML
INJECTION INTRAMUSCULAR; INTRAVENOUS PRN
Status: DISCONTINUED | OUTPATIENT
Start: 2020-11-16 | End: 2020-11-16 | Stop reason: SURG

## 2020-11-16 RX ORDER — SODIUM CHLORIDE, SODIUM LACTATE, POTASSIUM CHLORIDE, CALCIUM CHLORIDE 600; 310; 30; 20 MG/100ML; MG/100ML; MG/100ML; MG/100ML
INJECTION, SOLUTION INTRAVENOUS CONTINUOUS
Status: DISCONTINUED | OUTPATIENT
Start: 2020-11-16 | End: 2020-11-16 | Stop reason: HOSPADM

## 2020-11-16 RX ORDER — HYDRALAZINE HYDROCHLORIDE 20 MG/ML
5 INJECTION INTRAMUSCULAR; INTRAVENOUS
Status: DISCONTINUED | OUTPATIENT
Start: 2020-11-16 | End: 2020-11-16 | Stop reason: HOSPADM

## 2020-11-16 RX ORDER — DIPHENHYDRAMINE HYDROCHLORIDE 50 MG/ML
12.5 INJECTION INTRAMUSCULAR; INTRAVENOUS
Status: DISCONTINUED | OUTPATIENT
Start: 2020-11-16 | End: 2020-11-16 | Stop reason: HOSPADM

## 2020-11-16 RX ORDER — GABAPENTIN 300 MG/1
300 CAPSULE ORAL ONCE
Status: COMPLETED | OUTPATIENT
Start: 2020-11-16 | End: 2020-11-16

## 2020-11-16 RX ORDER — OXYCODONE HCL 5 MG/5 ML
5 SOLUTION, ORAL ORAL
Status: COMPLETED | OUTPATIENT
Start: 2020-11-16 | End: 2020-11-16

## 2020-11-16 RX ORDER — CEFAZOLIN SODIUM 1 G/3ML
INJECTION, POWDER, FOR SOLUTION INTRAMUSCULAR; INTRAVENOUS PRN
Status: DISCONTINUED | OUTPATIENT
Start: 2020-11-16 | End: 2020-11-16 | Stop reason: SURG

## 2020-11-16 RX ORDER — ACETAMINOPHEN 500 MG
1000 TABLET ORAL ONCE
Status: COMPLETED | OUTPATIENT
Start: 2020-11-16 | End: 2020-11-16

## 2020-11-16 RX ORDER — LIDOCAINE HYDROCHLORIDE 20 MG/ML
INJECTION, SOLUTION EPIDURAL; INFILTRATION; INTRACAUDAL; PERINEURAL PRN
Status: DISCONTINUED | OUTPATIENT
Start: 2020-11-16 | End: 2020-11-16 | Stop reason: SURG

## 2020-11-16 RX ADMIN — ONDANSETRON 4 MG: 2 INJECTION INTRAMUSCULAR; INTRAVENOUS at 17:20

## 2020-11-16 RX ADMIN — GABAPENTIN 300 MG: 300 CAPSULE ORAL at 15:56

## 2020-11-16 RX ADMIN — SODIUM CHLORIDE, POTASSIUM CHLORIDE, SODIUM LACTATE AND CALCIUM CHLORIDE: 600; 310; 30; 20 INJECTION, SOLUTION INTRAVENOUS at 17:07

## 2020-11-16 RX ADMIN — FENTANYL CITRATE 25 MCG: 50 INJECTION, SOLUTION INTRAMUSCULAR; INTRAVENOUS at 18:29

## 2020-11-16 RX ADMIN — ROCURONIUM BROMIDE 50 MG: 10 INJECTION INTRAVENOUS at 17:17

## 2020-11-16 RX ADMIN — FENTANYL CITRATE 25 MCG: 50 INJECTION, SOLUTION INTRAMUSCULAR; INTRAVENOUS at 18:35

## 2020-11-16 RX ADMIN — MEPERIDINE HYDROCHLORIDE 25 MG: 25 INJECTION INTRAMUSCULAR; INTRAVENOUS; SUBCUTANEOUS at 17:07

## 2020-11-16 RX ADMIN — CEFAZOLIN 2 G: 1 INJECTION, POWDER, FOR SOLUTION INTRAVENOUS at 17:14

## 2020-11-16 RX ADMIN — PROPOFOL 30 MG: 10 INJECTION, EMULSION INTRAVENOUS at 18:07

## 2020-11-16 RX ADMIN — TRANEXAMIC ACID 1000 MG: 100 INJECTION, SOLUTION INTRAVENOUS at 17:21

## 2020-11-16 RX ADMIN — MEPERIDINE HYDROCHLORIDE 25 MG: 25 INJECTION INTRAMUSCULAR; INTRAVENOUS; SUBCUTANEOUS at 17:59

## 2020-11-16 RX ADMIN — METOCLOPRAMIDE 10 MG: 5 INJECTION, SOLUTION INTRAMUSCULAR; INTRAVENOUS at 18:02

## 2020-11-16 RX ADMIN — ACETAMINOPHEN 1000 MG: 500 TABLET, FILM COATED ORAL at 15:55

## 2020-11-16 RX ADMIN — LIDOCAINE HYDROCHLORIDE 50 MG: 20 INJECTION, SOLUTION EPIDURAL; INFILTRATION; INTRACAUDAL; PERINEURAL at 17:17

## 2020-11-16 RX ADMIN — SODIUM CHLORIDE, POTASSIUM CHLORIDE, SODIUM LACTATE AND CALCIUM CHLORIDE: 600; 310; 30; 20 INJECTION, SOLUTION INTRAVENOUS at 17:57

## 2020-11-16 RX ADMIN — FENTANYL CITRATE 25 MCG: 50 INJECTION, SOLUTION INTRAMUSCULAR; INTRAVENOUS at 19:23

## 2020-11-16 RX ADMIN — OXYCODONE HYDROCHLORIDE 5 MG: 5 SOLUTION ORAL at 18:30

## 2020-11-16 RX ADMIN — PROPOFOL 150 MG: 10 INJECTION, EMULSION INTRAVENOUS at 17:17

## 2020-11-16 RX ADMIN — DEXAMETHASONE SODIUM PHOSPHATE 8 MG: 4 INJECTION, SOLUTION INTRAMUSCULAR; INTRAVENOUS at 17:20

## 2020-11-16 ASSESSMENT — PAIN DESCRIPTION - PAIN TYPE
TYPE: ACUTE PAIN;SURGICAL PAIN

## 2020-11-16 ASSESSMENT — PAIN SCALES - GENERAL: PAIN_LEVEL: 4

## 2020-11-16 ASSESSMENT — FIBROSIS 4 INDEX: FIB4 SCORE: 0.58

## 2020-11-16 NOTE — OR NURSING
1559:  Patient allergies and NPO status verified, home medication reconciliation completed and belongings secured. Patient verbalizes understanding of pain scale, expected course of stay and plan of care. Surgical site verified with patient. IV access established. Sequentials placed on legs. Triple aim completed by CNA.

## 2020-11-16 NOTE — ANESTHESIA PREPROCEDURE EVALUATION
Relevant Problems   No relevant active problems       Physical Exam    Airway   Mallampati: II  TM distance: >3 FB  Neck ROM: full       Cardiovascular - normal exam  Rhythm: regular  Rate: normal  (-) murmur     Dental - normal exam           Pulmonary - normal exam  Breath sounds clear to auscultation     Abdominal    Neurological - normal exam                 Anesthesia Plan    ASA 2       Plan - general       Airway plan will be ETT      Plan Factors:   Patient did not smoke on day of procedure.      Induction: intravenous    Postoperative Plan: Postoperative administration of opioids is intended.    Pertinent diagnostic labs and testing reviewed    Informed Consent:    Anesthetic plan and risks discussed with patient.    Use of blood products discussed with: patient whom consented to blood products.

## 2020-11-17 NOTE — OR NURSING
1845: Assumed care from MARYANA Jones. Patient resting with 2/10 pain and ready to go home. Discharge paperwork needs to be completed.    1900: Patient is having 3/10 pain which is tolerable at this time.    1915: Patient resting, pain is climbing. Plan to treat per MAR.    1930: Patient rates pain at 2/10 and tolerable. Phase II at bedside in PACU.    1945: Discharge instructions given to patient and son via telephone. Patient will be escorted to the ER entrance where she will meet her Son to go home.

## 2020-11-17 NOTE — OP REPORT
DATE OF SERVICE:  11/16/2020    INDICATIONS:  The patient with painful hardware, left hip.    PREOPERATIVE DIAGNOSIS:  Painful hardware, left hip.    POSTOPERATIVE DIAGNOSIS:  Painful hardware, left hip.    PROCEDURE:  Removal of deep hardware, left hip.    ANESTHESIA:  General.    COMPLICATIONS:  None.    SURGEON:  Julian Mendes MD    ASSISTANT:  Pari Samuel PA-C    PROCEDURE:  The patient was identified in the preoperative area, site was   marked, taken back to the operating room and underwent general anesthesia.    Left lower extremity was prepped and draped in sterile manner.  Preoperative   timeout was held and antibiotics were given.  The old incision was excised.    Soft tissue dissected down to fascia.  The fascia was split in line with the   IT band and gluteus fascia.  The loose screws removed.  It was imbedded in the   fascia closure and then the plate was removed after cables and screws there   were in the plate were removed without any issue.  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  It was   elected not to place any hardware back into the hip.  Wound was thoroughly   irrigated out, debrided and Vicryl was used for the fascia, Monocryl soft   tissue skin and Dermabond for the final skin layer.  The patient was woken up,   taken back to PACU, will be weightbear as tolerated.       ____________________________________     MD REY Ross / JERRICA    DD:  11/16/2020 18:17:51  DT:  11/16/2020 20:28:07    D#:  [id number]  Job#:  [id number]

## 2020-11-17 NOTE — ANESTHESIA PROCEDURE NOTES
Airway    Date/Time: 11/16/2020 5:19 PM  Performed by: Kirk Rodriges M.D.  Authorized by: Kirk Rodriges M.D.     Location:  OR  Urgency:  Elective  Difficult Airway: No    Indications for Airway Management:  Anesthesia      Spontaneous Ventilation: absent    Sedation Level:  Deep  Preoxygenated: Yes    Patient Position:  Sniffing  Mask Difficulty Assessment:  1 - vent by mask  Final Airway Type:  Endotracheal airway  Final Endotracheal Airway:  ETT  Cuffed: Yes    Technique Used for Successful ETT Placement:  Direct laryngoscopy    Insertion Site:  Oral  Blade Type:  Ender  Laryngoscope Blade/Videolaryngoscope Blade Size:  3  ETT Size (mm):  6.5  Measured from:  Teeth  ETT to Teeth (cm):  22  Placement Verified by: auscultation and capnometry    Cormack-Lehane Classification:  Grade I - full view of glottis  Number of Attempts at Approach:  1

## 2020-11-17 NOTE — ANESTHESIA TIME REPORT
Anesthesia Start and Stop Event Times     Date Time Event    11/16/2020 1641 Ready for Procedure     1707 Anesthesia Start     1818 Anesthesia Stop        Responsible Staff  11/16/20    Name Role Begin End    Kirk Rodriges M.D. Anesth 1707 1818        Preop Diagnosis (Free Text):  Pre-op Diagnosis     PRESENCE OF LEFT ARTIFICIAL HIP JOINT, HIP PAIN LEFT        Preop Diagnosis (Codes):    Post op Diagnosis  Left hip pain      Premium Reason  A. 3PM - 7AM    Comments: procedure: removal of hardware from left hip

## 2020-11-17 NOTE — DISCHARGE INSTRUCTIONS
ACTIVITY: Rest and take it easy for the first 24 hours.  A responsible adult is recommended to remain with you during that time.  It is normal to feel sleepy.  We encourage you to not do anything that requires balance, judgment or coordination.    MILD FLU-LIKE SYMPTOMS ARE NORMAL. YOU MAY EXPERIENCE GENERALIZED MUSCLE ACHES, THROAT IRRITATION, HEADACHE AND/OR SOME NAUSEA.    FOR 24 HOURS DO NOT:  Drive, operate machinery or run household appliances.  Drink beer or alcoholic beverages.   Make important decisions or sign legal documents.    SPECIAL INSTRUCTIONS: ACTIVITY AS TOLERATED.      DIET: To avoid nausea, slowly advance diet as tolerated, avoiding spicy or greasy foods for the first day.  Add more substantial food to your diet according to your physician's instructions.  Babies can be fed formula or breast milk as soon as they are hungry.  INCREASE FLUIDS AND FIBER TO AVOID CONSTIPATION.    SURGICAL DRESSING/BATHING: KEEP DRESSING CLEAN AND DRY.   Leave dressing on until F/U in 2 weeks.    FOLLOW-UP APPOINTMENT:  A follow-up appointment should be arranged with your doctor; call to schedule.    You should CALL YOUR PHYSICIAN if you develop:  Fever greater than 101 degrees F.  Pain not relieved by medication, or persistent nausea or vomiting.  Excessive bleeding (blood soaking through dressing) or unexpected drainage from the wound.  Extreme redness or swelling around the incision site, drainage of pus or foul smelling drainage.  Inability to urinate or empty your bladder within 8 hours.  Problems with breathing or chest pain.    You should call 911 if you develop problems with breathing or chest pain.  If you are unable to contact your doctor or surgical center, you should go to the nearest emergency room or urgent care center.      Physician's telephone #: DR. SHUKLA  [phone]    If any questions arise, call your doctor.  If your doctor is not available, please feel free to call the Surgical Center at  [phone]. The Contact Center is open Monday through Friday 7AM to 5PM and may speak to a nurse at [phone], or toll free at [phone].     A registered nurse may call you a few days after your surgery to see how you are doing after your procedure.    MEDICATIONS: Resume taking daily medication.  Take prescribed pain medication with food.  If no medication is prescribed, you may take non-aspirin pain medication if needed.  PAIN MEDICATION CAN BE VERY CONSTIPATING.  Take a stool softener or laxative such as senokot, pericolace, or milk of magnesia if needed.        If your physician has prescribed pain medication that includes Acetaminophen (Tylenol), do not take additional Acetaminophen (Tylenol) while taking the prescribed medication.    Depression / Suicide Risk    As you are discharged from this [facility] facility, it is important to learn how to keep safe from harming yourself.    Recognize the warning signs:  · Abrupt changes in personality, positive or negative- including increase in energy   · Giving away possessions  · Change in eating patterns- significant weight changes-  positive or negative  · Change in sleeping patterns- unable to sleep or sleeping all the time   · Unwillingness or inability to communicate  · Depression  · Unusual sadness, discouragement and loneliness  · Talk of wanting to die  · Neglect of personal appearance   · Rebelliousness- reckless behavior  · Withdrawal from people/activities they love  · Confusion- inability to concentrate     If you or a loved one observes any of these behaviors or has concerns about self-harm, here's what you can do:  · Talk about it- your feelings and reasons for harming yourself  · Remove any means that you might use to hurt yourself (examples: pills, rope, extension cords, firearm)  · Get professional help from the community (Mental Health, Substance Abuse, psychological counseling)  · Do not be alone:Call your Safe Contact- someone whom  you trust who will be there for you.  · Call your local CRISIS HOTLINE 563-6567 or 243-591-1221  · Call your local Children's Mobile Crisis Response Team [place] (222) 416-0146 or www.zweitgeist  · Call the toll free National Suicide Prevention Hotlines   · National Suicide Prevention Lifeline 252-019-LUXO (7502)  · [employer] Line Network 800-SUICIDE (960-7815)

## 2020-11-17 NOTE — ANESTHESIA POSTPROCEDURE EVALUATION
Patient: Kemi Silva    Procedure Summary     Date: 11/16/20 Room / Location:  OR  / SURGERY [facility]    Anesthesia Start: 1707 Anesthesia Stop: 1818    Procedure: REMOVAL, HARDWARE - HIP (Left Hip) Diagnosis: (PRESENCE OF LEFT ARTIFICIAL HIP JOINT, HIP PAIN LEFT)    Surgeons: Julian Mendes M.D. Responsible Provider: Kirk Rodriges M.D.    Anesthesia Type: general ASA Status: 2          Final Anesthesia Type: general  Last vitals  BP   Blood Pressure: 134/87    Temp   37.2 °C (99 °F)    Pulse   Pulse: 86   Resp   16    SpO2   96 %      Anesthesia Post Evaluation    Patient location during evaluation: PACU  Patient participation: complete - patient participated  Level of consciousness: awake and alert  Pain score: 4    Airway patency: patent  Anesthetic complications: no  Cardiovascular status: hemodynamically stable  Respiratory status: acceptable  Hydration status: euvolemic    PONV: none           Nurse Pain Score: 4 (NPRS)

## 2020-11-17 NOTE — OR NURSING
1816 PT RECEIVED IN PACU, REPORT RECEIVED.  VSS, RESP SPONT, EVEN, NON LABORED. ROBERTO WOUND VAC UNIT FLASHING ALARM.  ORESTES OLSON PHC AT BEDSIDE AND IS OK IF UNIT NOT TO VACUUM.  1843 [T REPORT L HIP PAIN 2/10 AND TOLERABLE. REPORT GIVEN TO BULMARO MIJARES TO ASSUME CARE OF THIS PT.

## 2020-12-28 ENCOUNTER — HOSPITAL ENCOUNTER (OUTPATIENT)
Dept: LAB | Facility: MEDICAL CENTER | Age: 60
End: 2020-12-28
Attending: INTERNAL MEDICINE
Payer: COMMERCIAL

## 2020-12-28 LAB
ANISOCYTOSIS BLD QL SMEAR: ABNORMAL
BASOPHILS # BLD AUTO: 0.9 % (ref 0–1.8)
BASOPHILS # BLD: 0.07 K/UL (ref 0–0.12)
EOSINOPHIL # BLD AUTO: 0 K/UL (ref 0–0.51)
EOSINOPHIL NFR BLD: 0 % (ref 0–6.9)
ERYTHROCYTE [DISTWIDTH] IN BLOOD BY AUTOMATED COUNT: 45.3 FL (ref 35.9–50)
FERRITIN SERPL-MCNC: 22.3 NG/ML (ref 10–291)
HCT VFR BLD AUTO: 33.2 % (ref 37–47)
HGB BLD-MCNC: 9.8 G/DL (ref 12–16)
HYPOCHROMIA BLD QL SMEAR: ABNORMAL
IRON SATN MFR SERPL: 5 % (ref 15–55)
IRON SERPL-MCNC: 17 UG/DL (ref 40–170)
LYMPHOCYTES # BLD AUTO: 1.16 K/UL (ref 1–4.8)
LYMPHOCYTES NFR BLD: 15.7 % (ref 22–41)
MANUAL DIFF BLD: NORMAL
MCH RBC QN AUTO: 22.4 PG (ref 27–33)
MCHC RBC AUTO-ENTMCNC: 29.5 G/DL (ref 33.6–35)
MCV RBC AUTO: 76 FL (ref 81.4–97.8)
MICROCYTES BLD QL SMEAR: ABNORMAL
MONOCYTES # BLD AUTO: 0.84 K/UL (ref 0–0.85)
MONOCYTES NFR BLD AUTO: 11.3 % (ref 0–13.4)
MORPHOLOGY BLD-IMP: NORMAL
NEUTROPHILS # BLD AUTO: 5.34 K/UL (ref 2–7.15)
NEUTROPHILS NFR BLD: 72.2 % (ref 44–72)
NRBC # BLD AUTO: 0 K/UL
NRBC BLD-RTO: 0 /100 WBC
PLATELET # BLD AUTO: 398 K/UL (ref 164–446)
PLATELET BLD QL SMEAR: NORMAL
PMV BLD AUTO: 10.4 FL (ref 9–12.9)
POIKILOCYTOSIS BLD QL SMEAR: NORMAL
RBC # BLD AUTO: 4.37 M/UL (ref 4.2–5.4)
RBC BLD AUTO: PRESENT
TIBC SERPL-MCNC: 339 UG/DL (ref 250–450)
UIBC SERPL-MCNC: 322 UG/DL (ref 110–370)
WBC # BLD AUTO: 7.4 K/UL (ref 4.8–10.8)

## 2020-12-28 PROCEDURE — 85007 BL SMEAR W/DIFF WBC COUNT: CPT

## 2020-12-28 PROCEDURE — 85027 COMPLETE CBC AUTOMATED: CPT

## 2020-12-28 PROCEDURE — 83550 IRON BINDING TEST: CPT

## 2020-12-28 PROCEDURE — 82728 ASSAY OF FERRITIN: CPT

## 2020-12-28 PROCEDURE — 36415 COLL VENOUS BLD VENIPUNCTURE: CPT

## 2020-12-28 PROCEDURE — 83540 ASSAY OF IRON: CPT

## 2021-01-26 RX ORDER — TRAZODONE HYDROCHLORIDE 100 MG/1
TABLET ORAL
Qty: 180 TAB | Refills: 1 | Status: SHIPPED | OUTPATIENT
Start: 2021-01-26 | End: 2021-03-11 | Stop reason: SDUPTHER

## 2021-02-24 ENCOUNTER — HOSPITAL ENCOUNTER (OUTPATIENT)
Dept: LAB | Facility: MEDICAL CENTER | Age: 61
End: 2021-02-24
Attending: PHYSICIAN ASSISTANT
Payer: COMMERCIAL

## 2021-02-24 DIAGNOSIS — D50.8 IRON DEFICIENCY ANEMIA SECONDARY TO INADEQUATE DIETARY IRON INTAKE: ICD-10-CM

## 2021-02-24 LAB
ANISOCYTOSIS BLD QL SMEAR: ABNORMAL
BASOPHILS # BLD AUTO: 0.4 % (ref 0–1.8)
BASOPHILS # BLD: 0.03 K/UL (ref 0–0.12)
BURR CELLS BLD QL SMEAR: NORMAL
COMMENT 1642: NORMAL
EOSINOPHIL # BLD AUTO: 0.15 K/UL (ref 0–0.51)
EOSINOPHIL NFR BLD: 1.9 % (ref 0–6.9)
ERYTHROCYTE [DISTWIDTH] IN BLOOD BY AUTOMATED COUNT: 59.1 FL (ref 35.9–50)
FERRITIN SERPL-MCNC: 43.9 NG/ML (ref 10–291)
HCT VFR BLD AUTO: 35.8 % (ref 37–47)
HGB BLD-MCNC: 10.6 G/DL (ref 12–16)
IMM GRANULOCYTES # BLD AUTO: 0.03 K/UL (ref 0–0.11)
IMM GRANULOCYTES NFR BLD AUTO: 0.4 % (ref 0–0.9)
LYMPHOCYTES # BLD AUTO: 1.68 K/UL (ref 1–4.8)
LYMPHOCYTES NFR BLD: 21.4 % (ref 22–41)
MACROCYTES BLD QL SMEAR: ABNORMAL
MCH RBC QN AUTO: 23.6 PG (ref 27–33)
MCHC RBC AUTO-ENTMCNC: 29.6 G/DL (ref 33.6–35)
MCV RBC AUTO: 79.7 FL (ref 81.4–97.8)
MICROCYTES BLD QL SMEAR: ABNORMAL
MONOCYTES # BLD AUTO: 0.73 K/UL (ref 0–0.85)
MONOCYTES NFR BLD AUTO: 9.3 % (ref 0–13.4)
MORPHOLOGY BLD-IMP: NORMAL
NEUTROPHILS # BLD AUTO: 5.23 K/UL (ref 2–7.15)
NEUTROPHILS NFR BLD: 66.6 % (ref 44–72)
NRBC # BLD AUTO: 0 K/UL
NRBC BLD-RTO: 0 /100 WBC
PLATELET # BLD AUTO: 372 K/UL (ref 164–446)
PLATELET BLD QL SMEAR: NORMAL
PMV BLD AUTO: 11.2 FL (ref 9–12.9)
POIKILOCYTOSIS BLD QL SMEAR: NORMAL
POLYCHROMASIA BLD QL SMEAR: NORMAL
RBC # BLD AUTO: 4.49 M/UL (ref 4.2–5.4)
RBC BLD AUTO: PRESENT
WBC # BLD AUTO: 7.9 K/UL (ref 4.8–10.8)

## 2021-02-24 PROCEDURE — 36415 COLL VENOUS BLD VENIPUNCTURE: CPT

## 2021-02-24 PROCEDURE — 85025 COMPLETE CBC W/AUTO DIFF WBC: CPT

## 2021-02-24 PROCEDURE — 83540 ASSAY OF IRON: CPT

## 2021-02-24 PROCEDURE — 83550 IRON BINDING TEST: CPT

## 2021-02-24 PROCEDURE — 82728 ASSAY OF FERRITIN: CPT

## 2021-02-25 LAB
IRON SATN MFR SERPL: 6 % (ref 15–55)
IRON SERPL-MCNC: 19 UG/DL (ref 40–170)
TIBC SERPL-MCNC: 316 UG/DL (ref 250–450)
UIBC SERPL-MCNC: 297 UG/DL (ref 110–370)

## 2021-02-26 ENCOUNTER — PATIENT MESSAGE (OUTPATIENT)
Dept: MEDICAL GROUP | Facility: PHYSICIAN GROUP | Age: 61
End: 2021-02-26

## 2021-02-26 NOTE — TELEPHONE ENCOUNTER
----- Message from Rafaela Meyers P.A.-C. sent at 2/26/2021  8:20 AM PST -----  Patient has follow up scheduled with me 03/02/21, we will review their labs at that time, no further action needed right now.     Thank you,    Rafaela Meyers PA-C

## 2021-03-10 NOTE — ASSESSMENT & PLAN NOTE
Improved from previous labs. Patient's ferritin is at 43.9 but total iron at 19 and iron saturation low at 6%.    Patient is feeling quite tired.     Discussed potential causes. She's had EGD and colonoscopy in past- end of last year- and were normal. She is on Ibuprofen 800 mg BID.     Diet- she doesn't eat a lot of red meats. Could be dietary. She eats small meals twice a day.     Discussed iron supplementation. She is taking iron 65 mg daily with vitamin C 500 mg daily.

## 2021-03-11 ENCOUNTER — OFFICE VISIT (OUTPATIENT)
Dept: MEDICAL GROUP | Facility: PHYSICIAN GROUP | Age: 61
End: 2021-03-11
Payer: COMMERCIAL

## 2021-03-11 VITALS
DIASTOLIC BLOOD PRESSURE: 68 MMHG | BODY MASS INDEX: 19.15 KG/M2 | HEART RATE: 82 BPM | SYSTOLIC BLOOD PRESSURE: 100 MMHG | HEIGHT: 67 IN | OXYGEN SATURATION: 96 % | TEMPERATURE: 98.2 F | WEIGHT: 122 LBS | RESPIRATION RATE: 12 BRPM

## 2021-03-11 DIAGNOSIS — D50.8 IRON DEFICIENCY ANEMIA SECONDARY TO INADEQUATE DIETARY IRON INTAKE: ICD-10-CM

## 2021-03-11 DIAGNOSIS — Z79.899 HIGH RISK MEDICATION USE: ICD-10-CM

## 2021-03-11 DIAGNOSIS — F51.01 PRIMARY INSOMNIA: ICD-10-CM

## 2021-03-11 DIAGNOSIS — Z13.6 SCREENING FOR CARDIOVASCULAR CONDITION: ICD-10-CM

## 2021-03-11 DIAGNOSIS — M85.80 OSTEOPENIA, UNSPECIFIED LOCATION: ICD-10-CM

## 2021-03-11 DIAGNOSIS — G89.29 CHRONIC LEFT HIP PAIN: ICD-10-CM

## 2021-03-11 DIAGNOSIS — M25.552 CHRONIC LEFT HIP PAIN: ICD-10-CM

## 2021-03-11 DIAGNOSIS — Z78.0 POSTMENOPAUSAL: ICD-10-CM

## 2021-03-11 DIAGNOSIS — Z13.29 SCREENING FOR ENDOCRINE DISORDER: ICD-10-CM

## 2021-03-11 PROCEDURE — 99214 OFFICE O/P EST MOD 30 MIN: CPT | Performed by: PHYSICIAN ASSISTANT

## 2021-03-11 RX ORDER — IBANDRONATE SODIUM 150 MG/1
150 TABLET, FILM COATED ORAL
Qty: 3 TABLET | Refills: 1 | Status: SHIPPED | OUTPATIENT
Start: 2021-03-11 | End: 2021-05-13

## 2021-03-11 RX ORDER — ALPRAZOLAM 0.5 MG/1
0.5 TABLET ORAL NIGHTLY PRN
Qty: 20 TABLET | Refills: 0 | Status: SHIPPED | OUTPATIENT
Start: 2021-03-11 | End: 2021-04-10

## 2021-03-11 RX ORDER — TRAZODONE HYDROCHLORIDE 100 MG/1
200 TABLET ORAL NIGHTLY PRN
Qty: 180 TABLET | Refills: 1 | Status: SHIPPED | OUTPATIENT
Start: 2021-03-11 | End: 2021-04-22

## 2021-03-11 ASSESSMENT — PATIENT HEALTH QUESTIONNAIRE - PHQ9
5. POOR APPETITE OR OVEREATING: 0 - NOT AT ALL
SUM OF ALL RESPONSES TO PHQ QUESTIONS 1-9: 8
CLINICAL INTERPRETATION OF PHQ2 SCORE: 2

## 2021-03-11 ASSESSMENT — FIBROSIS 4 INDEX: FIB4 SCORE: 0.64

## 2021-03-11 NOTE — PROGRESS NOTES
CC:   Chief Complaint   Patient presents with   • Lab Results   • Anemia          HISTORY OF PRESENT ILLNESS: Patient is a 61 y.o. female established patient who presents today to follow up on the following conditions:       Health Maintenance:  Colonoscopy end of last year- [facility]- request record.   No gyn- last pap years ago- decline setting pap.     Anemia      Improved from previous labs. Patient's ferritin is at 43.9 but total iron at 19 and iron saturation low at 6%.    Patient is feeling quite tired.     Discussed potential causes. She's had EGD and colonoscopy in past- end of last year- and were normal. She is on Ibuprofen 800 mg BID.     Diet- she doesn't eat a lot of red meats. Could be dietary. She eats small meals twice a day.     Discussed iron supplementation. She is taking iron 65 mg daily with vitamin C 500 mg daily.     Chronic left hip pain  Had hardware out by Dr. Mendes fall 2020- still has chronic pain in the left hip. She is taking ibuprofen 800 mg BID for pain. Does help the pain, but not fully. She was on tramadol BID in past and this did help.     Osteopenia  Chronic condition. Issue with hip healing, osteopenia dexa 2018- due for updated dexa.   Has been on this since 01/2019.     Insomnia  Trazadone 200 mg daily, sometimes takes Benedryl or a half a gummy. Once in a while takes a xanax for anxiety. Takes xanax right now maybe once a month.       Patient Active Problem List    Diagnosis Date Noted   • Anemia 09/11/2020   • Chronic left hip pain 11/06/2019   • Anxiety 01/10/2019   • Acute midline thoracic back pain 10/19/2016   • Hyperlipidemia 06/17/2013   • Menopause    • Osteopenia    • Insomnia       Allergies:Patient has no known allergies.    Current Outpatient Medications   Medication Sig Dispense Refill   • traZODone (DESYREL) 100 MG Tab Take 2 Tablets by mouth at bedtime as needed for Sleep. 180 tablet 1   • ALPRAZolam (XANAX) 0.5 MG Tab Take 1 tablet by mouth at bedtime as needed for  "Sleep for up to 30 days. 20 tablet 0   • ibandronate (BONIVA) 150 MG tablet Take 1 tablet by mouth every 30 days. 3 tablet 1   • Ferrous Sulfate (IRON PO) Take 65 mg by mouth every day.     • Ascorbic Acid (VITAMIN C) 500 MG Cap Take 1 Cap by mouth every day.     • simvastatin (ZOCOR) 20 MG Tab Take 1 Tab by mouth every evening. 90 Tab 3   • Acetaminophen (TYLENOL PO) Take 2 Tabs by mouth 2 times a day as needed.     • Coenzyme Q10 (COQ10 PO) Take 1 Tab by mouth every evening.     • therapeutic multivitamin-minerals (THERAGRAN-M) Tab Take 1 Tab by mouth every evening.     • CALCIUM PO Take 1 Tab by mouth every evening.     • ibuprofen (MOTRIN) 200 MG Tab Take 800 mg by mouth every 6 hours as needed.     • sulfamethoxazole-trimethoprim (BACTRIM DS) 800-160 MG tablet Take 1 Tab by mouth 2 times a day.       No current facility-administered medications for this visit.       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         Family History   Adopted: Yes   Family history unknown: Yes       Review of Systems:    Constitutional: No Fevers, Chills  Eyes: No vision changes  ENT: No hearing changes  Resp: No Shortness of breath  CV: No Chest pain  GI: No Nausea/Vomiting  MSK: No weakness  Skin: No rashes  Neuro: No Headaches  Psych: No Suicidal ideations    All remaining systems reviewed and found to be negative, except as stated above.    Exam:    /68   Pulse 82   Temp 36.8 °C (98.2 °F) (Temporal)   Resp 12   Ht 1.702 m (5' 7\")   Wt 55.3 kg (122 lb)   SpO2 96%  Body mass index is " 19.11 kg/m².    General:  Well nourished, well developed female in NAD  HENT: Atraumatic, normocephalic  EYES: Extraocular movements intact  NECK: Supple, FROM  CHEST: No deformities, Equal chest expansion  RESP: Unlabored, no stridor or audible wheeze  HEART: Regular Rate and rhythm.   Extremities: No Clubbing, Cyanosis, or Edema  Skin: Warm/dry, without rashes  Neuro: A/O x 4, due to COVID-19- did not have patient remove face mask to test cranial nerves.  Motor/sensory grossly intact  Psych: Normal behavior, normal affect      Lab review:  Labs are reviewed and discussed with a patient  Lab Results   Component Value Date/Time    CHOLSTRLTOT 180 06/28/2019 06:43 AM    LDL 97 06/28/2019 06:43 AM    HDL 68 06/28/2019 06:43 AM    TRIGLYCERIDE 75 06/28/2019 06:43 AM       Lab Results   Component Value Date/Time    SODIUM 135 08/21/2020 11:54 AM    POTASSIUM 4.4 08/21/2020 11:54 AM    CHLORIDE 98 08/21/2020 11:54 AM    CO2 27 08/21/2020 11:54 AM    GLUCOSE 96 08/21/2020 11:54 AM    BUN 13 08/21/2020 11:54 AM    CREATININE 0.88 08/21/2020 11:54 AM    CREATININE 0.78 10/11/2011 01:52 PM    BUNCREATRAT 18 07/29/2016 07:20 AM    BUNCREATRAT 18 10/11/2011 01:52 PM     Lab Results   Component Value Date/Time    ALKPHOSPHAT 89 08/21/2020 11:54 AM    ASTSGOT 13 08/21/2020 11:54 AM    ALTSGPT 12 08/21/2020 11:54 AM    TBILIRUBIN <0.2 08/21/2020 11:54 AM      No results found for: HBA1C  No results found for: TSH  No results found for: FREET4    Lab Results   Component Value Date/Time    WBC 7.9 02/24/2021 11:39 AM    WBC 5.7 10/11/2011 01:52 PM    RBC 4.49 02/24/2021 11:39 AM    RBC 4.43 10/11/2011 01:52 PM    HEMOGLOBIN 10.6 (L) 02/24/2021 11:39 AM    HEMATOCRIT 35.8 (L) 02/24/2021 11:39 AM    MCV 79.7 (L) 02/24/2021 11:39 AM    MCV 87 10/11/2011 01:52 PM    MCH 23.6 (L) 02/24/2021 11:39 AM    MCH 29.1 10/11/2011 01:52 PM    MCHC 29.6 (L) 02/24/2021 11:39 AM    MPV 11.2 02/24/2021 11:39 AM    NEUTSPOLYS 66.60 02/24/2021 11:39  AM    LYMPHOCYTES 21.40 (L) 02/24/2021 11:39 AM    MONOCYTES 9.30 02/24/2021 11:39 AM    EOSINOPHILS 1.90 02/24/2021 11:39 AM    BASOPHILS 0.40 02/24/2021 11:39 AM    HYPOCHROMIA 1+ 12/28/2020 12:30 PM    ANISOCYTOSIS 1+ 02/24/2021 11:39 AM          Assessment/Plan:  1. Iron deficiency anemia secondary to inadequate dietary iron intake  - CBC WITHOUT DIFFERENTIAL; Future  - FERRITIN; Future  - IRON/TOTAL IRON BIND; Future  Chronic condition, anemia is improving slightly.  Continue iron supplementation 65 mg a day with 500 mg of vitamin C.  Recheck labs in 6 months.  Most likely diet related.  Patient has had an EGD and colonoscopy at the end of last year that were normal per patient.  Requesting records.  2. Chronic left hip pain  Patient continues to have chronic left hip pain.  She was hopeful after she got the hardware out in fall 2020 with Dr. Julian Mendes that it would improve.  Unfortunately she continues to have chronic pain.  She takes ibuprofen 800 mg twice a day, denies any GI upset or dark tarry stools.  We will add a BMP due next labs.  Discussed with patient chronic pain care.  She was on tramadol in the past and this was helpful in taking away her pain, but she is hesitant to go back on on chronic opioid medication.  We discussed quality of life.  If at any time she wants to rediscuss her chronic pain and happy to try to help her with this.  We discussed parameters that would be involved if we did restart her tramadol though.  3. Screening for cardiovascular condition  - Comp Metabolic Panel; Future  - Lipid Profile; Future    4. Screening for endocrine disorder  - TSH; Future    5. Osteopenia, unspecified location  - DS-BONE DENSITY STUDY (DEXA); Future    6. Postmenopausal  - DS-BONE DENSITY STUDY (DEXA); Future    7. Primary insomnia  - ALPRAZolam (XANAX) 0.5 MG Tab; Take 1 tablet by mouth at bedtime as needed for Sleep for up to 30 days.  Dispense: 20 tablet; Refill: 0  Trazodone 200 mg typically  is effective for her insomnia.  She rarely has to take alprazolam, takes maybe once a month.   reviewed and appropriate.  I have reviewed the medical records and have determined that a controlled substance treatment is medically indicated:     I have conducted a physical exam and documented it. I have reviewed pt's prescription history as maintained by the [place] Prescription Monitoring Program.       Given the above, I believe the benefits of controlled substance therapy outweigh the risks.   Accordingly, I have discussed the risk and benefits, treatment plan, and alternative therapies with the patient.   Reviewed , appropriate.    Other orders  - traZODone (DESYREL) 100 MG Tab; Take 2 Tablets by mouth at bedtime as needed for Sleep.  Dispense: 180 tablet; Refill: 1  - ibandronate (BONIVA) 150 MG tablet; Take 1 tablet by mouth every 30 days.  Dispense: 3 tablet; Refill: 1       Follow-up: Return in about 6 months (around 9/11/2021) for Follow up on labs.    Please note that this dictation was created using voice recognition software. I have made every reasonable attempt to correct obvious errors, but I expect that there are errors of grammar and possibly content that I did not discover before finalizing the note.

## 2021-03-11 NOTE — LETTER
[facility]  Rafaela Meyers P.A.-C.  [address]  [place] [address]  Fax: [phone]   Authorization for Release/Disclosure of   Protected Health Information   Name: KEMI SILVA : 1960 SSN: [id number]   Address: [address] [place] [address] [place] [address] Phone:    [phone] (home)    I authorize the entity listed below to release/disclose the PHI below to:   [facility]/Rafaela Meyers P.A.-C. and Rafaela Meyers P.A.-C.   Provider or Entity Name:  [facility]    Address   City, State, Zip   Phone:      Fax:     Reason for request: continuity of care   Information to be released:    [ X ] LAST COLONOSCOPY,  including any PATH REPORT and follow-up  [  ] LAST FIT/COLOGUARD RESULT [  ] LAST DEXA  [  ] LAST MAMMOGRAM  [  ] LAST PAP  [  ] LAST LABS [  ] RETINA EXAM REPORT  [  ] IMMUNIZATION RECORDS  [  ] Release all info      [  ] Check here and initial the line next to each item to release ALL health information INCLUDING  _____ Care and treatment for drug and / or alcohol abuse  _____ HIV testing, infection status, or AIDS  _____ Genetic Testing    DATES OF SERVICE OR TIME PERIOD TO BE DISCLOSED: _____________  I understand and acknowledge that:  * This Authorization may be revoked at any time by you in writing, except if your health information has already been used or disclosed.  * Your health information that will be used or disclosed as a result of you signing this authorization could be re-disclosed by the recipient. If this occurs, your re-disclosed health information may no longer be protected by State or Federal laws.  * You may refuse to sign this Authorization. Your refusal will not affect your ability to obtain treatment.  * This Authorization becomes effective upon signing and will  on (date) __________.      If no date is indicated, this Authorization will  one (1) year from the signature date.    Name: Kemi Silva    Signature:   Date:     3/11/2021            PLEASE FAX REQUESTED RECORDS BACK TO: [phone]

## 2021-03-11 NOTE — ASSESSMENT & PLAN NOTE
Trazadone 200 mg daily, sometimes takes Benedryl or a half a gummy. Once in a while takes a xanax for anxiety. Takes xanax right now maybe once a month.

## 2021-03-11 NOTE — ASSESSMENT & PLAN NOTE
Had hardware out by Dr. Mendes fall 2020- still has chronic pain in the left hip. She is taking ibuprofen 800 mg BID for pain. Does help the pain, but not fully. She was on tramadol BID in past and this did help.

## 2021-03-11 NOTE — ASSESSMENT & PLAN NOTE
Chronic condition. Issue with hip healing, osteopenia dexa 2018- due for updated dexa.   Has been on this since 01/2019.

## 2021-04-08 ENCOUNTER — PATIENT MESSAGE (OUTPATIENT)
Dept: MEDICAL GROUP | Facility: PHYSICIAN GROUP | Age: 61
End: 2021-04-08

## 2021-04-08 DIAGNOSIS — M25.552 CHRONIC LEFT HIP PAIN: ICD-10-CM

## 2021-04-08 DIAGNOSIS — G89.29 CHRONIC LEFT HIP PAIN: ICD-10-CM

## 2021-04-09 RX ORDER — TRAMADOL HYDROCHLORIDE 50 MG/1
50 TABLET ORAL 2 TIMES DAILY PRN
Qty: 20 TABLET | Refills: 0 | Status: SHIPPED | OUTPATIENT
Start: 2021-04-09 | End: 2021-04-19

## 2021-04-09 NOTE — PROGRESS NOTES
Patient would like medication for tramadol to get her through until her appointment next week. We did discuss this last visit. I will write for BID for 10 days (#20).

## 2021-04-12 ENCOUNTER — APPOINTMENT (OUTPATIENT)
Dept: RADIOLOGY | Facility: MEDICAL CENTER | Age: 61
End: 2021-04-12
Attending: PHYSICIAN ASSISTANT
Payer: COMMERCIAL

## 2021-04-13 ENCOUNTER — TELEMEDICINE (OUTPATIENT)
Dept: MEDICAL GROUP | Facility: PHYSICIAN GROUP | Age: 61
End: 2021-04-13
Payer: COMMERCIAL

## 2021-04-13 VITALS — RESPIRATION RATE: 12 BRPM | BODY MASS INDEX: 19.15 KG/M2 | HEIGHT: 67 IN | WEIGHT: 122 LBS

## 2021-04-13 DIAGNOSIS — M25.552 CHRONIC LEFT HIP PAIN: ICD-10-CM

## 2021-04-13 DIAGNOSIS — G89.29 CHRONIC LEFT HIP PAIN: ICD-10-CM

## 2021-04-13 PROCEDURE — 99213 OFFICE O/P EST LOW 20 MIN: CPT | Mod: 95,CR | Performed by: PHYSICIAN ASSISTANT

## 2021-04-13 RX ORDER — TRAMADOL HYDROCHLORIDE 50 MG/1
50 TABLET ORAL 2 TIMES DAILY
Qty: 60 TABLET | Refills: 0 | Status: SHIPPED | OUTPATIENT
Start: 2021-04-13 | End: 2021-05-13 | Stop reason: SDUPTHER

## 2021-04-13 ASSESSMENT — FIBROSIS 4 INDEX: FIB4 SCORE: 0.64

## 2021-04-13 NOTE — ASSESSMENT & PLAN NOTE
Patient continues to suffer from chronic left hip pain status post surgery and hardware removal.  At her last visit we had an extensive discussion about medication regimen to try to improve her quality life.  Patient has been taking Tylenol and ibuprofen multiple times a day for the last few months and ineffective on controlling her pain.  Discussed again with patient today continuing a regimen of tramadol twice a day to control her pain and increase her quality of life.    Since starting tramadol BID again, doing substantially better. 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 Mom is on hospice.     She is still using 1600 mg advil with tramadol 50 mg BID. With Advil her pain is rated 5/10 from a 7/10. With tramadol 2/10.

## 2021-04-13 NOTE — PROGRESS NOTES
Virtual Visit: Established Patient   This visit was conducted via Zoom using secure and encrypted videoconferencing technology. The patient was in a private location in the state of [place].    The patient's identity was confirmed and verbal consent was obtained for this virtual visit.    Subjective:   CC:   Chief Complaint   Patient presents with   • Medication Refill   • Hip Pain       Kemi Silva is a 61 y.o. female presenting for evaluation and management of:      Chronic left hip pain  Patient continues to suffer from chronic left hip pain status post surgery and hardware removal.  At her last visit we had an extensive discussion about medication regimen to try to improve her quality life.  Patient has been taking Tylenol and ibuprofen multiple times a day for the last few months and ineffective on controlling her pain.  Discussed again with patient today continuing a regimen of tramadol twice a day to control her pain and increase her quality of life.    Since starting tramadol BID again, doing substantially better. 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 Mom is on hospice.     She is still using 1600 mg advil with tramadol 50 mg BID. With Advil her pain is rated 5/10 from a 7/10. With tramadol 2/10.       ROS   Denies any recent fevers or chills. No nausea or vomiting. No chest pains or shortness of breath.     No Known Allergies    Current medicines (including changes today)  Current Outpatient Medications   Medication Sig Dispense Refill   • traMADol (ULTRAM) 50 MG Tab Take 1 tablet by mouth 2 times a day for 30 days. 60 tablet 0   • traMADol (ULTRAM) 50 MG Tab Take 1 tablet by mouth 2 times a day as needed for Moderate Pain or Severe Pain for up to 10 days. 20 tablet 0   • traZODone (DESYREL) 100 MG Tab Take 2 Tablets by mouth at bedtime as needed for Sleep. 180 tablet 1   • ibandronate (BONIVA) 150 MG tablet Take 1 tablet by mouth every 30 days. 3 tablet 1   • Ferrous Sulfate (IRON  "PO) Take 65 mg by mouth every day.     • Ascorbic Acid (VITAMIN C) 500 MG Cap Take 1 Cap by mouth every day.     • simvastatin (ZOCOR) 20 MG Tab Take 1 Tab by mouth every evening. 90 Tab 3   • Acetaminophen (TYLENOL PO) Take 2 Tabs by mouth 2 times a day as needed.     • Coenzyme Q10 (COQ10 PO) Take 1 Tab by mouth every evening.     • therapeutic multivitamin-minerals (THERAGRAN-M) Tab Take 1 Tab by mouth every evening.     • CALCIUM PO Take 1 Tab by mouth every evening.     • ibuprofen (MOTRIN) 200 MG Tab Take 800 mg by mouth every 6 hours as needed.     • sulfamethoxazole-trimethoprim (BACTRIM DS) 800-160 MG tablet Take 1 Tab by mouth 2 times a day.       No current facility-administered medications for this visit.       Patient Active Problem List    Diagnosis Date Noted   • Anemia 09/11/2020   • Chronic left hip pain 11/06/2019   • Anxiety 01/10/2019   • Acute midline thoracic back pain 10/19/2016   • Hyperlipidemia 06/17/2013   • Menopause    • Osteopenia    • Insomnia        Family History   Adopted: Yes   Family history unknown: Yes       She  has a past medical history of Acute midline thoracic back pain (10/19/2016), Anesthesia (11/19/2018), Arthritis, High cholesterol, Hip pain, History of anemia, Hyperlipidemia (6/17/2013), Insomnia, Menopause, Osteopenia, and PONV (postoperative nausea and vomiting).  She  has a past surgical history that includes other orthopedic surgery (Bilateral, 2009); shoulder decompression arthroscopic (Left, 7/23/2018); shoulder arthroscopy w/ rotator cuff repair (7/23/2018); hip arth anterior total (Left, 11/29/2018); irrigation & debridement hip (Left, 3/31/2020); umbilical hernia repair (10/21/2009); ulna orif (Left, 2010); hip orif (Left, 11/21/2019); hip orif (Left, 3/5/2020); and hardware removal ortho (Left, 11/16/2020).       Objective:   Resp 12   Ht 1.702 m (5' 7\")   Wt 55.3 kg (122 lb)   BMI 19.11 kg/m²     Physical Exam:  Constitutional: Alert, no distress, " well-groomed.  Skin: No rashes in visible areas.  Eye: Round. Conjunctiva clear, lids normal. No icterus.   ENMT: Lips pink without lesions, good dentition, moist mucous membranes. Phonation normal.  Neck: No masses, no thyromegaly. Moves freely without pain.  Respiratory: Unlabored respiratory effort, no cough or audible wheeze  Psych: Alert and oriented x3, normal affect and mood.       Assessment and Plan:   The following treatment plan was discussed:     1. Chronic left hip pain  - Controlled Substance Treatment Agreement  - traMADol (ULTRAM) 50 MG Tab; Take 1 tablet by mouth 2 times a day for 30 days.  Dispense: 60 tablet; Refill: 0  Continue NSAID therapy, and tramadol 50 mg twice daily.  Will refill prescription for 30 days.  Patient understands we will follow-up monthly for refill on this prescription.  Office visit we will do urine drug screen.  We will mail controlled substance agreement to patient today.  Opioid consent form signed last week.    Discussed with patient if she wants to get a second opinion on her left hip pain I would send her to Dr. Ross, she would like to wait at this time.  She is taking care of her mother who is now on hospice.    I have reviewed the medical records and have determined that a controlled substance treatment is medically indicated:     I have conducted a physical exam and documented it. I have reviewed pt's prescription history as maintained by the [place] Prescription Monitoring Program.       Given the above, I believe the benefits of controlled substance therapy outweigh the risks.   Accordingly, I have discussed the risk and benefits, treatment plan, and alternative therapies with the patient.   Reviewed , appropriate.        Follow-up: Return in about 4 weeks (around 5/11/2021) for Follow up.        The patient verbalized agreement and understanding of current plan. All questions and concerns were addressed at time of visit.    Please note that this dictation  was created using voice recognition software. I have made every reasonable attempt to correct obvious errors, but I expect that there are errors of grammar and possibly content that I did not discover before finalizing the note.

## 2021-04-15 ENCOUNTER — HOSPITAL ENCOUNTER (OUTPATIENT)
Dept: RADIOLOGY | Facility: MEDICAL CENTER | Age: 61
End: 2021-04-15
Attending: PHYSICIAN ASSISTANT
Payer: COMMERCIAL

## 2021-04-15 DIAGNOSIS — Z78.0 POSTMENOPAUSAL: ICD-10-CM

## 2021-04-15 DIAGNOSIS — M85.80 OSTEOPENIA, UNSPECIFIED LOCATION: ICD-10-CM

## 2021-04-15 PROCEDURE — 77080 DXA BONE DENSITY AXIAL: CPT

## 2021-04-19 ENCOUNTER — TELEPHONE (OUTPATIENT)
Dept: MEDICAL GROUP | Facility: PHYSICIAN GROUP | Age: 61
End: 2021-04-19

## 2021-04-19 NOTE — TELEPHONE ENCOUNTER
----- Message from Rafaela Meyers P.A.-C. sent at 4/19/2021  1:57 PM PDT -----  Please call patient about their results.     Results showed: DEXA scan shows osteopenia.  Will review results in full detail at her next follow-up on May 14, 2021.    Thank you,    Rafaela Meyers PA-C

## 2021-04-22 RX ORDER — TRAZODONE HYDROCHLORIDE 100 MG/1
TABLET ORAL
Qty: 90 TABLET | Refills: 0 | Status: SHIPPED | OUTPATIENT
Start: 2021-04-22 | End: 2021-10-08 | Stop reason: SDUPTHER

## 2021-04-23 ENCOUNTER — TELEPHONE (OUTPATIENT)
Dept: MEDICAL GROUP | Facility: PHYSICIAN GROUP | Age: 61
End: 2021-04-23

## 2021-04-23 NOTE — TELEPHONE ENCOUNTER
DOCUMENTATION OF PAR STATUS:    1. Name of Medication & Dose: trazadone 100mg tab     2. Name of Prescription Coverage Company & phone #: [employer]    3. Date Prior Auth Submitted: 04/23/2021    4. What information was given to obtain insurance decision? Cover My Meds    5. Prior Auth Status? Pending    6. Patient Notified: yes

## 2021-05-13 ENCOUNTER — OFFICE VISIT (OUTPATIENT)
Dept: MEDICAL GROUP | Facility: PHYSICIAN GROUP | Age: 61
End: 2021-05-13
Payer: COMMERCIAL

## 2021-05-13 VITALS
DIASTOLIC BLOOD PRESSURE: 64 MMHG | HEART RATE: 81 BPM | SYSTOLIC BLOOD PRESSURE: 110 MMHG | BODY MASS INDEX: 18.68 KG/M2 | WEIGHT: 119 LBS | HEIGHT: 67 IN | TEMPERATURE: 97 F | OXYGEN SATURATION: 95 % | RESPIRATION RATE: 12 BRPM

## 2021-05-13 DIAGNOSIS — M85.80 OSTEOPENIA, UNSPECIFIED LOCATION: ICD-10-CM

## 2021-05-13 DIAGNOSIS — R21 RASH: ICD-10-CM

## 2021-05-13 DIAGNOSIS — G89.29 CHRONIC LEFT HIP PAIN: ICD-10-CM

## 2021-05-13 DIAGNOSIS — M25.552 CHRONIC LEFT HIP PAIN: ICD-10-CM

## 2021-05-13 PROCEDURE — 99213 OFFICE O/P EST LOW 20 MIN: CPT | Performed by: PHYSICIAN ASSISTANT

## 2021-05-13 RX ORDER — TRIAMCINOLONE ACETONIDE 0.25 MG/G
1 CREAM TOPICAL 2 TIMES DAILY
Qty: 80 G | Refills: 0 | Status: SHIPPED | OUTPATIENT
Start: 2021-05-13 | End: 2022-01-21

## 2021-05-13 RX ORDER — TRAMADOL HYDROCHLORIDE 50 MG/1
50 TABLET ORAL 3 TIMES DAILY
Qty: 90 TABLET | Refills: 0 | Status: ON HOLD | OUTPATIENT
Start: 2021-05-13 | End: 2021-05-28

## 2021-05-13 ASSESSMENT — FIBROSIS 4 INDEX: FIB4 SCORE: 0.64

## 2021-05-13 NOTE — PROGRESS NOTES
CC:   Chief Complaint   Patient presents with   • Medication Management     Pain          HISTORY OF PRESENT ILLNESS: Patient is a 61 y.o. female established patient who presents today to follow up on the following conditions:       Health Maintenance: Completed  Colonoscopy [facility] 12/2020.     Chronic left hip pain  Patient is here for refill on her tramadol.  Continues to work effectively to control her chronic left hip pain.  She is using it three times a day.  Providing significant relief, so she may proceed with normal daily function.  She continues to use Advil 600 mg TID.     Osteopenia  On Boniva for over a year now, this is a second round of Boniva, she was on this before and it was stopped then restarted. Dexa scan 04/21 - osteopenia- no change from previous  T -1.8 lumbar spine and T -1.9 femur. Discussed trial prolia.       Rash  Rash right lower abdomen. Itches. Been there for a few years. Comes and goes. Worse with certain clothing. Switching her soaps and no changes.       Patient Active Problem List    Diagnosis Date Noted   • Rash 05/13/2021   • Anemia 09/11/2020   • Chronic left hip pain 11/06/2019   • Anxiety 01/10/2019   • Acute midline thoracic back pain 10/19/2016   • Hyperlipidemia 06/17/2013   • Menopause    • Osteopenia    • Insomnia       Allergies:Patient has no known allergies.    Current Outpatient Medications   Medication Sig Dispense Refill   • traMADol (ULTRAM) 50 MG Tab Take 1 tablet by mouth in the morning, at noon, and at bedtime for 30 days. 90 tablet 0   • triamcinolone acetonide (KENALOG) 0.025 % Cream Apply 1 Application topically 2 times a day. 80 g 0   • traZODone (DESYREL) 100 MG Tab TAKE 1 TABLET BY MOUTH AT BEDTIME AS NEEDED FOR SLEEP 90 tablet 0   • Ferrous Sulfate (IRON PO) Take 65 mg by mouth every day.     • Ascorbic Acid (VITAMIN C) 500 MG Cap Take 1 Cap by mouth every day.     • simvastatin (ZOCOR) 20 MG Tab Take 1 Tab by mouth every evening. 90 Tab 3   • Coenzyme Q10  "(COQ10 PO) Take 1 Tab by mouth every evening.     • therapeutic multivitamin-minerals (THERAGRAN-M) Tab Take 1 Tab by mouth every evening.     • CALCIUM PO Take 1 Tab by mouth every evening.     • ibuprofen (MOTRIN) 200 MG Tab Take 800 mg by mouth every 6 hours as needed.       No current facility-administered medications for this visit.       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         Family History   Adopted: Yes   Family history unknown: Yes       Review of Systems:    Constitutional: No Fevers, Chills  Eyes: No vision changes  ENT: No hearing changes  Resp: No Shortness of breath  CV: No Chest pain  GI: No Nausea/Vomiting  MSK: No weakness  Skin: No rashes  Neuro: No Headaches  Psych: No Suicidal ideations    All remaining systems reviewed and found to be negative, except as stated above.    Exam:    /64   Pulse 81   Temp 36.1 °C (97 °F) (Temporal)   Resp 12   Ht 1.702 m (5' 7\")   Wt 54 kg (119 lb)   SpO2 95%  Body mass index is 18.64 kg/m².    General:  Well nourished, well developed female in NAD  HENT: Atraumatic, normocephalic  EYES: Extraocular movements intact  NECK: Supple, FROM  CHEST: No deformities, Equal chest expansion  RESP: Unlabored, no stridor or audible wheeze  HEART: Regular Rate and rhythm.   Extremities: No Clubbing, Cyanosis, or Edema  Skin: Warm/dry, without rashes  Neuro: A/O x 4, due to COVID-19- did not have patient remove face mask to test cranial nerves.  Motor/sensory grossly intact  Psych: Normal " behavior, normal affect      Lab review:  Labs are reviewed and discussed with a patient  Lab Results   Component Value Date/Time    CHOLSTRLTOT 180 06/28/2019 06:43 AM    LDL 97 06/28/2019 06:43 AM    HDL 68 06/28/2019 06:43 AM    TRIGLYCERIDE 75 06/28/2019 06:43 AM       Lab Results   Component Value Date/Time    SODIUM 135 08/21/2020 11:54 AM    POTASSIUM 4.4 08/21/2020 11:54 AM    CHLORIDE 98 08/21/2020 11:54 AM    CO2 27 08/21/2020 11:54 AM    GLUCOSE 96 08/21/2020 11:54 AM    BUN 13 08/21/2020 11:54 AM    CREATININE 0.88 08/21/2020 11:54 AM    CREATININE 0.78 10/11/2011 01:52 PM    BUNCREATRAT 18 07/29/2016 07:20 AM    BUNCREATRAT 18 10/11/2011 01:52 PM     Lab Results   Component Value Date/Time    ALKPHOSPHAT 89 08/21/2020 11:54 AM    ASTSGOT 13 08/21/2020 11:54 AM    ALTSGPT 12 08/21/2020 11:54 AM    TBILIRUBIN <0.2 08/21/2020 11:54 AM      No results found for: HBA1C  No results found for: TSH  No results found for: FREET4    Lab Results   Component Value Date/Time    WBC 7.9 02/24/2021 11:39 AM    WBC 5.7 10/11/2011 01:52 PM    RBC 4.49 02/24/2021 11:39 AM    RBC 4.43 10/11/2011 01:52 PM    HEMOGLOBIN 10.6 (L) 02/24/2021 11:39 AM    HEMATOCRIT 35.8 (L) 02/24/2021 11:39 AM    MCV 79.7 (L) 02/24/2021 11:39 AM    MCV 87 10/11/2011 01:52 PM    MCH 23.6 (L) 02/24/2021 11:39 AM    MCH 29.1 10/11/2011 01:52 PM    MCHC 29.6 (L) 02/24/2021 11:39 AM    MPV 11.2 02/24/2021 11:39 AM    NEUTSPOLYS 66.60 02/24/2021 11:39 AM    LYMPHOCYTES 21.40 (L) 02/24/2021 11:39 AM    MONOCYTES 9.30 02/24/2021 11:39 AM    EOSINOPHILS 1.90 02/24/2021 11:39 AM    BASOPHILS 0.40 02/24/2021 11:39 AM    HYPOCHROMIA 1+ 12/28/2020 12:30 PM    ANISOCYTOSIS 1+ 02/24/2021 11:39 AM          Assessment/Plan:  1. Chronic left hip pain  - URINE DRUG SCREEN; Future  - traMADol (ULTRAM) 50 MG Tab; Take 1 tablet by mouth in the morning, at noon, and at bedtime for 30 days.  Dispense: 90 tablet; Refill: 0  Patient has had to increase her tramadol to  3 times a day to achieve adequate pain control and be able to take care of her 99-year-old mother.  She is also continuing ibuprofen 600 mg 3 times a day.  No adverse effects from medication.    I have reviewed the medical records and have determined that a controlled substance treatment is medically indicated:     I have conducted a physical exam and documented it. I have reviewed pt's prescription history as maintained by the [place] Prescription Monitoring Program.      Given the above, I believe the benefits of controlled substance therapy outweigh the risks.   Accordingly, I have discussed the risk and benefits, treatment plan, and alternative therapies with the patient.   Reviewed , appropriate.  UDS ordered today    2. Osteopenia, unspecified location  - REFERRAL TO OUTPATIENT INFUSION  Patient was on Boniva before and then discontinued off this medication has been on it again for over a year now.  Her DEXA scan shows no improvement in her bone density.  And she has a history of a fracture.  I would recommend we trial Prolia infusions every 6 months.  We will write an order for the patient.      3. Rash  Suspect dermatitis.  We will trial triamcinolone cream to use as needed.  Other orders  - triamcinolone acetonide (KENALOG) 0.025 % Cream; Apply 1 Application topically 2 times a day.  Dispense: 80 g; Refill: 0       Follow-up: Return in about 4 weeks (around 6/10/2021) for Follow up.    Please note that this dictation was created using voice recognition software. I have made every reasonable attempt to correct obvious errors, but I expect that there are errors of grammar and possibly content that I did not discover before finalizing the note.

## 2021-05-13 NOTE — LETTER
[facility]  Rafaela Meyers P.A.-C.  [address]  [place] [address]  Fax: [phone]   Authorization for Release/Disclosure of   Protected Health Information   Name: KEMI SILVA : 1960 SSN: [id number]   Address: [address] [place] [address] [place] [address] Phone:    [phone] (home)    I authorize the entity listed below to release/disclose the PHI below to:   [facility]/Rafaela Meyers P.A.-C. and Rafaela Meyers P.A.-C.   Provider or Entity Name:  [facility]   Address   City, State, Zip   Phone:      Fax:     Reason for request: continuity of care   Information to be released:    [ X ] LAST COLONOSCOPY,  including any PATH REPORT and follow-up  [  ] LAST FIT/COLOGUARD RESULT [  ] LAST DEXA  [  ] LAST MAMMOGRAM  [  ] LAST PAP  [  ] LAST LABS [  ] RETINA EXAM REPORT  [  ] IMMUNIZATION RECORDS  [  ] Release all info      [  ] Check here and initial the line next to each item to release ALL health information INCLUDING  _____ Care and treatment for drug and / or alcohol abuse  _____ HIV testing, infection status, or AIDS  _____ Genetic Testing    DATES OF SERVICE OR TIME PERIOD TO BE DISCLOSED: _____________  I understand and acknowledge that:  * This Authorization may be revoked at any time by you in writing, except if your health information has already been used or disclosed.  * Your health information that will be used or disclosed as a result of you signing this authorization could be re-disclosed by the recipient. If this occurs, your re-disclosed health information may no longer be protected by State or Federal laws.  * You may refuse to sign this Authorization. Your refusal will not affect your ability to obtain treatment.  * This Authorization becomes effective upon signing and will  on (date) __________.      If no date is indicated, this Authorization will  one (1) year from the signature date.    Name: Kemi Silva    Signature:   Date:     2021            PLEASE FAX REQUESTED RECORDS BACK TO: [phone]

## 2021-05-13 NOTE — ASSESSMENT & PLAN NOTE
Rash right lower abdomen. Itches. Been there for a few years. Comes and goes. Worse with certain clothing. Switching her soaps and no changes.

## 2021-05-13 NOTE — ASSESSMENT & PLAN NOTE
Patient is here for refill on her tramadol.  Continues to work effectively to control her chronic left hip pain.  She is using it three times a day.  Providing significant relief, so she may proceed with normal daily function.  She continues to use Advil 600 mg TID.

## 2021-05-13 NOTE — ASSESSMENT & PLAN NOTE
On Boniva for over a year now, this is a second round of Boniva, she was on this before and it was stopped then restarted. Dexa scan 04/21 - osteopenia- no change from previous  T -1.8 lumbar spine and T -1.9 femur. Discussed trial prolia.

## 2021-05-24 ENCOUNTER — APPOINTMENT (OUTPATIENT)
Dept: RADIOLOGY | Facility: MEDICAL CENTER | Age: 61
DRG: 467 | End: 2021-05-24
Attending: EMERGENCY MEDICINE
Payer: COMMERCIAL

## 2021-05-24 ENCOUNTER — HOSPITAL ENCOUNTER (INPATIENT)
Facility: MEDICAL CENTER | Age: 61
LOS: 4 days | DRG: 467 | End: 2021-05-28
Attending: EMERGENCY MEDICINE | Admitting: INTERNAL MEDICINE
Payer: COMMERCIAL

## 2021-05-24 DIAGNOSIS — F41.9 ANXIETY: ICD-10-CM

## 2021-05-24 DIAGNOSIS — L02.416 ABSCESS OF LEFT HIP: ICD-10-CM

## 2021-05-24 DIAGNOSIS — Z78.0 MENOPAUSE: ICD-10-CM

## 2021-05-24 DIAGNOSIS — F51.01 PRIMARY INSOMNIA: ICD-10-CM

## 2021-05-24 DIAGNOSIS — A49.01 STAPHYLOCOCCUS AUREUS INFECTION: ICD-10-CM

## 2021-05-24 DIAGNOSIS — R21 RASH: ICD-10-CM

## 2021-05-24 DIAGNOSIS — Z91.048 ALLERGY TO ADHESIVE: ICD-10-CM

## 2021-05-24 DIAGNOSIS — M85.80 OSTEOPENIA, UNSPECIFIED LOCATION: ICD-10-CM

## 2021-05-24 DIAGNOSIS — E78.2 MIXED HYPERLIPIDEMIA: ICD-10-CM

## 2021-05-24 DIAGNOSIS — D50.8 IRON DEFICIENCY ANEMIA SECONDARY TO INADEQUATE DIETARY IRON INTAKE: ICD-10-CM

## 2021-05-24 LAB
ALBUMIN SERPL BCP-MCNC: 3.4 G/DL (ref 3.2–4.9)
ALBUMIN/GLOB SERPL: 1 G/DL
ALP SERPL-CCNC: 104 U/L (ref 30–99)
ALT SERPL-CCNC: 11 U/L (ref 2–50)
ANION GAP SERPL CALC-SCNC: 11 MMOL/L (ref 7–16)
AST SERPL-CCNC: 16 U/L (ref 12–45)
BASOPHILS # BLD AUTO: 0.5 % (ref 0–1.8)
BASOPHILS # BLD: 0.04 K/UL (ref 0–0.12)
BILIRUB SERPL-MCNC: 0.2 MG/DL (ref 0.1–1.5)
BUN SERPL-MCNC: 12 MG/DL (ref 8–22)
CALCIUM SERPL-MCNC: 9.1 MG/DL (ref 8.4–10.2)
CHLORIDE SERPL-SCNC: 103 MMOL/L (ref 96–112)
CO2 SERPL-SCNC: 23 MMOL/L (ref 20–33)
CREAT SERPL-MCNC: 0.71 MG/DL (ref 0.5–1.4)
CRP SERPL HS-MCNC: 3.17 MG/DL (ref 0–0.75)
EOSINOPHIL # BLD AUTO: 0.07 K/UL (ref 0–0.51)
EOSINOPHIL NFR BLD: 0.8 % (ref 0–6.9)
ERYTHROCYTE [DISTWIDTH] IN BLOOD BY AUTOMATED COUNT: 46.2 FL (ref 35.9–50)
ERYTHROCYTE [SEDIMENTATION RATE] IN BLOOD BY WESTERGREN METHOD: 57 MM/HOUR (ref 0–30)
GLOBULIN SER CALC-MCNC: 3.5 G/DL (ref 1.9–3.5)
GLUCOSE SERPL-MCNC: 98 MG/DL (ref 65–99)
HCT VFR BLD AUTO: 34 % (ref 37–47)
HGB BLD-MCNC: 10.5 G/DL (ref 12–16)
IMM GRANULOCYTES # BLD AUTO: 0.04 K/UL (ref 0–0.11)
IMM GRANULOCYTES NFR BLD AUTO: 0.5 % (ref 0–0.9)
LYMPHOCYTES # BLD AUTO: 1.65 K/UL (ref 1–4.8)
LYMPHOCYTES NFR BLD: 19.2 % (ref 22–41)
MCH RBC QN AUTO: 24.6 PG (ref 27–33)
MCHC RBC AUTO-ENTMCNC: 30.9 G/DL (ref 33.6–35)
MCV RBC AUTO: 79.6 FL (ref 81.4–97.8)
MONOCYTES # BLD AUTO: 0.8 K/UL (ref 0–0.85)
MONOCYTES NFR BLD AUTO: 9.3 % (ref 0–13.4)
NEUTROPHILS # BLD AUTO: 5.99 K/UL (ref 2–7.15)
NEUTROPHILS NFR BLD: 69.7 % (ref 44–72)
NRBC # BLD AUTO: 0 K/UL
NRBC BLD-RTO: 0 /100 WBC
PLATELET # BLD AUTO: 396 K/UL (ref 164–446)
PMV BLD AUTO: 9.3 FL (ref 9–12.9)
POTASSIUM SERPL-SCNC: 3.9 MMOL/L (ref 3.6–5.5)
PROCALCITONIN SERPL-MCNC: 0.05 NG/ML
PROT SERPL-MCNC: 6.9 G/DL (ref 6–8.2)
RBC # BLD AUTO: 4.27 M/UL (ref 4.2–5.4)
SARS-COV+SARS-COV-2 AG RESP QL IA.RAPID: NOTDETECTED
SODIUM SERPL-SCNC: 137 MMOL/L (ref 135–145)
SPECIMEN SOURCE: NORMAL
WBC # BLD AUTO: 8.6 K/UL (ref 4.8–10.8)

## 2021-05-24 PROCEDURE — 73701 CT LOWER EXTREMITY W/DYE: CPT | Mod: LT

## 2021-05-24 PROCEDURE — 87426 SARSCOV CORONAVIRUS AG IA: CPT

## 2021-05-24 PROCEDURE — 86140 C-REACTIVE PROTEIN: CPT

## 2021-05-24 PROCEDURE — 85652 RBC SED RATE AUTOMATED: CPT

## 2021-05-24 PROCEDURE — 700102 HCHG RX REV CODE 250 W/ 637 OVERRIDE(OP): Performed by: INTERNAL MEDICINE

## 2021-05-24 PROCEDURE — 700117 HCHG RX CONTRAST REV CODE 255: Performed by: EMERGENCY MEDICINE

## 2021-05-24 PROCEDURE — 87040 BLOOD CULTURE FOR BACTERIA: CPT

## 2021-05-24 PROCEDURE — 36415 COLL VENOUS BLD VENIPUNCTURE: CPT

## 2021-05-24 PROCEDURE — 99223 1ST HOSP IP/OBS HIGH 75: CPT | Performed by: INTERNAL MEDICINE

## 2021-05-24 PROCEDURE — 84145 PROCALCITONIN (PCT): CPT

## 2021-05-24 PROCEDURE — 80053 COMPREHEN METABOLIC PANEL: CPT

## 2021-05-24 PROCEDURE — U0005 INFEC AGEN DETEC AMPLI PROBE: HCPCS

## 2021-05-24 PROCEDURE — 770006 HCHG ROOM/CARE - MED/SURG/GYN SEMI*

## 2021-05-24 PROCEDURE — 99285 EMERGENCY DEPT VISIT HI MDM: CPT

## 2021-05-24 PROCEDURE — A9270 NON-COVERED ITEM OR SERVICE: HCPCS | Performed by: INTERNAL MEDICINE

## 2021-05-24 PROCEDURE — 700105 HCHG RX REV CODE 258: Performed by: INTERNAL MEDICINE

## 2021-05-24 PROCEDURE — U0003 INFECTIOUS AGENT DETECTION BY NUCLEIC ACID (DNA OR RNA); SEVERE ACUTE RESPIRATORY SYNDROME CORONAVIRUS 2 (SARS-COV-2) (CORONAVIRUS DISEASE [COVID-19]), AMPLIFIED PROBE TECHNIQUE, MAKING USE OF HIGH THROUGHPUT TECHNOLOGIES AS DESCRIBED BY CMS-2020-01-R: HCPCS

## 2021-05-24 PROCEDURE — 85025 COMPLETE CBC W/AUTO DIFF WBC: CPT

## 2021-05-24 RX ORDER — ACETAMINOPHEN 500 MG
500-1000 TABLET ORAL EVERY 6 HOURS PRN
COMMUNITY

## 2021-05-24 RX ORDER — SIMVASTATIN 20 MG
20 TABLET ORAL EVERY EVENING
Status: DISCONTINUED | OUTPATIENT
Start: 2021-05-24 | End: 2021-05-29 | Stop reason: HOSPADM

## 2021-05-24 RX ORDER — BISACODYL 10 MG
10 SUPPOSITORY, RECTAL RECTAL
Status: DISCONTINUED | OUTPATIENT
Start: 2021-05-24 | End: 2021-05-29 | Stop reason: HOSPADM

## 2021-05-24 RX ORDER — OXYCODONE HYDROCHLORIDE 10 MG/1
10 TABLET ORAL
Status: DISCONTINUED | OUTPATIENT
Start: 2021-05-24 | End: 2021-05-28

## 2021-05-24 RX ORDER — FERROUS SULFATE 325(65) MG
325 TABLET ORAL EVERY EVENING
Status: DISCONTINUED | OUTPATIENT
Start: 2021-05-24 | End: 2021-05-29 | Stop reason: HOSPADM

## 2021-05-24 RX ORDER — PROMETHAZINE HYDROCHLORIDE 25 MG/1
12.5-25 TABLET ORAL EVERY 4 HOURS PRN
Status: DISCONTINUED | OUTPATIENT
Start: 2021-05-24 | End: 2021-05-29 | Stop reason: HOSPADM

## 2021-05-24 RX ORDER — ENALAPRILAT 1.25 MG/ML
1.25 INJECTION INTRAVENOUS EVERY 6 HOURS PRN
Status: DISCONTINUED | OUTPATIENT
Start: 2021-05-24 | End: 2021-05-29 | Stop reason: HOSPADM

## 2021-05-24 RX ORDER — PROCHLORPERAZINE EDISYLATE 5 MG/ML
5-10 INJECTION INTRAMUSCULAR; INTRAVENOUS EVERY 4 HOURS PRN
Status: DISCONTINUED | OUTPATIENT
Start: 2021-05-24 | End: 2021-05-29 | Stop reason: HOSPADM

## 2021-05-24 RX ORDER — AMOXICILLIN 250 MG
2 CAPSULE ORAL 2 TIMES DAILY
Status: DISCONTINUED | OUTPATIENT
Start: 2021-05-24 | End: 2021-05-29 | Stop reason: HOSPADM

## 2021-05-24 RX ORDER — FERROUS SULFATE 325(65) MG
325 TABLET ORAL EVERY EVENING
COMMUNITY
End: 2021-09-08

## 2021-05-24 RX ORDER — SODIUM CHLORIDE 9 MG/ML
INJECTION, SOLUTION INTRAVENOUS CONTINUOUS
Status: DISCONTINUED | OUTPATIENT
Start: 2021-05-24 | End: 2021-05-29 | Stop reason: HOSPADM

## 2021-05-24 RX ORDER — TRAZODONE HYDROCHLORIDE 50 MG/1
100 TABLET ORAL
Status: DISCONTINUED | OUTPATIENT
Start: 2021-05-24 | End: 2021-05-29 | Stop reason: HOSPADM

## 2021-05-24 RX ORDER — ONDANSETRON 2 MG/ML
4 INJECTION INTRAMUSCULAR; INTRAVENOUS EVERY 4 HOURS PRN
Status: DISCONTINUED | OUTPATIENT
Start: 2021-05-24 | End: 2021-05-29 | Stop reason: HOSPADM

## 2021-05-24 RX ORDER — ONDANSETRON 4 MG/1
4 TABLET, ORALLY DISINTEGRATING ORAL EVERY 4 HOURS PRN
Status: DISCONTINUED | OUTPATIENT
Start: 2021-05-24 | End: 2021-05-29 | Stop reason: HOSPADM

## 2021-05-24 RX ORDER — LABETALOL HYDROCHLORIDE 5 MG/ML
10 INJECTION, SOLUTION INTRAVENOUS EVERY 4 HOURS PRN
Status: DISCONTINUED | OUTPATIENT
Start: 2021-05-24 | End: 2021-05-29 | Stop reason: HOSPADM

## 2021-05-24 RX ORDER — LORAZEPAM 2 MG/ML
0.5 INJECTION INTRAMUSCULAR EVERY 4 HOURS PRN
Status: DISCONTINUED | OUTPATIENT
Start: 2021-05-24 | End: 2021-05-29 | Stop reason: HOSPADM

## 2021-05-24 RX ORDER — POLYETHYLENE GLYCOL 3350 17 G/17G
1 POWDER, FOR SOLUTION ORAL
Status: DISCONTINUED | OUTPATIENT
Start: 2021-05-24 | End: 2021-05-29 | Stop reason: HOSPADM

## 2021-05-24 RX ORDER — PROMETHAZINE HYDROCHLORIDE 25 MG/1
12.5-25 SUPPOSITORY RECTAL EVERY 4 HOURS PRN
Status: DISCONTINUED | OUTPATIENT
Start: 2021-05-24 | End: 2021-05-29 | Stop reason: HOSPADM

## 2021-05-24 RX ORDER — HYDROMORPHONE HYDROCHLORIDE 1 MG/ML
0.5 INJECTION, SOLUTION INTRAMUSCULAR; INTRAVENOUS; SUBCUTANEOUS
Status: DISCONTINUED | OUTPATIENT
Start: 2021-05-24 | End: 2021-05-28

## 2021-05-24 RX ORDER — OXYCODONE HYDROCHLORIDE 5 MG/1
5 TABLET ORAL
Status: DISCONTINUED | OUTPATIENT
Start: 2021-05-24 | End: 2021-05-28

## 2021-05-24 RX ORDER — DIPHENHYDRAMINE HCL 25 MG
25 TABLET ORAL EVERY 6 HOURS PRN
Status: DISCONTINUED | OUTPATIENT
Start: 2021-05-24 | End: 2021-05-28

## 2021-05-24 RX ORDER — CLONIDINE HYDROCHLORIDE 0.1 MG/1
0.1 TABLET ORAL EVERY 6 HOURS PRN
Status: DISCONTINUED | OUTPATIENT
Start: 2021-05-24 | End: 2021-05-29 | Stop reason: HOSPADM

## 2021-05-24 RX ORDER — TRAMADOL HYDROCHLORIDE 50 MG/1
50-100 TABLET ORAL EVERY 6 HOURS PRN
Status: DISCONTINUED | OUTPATIENT
Start: 2021-05-24 | End: 2021-05-28

## 2021-05-24 RX ORDER — ACETAMINOPHEN 325 MG/1
650 TABLET ORAL EVERY 6 HOURS PRN
Status: DISCONTINUED | OUTPATIENT
Start: 2021-05-24 | End: 2021-05-29 | Stop reason: HOSPADM

## 2021-05-24 RX ADMIN — TRAMADOL HYDROCHLORIDE 50 MG: 50 TABLET, FILM COATED ORAL at 17:48

## 2021-05-24 RX ADMIN — DOCUSATE SODIUM 50 MG AND SENNOSIDES 8.6 MG 2 TABLET: 8.6; 5 TABLET, FILM COATED ORAL at 17:48

## 2021-05-24 RX ADMIN — SODIUM CHLORIDE: 9 INJECTION, SOLUTION INTRAVENOUS at 16:49

## 2021-05-24 RX ADMIN — ACETAMINOPHEN 650 MG: 325 TABLET ORAL at 22:06

## 2021-05-24 RX ADMIN — OXYCODONE HYDROCHLORIDE 10 MG: 10 TABLET ORAL at 22:03

## 2021-05-24 RX ADMIN — TRAZODONE HYDROCHLORIDE 100 MG: 50 TABLET ORAL at 22:03

## 2021-05-24 RX ADMIN — SIMVASTATIN 20 MG: 20 TABLET, FILM COATED ORAL at 17:48

## 2021-05-24 RX ADMIN — FERROUS SULFATE TAB 325 MG (65 MG ELEMENTAL FE) 325 MG: 325 (65 FE) TAB at 17:48

## 2021-05-24 RX ADMIN — IOHEXOL 100 ML: 350 INJECTION, SOLUTION INTRAVENOUS at 13:35

## 2021-05-24 ASSESSMENT — COGNITIVE AND FUNCTIONAL STATUS - GENERAL
DAILY ACTIVITIY SCORE: 24
MOBILITY SCORE: 24
SUGGESTED CMS G CODE MODIFIER DAILY ACTIVITY: CH
SUGGESTED CMS G CODE MODIFIER MOBILITY: CH

## 2021-05-24 ASSESSMENT — ENCOUNTER SYMPTOMS
FEVER: 0
COUGH: 0
SHORTNESS OF BREATH: 0
MYALGIAS: 0
CLAUDICATION: 0
PHOTOPHOBIA: 0
HEARTBURN: 0
DIARRHEA: 0
VOMITING: 0
WEAKNESS: 0
DIZZINESS: 0
SENSORY CHANGE: 0
ABDOMINAL PAIN: 0
BLURRED VISION: 0
CONSTIPATION: 0
HEADACHES: 0
SORE THROAT: 0
SPEECH CHANGE: 0
NERVOUS/ANXIOUS: 0
INSOMNIA: 0
DEPRESSION: 0
CHILLS: 0

## 2021-05-24 ASSESSMENT — LIFESTYLE VARIABLES
EVER FELT BAD OR GUILTY ABOUT YOUR DRINKING: NO
TOTAL SCORE: 0
ON A TYPICAL DAY WHEN YOU DRINK ALCOHOL HOW MANY DRINKS DO YOU HAVE: 0
CONSUMPTION TOTAL: NEGATIVE
HAVE PEOPLE ANNOYED YOU BY CRITICIZING YOUR DRINKING: NO
EVER HAD A DRINK FIRST THING IN THE MORNING TO STEADY YOUR NERVES TO GET RID OF A HANGOVER: NO
TOTAL SCORE: 0
AVERAGE NUMBER OF DAYS PER WEEK YOU HAVE A DRINK CONTAINING ALCOHOL: 0
ALCOHOL_USE: NO
HOW MANY TIMES IN THE PAST YEAR HAVE YOU HAD 5 OR MORE DRINKS IN A DAY: 0
TOTAL SCORE: 0
HAVE YOU EVER FELT YOU SHOULD CUT DOWN ON YOUR DRINKING: NO

## 2021-05-24 ASSESSMENT — FIBROSIS 4 INDEX: FIB4 SCORE: 0.64

## 2021-05-24 ASSESSMENT — PAIN DESCRIPTION - PAIN TYPE
TYPE: ACUTE PAIN

## 2021-05-24 ASSESSMENT — PAIN SCALES - WONG BAKER: WONGBAKER_NUMERICALRESPONSE: HURTS JUST A LITTLE BIT

## 2021-05-24 NOTE — DISCHARGE PLANNING
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 Tearful. Concerned about care as no one else can assist with care at home. Mother Ema Canseco ( [place]) is 98yo on service with [facility] on 4.5 lpm oxygen, with a tumor in lung and a colostomy pouch, blind,and deaf. She is mentally clear. Kemi notes that her  is unable to provide the care at home as he has not been shown and works. Son is also unable. She is agreeable to respite for 5 days if available. ER CM called Betsey at [facility] to review concerns and issue. Pt almost would not allow self to be admitted for care due this concern. ER CM attempting to assist.    Barriers to Discharge: Unknown.    Plan: ER CM will cont to follow pt for needs and cont to follow with hospice for help for her mother at home for needs and placement safety.

## 2021-05-24 NOTE — ASSESSMENT & PLAN NOTE
Multiple complications related to this hip placed 3 years ago  Orthopedic surgery consulted, follow-up on recommendations, full hip revision on 5/25.  Continue with pain management, with as needed morphine   Initial cultures with s. Aureus, pending final culture.  Continue with antibiotics per ID  Case management working on outpatient infusion therapy, PICC line to be placed on 5/26 if blood cultures negative  IV hydration

## 2021-05-24 NOTE — ED PROVIDER NOTES
ED Provider Note    CHIEF COMPLAINT  Chief Complaint   Patient presents with   • Hip Pain       HPI  Kemi Silva is a 61 y.o. female who presents for evaluation of worsening left hip pain and redness.  The patient is a complex history with his left hip.  She had a total hip replacement around 2 and half years ago.  Underwent several revisions and ultimate removal of hardware secondary to infection with Dr. Mendes with orthopedics.  Last hardware removal and surgery was 6 months ago when she had prolonged course of antibiotics through a PICC line.  Patient reports that pain started coming back several weeks ago and now she developed an area of redness warmth and swelling overlying the site of the previous surgery.  She reports no high fevers or chills    REVIEW OF SYSTEMS  See HPI for further details.  No night sweats weight loss numbness tingling weakness rash all other systems are negative.     PAST MEDICAL HISTORY  Past Medical History:   Diagnosis Date   • Anesthesia 2018    post op nausea; shakes   • Acute midline thoracic back pain 10/19/2016   • Hyperlipidemia 2013   • Arthritis     osteoarthritis; hip, thumbs, knees   • High cholesterol    • Hip pain     left   • History of anemia    • Insomnia    • Menopause    • Osteopenia    • PONV (postoperative nausea and vomiting)        FAMILY HISTORY  Noncontributory    SOCIAL HISTORY  Social History     Socioeconomic History   • Marital status:      Spouse name: Not on file   • Number of children: Not on file   • Years of education: Not on file   • Highest education level: Not on file   Occupational History   • Occupation:      Employer: OTHER   Tobacco Use   • Smoking status: Former Smoker     Packs/day: 0.25     Years: 20.00     Pack years: 5.00     Types: Cigarettes     Quit date: 1999     Years since quittin.7   • Smokeless tobacco: Never Used   Vaping Use   • Vaping Use: Never used   Substance and Sexual  Activity   • Alcohol use: No     Alcohol/week: 0.0 oz     Comment: former heavy use, 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       Social Determinants of Health     Financial Resource Strain:    • Difficulty of Paying Living Expenses:    Food Insecurity:    • Worried About Running Out of Food in the Last Year:    • Ran Out of Food in the Last Year:    Transportation Needs:    • Lack of Transportation (Medical):    • Lack of Transportation (Non-Medical):    Physical Activity:    • Days of Exercise per Week:    • Minutes of Exercise per Session:    Stress:    • Feeling of Stress :    Social Connections:    • Frequency of Communication with Friends and Family:    • Frequency of Social Gatherings with Friends and Family:    • Attends [employer] Services:    • Active Member of Clubs or Organizations:    • Attends Club or Organization Meetings:    • Marital Status:    Intimate Partner Violence:    • Fear of Current or Ex-Partner:    • Emotionally Abused:    • Physically Abused:    • Sexually Abused:        SURGICAL HISTORY  Past Surgical History:   Procedure Laterality Date   • HARDWARE REMOVAL ORTHO Left 11/16/2020    Procedure: REMOVAL, HARDWARE - HIP;  Surgeon: Julian Mendes M.D.;  Location: SURGERY [facility];  Service: Orthopedics   • IRRIGATION & DEBRIDEMENT HIP Left 3/31/2020    Procedure: IRRIGATION AND DEBRIDEMENT, HIP;  Surgeon: Julian Mendes M.D.;  Location: [facility];  Service: Orthopedics   • HIP ORIF Left 3/5/2020    Procedure: ORIF, HIP;  Surgeon: Julian Mendes M.D.;  Location: [facility];  Service: Orthopedics   • HIP ORIF Left 11/21/2019    Procedure: ORIF, HIP greater trochanter;  Surgeon: Julian Mendes M.D.;  Location: SURGERY  "[facility];  Service: Orthopedics   • HIP ARTH ANTERIOR TOTAL Left 11/29/2018    Procedure: HIP ARTHROPLASTY ANTERIOR TOTAL;  Surgeon: Julian Mendes M.D.;  Location: SURGERY [facility];  Service: Orthopedics   • SHOULDER DECOMPRESSION ARTHROSCOPIC Left 7/23/2018    Procedure: SHOULDER DECOMPRESSION ARTHROSCOPIC- SUBACROMIAL;  Surgeon: Tuan Collins M.D.;  Location: SURGERY [facility];  Service: Orthopedics   • SHOULDER ARTHROSCOPY W/ ROTATOR CUFF REPAIR  7/23/2018    Procedure: SHOULDER ARTHROSCOPY W/ ROTATOR CUFF REPAIR- WITH OPEN BICEP TENODESIS AND SUBCAPULAR REPAIR;  Surgeon: Tuan Collins M.D.;  Location: SURGERY [facility];  Service: Orthopedics   • ULNA ORIF Left 2010    and radius   • UMBILICAL HERNIA REPAIR  10/21/2009    Performed by FLORIAN BHANDARI at SURGERY SAME DAY [facility]   • OTHER ORTHOPEDIC SURGERY Bilateral 2009    hammertoe correction bilat       CURRENT MEDICATIONS  Home Medications     Reviewed by Rupesh Sheppard (Pharmacy Tech) on 05/24/21 at 1216  Med List Status: Complete   Medication Last Dose Status   acetaminophen (TYLENOL) 500 MG Tab 5/24/2021 Active   Ascorbic Acid (VITAMIN C PO) 5/23/2021 Active   CALCIUM PO 5/23/2021 Active   Coenzyme Q10 (COQ10 PO) 5/23/2021 Active   ferrous sulfate 325 (65 Fe) MG tablet 5/23/2021 Active   ibuprofen (MOTRIN) 200 MG Tab 5/23/2021 Active   simvastatin (ZOCOR) 20 MG Tab 5/23/2021 Active   therapeutic multivitamin-minerals (THERAGRAN-M) Tab 5/23/2021 Active   traMADol (ULTRAM) 50 MG Tab 5/24/2021 Active   traZODone (DESYREL) 100 MG Tab 5/23/2021 Active   triamcinolone acetonide (KENALOG) 0.025 % Cream 5/23/2021 Active                ALLERGIES  Allergies   Allergen Reactions   • Elastic Bandages & [Zinc]      Allergic to all band aids and Tegaderm         PHYSICAL EXAM  VITAL SIGNS: /97   Pulse 96   Temp 36.9 °C (98.5 °F) (Temporal)   Resp 20   Ht 1.727 m (5' 8\")   Wt 53.3 kg (117 lb 8.1 oz)   SpO2 98%  "  BMI 17.87 kg/m²       Constitutional: Well developed, Well nourished, No acute distress, Non-toxic appearance.   HENT: Normocephalic, Atraumatic, Bilateral external ears normal, Oropharynx moist, No oral exudates, Nose normal.   Eyes: PERRLA, EOMI, Conjunctiva normal, No discharge.   Neck: Normal range of motion, No tenderness, Supple, No stridor.   Cardiovascular: Normal heart rate, Normal rhythm, No murmurs, No rubs, No gallops.   Thorax & Lungs: Normal breath sounds, No respiratory distress, No wheezing, No chest tenderness.   Abdomen: Bowel sounds normal, Soft, No tenderness, No masses, No pulsatile masses.   Skin: Warm, Dry, No erythema, No rash.   Back: No tenderness, No CVA tenderness.   Extremities: Left hip exam is notable for 2 surgical incisions.  There is a 5 x 10 cm area of erythema and warmth with some fluctuance overlying the greater trochanter with pain with range of motion   neurologic: Alert & oriented x 3, Normal motor function, Normal sensory function, No focal deficits noted.   Psychiatric: Affect normal, Judgment normal, Mood normal.     CT-HIP WITH PLUS RECONS LEFT   Final Result      1.  Soft tissue fluid collection lateral to the left hip most consistent with abscess measuring 5.2 x 4.4 x 6.9 cm.      2.  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        Results for orders placed or performed during the hospital encounter of 05/24/21   CBC WITH DIFFERENTIAL   Result Value Ref Range    WBC 8.6 4.8 - 10.8 K/uL    RBC 4.27 4.20 - 5.40 M/uL    Hemoglobin 10.5 (L) 12.0 - 16.0 g/dL    Hematocrit 34.0 (L) 37.0 - 47.0 %    MCV 79.6 (L) 81.4 - 97.8 fL    MCH 24.6 (L) 27.0 - 33.0 pg    MCHC 30.9 (L) 33.6 - 35.0 g/dL    RDW 46.2 35.9 - 50.0 fL    Platelet Count 396 164 - 446 K/uL    MPV 9.3 9.0 - 12.9 fL    Neutrophils-Polys 69.70 44.00 - 72.00 %    Lymphocytes 19.20 (L) 22.00 - 41.00 %    Monocytes 9.30 0.00 - 13.40 %    Eosinophils 0.80 0.00 - 6.90 %    Basophils  0.50 0.00 - 1.80 %    Immature Granulocytes 0.50 0.00 - 0.90 %    Nucleated RBC 0.00 /100 WBC    Neutrophils (Absolute) 5.99 2.00 - 7.15 K/uL    Lymphs (Absolute) 1.65 1.00 - 4.80 K/uL    Monos (Absolute) 0.80 0.00 - 0.85 K/uL    Eos (Absolute) 0.07 0.00 - 0.51 K/uL    Baso (Absolute) 0.04 0.00 - 0.12 K/uL    Immature Granulocytes (abs) 0.04 0.00 - 0.11 K/uL    NRBC (Absolute) 0.00 K/uL   COMP METABOLIC PANEL   Result Value Ref Range    Sodium 137 135 - 145 mmol/L    Potassium 3.9 3.6 - 5.5 mmol/L    Chloride 103 96 - 112 mmol/L    Co2 23 20 - 33 mmol/L    Anion Gap 11.0 7.0 - 16.0    Glucose 98 65 - 99 mg/dL    Bun 12 8 - 22 mg/dL    Creatinine 0.71 0.50 - 1.40 mg/dL    Calcium 9.1 8.4 - 10.2 mg/dL    AST(SGOT) 16 12 - 45 U/L    ALT(SGPT) 11 2 - 50 U/L    Alkaline Phosphatase 104 (H) 30 - 99 U/L    Total Bilirubin 0.2 0.1 - 1.5 mg/dL    Albumin 3.4 3.2 - 4.9 g/dL    Total Protein 6.9 6.0 - 8.2 g/dL    Globulin 3.5 1.9 - 3.5 g/dL    A-G Ratio 1.0 g/dL   CRP QUANTITIVE (NON-CARDIAC)   Result Value Ref Range    Stat C-Reactive Protein 3.17 (H) 0.00 - 0.75 mg/dL   Sed Rate   Result Value Ref Range    Sed Rate Westergren 57 (H) 0 - 30 mm/hour   PROCALCITONIN   Result Value Ref Range    Procalcitonin 0.05 <0.25 ng/mL   ESTIMATED GFR   Result Value Ref Range    GFR If African American >60 >60 mL/min/1.73 m 2    GFR If Non African American >60 >60 mL/min/1.73 m 2       COURSE & MEDICAL DECISION MAKING  Pertinent Labs & Imaging studies reviewed. (See chart for details)  Patient presents with pain redness and swelling overlying the left hip.  She had prior multiple surgeries at that site.  This was concerning for possible abscess septic joint etc.  Consultation with Dr. Mendes was obtained.  He is recommended admission to the hospital.  He would specifically like to hold antibiotics until they can get intraoperative cultures.  The patient will be admitted to internal medicine for further work-up    FINAL IMPRESSION  1.  Acute  left hip abscess possible intra-articular infection         Electronically signed by: Yg Hutchinson M.D., 5/24/2021 12:06 PM

## 2021-05-24 NOTE — ED NOTES
Iv placed , labs and bld cx x 1  drawn and taken to lab. poc update given to pt, results pending at this time

## 2021-05-24 NOTE — ED TRIAGE NOTES
Pt presents by self from home for left hip pain. Hx of left hip replacement and greater trochanter fx 2 weeks after resulting in many complications. Pt concerned for infection. No fever. Reports redness/swelling to affected hip. Pain has increased since Friday. Pt A&Ox4 and ambulatory.     Patient masked. No respiratory symptoms, no recent travel, denies known COVID exposure.

## 2021-05-24 NOTE — ED NOTES
1400 All results back, chart up for MD for re evaluation. poc update given to pt. No further questions at this time. Further orders and dispo pending  1445 wet / dry covid swab collected.  Pt aware of NPO status

## 2021-05-24 NOTE — H&P
Hospital Medicine History & Physical Note    Date of Service  5/24/2021    Primary Care Physician  Rafaela Meyers P.A.-C.    Consultants  Julian Mendes - orthopedics    Code Status  Full Code    Chief Complaint  Chief Complaint   Patient presents with   • Hip Pain       History of Presenting Illness  61 y.o. female who presented 5/24/2021 with fevers, chills and increased pain and warmth over the left hip.  She had a hip fracture 3 years ago and unfortunately has had complications and has had infection in that hip before.  Her most recent surgery was march 2020.  Given her prominence over the left hip and her history, she had CT evaluation of the hip and has a 5x6 cm abscess.  This was discussed with her surgeon, Dr Mendes who will be by to see her this afternoon and likely take her to OR for at least another washout.  She is afebrile and has a normal WBC.  Empiric antibiotics will not be given and intraoperative cultures will be collected to direct course of treatment.  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    Review of Systems  Review of Systems   Constitutional: Negative for chills and fever.   HENT: Negative for congestion and sore throat.    Eyes: Negative for blurred vision and photophobia.   Respiratory: Negative for cough and shortness of breath.    Cardiovascular: Negative for chest pain, claudication and leg swelling.   Gastrointestinal: Negative for abdominal pain, constipation, diarrhea, heartburn and vomiting.   Genitourinary: Negative for dysuria and hematuria.   Musculoskeletal: Positive for joint pain (left hip pain). Negative for myalgias.   Skin: Negative for itching and rash.   Neurological: Negative for dizziness, sensory change, speech change, weakness and headaches.   Psychiatric/Behavioral: Negative for depression. The patient is not nervous/anxious and  does not have insomnia.        Past Medical History   has a past medical history of Acute midline thoracic back pain (10/19/2016), Anesthesia (11/19/2018), Arthritis, High cholesterol, Hip pain, History of anemia, Hyperlipidemia (6/17/2013), Insomnia, Menopause, Osteopenia, and PONV (postoperative nausea and vomiting).    Surgical History   has a past surgical history that includes other orthopedic surgery (Bilateral, 2009); shoulder decompression arthroscopic (Left, 7/23/2018); shoulder arthroscopy w/ rotator cuff repair (7/23/2018); hip arth anterior total (Left, 11/29/2018); irrigation & debridement hip (Left, 3/31/2020); umbilical hernia repair (10/21/2009); ulna orif (Left, 2010); hip orif (Left, 11/21/2019); hip orif (Left, 3/5/2020); and hardware removal ortho (Left, 11/16/2020).     Family History  She was adopted. Family history is unknown by patient.     Social History   reports that she quit smoking about 21 years ago. Her smoking use included cigarettes. She has a 5.00 pack-year smoking history. She has never used smokeless tobacco. She reports that she does not drink alcohol and does not use drugs.    Allergies  Allergies   Allergen Reactions   • Elastic Bandages & [Zinc]      Allergic to all band aids and Tegaderm         Medications  Prior to Admission Medications   Prescriptions Last Dose Informant Patient Reported? Taking?   Ascorbic Acid (VITAMIN C PO) 5/23/2021 at PM Patient Yes No   Sig: Take 1 capsule by mouth every evening.   CALCIUM PO 5/23/2021 at PM Patient Yes No   Sig: Take 1 Tab by mouth every evening.   Coenzyme Q10 (COQ10 PO) 5/23/2021 at PM Patient Yes No   Sig: Take 1 Tab by mouth every evening.   acetaminophen (TYLENOL) 500 MG Tab 5/24/2021 at AM Patient Yes Yes   Sig: Take 500-1,000 mg by mouth every 6 hours as needed.   ferrous sulfate 325 (65 Fe) MG tablet 5/23/2021 at PM Patient Yes Yes   Sig: Take 325 mg by mouth every evening.   ibuprofen (MOTRIN) 200 MG Tab 5/23/2021 at PM  Patient Yes No   Sig: Take 800 mg by mouth every 6 hours as needed.   simvastatin (ZOCOR) 20 MG Tab 5/23/2021 at PM Patient No No   Sig: Take 1 Tab by mouth every evening.   therapeutic multivitamin-minerals (THERAGRAN-M) Tab 5/23/2021 at PM Patient Yes No   Sig: Take 1 Tab by mouth every evening.   traMADol (ULTRAM) 50 MG Tab 5/24/2021 at AM Patient No No   Sig: Take 1 tablet by mouth in the morning, at noon, and at bedtime for 30 days.   traZODone (DESYREL) 100 MG Tab 5/23/2021 at PM Patient No No   Sig: TAKE 1 TABLET BY MOUTH AT BEDTIME AS NEEDED FOR SLEEP   Patient taking differently: Take 100 mg by mouth at bedtime.   triamcinolone acetonide (KENALOG) 0.025 % Cream 5/23/2021 at PRN Patient No No   Sig: Apply 1 Application topically 2 times a day.   Patient taking differently: Apply 1 Application topically 2 times a day as needed.      Facility-Administered Medications: None       Physical Exam  Temp:  [36.9 °C (98.5 °F)] 36.9 °C (98.5 °F)  Pulse:  [96] 96  Resp:  [20] 20  BP: (130)/(97) 130/97  SpO2:  [98 %] 98 %    Physical Exam  Vitals and nursing note reviewed.   Constitutional:       General: She is not in acute distress.     Appearance: Normal appearance. She is not ill-appearing.   HENT:      Head: Normocephalic and atraumatic.      Nose: Nose normal.   Cardiovascular:      Rate and Rhythm: Normal rate and regular rhythm.      Heart sounds: Normal heart sounds. No murmur heard.     Pulmonary:      Effort: Pulmonary effort is normal.      Breath sounds: Normal breath sounds.   Abdominal:      General: Bowel sounds are normal. There is no distension.      Palpations: Abdomen is soft.   Musculoskeletal:         General: No swelling or tenderness.      Cervical back: Neck supple.      Comments: Left hip swelling, pain and focal fluid prominence.     Skin:     General: Skin is warm and dry.   Neurological:      General: No focal deficit present.      Mental Status: She is alert and oriented to person, place,  and time.   Psychiatric:         Mood and Affect: Mood normal.         Laboratory:  Recent Labs     05/24/21  1213   WBC 8.6   RBC 4.27   HEMOGLOBIN 10.5*   HEMATOCRIT 34.0*   MCV 79.6*   MCH 24.6*   MCHC 30.9*   RDW 46.2   PLATELETCT 396   MPV 9.3     Recent Labs     05/24/21  1213   SODIUM 137   POTASSIUM 3.9   CHLORIDE 103   CO2 23   GLUCOSE 98   BUN 12   CREATININE 0.71   CALCIUM 9.1     Recent Labs     05/24/21  1213   ALTSGPT 11   ASTSGOT 16   ALKPHOSPHAT 104*   TBILIRUBIN 0.2   GLUCOSE 98         No results for input(s): NTPROBNP in the last 72 hours.      No results for input(s): TROPONINT in the last 72 hours.    Imaging:  CT-HIP WITH PLUS RECONS LEFT   Final Result      1.  Soft tissue fluid collection lateral to the left hip most consistent with abscess measuring 5.2 x 4.4 x 6.9 cm.      2.  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            Assessment/Plan:  I anticipate this patient will require at least two midnights for appropriate medical management, necessitating inpatient admission.    * Abscess of left hip  Assessment & Plan  Multiple complications related to this hip placed 3 years ago  Dr Mendes will consult and likely take to OR later today  NPO to continue  No antibiotics given, intraoperative cultures to be taken  IV hydration      Allergy to adhesive  Assessment & Plan  Hypoallergenic supplies when able  PRN benadryl if needed.        Anxiety- (present on admission)  Assessment & Plan  Prn ativan if needed, difficult social situation at home      Insomnia- (present on admission)  Assessment & Plan  Trazodone qhs.

## 2021-05-25 ENCOUNTER — ANESTHESIA (OUTPATIENT)
Dept: SURGERY | Facility: MEDICAL CENTER | Age: 61
DRG: 467 | End: 2021-05-25
Payer: COMMERCIAL

## 2021-05-25 ENCOUNTER — APPOINTMENT (OUTPATIENT)
Dept: RADIOLOGY | Facility: MEDICAL CENTER | Age: 61
DRG: 467 | End: 2021-05-25
Attending: PHYSICIAN ASSISTANT
Payer: COMMERCIAL

## 2021-05-25 ENCOUNTER — ANESTHESIA EVENT (OUTPATIENT)
Dept: SURGERY | Facility: MEDICAL CENTER | Age: 61
DRG: 467 | End: 2021-05-25
Payer: COMMERCIAL

## 2021-05-25 LAB
ANION GAP SERPL CALC-SCNC: 8 MMOL/L (ref 7–16)
BUN SERPL-MCNC: 13 MG/DL (ref 8–22)
CALCIUM SERPL-MCNC: 8.4 MG/DL (ref 8.4–10.2)
CHLORIDE SERPL-SCNC: 104 MMOL/L (ref 96–112)
CO2 SERPL-SCNC: 25 MMOL/L (ref 20–33)
CREAT SERPL-MCNC: 0.55 MG/DL (ref 0.5–1.4)
ERYTHROCYTE [DISTWIDTH] IN BLOOD BY AUTOMATED COUNT: 47.3 FL (ref 35.9–50)
GLUCOSE SERPL-MCNC: 94 MG/DL (ref 65–99)
GRAM STN SPEC: NORMAL
HCT VFR BLD AUTO: 32.3 % (ref 37–47)
HGB BLD-MCNC: 9.9 G/DL (ref 12–16)
MCH RBC QN AUTO: 24.5 PG (ref 27–33)
MCHC RBC AUTO-ENTMCNC: 30.7 G/DL (ref 33.6–35)
MCV RBC AUTO: 80 FL (ref 81.4–97.8)
PLATELET # BLD AUTO: 379 K/UL (ref 164–446)
PMV BLD AUTO: 9.2 FL (ref 9–12.9)
POTASSIUM SERPL-SCNC: 4.2 MMOL/L (ref 3.6–5.5)
RBC # BLD AUTO: 4.04 M/UL (ref 4.2–5.4)
SARS-COV-2 RNA RESP QL NAA+PROBE: NOTDETECTED
SIGNIFICANT IND 70042: NORMAL
SITE SITE: NORMAL
SODIUM SERPL-SCNC: 137 MMOL/L (ref 135–145)
SOURCE SOURCE: NORMAL
SPECIMEN SOURCE: NORMAL
WBC # BLD AUTO: 6.8 K/UL (ref 4.8–10.8)

## 2021-05-25 PROCEDURE — 0SPB09Z REMOVAL OF LINER FROM LEFT HIP JOINT, OPEN APPROACH: ICD-10-PCS | Performed by: ORTHOPAEDIC SURGERY

## 2021-05-25 PROCEDURE — 700111 HCHG RX REV CODE 636 W/ 250 OVERRIDE (IP): Performed by: INTERNAL MEDICINE

## 2021-05-25 PROCEDURE — 700105 HCHG RX REV CODE 258: Performed by: INTERNAL MEDICINE

## 2021-05-25 PROCEDURE — 87186 SC STD MICRODIL/AGAR DIL: CPT

## 2021-05-25 PROCEDURE — 160048 HCHG OR STATISTICAL LEVEL 1-5: Performed by: ORTHOPAEDIC SURGERY

## 2021-05-25 PROCEDURE — 700111 HCHG RX REV CODE 636 W/ 250 OVERRIDE (IP): Performed by: ANESTHESIOLOGY

## 2021-05-25 PROCEDURE — 36415 COLL VENOUS BLD VENIPUNCTURE: CPT

## 2021-05-25 PROCEDURE — 503339 HCHG DRESSSING PICO: Performed by: ORTHOPAEDIC SURGERY

## 2021-05-25 PROCEDURE — 700105 HCHG RX REV CODE 258: Performed by: ANESTHESIOLOGY

## 2021-05-25 PROCEDURE — 160036 HCHG PACU - EA ADDL 30 MINS PHASE I: Performed by: ORTHOPAEDIC SURGERY

## 2021-05-25 PROCEDURE — 700105 HCHG RX REV CODE 258: Performed by: PHYSICIAN ASSISTANT

## 2021-05-25 PROCEDURE — 500367 HCHG DRAIN KIT, HEMOVAC: Performed by: ORTHOPAEDIC SURGERY

## 2021-05-25 PROCEDURE — 700101 HCHG RX REV CODE 250: Performed by: ANESTHESIOLOGY

## 2021-05-25 PROCEDURE — 87205 SMEAR GRAM STAIN: CPT

## 2021-05-25 PROCEDURE — 160035 HCHG PACU - 1ST 60 MINS PHASE I: Performed by: ORTHOPAEDIC SURGERY

## 2021-05-25 PROCEDURE — 160009 HCHG ANES TIME/MIN: Performed by: ORTHOPAEDIC SURGERY

## 2021-05-25 PROCEDURE — 700102 HCHG RX REV CODE 250 W/ 637 OVERRIDE(OP): Performed by: ANESTHESIOLOGY

## 2021-05-25 PROCEDURE — 700111 HCHG RX REV CODE 636 W/ 250 OVERRIDE (IP): Performed by: PHYSICIAN ASSISTANT

## 2021-05-25 PROCEDURE — A9270 NON-COVERED ITEM OR SERVICE: HCPCS | Performed by: INTERNAL MEDICINE

## 2021-05-25 PROCEDURE — 0SUS09Z SUPPLEMENT LEFT HIP JOINT, FEMORAL SURFACE WITH LINER, OPEN APPROACH: ICD-10-PCS | Performed by: ORTHOPAEDIC SURGERY

## 2021-05-25 PROCEDURE — 99233 SBSQ HOSP IP/OBS HIGH 50: CPT | Performed by: INTERNAL MEDICINE

## 2021-05-25 PROCEDURE — 160031 HCHG SURGERY MINUTES - 1ST 30 MINS LEVEL 5: Performed by: ORTHOPAEDIC SURGERY

## 2021-05-25 PROCEDURE — 160042 HCHG SURGERY MINUTES - EA ADDL 1 MIN LEVEL 5: Performed by: ORTHOPAEDIC SURGERY

## 2021-05-25 PROCEDURE — 700102 HCHG RX REV CODE 250 W/ 637 OVERRIDE(OP): Performed by: INTERNAL MEDICINE

## 2021-05-25 PROCEDURE — 160002 HCHG RECOVERY MINUTES (STAT): Performed by: ORTHOPAEDIC SURGERY

## 2021-05-25 PROCEDURE — 770001 HCHG ROOM/CARE - MED/SURG/GYN PRIV*

## 2021-05-25 PROCEDURE — 700111 HCHG RX REV CODE 636 W/ 250 OVERRIDE (IP): Performed by: ORTHOPAEDIC SURGERY

## 2021-05-25 PROCEDURE — 87070 CULTURE OTHR SPECIMN AEROBIC: CPT

## 2021-05-25 PROCEDURE — 87077 CULTURE AEROBIC IDENTIFY: CPT

## 2021-05-25 PROCEDURE — A9270 NON-COVERED ITEM OR SERVICE: HCPCS | Performed by: ANESTHESIOLOGY

## 2021-05-25 PROCEDURE — 72170 X-RAY EXAM OF PELVIS: CPT

## 2021-05-25 PROCEDURE — 87075 CULTR BACTERIA EXCEPT BLOOD: CPT

## 2021-05-25 PROCEDURE — 94760 N-INVAS EAR/PLS OXIMETRY 1: CPT

## 2021-05-25 PROCEDURE — 87147 CULTURE TYPE IMMUNOLOGIC: CPT

## 2021-05-25 PROCEDURE — 700102 HCHG RX REV CODE 250 W/ 637 OVERRIDE(OP): Performed by: PHYSICIAN ASSISTANT

## 2021-05-25 PROCEDURE — 85027 COMPLETE CBC AUTOMATED: CPT

## 2021-05-25 PROCEDURE — A9270 NON-COVERED ITEM OR SERVICE: HCPCS | Performed by: PHYSICIAN ASSISTANT

## 2021-05-25 PROCEDURE — 80048 BASIC METABOLIC PNL TOTAL CA: CPT

## 2021-05-25 PROCEDURE — 502578 HCHG PACK, TOTAL HIP: Performed by: ORTHOPAEDIC SURGERY

## 2021-05-25 PROCEDURE — 87015 SPECIMEN INFECT AGNT CONCNTJ: CPT

## 2021-05-25 PROCEDURE — 501838 HCHG SUTURE GENERAL: Performed by: ORTHOPAEDIC SURGERY

## 2021-05-25 PROCEDURE — C1776 JOINT DEVICE (IMPLANTABLE): HCPCS | Performed by: ORTHOPAEDIC SURGERY

## 2021-05-25 DEVICE — LINER ACETABULAR DUAL MOBILITY OR3O OD54 MM ID42 MM (1EA): Type: IMPLANTABLE DEVICE | Site: HIP | Status: FUNCTIONAL

## 2021-05-25 DEVICE — INSERT ACETABULAR OR3O DM XLPE OD42 MM ID28 MM (1EA): Type: IMPLANTABLE DEVICE | Site: HIP | Status: FUNCTIONAL

## 2021-05-25 DEVICE — IMPLANTABLE DEVICE: Type: IMPLANTABLE DEVICE | Site: HIP | Status: FUNCTIONAL

## 2021-05-25 RX ORDER — NEOSTIGMINE METHYLSULFATE 1 MG/ML
INJECTION, SOLUTION INTRAVENOUS PRN
Status: DISCONTINUED | OUTPATIENT
Start: 2021-05-25 | End: 2021-05-25 | Stop reason: SURG

## 2021-05-25 RX ORDER — SUCCINYLCHOLINE/SOD CL,ISO/PF 200MG/10ML
SYRINGE (ML) INTRAVENOUS PRN
Status: DISCONTINUED | OUTPATIENT
Start: 2021-05-25 | End: 2021-05-25 | Stop reason: SURG

## 2021-05-25 RX ORDER — HYDROMORPHONE HYDROCHLORIDE 1 MG/ML
0.4 INJECTION, SOLUTION INTRAMUSCULAR; INTRAVENOUS; SUBCUTANEOUS
Status: DISCONTINUED | OUTPATIENT
Start: 2021-05-25 | End: 2021-05-25 | Stop reason: HOSPADM

## 2021-05-25 RX ORDER — LIDOCAINE HYDROCHLORIDE 20 MG/ML
INJECTION, SOLUTION EPIDURAL; INFILTRATION; INTRACAUDAL; PERINEURAL PRN
Status: DISCONTINUED | OUTPATIENT
Start: 2021-05-25 | End: 2021-05-25 | Stop reason: SURG

## 2021-05-25 RX ORDER — OXYCODONE HCL 5 MG/5 ML
10 SOLUTION, ORAL ORAL
Status: COMPLETED | OUTPATIENT
Start: 2021-05-25 | End: 2021-05-25

## 2021-05-25 RX ORDER — ROCURONIUM BROMIDE 10 MG/ML
INJECTION, SOLUTION INTRAVENOUS PRN
Status: DISCONTINUED | OUTPATIENT
Start: 2021-05-25 | End: 2021-05-25 | Stop reason: SURG

## 2021-05-25 RX ORDER — HYDROMORPHONE HYDROCHLORIDE 1 MG/ML
0.1 INJECTION, SOLUTION INTRAMUSCULAR; INTRAVENOUS; SUBCUTANEOUS
Status: DISCONTINUED | OUTPATIENT
Start: 2021-05-25 | End: 2021-05-25 | Stop reason: HOSPADM

## 2021-05-25 RX ORDER — DIPHENHYDRAMINE HYDROCHLORIDE 50 MG/ML
12.5 INJECTION INTRAMUSCULAR; INTRAVENOUS
Status: DISCONTINUED | OUTPATIENT
Start: 2021-05-25 | End: 2021-05-25 | Stop reason: HOSPADM

## 2021-05-25 RX ORDER — DEXAMETHASONE SODIUM PHOSPHATE 4 MG/ML
4 INJECTION, SOLUTION INTRA-ARTICULAR; INTRALESIONAL; INTRAMUSCULAR; INTRAVENOUS; SOFT TISSUE
Status: DISCONTINUED | OUTPATIENT
Start: 2021-05-25 | End: 2021-05-29 | Stop reason: HOSPADM

## 2021-05-25 RX ORDER — ACETAMINOPHEN 500 MG
500 TABLET ORAL
Status: COMPLETED | OUTPATIENT
Start: 2021-05-25 | End: 2021-05-25

## 2021-05-25 RX ORDER — SCOLOPAMINE TRANSDERMAL SYSTEM 1 MG/1
1 PATCH, EXTENDED RELEASE TRANSDERMAL
Status: DISCONTINUED | OUTPATIENT
Start: 2021-05-25 | End: 2021-05-25 | Stop reason: HOSPADM

## 2021-05-25 RX ORDER — SODIUM CHLORIDE, SODIUM LACTATE, POTASSIUM CHLORIDE, CALCIUM CHLORIDE 600; 310; 30; 20 MG/100ML; MG/100ML; MG/100ML; MG/100ML
INJECTION, SOLUTION INTRAVENOUS CONTINUOUS
Status: DISCONTINUED | OUTPATIENT
Start: 2021-05-25 | End: 2021-05-25 | Stop reason: HOSPADM

## 2021-05-25 RX ORDER — BISACODYL 10 MG
10 SUPPOSITORY, RECTAL RECTAL
Status: DISCONTINUED | OUTPATIENT
Start: 2021-05-25 | End: 2021-05-29 | Stop reason: HOSPADM

## 2021-05-25 RX ORDER — CELECOXIB 200 MG/1
200 CAPSULE ORAL ONCE
Status: COMPLETED | OUTPATIENT
Start: 2021-05-25 | End: 2021-05-25

## 2021-05-25 RX ORDER — DOCUSATE SODIUM 100 MG/1
100 CAPSULE, LIQUID FILLED ORAL 2 TIMES DAILY
Status: DISCONTINUED | OUTPATIENT
Start: 2021-05-25 | End: 2021-05-29 | Stop reason: HOSPADM

## 2021-05-25 RX ORDER — DEXAMETHASONE SODIUM PHOSPHATE 4 MG/ML
INJECTION, SOLUTION INTRA-ARTICULAR; INTRALESIONAL; INTRAMUSCULAR; INTRAVENOUS; SOFT TISSUE PRN
Status: DISCONTINUED | OUTPATIENT
Start: 2021-05-25 | End: 2021-05-25 | Stop reason: SURG

## 2021-05-25 RX ORDER — MEPERIDINE HYDROCHLORIDE 25 MG/ML
6.25 INJECTION INTRAMUSCULAR; INTRAVENOUS; SUBCUTANEOUS ONCE
Status: COMPLETED | OUTPATIENT
Start: 2021-05-25 | End: 2021-05-25

## 2021-05-25 RX ORDER — ONDANSETRON 2 MG/ML
4 INJECTION INTRAMUSCULAR; INTRAVENOUS EVERY 4 HOURS PRN
Status: DISCONTINUED | OUTPATIENT
Start: 2021-05-25 | End: 2021-05-29 | Stop reason: HOSPADM

## 2021-05-25 RX ORDER — ENEMA 19; 7 G/133ML; G/133ML
1 ENEMA RECTAL
Status: DISCONTINUED | OUTPATIENT
Start: 2021-05-25 | End: 2021-05-29 | Stop reason: HOSPADM

## 2021-05-25 RX ORDER — ONDANSETRON 2 MG/ML
INJECTION INTRAMUSCULAR; INTRAVENOUS PRN
Status: DISCONTINUED | OUTPATIENT
Start: 2021-05-25 | End: 2021-05-25 | Stop reason: HOSPADM

## 2021-05-25 RX ORDER — HALOPERIDOL 5 MG/ML
1 INJECTION INTRAMUSCULAR
Status: DISCONTINUED | OUTPATIENT
Start: 2021-05-25 | End: 2021-05-25 | Stop reason: HOSPADM

## 2021-05-25 RX ORDER — OXYCODONE HCL 5 MG/5 ML
5 SOLUTION, ORAL ORAL
Status: COMPLETED | OUTPATIENT
Start: 2021-05-25 | End: 2021-05-25

## 2021-05-25 RX ORDER — BUPIVACAINE HYDROCHLORIDE 2.5 MG/ML
INJECTION, SOLUTION EPIDURAL; INFILTRATION; INTRACAUDAL
Status: DISCONTINUED | OUTPATIENT
Start: 2021-05-25 | End: 2021-05-25 | Stop reason: HOSPADM

## 2021-05-25 RX ORDER — ONDANSETRON 2 MG/ML
4 INJECTION INTRAMUSCULAR; INTRAVENOUS
Status: COMPLETED | OUTPATIENT
Start: 2021-05-25 | End: 2021-05-25

## 2021-05-25 RX ORDER — AMOXICILLIN 250 MG
1 CAPSULE ORAL
Status: DISCONTINUED | OUTPATIENT
Start: 2021-05-25 | End: 2021-05-29 | Stop reason: HOSPADM

## 2021-05-25 RX ORDER — SODIUM CHLORIDE, SODIUM LACTATE, POTASSIUM CHLORIDE, CALCIUM CHLORIDE 600; 310; 30; 20 MG/100ML; MG/100ML; MG/100ML; MG/100ML
INJECTION, SOLUTION INTRAVENOUS CONTINUOUS
Status: DISCONTINUED | OUTPATIENT
Start: 2021-05-25 | End: 2021-05-25

## 2021-05-25 RX ORDER — HALOPERIDOL 5 MG/ML
1 INJECTION INTRAMUSCULAR EVERY 6 HOURS PRN
Status: DISCONTINUED | OUTPATIENT
Start: 2021-05-25 | End: 2021-05-29 | Stop reason: HOSPADM

## 2021-05-25 RX ORDER — GLYCOPYRROLATE 0.2 MG/ML
INJECTION INTRAMUSCULAR; INTRAVENOUS PRN
Status: DISCONTINUED | OUTPATIENT
Start: 2021-05-25 | End: 2021-05-25 | Stop reason: SURG

## 2021-05-25 RX ORDER — HYDROMORPHONE HYDROCHLORIDE 2 MG/ML
INJECTION, SOLUTION INTRAMUSCULAR; INTRAVENOUS; SUBCUTANEOUS PRN
Status: DISCONTINUED | OUTPATIENT
Start: 2021-05-25 | End: 2021-05-25 | Stop reason: SURG

## 2021-05-25 RX ORDER — CEFAZOLIN SODIUM 1 G/3ML
INJECTION, POWDER, FOR SOLUTION INTRAMUSCULAR; INTRAVENOUS PRN
Status: DISCONTINUED | OUTPATIENT
Start: 2021-05-25 | End: 2021-05-25 | Stop reason: SURG

## 2021-05-25 RX ORDER — AMOXICILLIN 250 MG
1 CAPSULE ORAL NIGHTLY
Status: DISCONTINUED | OUTPATIENT
Start: 2021-05-25 | End: 2021-05-29 | Stop reason: HOSPADM

## 2021-05-25 RX ORDER — SODIUM CHLORIDE, SODIUM LACTATE, POTASSIUM CHLORIDE, CALCIUM CHLORIDE 600; 310; 30; 20 MG/100ML; MG/100ML; MG/100ML; MG/100ML
INJECTION, SOLUTION INTRAVENOUS
Status: DISCONTINUED | OUTPATIENT
Start: 2021-05-25 | End: 2021-05-25 | Stop reason: SURG

## 2021-05-25 RX ORDER — KETOROLAC TROMETHAMINE 30 MG/ML
INJECTION, SOLUTION INTRAMUSCULAR; INTRAVENOUS
Status: DISCONTINUED | OUTPATIENT
Start: 2021-05-25 | End: 2021-05-25 | Stop reason: HOSPADM

## 2021-05-25 RX ORDER — DIPHENHYDRAMINE HYDROCHLORIDE 50 MG/ML
25 INJECTION INTRAMUSCULAR; INTRAVENOUS EVERY 6 HOURS PRN
Status: DISCONTINUED | OUTPATIENT
Start: 2021-05-25 | End: 2021-05-28

## 2021-05-25 RX ORDER — ACETAMINOPHEN 160 MG/5ML
500 SUSPENSION ORAL
Status: DISCONTINUED | OUTPATIENT
Start: 2021-05-25 | End: 2021-05-25

## 2021-05-25 RX ORDER — ACETAMINOPHEN 500 MG
1000 TABLET ORAL ONCE
Status: DISCONTINUED | OUTPATIENT
Start: 2021-05-25 | End: 2021-05-25

## 2021-05-25 RX ORDER — SCOLOPAMINE TRANSDERMAL SYSTEM 1 MG/1
1 PATCH, EXTENDED RELEASE TRANSDERMAL
Status: DISCONTINUED | OUTPATIENT
Start: 2021-05-25 | End: 2021-05-29 | Stop reason: HOSPADM

## 2021-05-25 RX ORDER — HYDROMORPHONE HYDROCHLORIDE 1 MG/ML
0.2 INJECTION, SOLUTION INTRAMUSCULAR; INTRAVENOUS; SUBCUTANEOUS
Status: DISCONTINUED | OUTPATIENT
Start: 2021-05-25 | End: 2021-05-25 | Stop reason: HOSPADM

## 2021-05-25 RX ORDER — POLYETHYLENE GLYCOL 3350 17 G/17G
1 POWDER, FOR SOLUTION ORAL 2 TIMES DAILY PRN
Status: DISCONTINUED | OUTPATIENT
Start: 2021-05-25 | End: 2021-05-29 | Stop reason: HOSPADM

## 2021-05-25 RX ADMIN — FENTANYL CITRATE 50 MCG: 50 INJECTION, SOLUTION INTRAMUSCULAR; INTRAVENOUS at 10:59

## 2021-05-25 RX ADMIN — ACETAMINOPHEN 500 MG: 500 TABLET ORAL at 10:47

## 2021-05-25 RX ADMIN — PROPOFOL 50 MG: 10 INJECTION, EMULSION INTRAVENOUS at 12:22

## 2021-05-25 RX ADMIN — FENTANYL CITRATE 50 MCG: 50 INJECTION, SOLUTION INTRAMUSCULAR; INTRAVENOUS at 13:30

## 2021-05-25 RX ADMIN — FENTANYL CITRATE 50 MCG: 50 INJECTION, SOLUTION INTRAMUSCULAR; INTRAVENOUS at 13:03

## 2021-05-25 RX ADMIN — SODIUM CHLORIDE, POTASSIUM CHLORIDE, SODIUM LACTATE AND CALCIUM CHLORIDE: 600; 310; 30; 20 INJECTION, SOLUTION INTRAVENOUS at 10:55

## 2021-05-25 RX ADMIN — NEOSTIGMINE METHYLSULFATE 1 MG: 1 INJECTION INTRAVENOUS at 12:03

## 2021-05-25 RX ADMIN — VANCOMYCIN 1 G: 1 INJECTION, SOLUTION INTRAVENOUS at 11:20

## 2021-05-25 RX ADMIN — POVIDONE IODINE 15 ML: 100 SOLUTION TOPICAL at 10:39

## 2021-05-25 RX ADMIN — PIPERACILLIN AND TAZOBACTAM 4.5 G: 4; .5 INJECTION, POWDER, LYOPHILIZED, FOR SOLUTION INTRAVENOUS; PARENTERAL at 18:34

## 2021-05-25 RX ADMIN — MEPERIDINE HYDROCHLORIDE 6.25 MG: 25 INJECTION INTRAMUSCULAR; INTRAVENOUS; SUBCUTANEOUS at 13:18

## 2021-05-25 RX ADMIN — CELECOXIB 200 MG: 200 CAPSULE ORAL at 10:41

## 2021-05-25 RX ADMIN — ROCURONIUM BROMIDE 20 MG: 10 INJECTION, SOLUTION INTRAVENOUS at 10:59

## 2021-05-25 RX ADMIN — CEFAZOLIN 2 G: 1 INJECTION, POWDER, FOR SOLUTION INTRAVENOUS at 11:20

## 2021-05-25 RX ADMIN — SODIUM CHLORIDE, POTASSIUM CHLORIDE, SODIUM LACTATE AND CALCIUM CHLORIDE: 600; 310; 30; 20 INJECTION, SOLUTION INTRAVENOUS at 10:39

## 2021-05-25 RX ADMIN — DEXAMETHASONE SODIUM PHOSPHATE 8 MG: 4 INJECTION, SOLUTION INTRAMUSCULAR; INTRAVENOUS at 11:06

## 2021-05-25 RX ADMIN — GLYCOPYRROLATE 0.2 MG: 0.2 INJECTION INTRAMUSCULAR; INTRAVENOUS at 12:03

## 2021-05-25 RX ADMIN — HYDROMORPHONE HYDROCHLORIDE 0.2 MG: 1 INJECTION, SOLUTION INTRAMUSCULAR; INTRAVENOUS; SUBCUTANEOUS at 14:20

## 2021-05-25 RX ADMIN — FENTANYL CITRATE 50 MCG: 50 INJECTION, SOLUTION INTRAMUSCULAR; INTRAVENOUS at 11:27

## 2021-05-25 RX ADMIN — OXYCODONE HYDROCHLORIDE 5 MG: 5 TABLET ORAL at 16:03

## 2021-05-25 RX ADMIN — SODIUM CHLORIDE: 9 INJECTION, SOLUTION INTRAVENOUS at 15:18

## 2021-05-25 RX ADMIN — HYDROMORPHONE HYDROCHLORIDE 0.5 MG: 1 INJECTION, SOLUTION INTRAMUSCULAR; INTRAVENOUS; SUBCUTANEOUS at 20:46

## 2021-05-25 RX ADMIN — DOCUSATE SODIUM 100 MG: 100 CAPSULE, LIQUID FILLED ORAL at 18:34

## 2021-05-25 RX ADMIN — HYDROMORPHONE HYDROCHLORIDE 0.2 MG: 1 INJECTION, SOLUTION INTRAMUSCULAR; INTRAVENOUS; SUBCUTANEOUS at 14:06

## 2021-05-25 RX ADMIN — OXYCODONE HYDROCHLORIDE 10 MG: 5 SOLUTION ORAL at 13:35

## 2021-05-25 RX ADMIN — OXYCODONE HYDROCHLORIDE 10 MG: 10 TABLET ORAL at 06:01

## 2021-05-25 RX ADMIN — PROPOFOL 100 MG: 10 INJECTION, EMULSION INTRAVENOUS at 10:59

## 2021-05-25 RX ADMIN — DOCUSATE SODIUM 50 MG AND SENNOSIDES 8.6 MG 2 TABLET: 8.6; 5 TABLET, FILM COATED ORAL at 06:01

## 2021-05-25 RX ADMIN — PIPERACILLIN AND TAZOBACTAM 4.5 G: 4; .5 INJECTION, POWDER, LYOPHILIZED, FOR SOLUTION INTRAVENOUS; PARENTERAL at 16:01

## 2021-05-25 RX ADMIN — SODIUM CHLORIDE: 9 INJECTION, SOLUTION INTRAVENOUS at 05:57

## 2021-05-25 RX ADMIN — ONDANSETRON 4 MG: 2 INJECTION INTRAMUSCULAR; INTRAVENOUS at 11:06

## 2021-05-25 RX ADMIN — Medication 100 MG: at 10:59

## 2021-05-25 RX ADMIN — DOCUSATE SODIUM 50 MG AND SENNOSIDES 8.6 MG 2 TABLET: 8.6; 5 TABLET, FILM COATED ORAL at 18:34

## 2021-05-25 RX ADMIN — ONDANSETRON 4 MG: 2 INJECTION INTRAMUSCULAR; INTRAVENOUS at 13:18

## 2021-05-25 RX ADMIN — FENTANYL CITRATE 50 MCG: 50 INJECTION, SOLUTION INTRAMUSCULAR; INTRAVENOUS at 12:57

## 2021-05-25 RX ADMIN — SIMVASTATIN 20 MG: 20 TABLET, FILM COATED ORAL at 18:34

## 2021-05-25 RX ADMIN — VANCOMYCIN HYDROCHLORIDE 750 MG: 1 INJECTION, POWDER, LYOPHILIZED, FOR SOLUTION INTRAVENOUS at 23:37

## 2021-05-25 RX ADMIN — TRAMADOL HYDROCHLORIDE 50 MG: 50 TABLET, FILM COATED ORAL at 19:31

## 2021-05-25 RX ADMIN — FENTANYL CITRATE 50 MCG: 50 INJECTION, SOLUTION INTRAMUSCULAR; INTRAVENOUS at 13:45

## 2021-05-25 RX ADMIN — ACETAMINOPHEN 650 MG: 325 TABLET ORAL at 06:01

## 2021-05-25 RX ADMIN — FERROUS SULFATE TAB 325 MG (65 MG ELEMENTAL FE) 325 MG: 325 (65 FE) TAB at 18:33

## 2021-05-25 RX ADMIN — SCOPOLAMINE 1 PATCH: 1.5 PATCH, EXTENDED RELEASE TRANSDERMAL at 10:40

## 2021-05-25 RX ADMIN — TRAMADOL HYDROCHLORIDE 50 MG: 50 TABLET, FILM COATED ORAL at 03:30

## 2021-05-25 RX ADMIN — SODIUM CHLORIDE, POTASSIUM CHLORIDE, SODIUM LACTATE AND CALCIUM CHLORIDE: 600; 310; 30; 20 INJECTION, SOLUTION INTRAVENOUS at 13:34

## 2021-05-25 RX ADMIN — TRAZODONE HYDROCHLORIDE 100 MG: 50 TABLET ORAL at 22:20

## 2021-05-25 RX ADMIN — LIDOCAINE HYDROCHLORIDE 100 MG: 20 INJECTION, SOLUTION EPIDURAL; INFILTRATION; INTRACAUDAL; PERINEURAL at 10:59

## 2021-05-25 RX ADMIN — HYDROMORPHONE HYDROCHLORIDE 0.6 MG: 2 INJECTION, SOLUTION INTRAMUSCULAR; INTRAVENOUS; SUBCUTANEOUS at 11:42

## 2021-05-25 RX ADMIN — ROCURONIUM BROMIDE 10 MG: 10 INJECTION, SOLUTION INTRAVENOUS at 11:27

## 2021-05-25 ASSESSMENT — PAIN DESCRIPTION - PAIN TYPE
TYPE: SURGICAL PAIN
TYPE: ACUTE PAIN
TYPE: SURGICAL PAIN
TYPE: ACUTE PAIN
TYPE: SURGICAL PAIN
TYPE: SURGICAL PAIN

## 2021-05-25 ASSESSMENT — PAIN SCALES - GENERAL: PAIN_LEVEL: 7

## 2021-05-25 NOTE — ANESTHESIA POSTPROCEDURE EVALUATION
Patient: Kemi Silva    Procedure Summary     Date: 05/25/21 Room / Location:  OR  / SURGERY [facility]    Anesthesia Start: 1055 Anesthesia Stop: 1246    Procedure: REVISION, TOTAL ARTHROPLASTY, HIP. IRRIGATION AND DEBREIDMENT WITH LINER HEAD EXCHANGE (Left Hip) Diagnosis: (infection of the left hip)    Surgeons: Julian Mendes M.D. Responsible Provider: Christopher Thompson M.D.    Anesthesia Type: general ASA Status: 2          Final Anesthesia Type: general  Last vitals  BP   Blood Pressure: 136/82    Temp   36.7 °C (98.1 °F)    Pulse   85   Resp   18    SpO2   100 %      Anesthesia Post Evaluation    Patient location during evaluation: PACU  Patient participation: complete - patient participated  Level of consciousness: awake and alert  Pain score: 7  Chronic pain  Airway patency: patent  Anesthetic complications: no  Cardiovascular status: hemodynamically stable  Respiratory status: acceptable  Hydration status: euvolemic    PONV: none          No complications documented.     Nurse Pain Score: 7 (NPRS)

## 2021-05-25 NOTE — ANESTHESIA TIME REPORT
Anesthesia Start and Stop Event Times     Date Time Event    5/25/2021 1050 Ready for Procedure     1055 Anesthesia Start     1246 Anesthesia Stop        Responsible Staff  05/25/21    Name Role Begin End    Christopher Thompson M.D. Anesth 1055 1246        Preop Diagnosis (Free Text):  Pre-op Diagnosis     infection of the left hip        Preop Diagnosis (Codes):    Post op Diagnosis  Postoperative wound infection of left hip      Premium Reason  Non-Premium    Comments:

## 2021-05-25 NOTE — PROGRESS NOTES
"Hospital Medicine Daily Progress Note    Date of Service  5/25/2021    Chief Complaint  61 y.o. female admitted 5/24/2021 with hip pain    Hospital Course  Per notes, \"61 y.o. female who presented 5/24/2021 with fevers, chills and increased pain and warmth over the left hip.  She had a hip fracture 3 years ago and unfortunately has had complications and has had infection in that hip before.  Her most recent surgery was march 2020.  Given her prominence over the left hip and her history, she had CT evaluation of the hip and has a 5x6 cm abscess.  This was discussed with her surgeon, Dr Mendes who will be by to see her this afternoon and likely take her to OR for at least another washout.  She is afebrile and has a normal WBC.  Empiric antibiotics will not be given and intraoperative cultures will be collected to direct course of treatment.  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  Patient was anxious about surgery, but pain is well controlled.  Nursing had no other events to report.    Consultants/Specialty  Orthopedic surgery    Code Status  Full Code    Disposition  Anticipated discharge to be determined    Review of Systems  Review of Systems   Constitutional: Positive for malaise/fatigue.   Musculoskeletal: Positive for joint pain.   All other systems reviewed and are negative.       Physical Exam  Temp:  [36.4 °C (97.5 °F)-37.8 °C (100 °F)] 36.7 °C (98.1 °F)  Pulse:  [72-95] 72  Resp:  [16-22] 18  BP: (112-143)/() 115/69  SpO2:  [91 %-100 %] 100 %    Physical Exam  Vitals and nursing note reviewed.   Constitutional:       Appearance: Normal appearance.   Cardiovascular:      Rate and Rhythm: Normal rate and regular rhythm.      Pulses: Normal pulses.      Heart sounds: Normal " heart sounds.   Pulmonary:      Effort: Pulmonary effort is normal.      Breath sounds: Normal breath sounds.   Abdominal:      General: Abdomen is flat. Bowel sounds are normal.      Palpations: Abdomen is soft.   Musculoskeletal:         General: Swelling and tenderness present.      Left lower leg: Edema present.   Skin:     General: Skin is warm.   Neurological:      General: No focal deficit present.      Mental Status: She is alert and oriented to person, place, and time.         Fluids    Intake/Output Summary (Last 24 hours) at 5/25/2021 1600  Last data filed at 5/25/2021 1445  Gross per 24 hour   Intake 2712.38 ml   Output 375 ml   Net 2337.38 ml       Laboratory  Recent Labs     05/24/21  1213 05/25/21  0442   WBC 8.6 6.8   RBC 4.27 4.04*   HEMOGLOBIN 10.5* 9.9*   HEMATOCRIT 34.0* 32.3*   MCV 79.6* 80.0*   MCH 24.6* 24.5*   MCHC 30.9* 30.7*   RDW 46.2 47.3   PLATELETCT 396 379   MPV 9.3 9.2     Recent Labs     05/24/21  1213 05/25/21  0442   SODIUM 137 137   POTASSIUM 3.9 4.2   CHLORIDE 103 104   CO2 23 25   GLUCOSE 98 94   BUN 12 13   CREATININE 0.71 0.55   CALCIUM 9.1 8.4                   Imaging  DX-PELVIS-1 OR 2 VIEWS   Final Result         1. Status post revised left hip arthroplasty without evidence of early hardware complication.      CT-HIP WITH PLUS RECONS LEFT   Final Result      1.  Soft tissue fluid collection lateral to the left hip most consistent with abscess measuring 5.2 x 4.4 x 6.9 cm.      2.  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           Assessment/Plan  * Abscess of left hip  Assessment & Plan  Multiple complications related to this hip placed 3 years ago  Orthopedic surgery consulted, follow-up on recommendations, full hip revision on 5/25.  No antibiotics given, intraoperative cultures to be taken, was positive for staph aureus previous hospitalization. will follow up with ID  IV hydration    Allergy to adhesive  Assessment &  Plan  Hypoallergenic supplies when able  PRN benadryl if needed.    Anxiety- (present on admission)  Assessment & Plan  Prn ativan if needed, difficult social situation at home    Insomnia- (present on admission)  Assessment & Plan  Trazodone qhs.       VTE prophylaxis: SCDs

## 2021-05-25 NOTE — CARE PLAN
Problem: Pain - Standard  Goal: Alleviation of pain or a reduction in pain to the patient’s comfort goal  Outcome: Progressing  Flowsheets  Taken 5/25/2021 1054  OB Pain Intervention:   Phillips   Repositioned   Rest   Emotional support   Distraction  Taken 5/25/2021 0710  Pain Rating Scale (NPRS): 6  Note: Pt expressed 6/10 pain in L leg/hip. Helped pt adjust position and educated her on timing of next dose of pain meds, per MAR. Pt agreeable to pain med schedule.      Problem: Knowledge Deficit - Standard  Goal: Patient and family/care givers will demonstrate understanding of plan of care, disease process/condition, diagnostic tests and medications  Outcome: Progressing  Note: Pt able to express thoughts and feelings about upcoming surgical procedure for L hip and understands POC for the day, including CHG bath prior to surgical procedure.    The patient is Stable - Low risk of patient condition declining or worsening    Shift Goals  Clinical Goals: Pain management, decrease anxiety  Patient Goals: Pain managament, rest, decrease anxiety    Progress made toward(s) clinical / shift goals:  Pain level managed with repositioning and comfort measures.

## 2021-05-25 NOTE — CARE PLAN
The patient is Stable - Low risk of patient condition declining or worsening    Shift Goals  Clinical Goals: Pain mannagement   Patient Goals: Pain management; sleep      Problem: Pain - Standard  Goal: Alleviation of pain or a reduction in pain to the patient’s comfort goal  Outcome: Progressing     Problem: Knowledge Deficit - Standard  Goal: Patient and family/care givers will demonstrate understanding of plan of care, disease process/condition, diagnostic tests and medications  Outcome: Progressing       Progress made toward(s) clinical / shift goals: Collaborate with patient and interdisciplinary team to manage pain near or below comfortable level goal. Administer medications as prescribed for pain.     Patient is not progressing towards the following goals: N/A

## 2021-05-25 NOTE — DISCHARGE PLANNING
ER CM met with pt at bedside. Very pleasant.   Care Transition Team Assessment    Information Source  Orientation Level: Oriented X4  Information Given By: Patient  Informant's Name: Kemi  Who is responsible for making decisions for patient? : Patient         Elopement Risk  Legal Hold: No  Ambulatory or Self Mobile in Wheelchair: No-Not an Elopement Risk    Interdisciplinary Discharge Planning  Primary Care Physician: Dr Meyers  Lives with - Patient's Self Care Capacity: Spouse, Adult Children, Other (Comments)  Patient or legal guardian wants to designate a caregiver: No  Support Systems: Spouse / Significant Other, Other (Comments)  Housing / Facility: [address]  Do You Take your Prescribed Medications Regularly: Yes  Able to Return to Previous ADL's: Yes  Mobility Issues: No  Prior Services: None  Assistance Needed: Unknown at this Time  Durable Medical Equipment: Walker    Discharge Preparedness  What is your plan after discharge?: Uncertain - pending medical team collaboration         Finances  Prescription Coverage: Yes ([employer])    Vision / Hearing Impairment  Vision Impairment : Yes  Right Eye Vision: Wears Glasses, Wears Contacts, Impaired  Left Eye Vision: Wears Glasses, Wears Contacts, Impaired  Hearing Impairment : No              Domestic Abuse  Have you ever been the victim of abuse or violence?: No  Physical Abuse or Sexual Abuse: No  Verbal Abuse or Emotional Abuse: No  Possible Abuse/Neglect Reported to:: Not Applicable    Psychological Assessment  History of Substance Abuse: None    Discharge Risks or Barriers  Discharge risks or barriers?: Other (comment)    Anticipated Discharge Information  Discharge Disposition: Still a Patient (28)      She lives in a trilevel. She lives with spouse Myron, son and mother who she is a caregiver to. She was able to coordinate with [facility] to get mother Chi on to respite Hospice in [place]. She is pleased with this. She feels   can manage tonight her mothers can. She notes that this surgery will be her 6th on this hip , last was last November to remove hardward except original prosthetic. She had surgery on shoulder 3 yrs ago as well. She is very fit and teaches pilates. She has a fww at home. She notes that you enter home on 2nd floor which is kitchen living areas and mothers area. Her bedroom is 3rd floor. Adult son on 1st floor. Myron works M-F . Ramps in garage, No o2 or wheelchair for her ( mother has one).  [facility] has [facility] in past for IV Antibx. She has not had outpt PT in past. PCP Dr Meyers and RX at [facility].

## 2021-05-25 NOTE — H&P
DATE OF ADMISSION:  05/24/2021     CHIEF COMPLAINT:  Left hip swelling.     HISTORY OF PRESENT ILLNESS:  This is a patient who has had several surgeries   on the left hip.  She was doing okay and then started noticing bulging at the   hip with pain, over the last week especially, it has gotten bad.     REVIEW OF SYSTEMS:  Review of systems reviewed and found to be otherwise   unremarkable.     PHYSICAL EXAMINATION:  The patient has swelling, tenderness over the incision   with obvious fluid collection and erythema.     LABORATORY DATA;:  The patient has an elevated ESR, CRP.     ASSESSMENT AND PLAN:  The patient has infection of the left hip.  Plan is for   washout with irrigation, debridement and poly liner exchange.  It was   thoroughly discussed with the patient that this is not optimal in terms of the   final result, the greatest success of not having recurrent infection;   however, she is in a social situation where she was taking care of her mother   who is currently on hospice.  She has strong feelings about needing to be   there and take care of the mother through the next several months.  Therefore,   she elected for irrigation and debridement once that is with poly liner   exchange.  Once that is done with regards to taking care of her mother, we   will take her back for the 2-stage revision.        ______________________________  MD REY Ross/KIMBERLYN    DD:  05/25/2021 10:49  DT:  05/25/2021 11:07    Job#:  [id number]

## 2021-05-25 NOTE — HOSPITAL COURSE
"Per notes, \"61 y.o. female who presented 5/24/2021 with fevers, chills and increased pain and warmth over the left hip.  She had a hip fracture 3 years ago and unfortunately has had complications and has had infection in that hip before.  Her most recent surgery was march 2020.  Given her prominence over the left hip and her history, she had CT evaluation of the hip and has a 5x6 cm abscess.  This was discussed with her surgeon, Dr Mendes who will be by to see her this afternoon and likely take her to OR for at least another washout.  She is afebrile and has a normal WBC.  Empiric antibiotics will not be given and intraoperative cultures will be collected to direct course of treatment.  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

## 2021-05-25 NOTE — OR NURSING
1321: Care assumed of awake patient in severe pain postop and c/o nausea  1330: pain persists, nausea resolved so plan po analgesia. Tolerates sips po water and ginger ale.  1345: Pain minimally decreased so medicated further IV  1400: Pt states pain not yet tolerable so plan Dilaudid IV. Pt awake, alert and grimacing in pain.   1415: No change.  1430: pain controlled  1435: No change in surgical site assessment.Meets criteria to transfer to floor.   1439: Transferred on O2 tank @ 598 L

## 2021-05-25 NOTE — PROGRESS NOTES
Telemetry Shift Summary    Rhythm SR/SB  HR Range 46-66; low of 43  Ectopy -  Measurements 0.18/0.10/0.48        Normal Values  Rhythm SR  HR Range    Measurements 0.12-0.20 / 0.06-0.10  / 0.30-0.52

## 2021-05-25 NOTE — OR NURSING
Patient allergies and NPO status verified. Patient verbalizes understanding of pain scale, expected course of stay and plan of care. Surgical site verified with patient. IV assessed for patency, sequentials placed on RLE.

## 2021-05-25 NOTE — OP REPORT
DATE OF SERVICE:  05/25/2021     INDICATION:  The patient with infected left total hip.     PREOPERATIVE DIAGNOSIS:  Infected left total hip.     POSTOPERATIVE DIAGNOSIS:  Infected left total hip.     PROCEDURE:  Irrigation and debridement with poly exchange, left total hip.     ANESTHESIA:  General.     COMPLICATIONS:  None.     SURGEON:  Julian Mendes MD     ASSISTANT:  Pari Samuel PA-C     DESCRIPTION OF PROCEDURE:  The patient underwent general anesthesia.  Left   lower extremity was prepped and draped in sterile manner.  Old incision was   excised and soft tissue dissected down to the fascia underneath the swelling.    On the left hip, there was a large amount of purulent material found, which   tracked all the way into the greater troch region.  All of this was thoroughly   debrided and irrigated out.  Then, the hip was dislocated, and protruded   through the posterior side and the head and liner were removed and the hip was   thoroughly irrigated and debrided manually with normal saline as well as   Betadine were used.  A new femoral head, +12, 28 and do an MDM poly as well as   an MDM liner.  This was reduced.  It was found that there is still evidence   of intact fibrous connection for the greater trochanter.  Capsule was closed   back to the greater troch.  0 Vicryl was used for the fascia and soft tissue,   Monocryl for the soft tissue skin and Dermabond for final skin layer.  The   patient was woken up, taken back to PACU, will be weightbear as tolerated,   posterior hip precautions.        ______________________________  MD REY Ross/SOROD/NIKI    DD:  05/25/2021 12:47  DT:  05/25/2021 14:41    Job#:  [id number]

## 2021-05-25 NOTE — PROGRESS NOTES
Dr. Julian Mendes with Ortho called to confirm patient's scheduled surgery time of 1400 tomorrow, May 25th. Patient to be NPO at midnight. Dr. Mendes will be by in the morning to discuss plan of care with patient at the bedside. Primary nurse Betsey made aware.

## 2021-05-25 NOTE — PROGRESS NOTES
Pt arrived to floor. A&O x4. Reports tolerable pain. No signs of distress. Dressing CDI, ice applied, BLE pulses present. Pt able to plantar/dorsiflex. Pt understands POC.

## 2021-05-25 NOTE — ANESTHESIA PROCEDURE NOTES
Airway    Date/Time: 5/25/2021 10:59 AM  Performed by: Christopher Thompson M.D.  Authorized by: Christopher Thompson M.D.     Location:  OR  Urgency:  Elective  Indications for Airway Management:  Anesthesia      Spontaneous Ventilation: absent    Sedation Level:  Deep  Preoxygenated: Yes    Patient Position:  Sniffing  Mask Difficulty Assessment:  0 - not attempted  Final Airway Type:  Endotracheal airway  Final Endotracheal Airway:  ETT  Cuffed: Yes    Technique Used for Successful ETT Placement:  Direct laryngoscopy    Insertion Site:  Oral  Blade Type:  Ender  Laryngoscope Blade/Videolaryngoscope Blade Size:  3  ETT Size (mm):  7.0  Measured from:  Teeth  ETT to Teeth (cm):  21  Placement Verified by: auscultation and capnometry    Cormack-Lehane Classification:  Grade I - full view of glottis  Number of Attempts at Approach:  1

## 2021-05-25 NOTE — PROGRESS NOTES
Pt left the floor with transport to go to surgery. IV fluids (NS) paused, CHG bath completed, some belongings left in room.

## 2021-05-25 NOTE — PROGRESS NOTES
Pharmacy Vancomycin Kinetics Note for 5/25/2021     61 y.o. female on Vancomycin day # 1     Vancomycin Indication (AUC Dosing): Severe or complicated infection    Provider specified end date: 05/28/21    Active Antibiotics (From admission, onward)    Ordered     Ordering Provider       Tue May 25, 2021  3:17 PM    05/25/21 1517  vancomycin (VANCOCIN) 750 mg in  mL IVPB  (vancomycin (VANCOCIN) IV (LD + Maintenance))  EVERY 12 HOURS      Perez Pineda M.D.       Tue May 25, 2021  2:23 PM    05/25/21 1423  MD Alert...Vancomycin per Pharmacy  (Empiric antibiotics for suspected prosthetic joint infection in a patient with signs and symptoms of systemic infection (Vanco + Zosyn) )  PHARMACY TO DOSE     Question:  Indication(s) for vancomycin?  Answer:  Other (comments)  Comment:  Empiric antibioitcs for suspected prosthetic joint infection in a patient with signs and symptoms of systemic infection     Pari Samuel P.A.-C.    05/25/21 1423  piperacillin-tazobactam (ZOSYN) 4.5 g in  mL IVPB  (Empiric antibiotics for suspected prosthetic joint infection in a patient with signs and symptoms of systemic infection (Vanco + Zosyn) )  ONCE      Pari Samuel P.A.-C.    05/25/21 1423  piperacillin-tazobactam (ZOSYN) 4.5 g in  mL IVPB  (Empiric antibiotics for suspected prosthetic joint infection in a patient with signs and symptoms of systemic infection (Vanco + Zosyn) )  EVERY 8 HOURS      Pari Samuel P.A.-C.          Dosing Weight: 53.3 kg (117 lb 8.1 oz)      Admission History: Admitted on 5/24/2021 for Abscess of left hip [L02.416]  Pertinent history: Insomina, anxiety, abscess of L hip, osteopenia, HLD, anemia    Allergies:     Tape     Pertinent cultures to date:     Results     Procedure Component Value Units Date/Time    MRSA By PCR (Amp) [[id number]]     Order Status: No result Specimen: Respirate from Nares     Anaerobic Culture [[id number]] Collected: 05/25/21 1100    Order Status: Completed  "Specimen: Wound Updated: 05/25/21 1454     Significant Indicator NEG     Source WND     Site left hip fluids #2     Culture Result -    Anaerobic Culture [[id number]] Collected: 05/25/21 1100    Order Status: Completed Specimen: Wound Updated: 05/25/21 1454     Significant Indicator NEG     Source WND     Site left hip tissue     Culture Result -    Anaerobic Culture [[id number]] Collected: 05/25/21 1100    Order Status: Completed Specimen: Wound Updated: 05/25/21 1452     Significant Indicator NEG     Source WND     Site left hip fluids     Culture Result -    BLOOD CULTURE [[id number]] Collected: 05/24/21 1213    Order Status: Completed Specimen: Blood from Peripheral Updated: 05/25/21 0718     Significant Indicator NEG     Source BLD     Site PERIPHERAL     Culture Result No Growth  Note: Blood cultures are incubated for 5 days and  are monitored continuously.Positive blood cultures  are called to the RN and reported as soon as  they are identified.  Blood culture testing and Gram stain, if indicated, are  performed at [address].  Positive blood cultures are  sent to [facility], [address] [place] [address], for organism identification and  susceptibility testing.      Narrative:      1 of 2 for Blood Culture x 2 sites order. Per Hospital  Policy: Only change Specimen Src: to \"Line\" if specified by  physician order.  Right AC    BLOOD CULTURE [[id number]] Collected: 05/24/21 1400    Order Status: Completed Specimen: Blood from Peripheral Updated: 05/25/21 0718     Significant Indicator NEG     Source BLD     Site PERIPHERAL     Culture Result No Growth  Note: Blood cultures are incubated for 5 days and  are monitored continuously.Positive blood cultures  are called to the RN and reported as soon as  they are identified.  Blood culture testing and Gram stain, if indicated, are  performed at [facility], " "[address]  [place], [place].  Positive blood cultures are  sent to [facility], [address] [place] [address], for organism identification and  susceptibility testing.      Narrative:      2 of 2 blood culture x2  Sites order. Per Hospital Policy:  Only change Specimen Src: to \"Line\" if specified by physician  order.  Right AC    SARS-CoV-2 PCR (24 hour In-House): Collect NP swab in [facility] [[id number]] Collected: 21    Order Status: Completed Specimen: Respirate from Nasopharyngeal Updated: 21     SARS-CoV-2 Source NP Swab    Narrative:      Have you been in close contact with a person who is suspected  or known to be positive for COVID-19 within the last 30 days  (e.g. last seen that person < 30 days ago)->No          Labs:     Estimated Creatinine Clearance: 88.1 mL/min (by C-G formula based on SCr of 0.55 mg/dL).  Recent Labs     21  1213 21  0442   WBC 8.6 6.8   NEUTSPOLYS 69.70  --      Recent Labs     21  1213 21  0442   BUN 12 13   CREATININE 0.71 0.55   ALBUMIN 3.4  --        Intake/Output Summary (Last 24 hours) at 2021 1521  Last data filed at 2021 1445  Gross per 24 hour   Intake 2712.38 ml   Output 375 ml   Net 2337.38 ml      /102   Pulse 86   Temp 36.9 °C (98.4 °F) (Oral)   Resp 18   Ht 1.727 m (5' 8\")   Wt 53.3 kg (117 lb 8.1 oz)   SpO2 100%  Temp (24hrs), Av.9 °C (98.4 °F), Min:36.4 °C (97.5 °F), Max:37.8 °C (100 °F)      List concerns for Vancomycin clearance:          Pharmacokinetics:     AUC kinetics:   Ke (hr ^-1): 0.0775 hr^-1  Half life: 8.94 hr  Clearance: 2.685  Estimated TDD: 1342.5  Estimated Dose: 610  Estimated interval: 10.9    A/P:     -  Vancomycin dose: start 750 mg q12h starting @ 2300 tonight    -  Next vancomycin level(s):    -  @ 1400   - Vt @ 2230     -  Predicted vancomycin AUC from initial AUC test calculator: 559 mg·hr/L    -  Comments: Pharmacy to continue to f/u and " monitor per protocol.     Del Mann, TrumanD

## 2021-05-25 NOTE — OR NURSING
1243 Arrived to PACU. Airway patent. Lungs clear. Surgical site CDI. No redness, warmth or swelling noted. VSS. Allergies reviewed. Report received from anesthesia. Pt care assumed. Hemovac drain intact and charged.     1258 Pt c/o 8/10 pain, medicated. See MAR.     1303 Pt c/o 7/10 pain, medicated. See MAR.     1304 Vancomycin infusion complete.     1309 Pt has rigors. Dr. Mcfadden called for further orders.     1318 Pt c/o nausea, zofran given. See MAR.     1321 Report to MARYANA Sosa.

## 2021-05-25 NOTE — PROGRESS NOTES
Received bedside report from MARYANA Olivarez. Pt resting comfortably, no current needs, universal fall and safety precautions in place, bed in lowest position and call light within reach. Reminded pt of NPO status, pt understands.

## 2021-05-26 PROBLEM — A49.01 STAPHYLOCOCCUS AUREUS INFECTION: Status: ACTIVE | Noted: 2021-05-26

## 2021-05-26 LAB
ANION GAP SERPL CALC-SCNC: 8 MMOL/L (ref 7–16)
BUN SERPL-MCNC: 9 MG/DL (ref 8–22)
CALCIUM SERPL-MCNC: 7.9 MG/DL (ref 8.4–10.2)
CHLORIDE SERPL-SCNC: 106 MMOL/L (ref 96–112)
CO2 SERPL-SCNC: 23 MMOL/L (ref 20–33)
CREAT SERPL-MCNC: 0.54 MG/DL (ref 0.5–1.4)
ERYTHROCYTE [DISTWIDTH] IN BLOOD BY AUTOMATED COUNT: 47.4 FL (ref 35.9–50)
GLUCOSE SERPL-MCNC: 105 MG/DL (ref 65–99)
HCT VFR BLD AUTO: 23.9 % (ref 37–47)
HGB BLD-MCNC: 7.2 G/DL (ref 12–16)
MAGNESIUM SERPL-MCNC: 1.8 MG/DL (ref 1.5–2.5)
MCH RBC QN AUTO: 24.6 PG (ref 27–33)
MCHC RBC AUTO-ENTMCNC: 30.1 G/DL (ref 33.6–35)
MCV RBC AUTO: 81.6 FL (ref 81.4–97.8)
MRSA DNA SPEC QL NAA+PROBE: NORMAL
PLATELET # BLD AUTO: 310 K/UL (ref 164–446)
PMV BLD AUTO: 9.6 FL (ref 9–12.9)
POTASSIUM SERPL-SCNC: 4.1 MMOL/L (ref 3.6–5.5)
RBC # BLD AUTO: 2.93 M/UL (ref 4.2–5.4)
SIGNIFICANT IND 70042: NORMAL
SITE SITE: NORMAL
SODIUM SERPL-SCNC: 137 MMOL/L (ref 135–145)
SOURCE SOURCE: NORMAL
WBC # BLD AUTO: 9.3 K/UL (ref 4.8–10.8)

## 2021-05-26 PROCEDURE — 99255 IP/OBS CONSLTJ NEW/EST HI 80: CPT | Performed by: INTERNAL MEDICINE

## 2021-05-26 PROCEDURE — A9270 NON-COVERED ITEM OR SERVICE: HCPCS | Performed by: INTERNAL MEDICINE

## 2021-05-26 PROCEDURE — 80048 BASIC METABOLIC PNL TOTAL CA: CPT

## 2021-05-26 PROCEDURE — 87641 MR-STAPH DNA AMP PROBE: CPT

## 2021-05-26 PROCEDURE — 97165 OT EVAL LOW COMPLEX 30 MIN: CPT

## 2021-05-26 PROCEDURE — 700111 HCHG RX REV CODE 636 W/ 250 OVERRIDE (IP): Performed by: INTERNAL MEDICINE

## 2021-05-26 PROCEDURE — 83735 ASSAY OF MAGNESIUM: CPT

## 2021-05-26 PROCEDURE — 36415 COLL VENOUS BLD VENIPUNCTURE: CPT

## 2021-05-26 PROCEDURE — A9270 NON-COVERED ITEM OR SERVICE: HCPCS | Performed by: PHYSICIAN ASSISTANT

## 2021-05-26 PROCEDURE — 700111 HCHG RX REV CODE 636 W/ 250 OVERRIDE (IP): Performed by: PHYSICIAN ASSISTANT

## 2021-05-26 PROCEDURE — 770001 HCHG ROOM/CARE - MED/SURG/GYN PRIV*

## 2021-05-26 PROCEDURE — 700105 HCHG RX REV CODE 258: Performed by: INTERNAL MEDICINE

## 2021-05-26 PROCEDURE — 85027 COMPLETE CBC AUTOMATED: CPT

## 2021-05-26 PROCEDURE — 700102 HCHG RX REV CODE 250 W/ 637 OVERRIDE(OP): Performed by: PHYSICIAN ASSISTANT

## 2021-05-26 PROCEDURE — 700102 HCHG RX REV CODE 250 W/ 637 OVERRIDE(OP): Performed by: INTERNAL MEDICINE

## 2021-05-26 PROCEDURE — 700105 HCHG RX REV CODE 258: Performed by: PHYSICIAN ASSISTANT

## 2021-05-26 PROCEDURE — 02HV33Z INSERTION OF INFUSION DEVICE INTO SUPERIOR VENA CAVA, PERCUTANEOUS APPROACH: ICD-10-PCS | Performed by: INTERNAL MEDICINE

## 2021-05-26 PROCEDURE — 87040 BLOOD CULTURE FOR BACTERIA: CPT | Mod: 91

## 2021-05-26 PROCEDURE — 97162 PT EVAL MOD COMPLEX 30 MIN: CPT

## 2021-05-26 PROCEDURE — 700101 HCHG RX REV CODE 250: Performed by: INTERNAL MEDICINE

## 2021-05-26 PROCEDURE — 99233 SBSQ HOSP IP/OBS HIGH 50: CPT | Performed by: INTERNAL MEDICINE

## 2021-05-26 RX ORDER — CEFAZOLIN SODIUM IN 0.9 % NACL 2 G/100 ML
2 PLASTIC BAG, INJECTION (ML) INTRAVENOUS EVERY 8 HOURS
Status: DISCONTINUED | OUTPATIENT
Start: 2021-05-26 | End: 2021-05-26

## 2021-05-26 RX ADMIN — DOCUSATE SODIUM 50 MG AND SENNOSIDES 8.6 MG 2 TABLET: 8.6; 5 TABLET, FILM COATED ORAL at 18:04

## 2021-05-26 RX ADMIN — PIPERACILLIN AND TAZOBACTAM 4.5 G: 4; .5 INJECTION, POWDER, LYOPHILIZED, FOR SOLUTION INTRAVENOUS; PARENTERAL at 05:17

## 2021-05-26 RX ADMIN — ASPIRIN 81 MG: 81 TABLET, COATED ORAL at 01:43

## 2021-05-26 RX ADMIN — DOCUSATE SODIUM 100 MG: 100 CAPSULE, LIQUID FILLED ORAL at 05:17

## 2021-05-26 RX ADMIN — NAFCILLIN SODIUM 2 G: 2 INJECTION, POWDER, FOR SOLUTION INTRAMUSCULAR; INTRAVENOUS at 22:01

## 2021-05-26 RX ADMIN — SIMVASTATIN 20 MG: 20 TABLET, FILM COATED ORAL at 18:04

## 2021-05-26 RX ADMIN — HYDROMORPHONE HYDROCHLORIDE 0.5 MG: 1 INJECTION, SOLUTION INTRAMUSCULAR; INTRAVENOUS; SUBCUTANEOUS at 08:20

## 2021-05-26 RX ADMIN — VANCOMYCIN HYDROCHLORIDE 750 MG: 1 INJECTION, POWDER, LYOPHILIZED, FOR SOLUTION INTRAVENOUS at 11:39

## 2021-05-26 RX ADMIN — DOCUSATE SODIUM 50 MG AND SENNOSIDES 8.6 MG 1 TABLET: 8.6; 5 TABLET, FILM COATED ORAL at 21:54

## 2021-05-26 RX ADMIN — OXYCODONE HYDROCHLORIDE 5 MG: 5 TABLET ORAL at 06:13

## 2021-05-26 RX ADMIN — DOCUSATE SODIUM 100 MG: 100 CAPSULE, LIQUID FILLED ORAL at 18:04

## 2021-05-26 RX ADMIN — ASPIRIN 81 MG: 81 TABLET, COATED ORAL at 14:02

## 2021-05-26 RX ADMIN — OXYCODONE HYDROCHLORIDE 10 MG: 10 TABLET ORAL at 15:57

## 2021-05-26 RX ADMIN — FERROUS SULFATE TAB 325 MG (65 MG ELEMENTAL FE) 325 MG: 325 (65 FE) TAB at 18:04

## 2021-05-26 RX ADMIN — OXYCODONE HYDROCHLORIDE 5 MG: 5 TABLET ORAL at 01:51

## 2021-05-26 RX ADMIN — OXYCODONE HYDROCHLORIDE 5 MG: 5 TABLET ORAL at 20:00

## 2021-05-26 RX ADMIN — OXYCODONE HYDROCHLORIDE 10 MG: 10 TABLET ORAL at 11:39

## 2021-05-26 RX ADMIN — TRAZODONE HYDROCHLORIDE 100 MG: 50 TABLET ORAL at 21:54

## 2021-05-26 ASSESSMENT — ENCOUNTER SYMPTOMS
DIARRHEA: 0
DOUBLE VISION: 0
NAUSEA: 0
NERVOUS/ANXIOUS: 0
ABDOMINAL PAIN: 0
HEADACHES: 0
WEAKNESS: 0
FEVER: 0
SHORTNESS OF BREATH: 0
CONSTIPATION: 0
BLURRED VISION: 0
VOMITING: 0
MYALGIAS: 1
COUGH: 0
SPUTUM PRODUCTION: 0
CHILLS: 1

## 2021-05-26 ASSESSMENT — COGNITIVE AND FUNCTIONAL STATUS - GENERAL
SUGGESTED CMS G CODE MODIFIER DAILY ACTIVITY: CJ
WALKING IN HOSPITAL ROOM: A LITTLE
DAILY ACTIVITIY SCORE: 21
MOBILITY SCORE: 22
DRESSING REGULAR LOWER BODY CLOTHING: A LOT
CLIMB 3 TO 5 STEPS WITH RAILING: A LITTLE
SUGGESTED CMS G CODE MODIFIER MOBILITY: CH
HELP NEEDED FOR BATHING: A LITTLE
SUGGESTED CMS G CODE MODIFIER DAILY ACTIVITY: CH
MOBILITY SCORE: 24
SUGGESTED CMS G CODE MODIFIER MOBILITY: CJ
DAILY ACTIVITIY SCORE: 24

## 2021-05-26 ASSESSMENT — GAIT ASSESSMENTS
ASSISTIVE DEVICE: FRONT WHEEL WALKER
DEVIATION: STEP TO
DISTANCE (FEET): 150
DISTANCE (FEET): 15
GAIT LEVEL OF ASSIST: SUPERVISED

## 2021-05-26 ASSESSMENT — PAIN DESCRIPTION - PAIN TYPE
TYPE: SURGICAL PAIN
TYPE: ACUTE PAIN

## 2021-05-26 ASSESSMENT — ACTIVITIES OF DAILY LIVING (ADL): TOILETING: INDEPENDENT

## 2021-05-26 NOTE — ASSESSMENT & PLAN NOTE
Preliminary wound cultures positive for MSSA  - ID consulted, switch to nafcillin, will continue with daptomycin for 6 weeks on discharge

## 2021-05-26 NOTE — PROGRESS NOTES
Received report and assumed care of pt. A&O x4, reports 7/10 pain, was medicated @ 0613. Denies nausea, numbness/tingling. BLE pulses present. Pt able to plantar/dorsiflex. Dressing on L hip CDI, dressing for hemovac has small amount of old drainage but otherwise dry and intact. No other needs at this time. Abx running. Bed low and locked.

## 2021-05-26 NOTE — CARE PLAN
The patient is Stable - Low risk of patient condition declining or worsening    Shift Goals  Clinical Goals: Pts pain will be at a tolerable level   Patient Goals: comfort with movement     Progress made toward(s) clinical / shift goals:  Pt is able to rest in between pain medication administration     Patient is not progressing towards the following goals: N/A      Problem: Pain - Standard  Goal: Alleviation of pain or a reduction in pain to the patient’s comfort goal  Outcome: Progressing     Problem: Knowledge Deficit - Standard  Goal: Patient and family/care givers will demonstrate understanding of plan of care, disease process/condition, diagnostic tests and medications  Outcome: Progressing

## 2021-05-26 NOTE — PROGRESS NOTES
Pt continues to have break through pain after Tramadol administration. Dr. Christensen paged at 2030. New order received, see orders for Nursing communication.

## 2021-05-26 NOTE — CARE PLAN
Problem: Nutritional:  Goal: Achieve adequate nutritional intake  Description: Patient will consume >50% of meals  Outcome: Progressing

## 2021-05-26 NOTE — CARE PLAN
Problem: Pain - Standard  Goal: Alleviation of pain or a reduction in pain to the patient’s comfort goal  Outcome: Progressing     Problem: Knowledge Deficit - Standard  Goal: Patient and family/care givers will demonstrate understanding of plan of care, disease process/condition, diagnostic tests and medications  Outcome: Progressing   The patient is Stable - Low risk of patient condition declining or worsening    Shift Goals  Clinical Goals: Pain management  Patient Goals: comfort with movement     Progress made toward(s) clinical / shift goals:  Pain assessed and medicated per MAR    Patient is not progressing towards the following goals: N/A

## 2021-05-26 NOTE — PROGRESS NOTES
1930: Received report from Day shift RN. Pt is laying in bed, A+OX4 , showing no signs of distress. Pt c/o 6/10 pain, see MAR. VSS. CMS intact in LLE. Pt is able to plantar and dorsi flex bilaterally. Call light and belongings at bedside and within reach. All questions answered at this time. Rounding in place

## 2021-05-26 NOTE — DISCHARGE PLANNING
"Anticipated Discharge Disposition:   Home when medically cleared    Action:   Chart review complete.     Discussed patient's plan of care and plans for discharge during rounds.   Per MD, ID to consult patient and cultures pending. Per MD, patient may need long term IV antibiotics.     PT/OT consulted.     Per PT note, \"Recommend outpatient physical therapy services to address higher level deficits.\"   Per OT note,  \"Anticipate that the patient will have no further occupational therapy needs after discharge from the hospital.\"     RN CM will continue to follow and assist.    Barriers to Discharge:   Medical Clearance  ID consult/possible long term antibiotics    Plan:   Wait for ID consult   Hospital care management will continue to assist with discharge planning needs.     "

## 2021-05-26 NOTE — PROGRESS NOTES
"Hospital Medicine Daily Progress Note    Date of Service  5/26/2021    Chief Complaint  61 y.o. female admitted 5/24/2021 with hip pain    Hospital Course  Per notes, \"61 y.o. female who presented 5/24/2021 with fevers, chills and increased pain and warmth over the left hip.  She had a hip fracture 3 years ago and unfortunately has had complications and has had infection in that hip before.  Her most recent surgery was march 2020.  Given her prominence over the left hip and her history, she had CT evaluation of the hip and has a 5x6 cm abscess.  This was discussed with her surgeon, Dr Mendes who will be by to see her this afternoon and likely take her to OR for at least another washout.  She is afebrile and has a normal WBC.  Empiric antibiotics will not be given and intraoperative cultures will be collected to direct course of treatment.  Patient is very anxious as she is the primary caregiver for her mother currently on hospice service at home and case management is offering assistance for coordination of care.\"      Interval Problem Update  Seen and examined by me this morning, still having some pain. Nursing had no events to report.     Consultants/Specialty  Orthopedic surgery  Infectious Disease     Code Status  Full Code    Disposition  Anticipated discharge to be determined    Review of Systems  Review of Systems   Constitutional: Positive for malaise/fatigue.   Musculoskeletal: Positive for joint pain.   All other systems reviewed and are negative.       Physical Exam  Temp:  [36.7 °C (98 °F)-37.8 °C (100 °F)] 37.1 °C (98.7 °F)  Pulse:  [63-86] 80  Resp:  [16-18] 18  BP: ()/() 96/51  SpO2:  [93 %-100 %] 94 %    Physical Exam  Vitals and nursing note reviewed.   Constitutional:       Appearance: Normal appearance.   Cardiovascular:      Rate and Rhythm: Normal rate and regular rhythm.      Pulses: Normal pulses.      Heart sounds: Normal heart sounds.   Pulmonary:      Effort: Pulmonary effort is " normal.      Breath sounds: Normal breath sounds.   Abdominal:      General: Abdomen is flat. Bowel sounds are normal.      Palpations: Abdomen is soft.   Musculoskeletal:         General: Swelling and tenderness present.      Left lower leg: Edema present.   Skin:     General: Skin is warm.   Neurological:      General: No focal deficit present.      Mental Status: She is alert and oriented to person, place, and time.         Fluids    Intake/Output Summary (Last 24 hours) at 5/26/2021 1401  Last data filed at 5/26/2021 0800  Gross per 24 hour   Intake 2384.22 ml   Output 1265 ml   Net 1119.22 ml       Laboratory  Recent Labs     05/24/21  1213 05/25/21  0442 05/26/21  0313   WBC 8.6 6.8 9.3   RBC 4.27 4.04* 2.93*   HEMOGLOBIN 10.5* 9.9* 7.2*   HEMATOCRIT 34.0* 32.3* 23.9*   MCV 79.6* 80.0* 81.6   MCH 24.6* 24.5* 24.6*   MCHC 30.9* 30.7* 30.1*   RDW 46.2 47.3 47.4   PLATELETCT 396 379 310   MPV 9.3 9.2 9.6     Recent Labs     05/24/21  1213 05/25/21  0442 05/26/21  0313   SODIUM 137 137 137   POTASSIUM 3.9 4.2 4.1   CHLORIDE 103 104 106   CO2 23 25 23   GLUCOSE 98 94 105*   BUN 12 13 9   CREATININE 0.71 0.55 0.54   CALCIUM 9.1 8.4 7.9*                   Imaging  DX-PELVIS-1 OR 2 VIEWS   Final Result         1. Status post revised left hip arthroplasty without evidence of early hardware complication.      CT-HIP WITH PLUS RECONS LEFT   Final Result      1.  Soft tissue fluid collection lateral to the left hip most consistent with abscess measuring 5.2 x 4.4 x 6.9 cm.      2.  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           Assessment/Plan  * Abscess of left hip  Assessment & Plan  Multiple complications related to this hip placed 3 years ago  Orthopedic surgery consulted, follow-up on recommendations, full hip revision on 5/25.  Initial cultures with s. Aureus, pending final culture.  Discontinue Zosyn  ID consulted, FU on recommendations   IV hydration    Staphylococcus aureus  infection- (present on admission)  Assessment & Plan  Preliminary wound cultures positive for S. Aureus, pending final cultures.   - ID consulted, FU on antibiotic recommendations    Allergy to adhesive  Assessment & Plan  Hypoallergenic supplies when able  PRN benadryl if needed.    Anxiety- (present on admission)  Assessment & Plan  Prn ativan if needed, difficult social situation at home    Insomnia- (present on admission)  Assessment & Plan  Trazodone qhs.       VTE prophylaxis: SCDs

## 2021-05-26 NOTE — THERAPY
"Occupational Therapy   Initial Evaluation     Patient Name: Kemi Silva  Age:  61 y.o., Sex:  female  Medical Record #: [id number]  Today's Date: 5/26/2021     Precautions  Precautions: Fall Risk, Posterior Hip Precautions, No Weight Bearing Restrictions Left Lower Extremity  Comments: s/p L RUDDY revision and I&D, hemovac and ROBERTO dressing in place    Assessment    Patient is 61 y.o. female with a diagnosis of infected L RUDDY, now s/p I&D and L RUDDY revision, posterior approach. Per pt, this is her 6th hip sx on L hip, last one just over 6mos ago. Additional factors influencing patient status / progress: thoracic pain, anemia, HLD, osteopenia, PONV, high cholesterol. Pt lives with , adult son, and 99-y/o mother whom she is primary caregiver for. Her main bedroom and bathroom are on the 3rd flr, with bilateral handrails to go up/down 2 flights. Pt states she does not access basement, since son stays down there. Pt is very active PTA, teaches pilates, and drives. She now presents with decreased activity tolerance, reduced balance, and limited knowledge of post op pxn. Pt was educated on posterior hip pxn, post op care, dressing techniques, use of AE for LB dressing safety, and home safety. Pt demonstrates good carryover, but did require cueing for reminder of PHPs. She will benefit from acute OT in house, but I anticipate she will not need post acute/HH OT upon dc home.     Plan    Recommend Occupational Therapy 5 times per week until therapy goals are met for the following treatments:  Adaptive Equipment, Community Re-integration, Neuro Re-Education / Balance, Self Care/Activities of Daily Living, Therapeutic Activities and Therapeutic Exercises.    DC Equipment Recommendations:  (aravind sousa)  Discharge Recommendations: Anticipate that the patient will have no further occupational therapy needs after discharge from the hospital     Subjective    \"This isn't my first rodeo.\"     Objective       " 05/26/21 0918   Prior Living Situation   Prior Services None   Housing / Facility 3 Story House  (trilevel home with basement)   Steps Into Home 2  (with ramp access as well)   Steps In Home 17   Rail Both Rail (Steps in Home)   Bathroom Set up Walk In Shower;Grab Bars;Shower Glass Doors  (handheld shower)   Equipment Owned Front-Wheel Walker;Grab Bar(s) In Tub / Shower;Grab Bar(s) By Toilet;Hand Held Shower;Ramp   Lives with - Patient's Self Care Capacity Spouse;Adult Children;Other (Comments)  (mother)   Comments pt lives with  and adult son, as well as her 99-y/o mother whom she cares for. both  and son works, and mom is under hospice care.    Prior Level of ADL Function   Self Feeding Independent   Grooming / Hygiene Independent   Bathing Independent   Dressing Independent   Toileting Independent   Prior Level of IADL Function   Medication Management Independent   Laundry Independent   Kitchen Mobility Independent   Finances Independent   Home Management Independent   Shopping Independent   Prior Level Of Mobility Independent Without Device in Community;Independent Without Device in Home   Driving / Transportation Driving Independent   Occupation (Pre-Hospital Vocational) Employed Full Time   Comments pt [employer]   History of Falls   History of Falls No   Precautions   Precautions Fall Risk;Posterior Hip Precautions;No Weight Bearing Restrictions Left Lower Extremity   Comments s/p L RUDDY revision and I&D, hemovac and ROBERTO dressing in place   Pain   Pain Scales 0 to 10 Scale    Intervention Medication (see MAR)   Pain 0 - 10 Group   Location Hip   Location Orientation Left   Pain Rating Scale (NPRS) 6   Description Sharp;Shooting;Throbbing   Comfort Goal Comfort with Movement   Therapist Pain Assessment 8;During Activity;Post Activity Pain Same as Prior to Activity;Nurse Notified   Non Verbal Descriptors   Non Verbal Scale  Calm;Unlabored Breathing   Cognition    Cognition / Consciousness WDL    Level of Consciousness Alert   Comments pleasant and cooperative, but slightly impulsive   Passive ROM Upper Body   Passive ROM Upper Body WDL   Active ROM Upper Body   Active ROM Upper Body  WDL   Dominant Hand Right   Strength Upper Body   Upper Body Strength  WDL   Balance Assessment   Sitting Balance (Static) Good   Sitting Balance (Dynamic) Good   Standing Balance (Static) Good   Standing Balance (Dynamic) Fair +   Weight Shift Sitting Good   Weight Shift Standing Good   Comments with FWW, cues for safety and sequencing   Bed Mobility    Supine to Sit Modified Independent   Sit to Supine Modified Independent   Scooting Modified Independent   Comments HOB elevated, rails not utilized   ADL Assessment   Grooming Standing;Supervision   Upper Body Dressing Supervision   Lower Body Dressing Moderate Assist   Toileting Modified Independent   How much help from another person does the patient currently need...   Putting on and taking off regular lower body clothing? 2   Bathing (including washing, rinsing, and drying)? 3   Toileting, which includes using a toilet, bedpan, or urinal? 4   Putting on and taking off regular upper body clothing? 4   Taking care of personal grooming such as brushing teeth? 4   Eating meals? 4   6 Clicks Daily Activity Score 21   Functional Mobility   Sit to Stand Supervised   Bed, Chair, Wheelchair Transfer Supervised   Toilet Transfers Supervised   Transfer Method Stand Step   Mobility in room with FWW   Distance (Feet) 15   # of Times Distance was Traveled 2   Activity Tolerance   Sitting in Chair 5 mins toilet   Sitting Edge of Bed 15 mins   Standing 10 mins   Short Term Goals   Short Term Goal # 1 pt will complete LB dressing with PRN AE use at spv level   Short Term Goal # 2 pt will recite and carryover PHPs without cues at discharge   Education Group   Education Provided Role of Occupational Therapist;Hip Precautions;Adaptive Equipment;Home Safety;Transfers;Activities of Daily  Living   Role of Occupational Therapist Patient Response Patient;Acceptance;Explanation;Verbal Demonstration   Hip Precautions Patient Response Patient;Acceptance;Explanation;Demonstration;Handout;Verbal Demonstration;Reinforcement Needed   Home Safety Patient Response Patient;Acceptance;Explanation;Verbal Demonstration   Transfers Patient Response Patient;Acceptance;Explanation;Demonstration;Handout;Verbal Demonstration;Reinforcement Needed   ADL Patient Response Patient;Acceptance;Explanation;Demonstration;Verbal Demonstration;Reinforcement Needed   Adaptive Equipment Patient Response Patient;Acceptance;Explanation;Verbal Demonstration;Action Demonstration;Reinforcement Needed;Demonstration   Problem List   Problem List Decreased Active Daily Living Skills;Limited Knowledge of Post Op Precautions   Anticipated Discharge Equipment and Recommendations   DC Equipment Recommendations   (aravind sousa)   Discharge Recommendations Anticipate that the patient will have no further occupational therapy needs after discharge from the hospital   Interdisciplinary Plan of Care Collaboration   IDT Collaboration with  Nursing;Physical Therapist   Patient Position at End of Therapy In Bed;Call Light within Reach;Tray Table within Reach;Phone within Reach   Collaboration Comments RN aware of OT session   Session Information   Date / Session Number  5/26 #1 (1/5, 6/1) LF   Priority 4

## 2021-05-26 NOTE — THERAPY
Physical Therapy   Initial Evaluation     Patient Name: Kemi Silva  Age:  61 y.o., Sex:  female  Medical Record #: [id number]  Today's Date: 5/26/2021     Precautions: (P) Posterior Hip Precautions, Weight Bearing As Tolerated Left Lower Extremity    Assessment  Patient is 61 y.o. female with a diagnosis of L THR revision secondary to infection.Pt lives at home with  and is active.Pt is safe with bed mob,transfers,ambulation and stairs,she understands precautions and HEP and has no equipment needs.    Plan    Recommend Physical Therapy Eval only      05/26/21 1000   Total Time Spent   Total Time Spent (Mins) 30   Charge Group   PT Evaluation PT Evaluation Mod   Initial Contact Note    Initial Contact Note Order Received and Verified, Physical Therapy Evaluation in Progress with Full Report to Follow.   Precautions   Precautions Posterior Hip Precautions;Weight Bearing As Tolerated Left Lower Extremity   Pain 0 - 10 Group   Pain Rating Scale (NPRS) 2   Therapist Pain Assessment 2   Prior Living Situation   Prior Services None   Housing / Facility 3 Story House   Steps Into Home 2   Steps In Home 17   Equipment Owned Front-Wheel Walker   Lives with - Patient's Self Care Capacity Spouse;Adult Children   Prior Level of Functional Mobility   Bed Mobility Independent   Transfer Status Independent   Ambulation Independent   Distance Ambulation (Feet)   (community amb)   Assistive Devices Used None   Stairs Independent   History of Falls   History of Falls No   Cognition    Cognition / Consciousness WDL   Passive ROM Lower Body   Passive ROM Lower Body X   Active ROM Lower Body    Active ROM Lower Body  X   Strength Lower Body   Lower Body Strength  X   Coordination Upper Body   Coordination WDL   Coordination Lower Body    Coordination Lower Body  WDL   Balance Assessment   Sitting Balance (Static) Good   Sitting Balance (Dynamic) Good   Standing Balance (Static) Good   Standing Balance (Dynamic) Good    Weight Shift Sitting Good   Weight Shift Standing Good   Gait Analysis   Gait Level Of Assist Supervised   Assistive Device Front Wheel Walker   Distance (Feet) 150   # of Times Distance was Traveled 2   Deviation Step To   # of Stairs Climbed 6   Level of Assist with Stairs Supervised   Weight Bearing Status wbat L   Bed Mobility    Supine to Sit Modified Independent   Sit to Supine Modified Independent   Scooting Modified Independent   Functional Mobility   Sit to Stand Supervised   Bed, Chair, Wheelchair Transfer Supervised   Toilet Transfers Supervised   Transfer Method Stand Step   How much difficulty does the patient currently have...   Turning over in bed (including adjusting bedclothes, sheets and blankets)? 4   Sitting down on and standing up from a chair with arms (e.g., wheelchair, bedside commode, etc.) 4   Moving from lying on back to sitting on the side of the bed? 4   How much help from another person does the patient currently need...   Moving to and from a bed to a chair (including a wheelchair)? 4   Need to walk in a hospital room? 4   Climbing 3-5 steps with a railing? 4   6 clicks Mobility Score 24   Activity Tolerance   Sitting Edge of Bed 10   Standing 10   Patient / Family Goals    Patient / Family Goal #1 Home   Anticipated Discharge Equipment and Recommendations   DC Equipment Recommendations None   Discharge Recommendations Recommend outpatient physical therapy services to address higher level deficits   Interdisciplinary Plan of Care Collaboration   IDT Collaboration with  Nursing   Session Information   Date / Session Number  5/26   Priority 0       DC Equipment Recommendations: (P) None  Discharge Recommendations: (P) Recommend outpatient physical therapy services to address higher level deficits

## 2021-05-26 NOTE — PROGRESS NOTES
"Patient seen and examined  Reporting moderate pain requiring oxycodone.  \"I'm more sore with this surgery than all the other ones.\"    BP (!) 96/51   Pulse 80   Temp 37.1 °C (98.7 °F) (Oral)   Resp 18   Ht 1.727 m (5' 8\")   Wt 53.3 kg (117 lb 8.1 oz)   SpO2 94%     Recent Labs     05/24/21  1213 05/25/21  0442 05/26/21  0313   WBC 8.6 6.8 9.3   RBC 4.27 4.04* 2.93*   HEMOGLOBIN 10.5* 9.9* 7.2*   HEMATOCRIT 34.0* 32.3* 23.9*   MCV 79.6* 80.0* 81.6   MCH 24.6* 24.5* 24.6*   MCHC 30.9* 30.7* 30.1*   RDW 46.2 47.3 47.4   PLATELETCT 396 379 310   MPV 9.3 9.2 9.6       No acute distress  ROBERTO dressing clean dry and intact  Neurovascularly intact  HV intact.  Scant bloody drainage. 50cc last shift.     POD#1 I&D RUDDY infection.       Plan:  DVT Prophylaxis- TEDS/SCDs, ASA 81 mg PO BID  Will keep HV in place for now.  Would like 20cc or less output before pulling out.   Weight Bearing Status-WBAT, posterior hip precautions.   PT/OT  Antibiotics: zosyn/vanco for now.  Awaiting ID consult for further recommendations.   Case Coordination          "

## 2021-05-26 NOTE — DIETARY
"Nutrition services: Day 2 of admit.  Kemi Silva is a 61 y.o. female with admitting DX of Abscess of left hip.    Consult received for low BMI      Assessment:  Height: 172.7 cm (5' 8\")  Weight: 53.3 kg (117 lb 8.1 oz)(stand up scale)  Body mass index is 17.87 kg/m²., BMI classification: underweight  Diet/Intake: regular/% x 1     Evaluation:   1. HX includes GERD and hyperlipidemia.   2. RD met with pt in her room. Pt ate ~1/2 of her omelet, a few bites of sausage and her fruit at breakfast. She said that she has never had a big appetite. She eats because she knows she needs to. She reports usually eating a later breakfast and dinner daily. She will snack on crackers or nuts between meals. She used to drink milk mixed with protein powder in the past when she wanted to gain some weight. She is planning on starting this again because she is a little less than her goal weight of 120 lb. Pt did not think she would like Boost supplements. She agreed to addition of Chobani smoothie with meals TID.  3. Pt report that she has lost ~ 5 lb (4%) x 2 weeks due to diminished PO intake of ~50% of normal. She said that she has been eating only 1 meal a day over the past 2 weeks. Appetite has been poor due to her infection. Weight loss is significant.     Malnutrition Risk: Pt meets criteria for severe malnutrition in the context of acute illness related to diminished appetite due to presence of infection AEB PO 50% of normal x 2 weeks and report of 4% weight loss x 2 weeks.     Recommendations/Plan:  1. Add Chobani Smoothies to meals TID   2. Encourage intake of >50%  3. Document intake of all meals as % taken in ADL's to provide interdisciplinary communication across all shifts.   4. Monitor weight.  5. Nutrition rep will continue to see patient for ongoing meal and snack preferences.     RD will follow.        "

## 2021-05-27 LAB
ANION GAP SERPL CALC-SCNC: 8 MMOL/L (ref 7–16)
BACTERIA TISS AEROBE CULT: ABNORMAL
BACTERIA TISS AEROBE CULT: ABNORMAL
BACTERIA WND AEROBE CULT: ABNORMAL
BUN SERPL-MCNC: 8 MG/DL (ref 8–22)
CALCIUM SERPL-MCNC: 8 MG/DL (ref 8.4–10.2)
CHLORIDE SERPL-SCNC: 106 MMOL/L (ref 96–112)
CO2 SERPL-SCNC: 25 MMOL/L (ref 20–33)
CREAT SERPL-MCNC: 0.55 MG/DL (ref 0.5–1.4)
ERYTHROCYTE [DISTWIDTH] IN BLOOD BY AUTOMATED COUNT: 46.7 FL (ref 35.9–50)
GLUCOSE SERPL-MCNC: 102 MG/DL (ref 65–99)
GRAM STN SPEC: ABNORMAL
HCT VFR BLD AUTO: 23.5 % (ref 37–47)
HGB BLD-MCNC: 7.2 G/DL (ref 12–16)
MAGNESIUM SERPL-MCNC: 1.8 MG/DL (ref 1.5–2.5)
MCH RBC QN AUTO: 24.1 PG (ref 27–33)
MCHC RBC AUTO-ENTMCNC: 30.6 G/DL (ref 33.6–35)
MCV RBC AUTO: 78.6 FL (ref 81.4–97.8)
PLATELET # BLD AUTO: 307 K/UL (ref 164–446)
PMV BLD AUTO: 9.5 FL (ref 9–12.9)
POTASSIUM SERPL-SCNC: 4.2 MMOL/L (ref 3.6–5.5)
RBC # BLD AUTO: 2.99 M/UL (ref 4.2–5.4)
SIGNIFICANT IND 70042: ABNORMAL
SITE SITE: ABNORMAL
SODIUM SERPL-SCNC: 139 MMOL/L (ref 135–145)
SOURCE SOURCE: ABNORMAL
WBC # BLD AUTO: 6.1 K/UL (ref 4.8–10.8)

## 2021-05-27 PROCEDURE — 700105 HCHG RX REV CODE 258: Performed by: INTERNAL MEDICINE

## 2021-05-27 PROCEDURE — A9270 NON-COVERED ITEM OR SERVICE: HCPCS | Performed by: INTERNAL MEDICINE

## 2021-05-27 PROCEDURE — 85027 COMPLETE CBC AUTOMATED: CPT

## 2021-05-27 PROCEDURE — A9270 NON-COVERED ITEM OR SERVICE: HCPCS | Performed by: PHYSICIAN ASSISTANT

## 2021-05-27 PROCEDURE — 97535 SELF CARE MNGMENT TRAINING: CPT

## 2021-05-27 PROCEDURE — 36415 COLL VENOUS BLD VENIPUNCTURE: CPT

## 2021-05-27 PROCEDURE — 99233 SBSQ HOSP IP/OBS HIGH 50: CPT | Performed by: INTERNAL MEDICINE

## 2021-05-27 PROCEDURE — 80048 BASIC METABOLIC PNL TOTAL CA: CPT

## 2021-05-27 PROCEDURE — 700102 HCHG RX REV CODE 250 W/ 637 OVERRIDE(OP): Performed by: INTERNAL MEDICINE

## 2021-05-27 PROCEDURE — 97530 THERAPEUTIC ACTIVITIES: CPT

## 2021-05-27 PROCEDURE — 83735 ASSAY OF MAGNESIUM: CPT

## 2021-05-27 PROCEDURE — 700102 HCHG RX REV CODE 250 W/ 637 OVERRIDE(OP): Performed by: PHYSICIAN ASSISTANT

## 2021-05-27 PROCEDURE — 770001 HCHG ROOM/CARE - MED/SURG/GYN PRIV*

## 2021-05-27 PROCEDURE — 700101 HCHG RX REV CODE 250: Performed by: INTERNAL MEDICINE

## 2021-05-27 RX ORDER — DIAZEPAM 2 MG/1
2-4 TABLET ORAL EVERY 6 HOURS PRN
Status: DISCONTINUED | OUTPATIENT
Start: 2021-05-27 | End: 2021-05-29 | Stop reason: HOSPADM

## 2021-05-27 RX ORDER — RIFAMPIN 300 MG/1
300 CAPSULE ORAL 2 TIMES DAILY
Status: DISCONTINUED | OUTPATIENT
Start: 2021-05-27 | End: 2021-05-29 | Stop reason: HOSPADM

## 2021-05-27 RX ADMIN — ACETAMINOPHEN 650 MG: 325 TABLET ORAL at 08:15

## 2021-05-27 RX ADMIN — DIPHENHYDRAMINE HCL 25 MG: 25 TABLET ORAL at 22:36

## 2021-05-27 RX ADMIN — OXYCODONE HYDROCHLORIDE 10 MG: 10 TABLET ORAL at 17:22

## 2021-05-27 RX ADMIN — POLYETHYLENE GLYCOL 3350 1 PACKET: 17 POWDER, FOR SOLUTION ORAL at 06:25

## 2021-05-27 RX ADMIN — NAFCILLIN SODIUM 2 G: 2 INJECTION, POWDER, FOR SOLUTION INTRAMUSCULAR; INTRAVENOUS at 06:25

## 2021-05-27 RX ADMIN — NAFCILLIN SODIUM 2 G: 2 INJECTION, POWDER, FOR SOLUTION INTRAMUSCULAR; INTRAVENOUS at 19:15

## 2021-05-27 RX ADMIN — NAFCILLIN SODIUM 2 G: 2 INJECTION, POWDER, FOR SOLUTION INTRAMUSCULAR; INTRAVENOUS at 15:26

## 2021-05-27 RX ADMIN — DIPHENHYDRAMINE HCL 25 MG: 25 TABLET ORAL at 15:26

## 2021-05-27 RX ADMIN — NAFCILLIN SODIUM 2 G: 2 INJECTION, POWDER, FOR SOLUTION INTRAMUSCULAR; INTRAVENOUS at 03:00

## 2021-05-27 RX ADMIN — FERROUS SULFATE TAB 325 MG (65 MG ELEMENTAL FE) 325 MG: 325 (65 FE) TAB at 17:22

## 2021-05-27 RX ADMIN — NAFCILLIN SODIUM 2 G: 2 INJECTION, POWDER, FOR SOLUTION INTRAMUSCULAR; INTRAVENOUS at 22:36

## 2021-05-27 RX ADMIN — RIFAMPIN 300 MG: 300 CAPSULE ORAL at 17:22

## 2021-05-27 RX ADMIN — DIAZEPAM 4 MG: 2 TABLET ORAL at 22:38

## 2021-05-27 RX ADMIN — ASPIRIN 81 MG: 81 TABLET, COATED ORAL at 15:26

## 2021-05-27 RX ADMIN — NAFCILLIN SODIUM 2 G: 2 INJECTION, POWDER, FOR SOLUTION INTRAMUSCULAR; INTRAVENOUS at 11:20

## 2021-05-27 RX ADMIN — OXYCODONE HYDROCHLORIDE 5 MG: 5 TABLET ORAL at 00:26

## 2021-05-27 RX ADMIN — OXYCODONE HYDROCHLORIDE 5 MG: 5 TABLET ORAL at 08:15

## 2021-05-27 RX ADMIN — OXYCODONE HYDROCHLORIDE 10 MG: 10 TABLET ORAL at 11:20

## 2021-05-27 RX ADMIN — ASPIRIN 81 MG: 81 TABLET, COATED ORAL at 00:26

## 2021-05-27 RX ADMIN — SODIUM CHLORIDE: 9 INJECTION, SOLUTION INTRAVENOUS at 06:25

## 2021-05-27 RX ADMIN — DOCUSATE SODIUM 50 MG AND SENNOSIDES 8.6 MG 1 TABLET: 8.6; 5 TABLET, FILM COATED ORAL at 20:29

## 2021-05-27 RX ADMIN — TRAZODONE HYDROCHLORIDE 100 MG: 50 TABLET ORAL at 22:00

## 2021-05-27 RX ADMIN — SIMVASTATIN 20 MG: 20 TABLET, FILM COATED ORAL at 17:22

## 2021-05-27 RX ADMIN — OXYCODONE HYDROCHLORIDE 10 MG: 10 TABLET ORAL at 04:34

## 2021-05-27 ASSESSMENT — ENCOUNTER SYMPTOMS
NAUSEA: 0
ABDOMINAL PAIN: 0
CHILLS: 0
CONSTIPATION: 0
COUGH: 0
VOMITING: 0
SHORTNESS OF BREATH: 0
FEVER: 0
DIARRHEA: 0
MYALGIAS: 1

## 2021-05-27 ASSESSMENT — COGNITIVE AND FUNCTIONAL STATUS - GENERAL
DAILY ACTIVITIY SCORE: 23
SUGGESTED CMS G CODE MODIFIER DAILY ACTIVITY: CI
HELP NEEDED FOR BATHING: A LITTLE

## 2021-05-27 ASSESSMENT — PAIN DESCRIPTION - PAIN TYPE
TYPE: SURGICAL PAIN
TYPE: ACUTE PAIN;SURGICAL PAIN
TYPE: SURGICAL PAIN

## 2021-05-27 ASSESSMENT — GAIT ASSESSMENTS: DISTANCE (FEET): 150

## 2021-05-27 NOTE — FACE TO FACE
Face to Face Supporting Documentation - Home Health    The encounter with this patient was in whole or in part the primary reason for home health admission.    Date of encounter:   Patient:                    MRN:                       YOB: 2023  Kemi Silva  [id number]  1960     Home health to see patient for:  Skilled Nursing care for assessment, interventions & education and Wound Care    Skilled need for:  Surgical Aftercare Infected hip    Skilled nursing interventions to include:  Home IV infusion therapy and Line/Drain/Airway education and care    Homebound status evidenced by:  Needs the assistance of another person in order to leave the home. Leaving home requires a considerable and taxing effort. There is a normal inability to leave the home.    Community Physician to provide follow up care: Rafaela Meyers P.A.-C.     Optional Interventions? No      I certify the face to face encounter for this home health care referral meets the CMS requirements and the encounter/clinical assessment with the patient was, in whole, or in part, for the medical condition(s) listed above, which is the primary reason for home health care. Based on my clinical findings: the service(s) are medically necessary, support the need for home health care, and the homebound criteria are met.  I certify that this patient has had a face to face encounter by myself.  Perez Pineda M.D. - NPI: [id number]

## 2021-05-27 NOTE — PROGRESS NOTES
Infectious Disease Progress Note    Author: Rhiannon Mcrae M.D. Date & Time of service: 2021  10:29 AM    Chief Complaint:  Prosthetic joint infection left hip    Interval History:    Review of Systems:  Review of Systems   Constitutional: Negative for chills, fever and malaise/fatigue.   Respiratory: Negative for cough and shortness of breath.    Gastrointestinal: Negative for abdominal pain, constipation, diarrhea, nausea and vomiting.   Musculoskeletal: Positive for joint pain and myalgias.       Hemodynamics:  Temp (24hrs), Av.3 °C (99.1 °F), Min:36.9 °C (98.4 °F), Max:37.6 °C (99.7 °F)  Temperature: 37.5 °C (99.5 °F)  Pulse  Av.9  Min: 63  Max: 96   Blood Pressure: 115/73       Physical Exam:  Physical Exam  Constitutional:       Appearance: Normal appearance.   Cardiovascular:      Rate and Rhythm: Normal rate and regular rhythm.      Heart sounds: Normal heart sounds.   Pulmonary:      Effort: Pulmonary effort is normal.      Breath sounds: Normal breath sounds.   Abdominal:      General: Abdomen is flat. Bowel sounds are normal.      Palpations: Abdomen is soft.   Musculoskeletal:         General: Tenderness and signs of injury present.      Left lower leg: Edema present.      Comments: Left hip with edema, mild tenderness, drain in place   Skin:     General: Skin is warm and dry.   Neurological:      General: No focal deficit present.      Mental Status: She is alert and oriented to person, place, and time.   Psychiatric:         Mood and Affect: Mood normal.         Behavior: Behavior normal.         Meds:    Current Facility-Administered Medications:   •  nafcillin  •  Pharmacy Consult Request  •  ondansetron  •  dexamethasone  •  diphenhydrAMINE  •  haloperidol lactate  •  scopolamine  •  docusate sodium  •  senna-docusate  •  senna-docusate  •  polyethylene glycol/lytes  •  magnesium hydroxide  •  bisacodyl  •  fleet  •  aspirin EC  •  ferrous sulfate  •  simvastatin  •  traZODone  •   senna-docusate **AND** polyethylene glycol/lytes **AND** magnesium hydroxide **AND** bisacodyl  •  NS  •  acetaminophen  •  Notify provider if pain remains uncontrolled **AND** Use the Numeric Rating Scale (NRS), Kramer-Baker Faces (WBF), or FLACC on regular floors and Critical-Care Pain Observation Tool (CPOT) on ICUs/Trauma to assess pain **AND** Pulse Ox **AND** Pharmacy Consult Request **AND** If patient difficult to arouse and/or has respiratory depression (respiratory rate of 10 or less), stop any opiates that are currently infusing and call a Rapid Response.  •  oxyCODONE immediate-release **OR** oxyCODONE immediate-release **OR** HYDROmorphone  •  cloNIDine  •  enalaprilat  •  labetalol  •  ondansetron  •  ondansetron  •  promethazine  •  promethazine  •  prochlorperazine  •  diphenhydrAMINE  •  LORazepam  •  traMADol    Labs:  Recent Labs     05/24/21  1213 05/24/21  1213 05/25/21 0442 05/26/21 0313 05/27/21  0401   WBC 8.6   < > 6.8 9.3 6.1   RBC 4.27   < > 4.04* 2.93* 2.99*   HEMOGLOBIN 10.5*   < > 9.9* 7.2* 7.2*   HEMATOCRIT 34.0*   < > 32.3* 23.9* 23.5*   MCV 79.6*   < > 80.0* 81.6 78.6*   MCH 24.6*   < > 24.5* 24.6* 24.1*   RDW 46.2   < > 47.3 47.4 46.7   PLATELETCT 396   < > 379 310 307   MPV 9.3   < > 9.2 9.6 9.5   NEUTSPOLYS 69.70  --   --   --   --    LYMPHOCYTES 19.20*  --   --   --   --    MONOCYTES 9.30  --   --   --   --    EOSINOPHILS 0.80  --   --   --   --    BASOPHILS 0.50  --   --   --   --     < > = values in this interval not displayed.     Recent Labs     05/25/21 0442 05/26/21 0313 05/27/21  0401   SODIUM 137 137 139   POTASSIUM 4.2 4.1 4.2   CHLORIDE 104 106 106   CO2 25 23 25   GLUCOSE 94 105* 102*   BUN 13 9 8     Recent Labs     05/24/21 1213 05/24/21 1213 05/25/21 0442 05/26/21 0313 05/27/21  0401   ALBUMIN 3.4  --   --   --   --    TBILIRUBIN 0.2  --   --   --   --    ALKPHOSPHAT 104*  --   --   --   --    TOTPROTEIN 6.9  --   --   --   --    ALTSGPT 11  --   --   --   --     ASTSGOT 16  --   --   --   --    CREATININE 0.71   < > 0.55 0.54 0.55    < > = values in this interval not displayed.       Imaging:  CT-HIP WITH PLUS RECONS LEFT    Result Date: 5/24/2021 5/24/2021 1:20 PM HISTORY/REASON FOR EXAM:  Hip replaced, infection suspected. TECHNIQUE/ EXAM DESCRIPTION AND NUMBER OF VIEWS:  CT scan of the pelvis/hip with contrast and including reconstructions. Thin-section helical images were obtained from the iliac crests through the lesser trochanters with contrast. Coronal reconstructions were generated from the axial images. 100 mL of Omnipaque 350 nonionic contrast was utilized. Low dose optimization technique was utilized for this CT exam including automated exposure control and adjustment of the mA and/or kV according to patient size. COMPARISON: November 6, 2020 FINDINGS: Patient is status post left hip replacement. There has been removal of lateral fixation plate fixing greater trochanter fracture. The trochanter fracture again noted and is ununited. No acute fracture identified. No dislocation. There is bone resorption about the proximal aspect of the femoral prosthesis. There is a fluid collection lateral to the left hip which demonstrates some peripheral enhancement. This measures 5.2 x 4.4 x 6.9 cm and is most consistent with an abscess based on reported history of pain and redness and swelling about the left hip. There is some degenerative change in the right hip.     1.  Soft tissue fluid collection lateral to the left hip most consistent with abscess measuring 5.2 x 4.4 x 6.9 cm. 2.  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    DX-PELVIS-1 OR 2 VIEWS    Result Date: 5/25/2021 5/25/2021 1:04 PM HISTORY/REASON FOR EXAM:  Post-Op - To include entire implant. TECHNIQUE/EXAM DESCRIPTION AND NUMBER OF VIEWS:  1 view(s) of the pelvis. COMPARISON:  CT 5/24/2021. FINDINGS: Postsurgical changes from revised left hip arthoplasty. Extensive  heterotopic ossification along the greater trochanteric region. No periprosthetic fracture. There are expected soft tissue swelling and gas as well as surgical drain.     1. Status post revised left hip arthroplasty without evidence of early hardware complication.      Micro:  Results     Procedure Component Value Units Date/Time    CULTURE WOUND W/ GRAM STAIN [[id number]]  (Abnormal) Collected: 05/25/21 1141    Order Status: Completed Specimen: Wound Updated: 05/27/21 0954     Significant Indicator POS     Source WND     Site Left Hip Fluid     Culture Result -     Gram Stain Result Few WBCs.  Few Gram positive cocci.       Culture Result Staphylococcus aureus  Light growth  See previous culture for sensitivity report.      Narrative:      Surgery - swabs received    Anaerobic Culture [[id number]] Collected: 05/25/21 1141    Order Status: Completed Specimen: Wound Updated: 05/27/21 0954     Significant Indicator NEG     Source WND     Site Left Hip Fluid     Culture Result Culture in progress.    Narrative:      Surgery - swabs received    Anaerobic Culture [[id number]] Collected: 05/25/21 1100    Order Status: Completed Specimen: Wound Updated: 05/27/21 0936     Significant Indicator NEG     Source WND     Site left hip tissue     Culture Result Culture in progress.    Narrative:      Surgery Specimen    CULTURE TISSUE W/ GRM STAIN [[id number]]  (Abnormal) Collected: 05/25/21 1100    Order Status: Completed Specimen: Wound Updated: 05/27/21 0936     Significant Indicator POS     Source WND     Site left hip tissue     Culture Result -     Gram Stain Result No organisms seen.     Culture Result Staphylococcus aureus  Light growth  See previous culture for sensitivity report.      Narrative:      Surgery Specimen    Anaerobic Culture [[id number]] Collected: 05/25/21 1140    Order Status: Completed Specimen: Wound Updated: 05/27/21 0935     Significant Indicator NEG     Source WND     Site left hip fluids     Culture  Result Culture in progress.    Narrative:      Surgery - swabs received    CULTURE WOUND W/ GRAM STAIN [[id number]]  (Abnormal)  (Susceptibility) Collected: 05/25/21 1140    Order Status: Completed Specimen: Wound Updated: 05/27/21 0935     Significant Indicator POS     Source WND     Site left hip fluids     Culture Result -     Gram Stain Result Few WBCs.  Few Gram positive cocci.       Culture Result Staphylococcus aureus  Moderate growth      Narrative:      Surgery - swabs received    Susceptibility     Staphylococcus aureus (1)     Antibiotic Interpretation Microscan Method Status    Azithromycin Sensitive <=2 mcg/mL MAURICIO Final    Clindamycin Sensitive <=0.25 mcg/mL MAURICIO Final    Cefazolin Sensitive <=8 mcg/mL MAURICIO Final    Cefepime Sensitive <=4 mcg/mL MAURICIO Final    Ceftaroline Sensitive <=0.5 mcg/mL MAURICIO Final    Daptomycin Sensitive <=0.5 mcg/mL MAURICIO Final    Ampicillin/sulbactam Sensitive <=8/4 mcg/mL MAURICIO Final    Erythromycin Sensitive <=0.25 mcg/mL MAURICIO Final    Vancomycin Sensitive 1 mcg/mL MAURICIO Final    Oxacillin Sensitive 0.5 mcg/mL MAURICIO Final    Pip/Tazobactam Sensitive <=8 mcg/mL MAURICIO Final    Trimeth/Sulfa Sensitive <=0.5/9.5 mcg/mL MAURICIO Final    Tetracycline Sensitive <=4 mcg/mL MAURICIO Final                   BLOOD CULTURE [[id number]] Collected: 05/26/21 2124    Order Status: Completed Specimen: Blood from Peripheral Updated: 05/27/21 0734     Significant Indicator NEG     Source BLD     Site PERIPHERAL     Culture Result No Growth  Note: Blood cultures are incubated for 5 days and  are monitored continuously.Positive blood cultures  are called to the RN and reported as soon as  they are identified.  Blood culture testing and Gram stain, if indicated, are  performed at [facility], [address] [place] [address].  Positive blood cultures are  sent to [facility], [address] [place] [address], for organism identification and  susceptibility testing.       "Narrative:      Per Hospital Policy: Only change Specimen Src: to \"Line\" if  specified by physician order.  Right AC    BLOOD CULTURE [[id number]] Collected: 05/26/21 2233    Order Status: Completed Specimen: Blood from Peripheral Updated: 05/27/21 0734     Significant Indicator NEG     Source BLD     Site PERIPHERAL     Culture Result No Growth  Note: Blood cultures are incubated for 5 days and  are monitored continuously.Positive blood cultures  are called to the RN and reported as soon as  they are identified.  Blood culture testing and Gram stain, if indicated, are  performed at [facility], [address] [place] [address].  Positive blood cultures are  sent to [facility], [address] [place] [address], for organism identification and  susceptibility testing.      Narrative:      Per Hospital Policy: Only change Specimen Src: to \"Line\" if  specified by physician order.  Right AC    MRSA By PCR (Amp) [[id number]] Collected: 05/26/21 1145    Order Status: Completed Specimen: Respirate from Nares Updated: 05/26/21 1950     Significant Indicator NEG     Source RESP     Site NARES     MRSA PCR Negative for MRSA by PCR.    Narrative:      Collected By:[id number] RIYA WING  Collected By:[id number] RIYA WING    GRAM STAIN [[id number]] Collected: 05/25/21 1140    Order Status: Completed Specimen: Wound Updated: 05/25/21 2214     Significant Indicator .     Source WND     Site left hip fluids     Gram Stain Result Few WBCs.  Few Gram positive cocci.      Narrative:      Surgery - swabs received    GRAM STAIN [[id number]] Collected: 05/25/21 1141    Order Status: Completed Specimen: Wound Updated: 05/25/21 2213     Significant Indicator .     Source WND     Site Left Hip Fluid     Gram Stain Result Few WBCs.  Few Gram positive cocci.      Narrative:      Surgery - swabs received    GRAM STAIN [[id number]] Collected: 05/25/21 1100    Order Status: Completed Specimen: Wound " "Updated: 05/25/21 2211     Significant Indicator .     Source WND     Site left hip tissue     Gram Stain Result No organisms seen.    Narrative:      Surgery Specimen    SARS-CoV-2 PCR (24 hour In-House): Collect NP swab in [facility] [[id number]] Collected: 05/24/21 1443    Order Status: Completed Specimen: Respirate from Nasopharyngeal Updated: 05/25/21 1612     SARS-CoV-2 Source NP Swab     SARS-CoV-2 by PCR NotDetected     Comment: PATIENTS: Important information regarding your results and instructions can  be found at [email or web address]   \"After your  Covid-19 Test\"    [facility] providers: PLEASE REFER TO DE-ESCALATION AND RETESTING PROTOCOL  on [email or web address]    **The TaqPath COVID-19 SARS-CoV-2 RT-PCR test has been made available for  use under the Emergency Use Authorization (EUA) only.         Narrative:      Have you been in close contact with a person who is suspected  or known to be positive for COVID-19 within the last 30 days  (e.g. last seen that person < 30 days ago)->No    MRSA By PCR (Amp) [[id number]]     Order Status: Canceled Specimen: Respirate from Nares     Anaerobic Culture [[id number]] Collected: 05/25/21 1100    Order Status: Completed Specimen: Wound Updated: 05/25/21 1452     Significant Indicator NEG     Source WND     Site left hip fluids     Culture Result -    BLOOD CULTURE [[id number]] Collected: 05/24/21 1213    Order Status: Completed Specimen: Blood from Peripheral Updated: 05/25/21 0718     Significant Indicator NEG     Source BLD     Site PERIPHERAL     Culture Result No Growth  Note: Blood cultures are incubated for 5 days and  are monitored continuously.Positive blood cultures  are called to the RN and reported as soon as  they are identified.  Blood culture testing and Gram stain, if indicated, are  performed at [facility], [address] [place] [address].  Positive blood cultures are  sent to [facility], " "[address] [place] [address], for organism identification and  susceptibility testing.      Narrative:      1 of 2 for Blood Culture x 2 sites order. Per Hospital  Policy: Only change Specimen Src: to \"Line\" if specified by  physician order.  Right AC    BLOOD CULTURE [[id number]] Collected: 05/24/21 1400    Order Status: Completed Specimen: Blood from Peripheral Updated: 05/25/21 0718     Significant Indicator NEG     Source BLD     Site PERIPHERAL     Culture Result No Growth  Note: Blood cultures are incubated for 5 days and  are monitored continuously.Positive blood cultures  are called to the RN and reported as soon as  they are identified.  Blood culture testing and Gram stain, if indicated, are  performed at [facility], [address] [place] [address].  Positive blood cultures are  sent to [facility], [address] [place] [address], for organism identification and  susceptibility testing.      Narrative:      2 of 2 blood culture x2  Sites order. Per Hospital Policy:  Only change Specimen Src: to \"Line\" if specified by physician  order.  Right AC          Assessment:  Active Hospital Problems    Diagnosis    • *Abscess of left hip [L02.416]    • Staphylococcus aureus infection [A49.01]    • Allergy to adhesive [Z91.048]    • Anxiety [F41.9]    • Insomnia [G47.00]      Interval 24 hours:      AF, O2 RA  Labs reviewed  Micro reviewed    Patient doing well, some ongoing pain in left hip.  Patient continued on nafcillin as below.    Assessment:  61 y.o.  admitted 5/24/2021. Pt has a past medical history of left hip fracture approximately 3 years ago with ongoing pain and complications.  Most recent surgery in 11/2020 and history of MSSA infection in the hip treated with cefazolin and then long-term Bactrim prior to the last surgery.  She has been off antibiotics since 11/2020.  Ongoing pain in the area and increasing left thigh edema.  Underwent CT evaluation " with finding of abscess.       Hospital Course:   Orthopedics was consulted and she went to the OR on 5/25 for I&D and polyliner exchange.  Per op note purulent material was found the tract all the way to the greater trochanter region.  The head and liner were placed.  Cultures now positive with MSSA.  Patient with some ongoing left hip pain and rigors post surgery.     Problem List      Left hip abscess  Chronic prostatic joint infection, left hip, cultures +MSSA  -S/P head and liner exchange on 5/25,original hardware remains in place      Plan      --- Continue nafcillin 2 g q4h and will add rifampin 300 mg BID.  Typically would use cefazolin in this case, but  patient had cefazolin before and developed widespread rash and does not wish to rechallenge.  Cefazolin has a unique side chain different from other beta lactams so anticipate she will tolerate nafcillin. On discharge will likely transition to daptomycin 8 mg/kg for once daily dosing + rifampin 300 mg bid   --- Blood cultures remain negative -if blood cultures remain negative for 48 hours okay to place PICC line  --- Plan for  6 weeks of IV and then p.o. thereafter (docycycline), long-term   --- Per surgeon the plan will be eventual explant of the hardware   --- Monitor labs  --- Wound care and drain management per surgery      Discussed with internal medicine Dr. Moran and Dr. Mendes. ID will follow.

## 2021-05-27 NOTE — THERAPY
"Occupational Therapy  Daily Treatment     Patient Name: Kemi Silva  Age:  61 y.o., Sex:  female  Medical Record #: [id number]  Today's Date: 5/27/2021     Precautions  Precautions: Posterior Hip Precautions, Weight Bearing As Tolerated Left Lower Extremity  Comments: s/p L RUDDY revision and I&D, hemovac and ROBERTO dressing in place    Assessment    Pt seen for OT tx today, reviewed posterior hip pxns, dressing techniques, shower strategies with ROBERTO dressing, and ADL txfs. Pt demonstrated improved carryover of all safety techniques without LOB during ADL txfs and good utilization of FWW. Also reports improved pain today, and no cues provided during entire OT visit. Pt has met goals for OT, will be safe to transition home without post acute or HH OT services, once medically cleared. No further needs.     Plan    Discharge secondary to goals met.    DC Equipment Recommendations: None (ordered reacher)  Discharge Recommendations: Anticipate that the patient will have no further occupational therapy needs after discharge from the hospital    Subjective    \"I feel so much better today.\"     Objective       05/27/21 1446   Pain   Pain Scales 0 to 10 Scale    Intervention Medication (see MAR)   Pain 0 - 10 Group   Location Hip   Location Orientation Left   Pain Rating Scale (NPRS) 3   Description Aching   Comfort Goal Comfort with Movement   Therapist Pain Assessment Post Activity Pain Same as Prior to Activity   Non Verbal Descriptors   Non Verbal Scale  Calm;Unlabored Breathing   Cognition    Cognition / Consciousness WDL   Level of Consciousness Alert   Comments pleasant and cooperative   Passive ROM Upper Body   Passive ROM Upper Body WDL   Active ROM Upper Body   Active ROM Upper Body  WDL   Dominant Hand Right   Strength Upper Body   Upper Body Strength  WDL   Other Treatments   Other Treatments Provided reviewed posterior hip pxns, dressing techniques, shower strategies with ROBERTO dressing, and ADL txfs. pt " demonstrated improved carryover of all safety techniques without LOB during ADL txfs and good utilization of FWW. no cues provided during entire OT visit.   Balance   Sitting Balance (Static) Good   Sitting Balance (Dynamic) Good   Standing Balance (Static) Good   Standing Balance (Dynamic) Good   Weight Shift Sitting Good   Weight Shift Standing Good   Comments with FWW   Bed Mobility    Supine to Sit Modified Independent   Sit to Supine Modified Independent   Scooting Modified Independent   Skilled Intervention Compensatory Strategies   Activities of Daily Living   Grooming Standing;Modified Independent   Upper Body Dressing Modified Independent   Lower Body Dressing Modified Independent  (doffing socks, and donning undergarment with AE use)   Toileting Modified Independent   Skilled Intervention Compensatory Strategies   How much help from another person does the patient currently need...   Putting on and taking off regular lower body clothing? 4   Bathing (including washing, rinsing, and drying)? 3   Toileting, which includes using a toilet, bedpan, or urinal? 4   Putting on and taking off regular upper body clothing? 4   Taking care of personal grooming such as brushing teeth? 4   Eating meals? 4   6 Clicks Daily Activity Score 23   Functional Mobility   Sit to Stand Modified Independent   Bed, Chair, Wheelchair Transfer Modified Independent   Toilet Transfers Modified Independent   Transfer Method Stand Step   Mobility in room and hallways with FWW   Distance (Feet) 150   # of Times Distance was Traveled 2   Skilled Intervention Compensatory Strategies   Activity Tolerance   Sitting in Chair 5 mins; up ad drew   Sitting Edge of Bed NT   Standing 15 mins   Short Term Goals   Short Term Goal # 1 pt will complete LB dressing with PRN AE use at spv level   Goal Outcome # 1 Goal met   Short Term Goal # 2 pt will recite and carryover PHPs without cues at discharge   Goal Outcome # 2 Goal met   Education Group   Hip  Precautions Patient Response Patient;Acceptance;Explanation;Demonstration;Handout;Verbal Demonstration;Action Demonstration   Transfers Patient Response Patient;Acceptance;Explanation;Demonstration;Handout;Verbal Demonstration;Action Demonstration   ADL Patient Response Patient;Acceptance;Explanation;Demonstration;Verbal Demonstration;Action Demonstration   Anticipated Discharge Equipment and Recommendations   DC Equipment Recommendations None  (ordered reacher)   Discharge Recommendations Anticipate that the patient will have no further occupational therapy needs after discharge from the hospital   Interdisciplinary Plan of Care Collaboration   IDT Collaboration with  Nursing   Patient Position at End of Therapy In Bed;Call Light within Reach;Tray Table within Reach;Phone within Reach   Collaboration Comments RN aware of OT session   Session Information   Date / Session Number  5/27 #2 (DC OT, goals met) LF   Priority 0

## 2021-05-27 NOTE — CARE PLAN
The patient is Stable - Low risk of patient condition declining or worsening    Shift Goals  Clinical Goals: Pt's pain will remain at a tolerable level   Patient Goals: Comfort with movement     Progress made toward(s) clinical / shift goals:  Pt requesting pain medication to maintain pain at a tolerable level.     Patient is not progressing towards the following goals: n/a        Problem: Pain - Standard  Goal: Alleviation of pain or a reduction in pain to the patient’s comfort goal  Outcome: Progressing     Problem: Knowledge Deficit - Standard  Goal: Patient and family/care givers will demonstrate understanding of plan of care, disease process/condition, diagnostic tests and medications  Outcome: Progressing

## 2021-05-27 NOTE — DISCHARGE PLANNING
"Anticipated Discharge Disposition:   Home with outpatient or home IV antibiotic infusions and possibly HH     Action:   Chart review complete.     Discussed patient's plan of care and plans for discharge during rounds.   Per MD, ID is pending blood cultures before order for PICC line and outpatient IV antibiotics can be set up. Per ID note from 5/27, \"Plan for 6 weeks of IV and then p.o. thereafter (docycycline), long-term.\"     RN CM will continue to follow and assist.     Barriers to Discharge:   IV Antibiotic set up   Possible HH set up   Medical Clearance  Pending blood cultures     Plan:   Wait for blood cultures and appropriate orders   Hospital care management will continue to assist with discharge planning needs.     "

## 2021-05-27 NOTE — CONSULTS
Consults   INFECTIOUS DISEASES INPATIENT CONSULT NOTE     Date of Service: 5/26/2021    Consult Requested By: Perez Pineda M.D.    Reason for Consultation: Prosthetic joint infection left hip    History of Present Illness:   Kemi Silva is a 61 y.o.  admitted 5/24/2021. Pt has a past medical history of left hip fracture approximately 3 years ago with ongoing pain and complications.  Most recent surgery in 11/2020 and history of MSSA infection in the hip treated with cefazolin and then long-term Bactrim prior to the last surgery.  She has been off antibiotics since 11/2020.  Ongoing pain in the area and increasing left thigh edema.  Underwent CT evaluation with finding of abscess.      Hospital Course:   Orthopedics was consulted and she went to the OR on 5/25 for I&D and polyliner exchange.  Per op note purulent material was found the tract all the way to the greater trochanter region.  The head and liner were placed.  Cultures now positive with MSSA.    Review Of Systems:  Review of Systems   Constitutional: Positive for chills and malaise/fatigue. Negative for fever.   HENT: Negative for hearing loss.    Eyes: Negative for blurred vision and double vision.   Respiratory: Negative for cough, sputum production and shortness of breath.    Cardiovascular: Positive for leg swelling. Negative for chest pain.   Gastrointestinal: Negative for abdominal pain, constipation, diarrhea, nausea and vomiting.   Genitourinary: Negative for dysuria.   Musculoskeletal: Positive for joint pain and myalgias.   Skin: Negative for rash.   Neurological: Negative for weakness and headaches.   Psychiatric/Behavioral: The patient is not nervous/anxious.          PMH:   Past Medical History:   Diagnosis Date   • Acute midline thoracic back pain 10/19/2016   • Anesthesia 11/19/2018    post op nausea; shakes   • Arthritis     osteoarthritis; hip, thumbs, knees   • High cholesterol    • Hip pain     left   • History of anemia       • Hyperlipidemia 6/17/2013   • Insomnia    • Menopause    • Osteopenia    • PONV (postoperative nausea and vomiting)        PSH:  Past Surgical History:   Procedure Laterality Date   • HIP REVISION TOTAL Left 5/25/2021    Procedure: REVISION, TOTAL ARTHROPLASTY, HIP. IRRIGATION AND DEBREIDMENT WITH LINER HEAD EXCHANGE;  Surgeon: Julian Mendes M.D.;  Location: [facility];  Service: Orthopedics   • HARDWARE REMOVAL ORTHO Left 11/16/2020    Procedure: REMOVAL, HARDWARE - HIP;  Surgeon: Julian Mendes M.D.;  Location: [facility];  Service: Orthopedics   • IRRIGATION & DEBRIDEMENT HIP Left 3/31/2020    Procedure: IRRIGATION AND DEBRIDEMENT, HIP;  Surgeon: Julian Mendes M.D.;  Location: [facility];  Service: Orthopedics   • HIP ORIF Left 3/5/2020    Procedure: ORIF, HIP;  Surgeon: Julian Mendes M.D.;  Location: [facility];  Service: Orthopedics   • HIP ORIF Left 11/21/2019    Procedure: ORIF, HIP greater trochanter;  Surgeon: Julian Mendes M.D.;  Location: [facility];  Service: Orthopedics   • HIP ARTH ANTERIOR TOTAL Left 11/29/2018    Procedure: HIP ARTHROPLASTY ANTERIOR TOTAL;  Surgeon: Julian Mendes M.D.;  Location: [facility];  Service: Orthopedics   • SHOULDER DECOMPRESSION ARTHROSCOPIC Left 7/23/2018    Procedure: SHOULDER DECOMPRESSION ARTHROSCOPIC- SUBACROMIAL;  Surgeon: Tuan Collins M.D.;  Location: [facility];  Service: Orthopedics   • SHOULDER ARTHROSCOPY W/ ROTATOR CUFF REPAIR  7/23/2018    Procedure: SHOULDER ARTHROSCOPY W/ ROTATOR CUFF REPAIR- WITH OPEN BICEP TENODESIS AND SUBCAPULAR REPAIR;  Surgeon: Tuan Collins M.D.;  Location: [facility];  Service: Orthopedics   • ULNA ORIF Left 2010    and radius   • UMBILICAL HERNIA REPAIR  10/21/2009    Performed by FLORIAN BHANDARI at SURGERY SAME DAY [facility]   • OTHER ORTHOPEDIC SURGERY Bilateral 2009    [facility]        FAMILY HX:  Family History   Adopted: Yes   Family history unknown: Yes       SOCIAL HX:  Social History     Socioeconomic History   • Marital status:      Spouse name: Not on file   • Number of children: Not on file   • Years of education: Not on file   • Highest education level: Not on file   Occupational History   • Occupation:      Employer: OTHER   Tobacco Use   • Smoking status: Former Smoker     Packs/day: 0.25     Years: 20.00     Pack years: 5.00     Types: Cigarettes     Quit date: 1999     Years since quittin.8   • Smokeless tobacco: Never Used   Vaping Use   • Vaping Use: Never used   Substance and Sexual Activity   • Alcohol use: No     Alcohol/week: 0.0 oz     Comment: former heavy use, 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       Social Determinants of Health     Financial Resource Strain:    • Difficulty of Paying Living Expenses:    Food Insecurity:    • Worried About Running Out of Food in the Last Year:    • Ran Out of Food in the Last Year:    Transportation Needs:    • Lack of Transportation (Medical):    • Lack of Transportation (Non-Medical):    Physical Activity:    • Days of Exercise per Week:    • Minutes of Exercise per Session:    Stress:    • Feeling of Stress :    Social Connections:    • Frequency of Communication with Friends and Family:    • Frequency of Social Gatherings with Friends and Family:    • Attends Oriental orthodox Services:    • Active Member of Clubs or Organizations:    • Attends Club or Organization Meetings:    • Marital Status:    Intimate Partner Violence:    • Fear of Current or Ex-Partner:    • Emotionally Abused:    • Physically Abused:    • Sexually Abused:      Social History     Tobacco Use    Smoking Status Former Smoker   • Packs/day: 0.25   • Years: 20.00   • Pack years: 5.00   • Types: Cigarettes   • Quit date: 1999   • Years since quittin.8   Smokeless Tobacco Never Used     Social History     Substance and Sexual Activity   Alcohol Use No   • Alcohol/week: 0.0 oz    Comment: former heavy use, 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  •  ondansetron (ZOFRAN) syringe/vial injection 4 mg, 4 mg, Intravenous, Q4HRS PRN, Pari Samuel, P.A.-C.  •  dexamethasone (DECADRON) injection 4 mg, 4 mg, Intravenous, Once PRN, Pari Samuel, P.A.-C.  •  diphenhydrAMINE (BENADRYL) injection 25 mg, 25 mg, Intravenous, Q6HRS PRN, Pari Samuel, P.A.-C.  •  haloperidol lactate (HALDOL) injection 1 mg, 1 mg, Intravenous, Q6HRS PRN, Pari Samuel, P.A.-C.  •  scopolamine (TRANSDERM-SCOP) patch 1 Patch, 1 Patch, Transdermal, Q72HRS PRN, Pari Samuel, P.A.-C.  •  docusate sodium (COLACE) capsule 100 mg, 100 mg, Oral, BID, Pari Samuel, P.A.-C., 100 mg at 21 0517  •  senna-docusate (PERICOLACE or SENOKOT S) 8.6-50 MG per tablet 1 tablet, 1 tablet, Oral, Nightly, Pari Samuel, P.A.-C.  •  senna-docusate (PERICOLACE or SENOKOT S) 8.6-50 MG per tablet 1 tablet, 1 tablet, Oral, Q24HRS PRN, Pari Samuel, P.A.-C.  •  polyethylene glycol/lytes (MIRALAX) PACKET 1 Packet, 1 Packet, Oral, BID PRN, Pari Samuel, LUIS ANTONIO.A.-C.  •  magnesium hydroxide (MILK OF MAGNESIA) suspension 30 mL, 30 mL, Oral, QDAY PRN, Pari Samuel, LUIS ANTONIO.A.-C.  •  bisacodyl (DULCOLAX) suppository 10 mg, 10 mg, Rectal, Q24HRS PRN, Pari Samuel, P.A.-C.  •  fleet enema 133 mL, 1 Each, Rectal, Once PRN, Pari Samuel, P.A.-C.  •   aspirin EC (ECOTRIN) tablet 81 mg, 81 mg, Oral, BID, BERNARDA Lewis-SARA., 81 mg at 05/26/21 1402  •  [COMPLETED] piperacillin-tazobactam (ZOSYN) 4.5 g in  mL IVPB, 4.5 g, Intravenous, Once, Stopped at 05/25/21 1631 **AND** [DISCONTINUED] piperacillin-tazobactam (ZOSYN) 4.5 g in  mL IVPB, 4.5 g, Intravenous, Q8HRS, Stopped at 05/26/21 0917 **AND** MD Alert...Vancomycin per Pharmacy, 1 Each, Other, PHARMACY TO DOSE, Pari Samuel P.A.-C.  •  vancomycin (VANCOCIN) 750 mg in  mL IVPB, 750 mg, Intravenous, Q12HR, Perez Pineda M.D., Stopped at 05/26/21 1339  •  ferrous sulfate tablet 325 mg, 325 mg, Oral, Q EVENING, Betsey Buchanan D.O., 325 mg at 05/25/21 1833  •  simvastatin (ZOCOR) tablet 20 mg, 20 mg, Oral, Q EVENING, Betsey Buchanan D.O., 20 mg at 05/25/21 1834  •  traZODone (DESYREL) tablet 100 mg, 100 mg, Oral, QHS, Bestey Buchanan, D.O., 100 mg at 05/25/21 2220  •  senna-docusate (PERICOLACE or SENOKOT S) 8.6-50 MG per tablet 2 tablet, 2 tablet, Oral, BID, 2 tablet at 05/25/21 1834 **AND** polyethylene glycol/lytes (MIRALAX) PACKET 1 Packet, 1 Packet, Oral, QDAY PRN **AND** magnesium hydroxide (MILK OF MAGNESIA) suspension 30 mL, 30 mL, Oral, QDAY PRN **AND** bisacodyl (DULCOLAX) suppository 10 mg, 10 mg, Rectal, QDAY PRN, Betsey Buchanan, D.O.  •  NS infusion, , Intravenous, Continuous, Betsey Buchanan D.O., Last Rate: 83 mL/hr at 05/25/21 1518, New Bag at 05/25/21 1518  •  acetaminophen (Tylenol) tablet 650 mg, 650 mg, Oral, Q6HRS PRN, Betsey Buchanan D.O., 650 mg at 05/25/21 0601  •  Notify provider if pain remains uncontrolled, , , CONTINUOUS **AND** Use the Numeric Rating Scale (NRS), Kramer-Baker Faces (WBF), or FLACC on regular floors and Critical-Care Pain Observation Tool (CPOT) on ICUs/Trauma to assess pain, , , CONTINUOUS **AND** Pulse Ox, , , CONTINUOUS **AND** Pharmacy Consult Request ...Pain Management Review 1 Each, 1 Each, Other, PHARMACY TO DOSE **AND** If patient difficult to  "arouse and/or has respiratory depression (respiratory rate of 10 or less), stop any opiates that are currently infusing and call a Rapid Response., , , CONTINUOUS, AUGUST Rust.O.  •  oxyCODONE immediate-release (ROXICODONE) tablet 5 mg, 5 mg, Oral, Q3HRS PRN, 5 mg at 21 0613 **OR** oxyCODONE immediate release (ROXICODONE) tablet 10 mg, 10 mg, Oral, Q3HRS PRN, 10 mg at 21 1557 **OR** HYDROmorphone (Dilaudid) injection 0.5 mg, 0.5 mg, Intravenous, Q3HRS PRN, Betsey Buchanan D.O., 0.5 mg at 21 0820  •  cloNIDine (CATAPRES) tablet 0.1 mg, 0.1 mg, Oral, Q6HRS PRN, Betsey Buchanan D.O.  •  enalaprilat (VASOTEC) injection 1.25 mg, 1.25 mg, Intravenous, Q6HRS PRN, Betsey Buchanan D.O.  •  labetalol (NORMODYNE/TRANDATE) injection 10 mg, 10 mg, Intravenous, Q4HRS PRN, Betsey Buchanan D.O.  •  ondansetron (ZOFRAN) syringe/vial injection 4 mg, 4 mg, Intravenous, Q4HRS PRN, Betsey Buchanan D.O.  •  ondansetron (ZOFRAN ODT) dispertab 4 mg, 4 mg, Oral, Q4HRS PRN, Betsey Buchanan, D.O.  •  promethazine (PHENERGAN) tablet 12.5-25 mg, 12.5-25 mg, Oral, Q4HRS PRN, Betsey Buchanan D.O.  •  promethazine (PHENERGAN) suppository 12.5-25 mg, 12.5-25 mg, Rectal, Q4HRS PRN, Betsey Buchanan D.O.  •  prochlorperazine (COMPAZINE) injection 5-10 mg, 5-10 mg, Intravenous, Q4HRS PRN, Betsey Buchanan D.O.  •  diphenhydrAMINE (BENADRYL) tablet/capsule 25 mg, 25 mg, Oral, Q6HRS PRN, Betsey Buchanan D.O.  •  LORazepam (ATIVAN) injection 0.5 mg, 0.5 mg, Intravenous, Q4HRS PRN, Betsey Buchanan D.O.  •  traMADol (ULTRAM) 50 MG tablet  mg,  mg, Oral, Q6HRS PRN, Betsey Buchanan D.O., 50 mg at 21 1931  [unfilled]    Most Recent Vital Signs:  /62   Pulse 91   Temp 36.9 °C (98.4 °F) (Oral)   Resp 18   Ht 1.727 m (5' 8\")   Wt 53.3 kg (117 lb 8.1 oz)   SpO2 94%   BMI 17.87 kg/m²   Temp  Av.9 °C (98.5 °F)  Min: 36.4 °C (97.5 °F)  Max: 37.8 °C (100 °F)    Physical Exam:  Physical Exam  Constitutional:       " Appearance: Normal appearance. She is obese.   HENT:      Head: Normocephalic and atraumatic.      Right Ear: External ear normal.      Left Ear: External ear normal.      Nose: Nose normal.      Mouth/Throat:      Mouth: Mucous membranes are dry.      Pharynx: Oropharynx is clear.   Eyes:      Extraocular Movements: Extraocular movements intact.      Conjunctiva/sclera: Conjunctivae normal.      Pupils: Pupils are equal, round, and reactive to light.   Cardiovascular:      Rate and Rhythm: Normal rate and regular rhythm.      Heart sounds: Normal heart sounds.   Pulmonary:      Effort: Pulmonary effort is normal.      Breath sounds: Normal breath sounds.   Abdominal:      General: Abdomen is flat. Bowel sounds are normal. There is no distension.      Palpations: Abdomen is soft.      Tenderness: There is no abdominal tenderness. There is no guarding.   Musculoskeletal:         General: Tenderness and signs of injury present.      Left lower leg: Edema present.      Comments: Left hip with edema, mild tenderness, pressure dressing and drain in place with serosanguineous fluid   Skin:     General: Skin is warm and dry.   Neurological:      General: No focal deficit present.      Mental Status: She is alert and oriented to person, place, and time.   Psychiatric:         Mood and Affect: Mood normal.         Behavior: Behavior normal.             Pertinent Lab Results:  Recent Labs     05/24/21  1213 05/25/21  0442 05/26/21  0313   WBC 8.6 6.8 9.3      Recent Labs     05/24/21 1213 05/25/21  0442 05/26/21  0313   HEMOGLOBIN 10.5* 9.9* 7.2*   HEMATOCRIT 34.0* 32.3* 23.9*   MCV 79.6* 80.0* 81.6   MCH 24.6* 24.5* 24.6*   PLATELETCT 396 379 310         Recent Labs     05/24/21 1213 05/25/21  0442 05/26/21  0313   SODIUM 137 137 137   POTASSIUM 3.9 4.2 4.1   CHLORIDE 103 104 106   CO2 23 25 23   CREATININE 0.71 0.55 0.54        Recent Labs     05/24/21 1213   ALBUMIN 3.4        Pertinent Micro:  Results     Procedure  Component Value Units Date/Time    MRSA By PCR (Amp) [[id number]] Collected: 05/26/21 1145    Order Status: Completed Specimen: Respirate from Nares Updated: 05/26/21 1622    Narrative:      Collected By:Tamiko WING    CULTURE WOUND W/ GRAM STAIN [[id number]]  (Abnormal) Collected: 05/25/21 1141    Order Status: Completed Specimen: Wound Updated: 05/26/21 1425     Significant Indicator POS     Source WND     Site Left Hip Fluid     Culture Result -     Gram Stain Result Few WBCs.  Few Gram positive cocci.       Culture Result Staphylococcus aureus  Light growth      Narrative:      Surgery - swabs received    Anaerobic Culture [[id number]] Collected: 05/25/21 1141    Order Status: Completed Specimen: Wound Updated: 05/26/21 1425     Significant Indicator NEG     Source WND     Site Left Hip Fluid     Culture Result Culture in progress.    Narrative:      Surgery - swabs received    Anaerobic Culture [[id number]] Collected: 05/25/21 1140    Order Status: Completed Specimen: Wound Updated: 05/26/21 1424     Significant Indicator NEG     Source WND     Site left hip fluids     Culture Result Culture in progress.    Narrative:      Surgery - swabs received    Anaerobic Culture [[id number]] Collected: 05/25/21 1100    Order Status: Completed Specimen: Wound Updated: 05/26/21 1424     Significant Indicator NEG     Source WND     Site left hip tissue     Culture Result Culture in progress.    Narrative:      Surgery Specimen    CULTURE TISSUE W/ GRM STAIN [[id number]]  (Abnormal) Collected: 05/25/21 1100    Order Status: Completed Specimen: Wound Updated: 05/26/21 1424     Significant Indicator POS     Source WND     Site left hip tissue     Culture Result -     Gram Stain Result No organisms seen.     Culture Result Staphylococcus aureus  Light growth      Narrative:      Surgery Specimen    CULTURE WOUND W/ GRAM STAIN [[id number]]  (Abnormal) Collected: 05/25/21 1140    Order Status: Completed Specimen: Wound Updated:  "05/26/21 1424     Significant Indicator POS     Source WND     Site left hip fluids     Culture Result -     Gram Stain Result Few WBCs.  Few Gram positive cocci.       Culture Result Staphylococcus aureus  Moderate growth  Methicillin sensitive via screening method      Narrative:      Surgery - swabs received    GRAM STAIN [[id number]] Collected: 05/25/21 1140    Order Status: Completed Specimen: Wound Updated: 05/25/21 2214     Significant Indicator .     Source WND     Site left hip fluids     Gram Stain Result Few WBCs.  Few Gram positive cocci.      Narrative:      Surgery - swabs received    GRAM STAIN [[id number]] Collected: 05/25/21 1141    Order Status: Completed Specimen: Wound Updated: 05/25/21 2213     Significant Indicator .     Source WND     Site Left Hip Fluid     Gram Stain Result Few WBCs.  Few Gram positive cocci.      Narrative:      Surgery - swabs received    GRAM STAIN [[id number]] Collected: 05/25/21 1100    Order Status: Completed Specimen: Wound Updated: 05/25/21 2211     Significant Indicator .     Source WND     Site left hip tissue     Gram Stain Result No organisms seen.    Narrative:      Surgery Specimen    SARS-CoV-2 PCR (24 hour In-House): Collect NP swab in [place] [[id number]] Collected: 05/24/21 1443    Order Status: Completed Specimen: Respirate from Nasopharyngeal Updated: 05/25/21 1612     SARS-CoV-2 Source NP Swab     SARS-CoV-2 by PCR NotDetected     Comment: PATIENTS: Important information regarding your results and instructions can  be found at [email or web address]   \"After your  Covid-19 Test\"    [facility] providers: PLEASE REFER TO DE-ESCALATION AND RETESTING PROTOCOL  on [email or web address]    **The TaqPath COVID-19 SARS-CoV-2 RT-PCR test has been made available for  use under the Emergency Use Authorization (EUA) only.         Narrative:      Have you been in close contact with a person who is suspected  or known to be positive for COVID-19 within the " "last 30 days  (e.g. last seen that person < 30 days ago)->No    MRSA By PCR (Amp) [[id number]]     Order Status: Canceled Specimen: Respirate from Nares     Anaerobic Culture [[id number]] Collected: 05/25/21 1100    Order Status: Completed Specimen: Wound Updated: 05/25/21 1452     Significant Indicator NEG     Source WND     Site left hip fluids     Culture Result -    BLOOD CULTURE [[id number]] Collected: 05/24/21 1213    Order Status: Completed Specimen: Blood from Peripheral Updated: 05/25/21 0718     Significant Indicator NEG     Source BLD     Site PERIPHERAL     Culture Result No Growth  Note: Blood cultures are incubated for 5 days and  are monitored continuously.Positive blood cultures  are called to the RN and reported as soon as  they are identified.  Blood culture testing and Gram stain, if indicated, are  performed at [facility], [facility]  [address] [place], [place].  Positive blood cultures are  sent to [facility], [address] [place] [address], for organism identification and  susceptibility testing.      Narrative:      1 of 2 for Blood Culture x 2 sites order. Per Hospital  Policy: Only change Specimen Src: to \"Line\" if specified by  physician order.  Right AC    BLOOD CULTURE [[id number]] Collected: 05/24/21 1400    Order Status: Completed Specimen: Blood from Peripheral Updated: 05/25/21 0718     Significant Indicator NEG     Source BLD     Site PERIPHERAL     Culture Result No Growth  Note: Blood cultures are incubated for 5 days and  are monitored continuously.Positive blood cultures  are called to the RN and reported as soon as  they are identified.  Blood culture testing and Gram stain, if indicated, are  performed at [facility], [facility]  [address] [facility].[place], [place].  Positive blood cultures are  sent to [facility], [address] [place] [address], for organism identification and  susceptibility testing.    " "   Narrative:      2 of 2 blood culture x2  Sites order. Per Hospital Policy:  Only change Specimen Src: to \"Line\" if specified by physician  order.  Right AC        No results found for: BLOODCULTU, BLDCULT, BCHOLD     Studies:  CT-HIP WITH PLUS RECONS LEFT    Result Date: 5/24/2021 5/24/2021 1:20 PM HISTORY/REASON FOR EXAM:  Hip replaced, infection suspected. TECHNIQUE/ EXAM DESCRIPTION AND NUMBER OF VIEWS:  CT scan of the pelvis/hip with contrast and including reconstructions. Thin-section helical images were obtained from the iliac crests through the lesser trochanters with contrast. Coronal reconstructions were generated from the axial images. 100 mL of Omnipaque 350 nonionic contrast was utilized. Low dose optimization technique was utilized for this CT exam including automated exposure control and adjustment of the mA and/or kV according to patient size. COMPARISON: November 6, 2020 FINDINGS: Patient is status post left hip replacement. There has been removal of lateral fixation plate fixing greater trochanter fracture. The trochanter fracture again noted and is ununited. No acute fracture identified. No dislocation. There is bone resorption about the proximal aspect of the femoral prosthesis. There is a fluid collection lateral to the left hip which demonstrates some peripheral enhancement. This measures 5.2 x 4.4 x 6.9 cm and is most consistent with an abscess based on reported history of pain and redness and swelling about the left hip. There is some degenerative change in the right hip.     1.  Soft tissue fluid collection lateral to the left hip most consistent with abscess measuring 5.2 x 4.4 x 6.9 cm. 2.  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    DX-PELVIS-1 OR 2 VIEWS    Result Date: 5/25/2021 5/25/2021 1:04 PM HISTORY/REASON FOR EXAM:  Post-Op - To include entire implant. TECHNIQUE/EXAM DESCRIPTION AND NUMBER OF VIEWS:  1 view(s) of the pelvis. COMPARISON:  CT " 5/24/2021. FINDINGS: Postsurgical changes from revised left hip arthoplasty. Extensive heterotopic ossification along the greater trochanteric region. No periprosthetic fracture. There are expected soft tissue swelling and gas as well as surgical drain.     1. Status post revised left hip arthroplasty without evidence of early hardware complication.      ASSESSMENT/PLAN:     61 y.o.  admitted 5/24/2021. Pt has a past medical history of left hip fracture approximately 3 years ago with ongoing pain and complications.  Most recent surgery in 11/2020 and history of MSSA infection in the hip treated with cefazolin and then long-term Bactrim prior to the last surgery.  She has been off antibiotics since 11/2020.  Ongoing pain in the area and increasing left thigh edema.  Underwent CT evaluation with finding of abscess.      Hospital Course:   Orthopedics was consulted and she went to the OR on 5/25 for I&D and polyliner exchange.  Per op note purulent material was found the tract all the way to the greater trochanter region.  The head and liner were placed.  Cultures now positive with MSSA.  Patient with some ongoing left hip pain and rigors post surgery.    Problem List     Left hip abscess  Chronic prostatic joint infection, left hip, cultures +MSSA  -S/P head and liner exchange on 5/25,original hardware remains in place     Plan     --- Vancomycin stopped and transition to nafcillin.  Typically would use cefazolin in this case with the patient had a course of cefazolin before and developed widespread rash and does not wish to rechallenge.  On discharge will likely transition to daptomycin for once daily dosing.  --- Blood cultures remain negative and will repeat today due to reported rigors yesterday-if blood cultures remain negative for 24 hours okay to place PICC line  --- Dissipate 6 weeks of IV course and then p.o. thereafter, long-term  --- Monitor labs  --- Wound care and drain management per surgery        Plan  of care discussed with NASIM Pineda M.D.. Will continue to follow    Rhiannon Mcrae M.D.

## 2021-05-27 NOTE — PROGRESS NOTES
"Hospital Medicine Daily Progress Note    Date of Service  5/27/2021    Chief Complaint  61 y.o. female admitted 5/24/2021 with hip pain    Hospital Course  Per notes, \"61 y.o. female who presented 5/24/2021 with fevers, chills and increased pain and warmth over the left hip.  She had a hip fracture 3 years ago and unfortunately has had complications and has had infection in that hip before.  Her most recent surgery was march 2020.  Given her prominence over the left hip and her history, she had CT evaluation of the hip and has a 5x6 cm abscess.  This was discussed with her surgeon, Dr Mendes who will be by to see her this afternoon and likely take her to OR for at least another washout.  She is afebrile and has a normal WBC.  Empiric antibiotics will not be given and intraoperative cultures will be collected to direct course of treatment.  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    Discussed with ID, will need to continue on dexamethasone upon discharge, if blood cultures are negative tomorrow can have PICC line placed and can be discharged.  Patient is requesting home health as is difficult to leave the house for wound care and PICC line care at this time.    Consultants/Specialty  Orthopedic surgery  Infectious Disease     Code Status  Full Code    Disposition  Anticipated DC to home with home health in 24 to 48 hours    Review of Systems  Review of Systems   Constitutional: Positive for malaise/fatigue.   Musculoskeletal: Positive for joint pain.   All other systems reviewed and are negative.       Physical Exam  Temp:  [36.9 °C (98.4 °F)-37.6 °C (99.7 °F)] 37.5 °C (99.5 °F)  Pulse:  [91-93] 92  Resp:  [18] 18  BP: (106-115)/(62-84) 115/73  SpO2:  [94 %-95 %] 95 %    Physical " Exam  Vitals and nursing note reviewed.   Constitutional:       Appearance: Normal appearance.   Cardiovascular:      Rate and Rhythm: Normal rate and regular rhythm.      Pulses: Normal pulses.      Heart sounds: Normal heart sounds.   Pulmonary:      Effort: Pulmonary effort is normal.      Breath sounds: Normal breath sounds.   Abdominal:      General: Abdomen is flat. Bowel sounds are normal.      Palpations: Abdomen is soft.   Musculoskeletal:         General: Swelling and tenderness present.      Left lower leg: Edema present.   Skin:     General: Skin is warm.   Neurological:      General: No focal deficit present.      Mental Status: She is alert and oriented to person, place, and time.         Fluids    Intake/Output Summary (Last 24 hours) at 5/27/2021 1435  Last data filed at 5/27/2021 0815  Gross per 24 hour   Intake 3352 ml   Output 770 ml   Net 2582 ml       Laboratory  Recent Labs     05/25/21  0442 05/26/21  0313 05/27/21  0401   WBC 6.8 9.3 6.1   RBC 4.04* 2.93* 2.99*   HEMOGLOBIN 9.9* 7.2* 7.2*   HEMATOCRIT 32.3* 23.9* 23.5*   MCV 80.0* 81.6 78.6*   MCH 24.5* 24.6* 24.1*   MCHC 30.7* 30.1* 30.6*   RDW 47.3 47.4 46.7   PLATELETCT 379 310 307   MPV 9.2 9.6 9.5     Recent Labs     05/25/21  0442 05/26/21  0313 05/27/21  0401   SODIUM 137 137 139   POTASSIUM 4.2 4.1 4.2   CHLORIDE 104 106 106   CO2 25 23 25   GLUCOSE 94 105* 102*   BUN 13 9 8   CREATININE 0.55 0.54 0.55   CALCIUM 8.4 7.9* 8.0*                   Imaging  DX-PELVIS-1 OR 2 VIEWS   Final Result         1. Status post revised left hip arthroplasty without evidence of early hardware complication.      CT-HIP WITH PLUS RECONS LEFT   Final Result      1.  Soft tissue fluid collection lateral to the left hip most consistent with abscess measuring 5.2 x 4.4 x 6.9 cm.      2.  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           Assessment/Plan  * Abscess of left hip  Assessment & Plan  Multiple complications  related to this hip placed 3 years ago  Orthopedic surgery consulted, follow-up on recommendations, full hip revision on 5/25.  Continue with pain management, with as needed morphine   Initial cultures with s. Aureus, pending final culture.  Continue with antibiotics per ID  Case management working on outpatient infusion therapy, PICC line to be placed on 5/26 if blood cultures negative  IV hydration    Staphylococcus aureus infection- (present on admission)  Assessment & Plan  Preliminary wound cultures positive for MSSA  - ID consulted, switch to nafcillin, will continue with daptomycin for 6 weeks on discharge    Allergy to adhesive  Assessment & Plan  Hypoallergenic supplies when able  PRN benadryl if needed.    Anxiety- (present on admission)  Assessment & Plan  Prn ativan if needed, difficult social situation at home    Insomnia- (present on admission)  Assessment & Plan  Trazodone qhs.       VTE prophylaxis: SCDs

## 2021-05-27 NOTE — PROGRESS NOTES
"Patient seen and examined  Reports pain better this am.  Is having some twitching/spasms in thigh.      /73   Pulse 92   Temp 37.5 °C (99.5 °F) (Oral)   Resp 18   Ht 1.727 m (5' 8\")   Wt 53.3 kg (117 lb 8.1 oz)   SpO2 95%     Recent Labs     05/25/21  0442 05/26/21  0313 05/27/21  0401   WBC 6.8 9.3 6.1   RBC 4.04* 2.93* 2.99*   HEMOGLOBIN 9.9* 7.2* 7.2*   HEMATOCRIT 32.3* 23.9* 23.5*   MCV 80.0* 81.6 78.6*   MCH 24.5* 24.6* 24.1*   MCHC 30.7* 30.1* 30.6*   RDW 47.3 47.4 46.7   PLATELETCT 379 310 307   MPV 9.2 9.6 9.5       No acute distress  ROBERTO dressing clean dry and intact  Neurovascularly intact  Some drainage at point of hemovac insertion - reinforced with bandage.   HV output with scant w/ serosanguinous output.     POD#2 I&D left RUDDY infection       Plan:  DVT Prophylaxis- TEDS/SCDs, ASA 81 mg PO BID   Weight Bearing Status-WBAT, posterior hip precautions.   Can d/c HV before d/c home once antibiotics arranged for outpatient.   PT/OT  Antibiotics: per ID  Case Coordination          "

## 2021-05-27 NOTE — CARE PLAN
The patient is Stable - Low risk of patient condition declining or worsening    Shift Goals  Clinical Goals: Pain will stay at a tolerable level  Patient Goals: Comfort with movement     Progress made toward(s) clinical / shift goals:  Repositioning, ice applied, medication given per MAR.        Problem: Pain - Standard  Goal: Alleviation of pain or a reduction in pain to the patient’s comfort goal  Outcome: Progressing     Problem: Knowledge Deficit - Standard  Goal: Patient and family/care givers will demonstrate understanding of plan of care, disease process/condition, diagnostic tests and medications  Outcome: Progressing

## 2021-05-27 NOTE — PROGRESS NOTES
Received report from day shift. Pt a+ox4 showing no signs of distress. Pt states her hemovac is leaking, dressing re-enforced. Pt c/o 5/10 pain see MAR. Pt educated on POC. Polar ice in place. Fall precautions in place. Call light in reach, bed locked in lowest position, rounding in place.

## 2021-05-27 NOTE — PROGRESS NOTES
Received report from nightshift RN and assumed care of patient at 0700. Pt reports 6/10 pain in L hip, medicated per MAR, no further needs at this time. POC discussed, assessment complete. Call light in reach. Bed in lowest locked position. Hourly rounding to continue.

## 2021-05-28 ENCOUNTER — APPOINTMENT (OUTPATIENT)
Dept: RADIOLOGY | Facility: MEDICAL CENTER | Age: 61
DRG: 467 | End: 2021-05-28
Attending: INTERNAL MEDICINE
Payer: COMMERCIAL

## 2021-05-28 VITALS
BODY MASS INDEX: 17.81 KG/M2 | OXYGEN SATURATION: 97 % | WEIGHT: 117.5 LBS | DIASTOLIC BLOOD PRESSURE: 77 MMHG | HEIGHT: 68 IN | HEART RATE: 88 BPM | RESPIRATION RATE: 18 BRPM | SYSTOLIC BLOOD PRESSURE: 115 MMHG | TEMPERATURE: 98.7 F

## 2021-05-28 LAB
BACTERIA SPEC ANAEROBE CULT: NORMAL
ERYTHROCYTE [DISTWIDTH] IN BLOOD BY AUTOMATED COUNT: 46.3 FL (ref 35.9–50)
HCT VFR BLD AUTO: 25 % (ref 37–47)
HGB BLD-MCNC: 7.5 G/DL (ref 12–16)
MCH RBC QN AUTO: 23.7 PG (ref 27–33)
MCHC RBC AUTO-ENTMCNC: 30 G/DL (ref 33.6–35)
MCV RBC AUTO: 79.1 FL (ref 81.4–97.8)
PLATELET # BLD AUTO: 326 K/UL (ref 164–446)
PMV BLD AUTO: 9.4 FL (ref 9–12.9)
RBC # BLD AUTO: 3.16 M/UL (ref 4.2–5.4)
SIGNIFICANT IND 70042: NORMAL
SITE SITE: NORMAL
SOURCE SOURCE: NORMAL
WBC # BLD AUTO: 6.2 K/UL (ref 4.8–10.8)

## 2021-05-28 PROCEDURE — 700111 HCHG RX REV CODE 636 W/ 250 OVERRIDE (IP): Performed by: INTERNAL MEDICINE

## 2021-05-28 PROCEDURE — 700102 HCHG RX REV CODE 250 W/ 637 OVERRIDE(OP): Performed by: INTERNAL MEDICINE

## 2021-05-28 PROCEDURE — A9270 NON-COVERED ITEM OR SERVICE: HCPCS | Performed by: INTERNAL MEDICINE

## 2021-05-28 PROCEDURE — 700102 HCHG RX REV CODE 250 W/ 637 OVERRIDE(OP): Performed by: PHYSICIAN ASSISTANT

## 2021-05-28 PROCEDURE — 700105 HCHG RX REV CODE 258: Performed by: INTERNAL MEDICINE

## 2021-05-28 PROCEDURE — 36415 COLL VENOUS BLD VENIPUNCTURE: CPT

## 2021-05-28 PROCEDURE — 99239 HOSP IP/OBS DSCHRG MGMT >30: CPT | Performed by: INTERNAL MEDICINE

## 2021-05-28 PROCEDURE — 36573 INSJ PICC RS&I 5 YR+: CPT

## 2021-05-28 PROCEDURE — A9270 NON-COVERED ITEM OR SERVICE: HCPCS | Performed by: PHYSICIAN ASSISTANT

## 2021-05-28 PROCEDURE — 700101 HCHG RX REV CODE 250: Performed by: INTERNAL MEDICINE

## 2021-05-28 PROCEDURE — 85027 COMPLETE CBC AUTOMATED: CPT

## 2021-05-28 RX ORDER — HYDROXYZINE HYDROCHLORIDE 25 MG/1
25 TABLET, FILM COATED ORAL 3 TIMES DAILY PRN
Status: DISCONTINUED | OUTPATIENT
Start: 2021-05-28 | End: 2021-05-29 | Stop reason: HOSPADM

## 2021-05-28 RX ORDER — OXYCODONE HYDROCHLORIDE 10 MG/1
10 TABLET ORAL
Status: DISCONTINUED | OUTPATIENT
Start: 2021-05-28 | End: 2021-05-29 | Stop reason: HOSPADM

## 2021-05-28 RX ORDER — RIFAMPIN 300 MG/1
300 CAPSULE ORAL 2 TIMES DAILY
Qty: 80 CAPSULE | Refills: 0 | Status: SHIPPED | OUTPATIENT
Start: 2021-05-29 | End: 2021-07-06 | Stop reason: SDUPTHER

## 2021-05-28 RX ORDER — TRAMADOL HYDROCHLORIDE 50 MG/1
50 TABLET ORAL EVERY 4 HOURS PRN
Status: DISCONTINUED | OUTPATIENT
Start: 2021-05-28 | End: 2021-05-29 | Stop reason: HOSPADM

## 2021-05-28 RX ORDER — BENZOCAINE/MENTHOL 6 MG-10 MG
LOZENGE MUCOUS MEMBRANE 4 TIMES DAILY
Status: DISCONTINUED | OUTPATIENT
Start: 2021-05-28 | End: 2021-05-29 | Stop reason: HOSPADM

## 2021-05-28 RX ORDER — ASPIRIN 81 MG/1
81 TABLET ORAL 2 TIMES DAILY
Qty: 60 TABLET | Refills: 0 | Status: SHIPPED | OUTPATIENT
Start: 2021-05-29 | End: 2021-06-28

## 2021-05-28 RX ORDER — TRAMADOL HYDROCHLORIDE 50 MG/1
100 TABLET ORAL EVERY 4 HOURS PRN
Status: DISCONTINUED | OUTPATIENT
Start: 2021-05-28 | End: 2021-05-29 | Stop reason: HOSPADM

## 2021-05-28 RX ORDER — TRAMADOL HYDROCHLORIDE 50 MG/1
50 TABLET ORAL EVERY 6 HOURS PRN
Qty: 15 TABLET | Refills: 0 | Status: SHIPPED | OUTPATIENT
Start: 2021-05-28 | End: 2021-06-02

## 2021-05-28 RX ADMIN — DAPTOMYCIN 430 MG: 350 INJECTION, POWDER, LYOPHILIZED, FOR SOLUTION INTRAVENOUS at 14:27

## 2021-05-28 RX ADMIN — FERROUS SULFATE TAB 325 MG (65 MG ELEMENTAL FE) 325 MG: 325 (65 FE) TAB at 17:52

## 2021-05-28 RX ADMIN — HYDROCORTISONE: 1 CREAM TOPICAL at 18:00

## 2021-05-28 RX ADMIN — RIFAMPIN 300 MG: 300 CAPSULE ORAL at 05:09

## 2021-05-28 RX ADMIN — TRAMADOL HYDROCHLORIDE 50 MG: 50 TABLET, FILM COATED ORAL at 12:49

## 2021-05-28 RX ADMIN — DIPHENHYDRAMINE HCL 25 MG: 25 TABLET ORAL at 04:13

## 2021-05-28 RX ADMIN — DOCUSATE SODIUM 100 MG: 100 CAPSULE, LIQUID FILLED ORAL at 05:10

## 2021-05-28 RX ADMIN — HYDROCORTISONE: 1 CREAM TOPICAL at 10:22

## 2021-05-28 RX ADMIN — OXYCODONE HYDROCHLORIDE 5 MG: 5 TABLET ORAL at 02:26

## 2021-05-28 RX ADMIN — RIFAMPIN 300 MG: 300 CAPSULE ORAL at 17:52

## 2021-05-28 RX ADMIN — SIMVASTATIN 20 MG: 20 TABLET, FILM COATED ORAL at 17:53

## 2021-05-28 RX ADMIN — ASPIRIN 81 MG: 81 TABLET, COATED ORAL at 14:28

## 2021-05-28 RX ADMIN — ASPIRIN 81 MG: 81 TABLET, COATED ORAL at 02:24

## 2021-05-28 RX ADMIN — NAFCILLIN SODIUM 2 G: 2 INJECTION, POWDER, FOR SOLUTION INTRAMUSCULAR; INTRAVENOUS at 02:45

## 2021-05-28 RX ADMIN — DOCUSATE SODIUM 50 MG AND SENNOSIDES 8.6 MG 2 TABLET: 8.6; 5 TABLET, FILM COATED ORAL at 05:09

## 2021-05-28 RX ADMIN — HYDROCORTISONE: 1 CREAM TOPICAL at 14:28

## 2021-05-28 RX ADMIN — TRAMADOL HYDROCHLORIDE 50 MG: 50 TABLET, FILM COATED ORAL at 08:24

## 2021-05-28 RX ADMIN — HYDROXYZINE HYDROCHLORIDE 25 MG: 25 TABLET, FILM COATED ORAL at 09:18

## 2021-05-28 RX ADMIN — HYDROXYZINE HYDROCHLORIDE 25 MG: 25 TABLET, FILM COATED ORAL at 14:34

## 2021-05-28 ASSESSMENT — PAIN DESCRIPTION - PAIN TYPE
TYPE: SURGICAL PAIN

## 2021-05-28 NOTE — DISCHARGE SUMMARY
"Discharge Summary    CHIEF COMPLAINT ON ADMISSION  Chief Complaint   Patient presents with   • Hip Pain       Reason for Admission  Left Leg Pain     Admission Date  5/24/2021    CODE STATUS  Full Code    HPI & HOSPITAL COURSE  Per notes, \"61 y.o. female who presented 5/24/2021 with fevers, chills and increased pain and warmth over the left hip.  She had a hip fracture 3 years ago and unfortunately has had complications and has had infection in that hip before.  Her most recent surgery was march 2020.  Given her prominence over the left hip and her history, she had CT evaluation of the hip and has a 5x6 cm abscess.  This was discussed with her surgeon, Dr Mendes who will be by to see her this afternoon and likely take her to OR for at least another washout.  She is afebrile and has a normal WBC.  Empiric antibiotics will not be given and intraoperative cultures will be collected to direct course of treatment.  Patient is very anxious as she is the primary caregiver for her mother currently on hospice service at home and case management is offering assistance for coordination of care.\"    Patient was admitted and evaluated by orthopedic surgery. She underwent a incision and drainage and had revision on 5/25, without complications. Per orthopedic surgery, will likely need additional revision in the future. However, patient opted to postpone for hip revision until later due to difficulty with personal matters at this time.  Patient's cultures grew MSSA.  Blood cultures were negative.  Infectious disease was consulted and patient was switched to nafcillin.  She will need to continue daptomycin for 6 weeks upon discharge.  Home health and home infusion therapy was arranged.  A PICC line was placed on 5/26.  Patient did receive 1 dose of daptomycin on 5/28 prior to discharge.  Pain was well controlled with tramadol and scheduled Tylenol.  I did discuss all studies, procedures, treatment plans with patient in " detail.    Therefore, she is discharged in good and stable condition to home with organized home healthcare and close outpatient follow-up.    The patient met 2-midnight criteria for an inpatient stay at the time of discharge.    Discharge Date  5/28/2021    FOLLOW UP ITEMS POST DISCHARGE  Follow-up with PCP  Follow-up with orthopedic surgery  Follow-up with infectious disease    DISCHARGE DIAGNOSES  Principal Problem:    Abscess of left hip POA: Unknown  Active Problems:    Insomnia POA: Yes    Anxiety POA: Yes    Allergy to adhesive POA: Unknown    Staphylococcus aureus infection POA: Yes  Resolved Problems:    * No resolved hospital problems. *      FOLLOW UP  Future Appointments   Date Time Provider Department Center   6/2/2021  1:30 [facility]   6/10/2021  2:00 PM [facility]   9/13/2021  1:00 PM [facility] Kamara     No follow-up provider specified.    MEDICATIONS ON DISCHARGE     Medication List      START taking these medications      Instructions   aspirin 81 MG EC tablet  Start taking on: May 29, 2021   Take 1 tablet by mouth 2 times a day for 30 days.  Dose: 81 mg        CHANGE how you take these medications      Instructions   traMADol 50 MG Tabs  What changed:   · when to take this  · reasons to take this  Commonly known as: ULTRAM   Take 1 tablet by mouth every 6 hours as needed for Severe Pain for up to 5 days.  Dose: 50 mg     traZODone 100 MG Tabs  What changed:   · when to take this  · additional instructions  Commonly known as: DESYREL   TAKE 1 TABLET BY MOUTH AT BEDTIME AS NEEDED FOR SLEEP     triamcinolone acetonide 0.025 % Crea  What changed:   · when to take this  · reasons to take this  Commonly known as: KENALOG   Apply 1 Application topically 2 times a day.  Dose: 1 Application        CONTINUE taking these medications      Instructions   acetaminophen 500 MG Tabs  Commonly known as: TYLENOL   Take 500-1,000 mg by  mouth every 6 hours as needed.  Dose: 500-1,000 mg     CALCIUM PO   Take 1 Tab by mouth every evening.  Dose: 1 tablet     COQ10 PO   Take 1 Tab by mouth every evening.  Dose: 1 tablet     ferrous sulfate 325 (65 Fe) MG tablet   Take 325 mg by mouth every evening.  Dose: 325 mg     ibuprofen 200 MG Tabs  Commonly known as: MOTRIN   Take 800 mg by mouth every 6 hours as needed.  Dose: 800 mg     simvastatin 20 MG Tabs  Commonly known as: ZOCOR   Take 1 Tab by mouth every evening.  Dose: 20 mg     therapeutic multivitamin-minerals Tabs   Take 1 Tab by mouth every evening.  Dose: 1 tablet     VITAMIN C PO   Take 1 capsule by mouth every evening.  Dose: 1 capsule            Allergies  Allergies   Allergen Reactions   • Tape      Rash, Itchy        DIET  Orders Placed This Encounter   Procedures   • Diet Order Diet: Regular     Standing Status:   Standing     Number of Occurrences:   1     Order Specific Question:   Diet:     Answer:   Regular [1]       ACTIVITY  As tolerated.  Weight bearing as tolerated    CONSULTATIONS  Orthopedic surgery  Infectious disease    PROCEDURES  Left hip incision and drainage and revision    LABORATORY  Lab Results   Component Value Date    SODIUM 139 05/27/2021    POTASSIUM 4.2 05/27/2021    CHLORIDE 106 05/27/2021    CO2 25 05/27/2021    GLUCOSE 102 (H) 05/27/2021    BUN 8 05/27/2021    CREATININE 0.55 05/27/2021    CREATININE 0.78 10/11/2011        Lab Results   Component Value Date    WBC 6.2 05/28/2021    WBC 5.7 10/11/2011    HEMOGLOBIN 7.5 (L) 05/28/2021    HEMATOCRIT 25.0 (L) 05/28/2021    PLATELETCT 326 05/28/2021        Total time of the discharge process exceeds 45 minutes.

## 2021-05-28 NOTE — DISCHARGE PLANNING
Anticipated Discharge Disposition: Home w/ [facility] and [facility]    Action: Spoke with Juhi from [facility] who states pt is accepted. Supplies will be delivered to pt's home this evening.     Barriers to Discharge: None    Plan: D/C home

## 2021-05-28 NOTE — DISCHARGE PLANNING
Agency/Facility Name: [facility]   Spoke To: Renu  Outcome: Informed Crystal that MD is ok with pt being seen Wednesday by HH. Pt accepted    Agency/Facility Name: [facility]   Spoke To: Renu  Outcome: Renu states that [facility]  management is unsure if there is enough staff for the service area for the patient. [facility] has declined services

## 2021-05-28 NOTE — DISCHARGE PLANNING
Received Choice form at [phone]  Agency/Facility Name: 1)[facility], 2)[facility]  Referral sent per Choice form @ [phone]

## 2021-05-28 NOTE — PROCEDURES
Vascular Access Team     Date of Insertion: 5/28/2021  Arm Circumference: 26.5  Internal length: 43  External Length: 2  Vein Occupancy %: 23   Reason for PICC: IV ABX [id number]  Labs: WBC 6.2, , BUN 8, Cr 0.55, GFR >60     Consents confirmed, vessel patency confirmed with ultrasound. Risks and benefits of procedure explained to patient and education regarding central line associated bloodstream infections provided. Questions answered.      PICC placed in RUE per licensed provider order with ultrasound guidance.  4 Fr, 1 lumen PICC placed in BRACHIAL vein after 1 attempt(s). 2 mL of 1% lidocaine injected intradermally, 21 gauge microintroducer needle and modified Seldinger technique used. 43 cm catheter inserted with good blood return. Secured at 2 cm marker. Each lumen flushed without resistance with 10 mL 0.9% normal saline. PICC line secured with Biopatch and Tegaderm.     PICC tip placement location is confirmed by nurse to be in the Superior Vena Cava (SVC) utilizing 3CG technology. PICC line is appropriate for use at this time. Patient tolerated procedure well, without complications.  Patient condition relayed to unit RN or ordering physician via this post procedure note in the EMR.      Ultrasound images uploaded to PACS and viewable in the EMR - yes  Ultrasound imaged printed and placed in paper chart - no     Hair Scynce Power PICC ref # [id number], Lot # [id number], Expiration Date 2022-06-30

## 2021-05-28 NOTE — DISCHARGE PLANNING
Agency/Facility Name: [facility]  Spoke To: Hawa  Outcome: [facility] states pt is accepted and that HH will see pt on Sunday

## 2021-05-28 NOTE — PROGRESS NOTES
Received bedside report. Assumed pt care. Assessment and chart check complete. Pt is alert and awake, no signs of distress, received no IV. Reports left hip pain, medicated per MAR. Dressing CDI. CMS intact. Hemovac and ice polar in place. Fall precautions in place, treaded socks on pt, bed in low position. Call light within reach. Educated pt to call if needing anything. Hourly rounding.  14:00- PICC line inserted at right brachial, single lumen. Tolerated the procedure.

## 2021-05-28 NOTE — PROGRESS NOTES
Pt c/o burning pain during nafcillin infusion. Flushed PIV with NS, flushing without difficulty and pt reports pain relief with NS flush. Area above insertion site has slight erythema.   Discontinued the PIV and placed another in pts other arm. IV went in without difficulty and flushes well, no c/o pain or discomfort with the IV flush.     03:00 - Nafcillin infusion had about 20 cc left in the bag when pt again reported burning pain above the insertion site. She said each time she's had the infusion it has caused her the same kind of pain, and this is her 3rd PIV since admission. Area above insertion site again has some noticeable erythema. PIV was discontinued per pt request.  Pt declined to have another PIV insertion and says she'd rather have her PICC line for antibiotics, since the same problem has happened 3 times. Pt verbalizes understanding regarding importance of getting her antibiotics on time. Let pt know we can wait till morning to find out about the PICC, but if it's unable to be inserted today, she will need another PIV to continue her abx regimen.

## 2021-05-28 NOTE — PROGRESS NOTES
ID Brief Note     Reviewed chart including vitals and labs.  Also reviewed blood cultures which remain negative.    --- Okay to place PICC line  --- Transition antibiotics to daptomycin 8 mg/kgq 24  in anticipation of discharge with home infusion.  Continue rifampin 300 mg twice daily.  Patient will receive [facility] infusion with [facility] as well as weekly labs.    See last progress note for detailed plan.    Follow-up in ID clinic near the end of course.    Rhiannon Mcrae MD

## 2021-05-28 NOTE — CARE PLAN
Problem: Pain - Standard  Goal: Alleviation of pain or a reduction in pain to the patient’s comfort goal  Outcome: Progressing   The patient is Stable - Low risk of patient condition declining or worsening    Shift Goals  Clinical Goals: Pain control  Patient Goals: Comfort with movement     Progress made toward(s) clinical / shift goals:  Pt educated on pharmacologic & non-pharmacologic interventions for pain. Pt has polar ice to left hip.    Patient is not progressing towards the following goals:

## 2021-05-28 NOTE — DISCHARGE PLANNING
Care Coordination Note Continuation:     1445: MARYANA LEGER called Juhi from [facility]. RN CM asked about teaching for this patient. Per Juhi, she would like to know when [facility] will be able to see the patient and she plans to sent the patient a video for teaching.     [facility] has accepted this patient and can see her on Sunday.    1505: RN CM received a call back from Juhi. Juhi states that she is waiting to patient to accept payment but that after that, patient can go home. Juhi updated about [facility]. Juhi to call MARYANA LEGER back when patient can go home officially.     1515: RN CM updated patient on [facility] acceptance.

## 2021-05-28 NOTE — DISCHARGE PLANNING
Anticipated Discharge Disposition:   Home with HH and [facility] home IV antibiotics     Action:   Chart review complete. Order form for IV antibiotics found in media tab. Form uploaded on 5/27. Form printed out.     Per MD, patient is still pending cultures and PICC line placement. Order and face to face in place for HH. RN CM to collect HH and infusion choice.    0905: RN CM met with patient at bedside to discuss discharge plan. Patient states that she was previously [facility] service with [facility]. Patient did state that it will be difficult to get to appointments as her mobility has declined and her mother is on Hospice at home. RN CM explained that HH in conjunction with home [facility] infusions would be an ideal combination for being able to stay close to home. Patient agreeable and gave the following choices:     HH: 1- [facility], 2- [facility]   IV home infusions- [facility].     0925: Choice forms with IV order form faxed to [facility].     1012: [facility] has accepted this patient, as patient was already on service with them. HH pending.     1200: [facility]  has accepted this patient; however, they can not see her until Wednesday. MD notified. MD okay with it if patient is.     1245: RN CM spoke with patient. She is okay with seeing [place] on Wednesday and has some questions regarding care of the PICC Line. Information to be passed onto [facility].     1345: Informed that due to service area and lack of staffing, [facility] is declining patient. MD notified and referral to be sent to [facility].     Barriers to Discharge:   HH acceptance   [facility] teaching   Medical Clearance    Plan:   Follow up with [facility]  Hospital care management will continue to assist with discharge planning needs.

## 2021-05-28 NOTE — PROGRESS NOTES
Received report from day RN and assumed care. Chart reviewed. Pt AOx4. C/o 4/10 left hip pain, says this is tolerable for her at this time. Pt has polar ice to left hip. CMS intact. Dressing CDI. Pt denies SOB or nausea.   Plan of care reviewed with pt. No other needs at this time. Bed locked and in lowest position. Call light within reach.

## 2021-05-28 NOTE — CARE PLAN
The patient is Stable - Low risk of patient condition declining or worsening    Shift Goals  Clinical Goals: pain control  Patient Goals: Comfort with movement     Progress made toward(s) clinical / shift goals:  Pt will decrease pain, medicated per MAR.         Ice polar use to decrease the pain.    Patient is not progressing towards the following goals:      Problem: Pain - Standard  Goal: Alleviation of pain or a reduction in pain to the patient’s comfort goal  Outcome: Progressing     Problem: Mobility  Goal: Patient's capacity to carry out activities will improve  Outcome: Progressing     Problem: Infection - Standard  Goal: Patient will remain free from infection  Outcome: Progressing     Problem: Wound/ / Incision Healing  Goal: Patient's wound/surgical incision will decrease in size and heals properly  Outcome: Progressing

## 2021-05-29 LAB
BACTERIA BLD CULT: NORMAL
BACTERIA BLD CULT: NORMAL
SIGNIFICANT IND 70042: NORMAL
SIGNIFICANT IND 70042: NORMAL
SITE SITE: NORMAL
SITE SITE: NORMAL
SOURCE SOURCE: NORMAL
SOURCE SOURCE: NORMAL

## 2021-05-29 NOTE — DISCHARGE INSTRUCTIONS
Discharge Instructions    Discharged to home by car with relative. Discharged via wheelchair, hospital escort: Yes.  Special equipment needed: Not Applicable    Be sure to schedule a follow-up appointment with your primary care doctor or any specialists as instructed.     Discharge Plan:   Diet Plan: Discussed  Activity Level: Discussed  Confirmed Follow up Appointment: Patient to Call and Schedule Appointment  Confirmed Symptoms Management: Discussed  Medication Reconciliation Updated: No (Comments)        I understand that a diet low in cholesterol, fat, and sodium is recommended for good health. Unless I have been given specific instructions below for another diet, I accept this instruction as my diet prescription.   Other diet: REGULAR    Special Instructions: None    · Is patient discharged on Warfarin / Coumadin?   No     Depression / Suicide Risk    As you are discharged from this [facility] facility, it is important to learn how to keep safe from harming yourself.    Recognize the warning signs:  · Abrupt changes in personality, positive or negative- including increase in energy   · Giving away possessions  · Change in eating patterns- significant weight changes-  positive or negative  · Change in sleeping patterns- unable to sleep or sleeping all the time   · Unwillingness or inability to communicate  · Depression  · Unusual sadness, discouragement and loneliness  · Talk of wanting to die  · Neglect of personal appearance   · Rebelliousness- reckless behavior  · Withdrawal from people/activities they love  · Confusion- inability to concentrate     If you or a loved one observes any of these behaviors or has concerns about self-harm, here's what you can do:  · Talk about it- your feelings and reasons for harming yourself  · Remove any means that you might use to hurt yourself (examples: pills, rope, extension cords, firearm)  · Get professional help from the community (Mental Health, Substance Abuse,  psychological counseling)  · Do not be alone:Call your Safe Contact- someone whom you trust who will be there for you.  · Call your local CRISIS HOTLINE 773-9062 or 401-431-7420  · Call your local Children's Mobile Crisis Response Team [place] (604) 979-6017 or www.CommonFloor  · Call the toll free National Suicide Prevention Hotlines   · National Suicide Prevention Lifeline 875-215-JOMP (4690)  · National Hope Line Network 800-SUICIDE (399-5527)      PICC Home Care Guide    A peripherally inserted central catheter (PICC) is a form of IV access that allows medicines and IV fluids to be quickly distributed throughout the body. The PICC is a long, thin, flexible tube (catheter) that is inserted into a vein in the upper arm. The catheter ends in a large vein in the chest (superior vena cava, or SVC). After the PICC is inserted, a chest X-ray may be done to make sure that it is in the correct place.  A PICC may be placed for different reasons, such as:  · To give medicines and liquid nutrition.  · To give IV fluids and blood products.  · If there is trouble placing a peripheral intravenous (PIV) catheter.  If taken care of properly, a PICC can remain in place for several months. Having a PICC can also allow a person to go home from the hospital sooner. Medicine and PICC care can be managed at home by a family member, caregiver, or home health care team.  What are the risks?  Generally, having a PICC is safe. However, problems may occur, including:  · A blood clot (thrombus) forming in or at the tip of the PICC.  · A blood clot forming in a vein (deep vein thrombosis) or traveling to the lung (pulmonary embolism).  · Inflammation of the vein (phlebitis) in which the PICC is placed.  · Infection. Central line associated blood stream infection (CLABSI) is a serious infection that often requires hospitalization.  · PICC movement (malposition). The PICC tip may move from its original position due to excessive  physical activity, forceful coughing, sneezing, or vomiting.  · A break or cut in the PICC. It is important not to use scissors near the PICC.  · Nerve or tendon irritation or injury during PICC insertion.  How to take care of your PICC  Preventing problems  · You and any caregivers should wash your hands often with soap. Wash hands:  ? Before touching the PICC line or the infusion device.  ? Before changing a bandage (dressing).  · Flush the PICC as told by your health care provider. Let your health care provider know right away if the PICC is hard to flush or does not flush. Do not use force to flush the PICC.  · Do not use a syringe that is less than 10 mL to flush the PICC.  · Avoid blood pressure checks on the arm in which the PICC is placed.  · Never pull or tug on the PICC.  · Do not take the PICC out yourself. Only a trained clinical professional should remove the PICC.  · Use clean and sterile supplies only. Keep the supplies in a dry place. Do not reuse needles, syringes, or any other supplies. Doing that can lead to infection.  · Keep pets and children away from your PICC line.  · Check the PICC insertion site every day for signs of infection. Check for:  ? Leakage.  ? Redness, swelling, or pain.  ? Fluid or blood.  ? Warmth.  ? Pus or a bad smell.  PICC dressing care  · Keep your PICC bandage (dressing) clean and dry to prevent infection.  · Do not take baths, swim, or use a hot tub until your health care provider approves. Ask your health care provider if you can take showers. You may only be allowed to take sponge baths for bathing. When you are allowed to shower:  ? Ask your health care provider to teach you how to wrap the PICC line.  ? Cover the PICC line with clear plastic wrap and tape to keep it dry while showering.  · Follow instructions from your health care provider about how to take care of your insertion site and dressing. Make sure you:  ? Wash your hands with soap and water before you  change your bandage (dressing). If soap and water are not available, use hand .  ? Change your dressing as told by your health care provider.  ? Leave stitches (sutures), skin glue, or adhesive strips in place. These skin closures may need to stay in place for 2 weeks or longer. If adhesive strip edges start to loosen and curl up, you may trim the loose edges. Do not remove adhesive strips completely unless your health care provider tells you to do that.  · Change your PICC dressing if it becomes loose or wet.  General instructions    · Carry your PICC identification card or wear a medical alert bracelet at all times.  · Keep the tube clamped at all times, unless it is being used.  · Carry a smooth-edge clamp with you at all times to place on the tube if it breaks.  · Do not use scissors or sharp objects near the tube.  · You may bend your arm and move it freely. If your PICC is near or at the bend of your elbow, avoid activity with repeated motion at the elbow.  · Avoid lifting heavy objects as told by your health care provider.  · Keep all follow-up visits as told by your health care provider. This is important.  Disposal of supplies  · Throw away any syringes in a disposal container that is meant for sharp items (sharps container). You can buy a sharps container from a pharmacy, or you can make one by using an empty hard plastic bottle with a cover.  · Place any used dressings or infusion bags into a plastic bag. Throw that bag in the trash.  Contact a health care provider if:  · You have pain in your arm, ear, face, or teeth.  · You have a fever or chills.  · You have redness, swelling, or pain around the insertion site.  · You have fluid or blood coming from the insertion site.  · Your insertion site feels warm to the touch.  · You have pus or a bad smell coming from the insertion site.  · Your skin feels hard and raised around the insertion site.  Get help right away if:  · Your PICC is accidentally  "pulled all the way out. If this happens, cover the insertion site with a bandage or gauze dressing. Do not throw the PICC away. Your health care provider will need to check it.  · Your PICC was tugged or pulled and has partially come out. Do not  push the PICC back in.  · You cannot flush the PICC, it is hard to flush, or the PICC leaks around the insertion site when it is flushed.  · You hear a \"flushing\" sound when the PICC is flushed.  · You feel your heart racing or skipping beats.  · There is a hole or tear in the PICC.  · You have swelling in the arm in which the PICC was inserted.  · You have a red streak going up your arm from where the PICC was inserted.  Summary  · A peripherally inserted central catheter (PICC) is a long, thin, flexible tube (catheter) that is inserted into a vein in the upper arm.  · The PICC is inserted using a sterile technique by a specially trained nurse or physician. Only a trained clinical professional should remove it.  · Keep your PICC identification card with you at all times.  · Avoid blood pressure checks on the arm in which the PICC is placed.  · If cared for properly, a PICC can remain in place for several months. Having a PICC can also allow a person to go home from the hospital sooner.  This information is not intended to replace advice given to you by your health care provider. Make sure you discuss any questions you have with your health care provider.  Document Released: 06/23/2004 Document Revised: 11/30/2018 Document Reviewed: 01/20/2018  [employer] Patient Education © 2020 [employer]       Aspirin, ASA oral tablets  What is this medicine?  ASPIRIN (AS pir in) is a pain reliever. It is used to treat mild pain and fever. This medicine is also used as directed by a doctor to prevent and to treat heart attacks, to prevent strokes and blood clots, and to treat arthritis or inflammation.  This medicine may be used for other purposes; ask your health care provider or " pharmacist if you have questions.  COMMON BRAND NAME(S): Aspir-Low, Aspir-Sana, Aspirtab, Kaycee Advanced Aspirin, Kaycee Aspirin, Kaycee Aspirin Extra Strength, Kaycee Aspirin Plus, Kaycee Extra Strength, Kaycee Extra Strength Plus, Kaycee Genuine Aspirin, Kaycee Womens Aspirin, Bufferin, Bufferin Extra Strength, Bufferin Low Dose  What should I tell my health care provider before I take this medicine?  They need to know if you have any of these conditions:  · anemia  · asthma  · bleeding problems  · child with chickenpox, the flu, or other viral infection  · diabetes  · gout  · if you frequently drink alcohol containing drinks  · kidney disease  · liver disease  · low level of vitamin K  · lupus  · smoke tobacco  · stomach ulcers or other problems  · an unusual or allergic reaction to aspirin, tartrazine dye, other medicines, dyes, or preservatives  · pregnant or trying to get pregnant  · breast-feeding  How should I use this medicine?  Take this medicine by mouth with a glass of water. Follow the directions on the package or prescription label. You can take this medicine with or without food. If it upsets your stomach, take it with food. Do not take your medicine more often than directed.  Talk to your pediatrician regarding the use of this medicine in children. While this drug may be prescribed for children as young as 12 years of age for selected conditions, precautions do apply. Children and teenagers should not use this medicine to treat chicken pox or flu symptoms unless directed by a doctor.  Patients over 65 years old may have a stronger reaction and need a smaller dose.  Overdosage: If you think you have taken too much of this medicine contact a poison control center or emergency room at once.  NOTE: This medicine is only for you. Do not share this medicine with others.  What if I miss a dose?  If you are taking this medicine on a regular schedule and miss a dose, take it as soon as you can. If it is almost time  for your next dose, take only that dose. Do not take double or extra doses.  What may interact with this medicine?  Do not take this medicine with any of the following medications:  · cidofovir  · ketorolac  · probenecid  This medicine may also interact with the following medications:  · alcohol  · alendronate  · bismuth subsalicylate  · flavocoxid  · herbal supplements like feverfew, garlic, petra, ginkgo biloba, horse chestnut  · medicines for diabetes or glaucoma like acetazolamide, methazolamide  · medicines for gout  · medicines that treat or prevent blood clots like enoxaparin, heparin, ticlopidine, warfarin  · other aspirin and aspirin-like medicines  · NSAIDs, medicines for pain and inflammation, like ibuprofen or naproxen  · pemetrexed  · sulfinpyrazone  · varicella live vaccine  This list may not describe all possible interactions. Give your health care provider a list of all the medicines, herbs, non-prescription drugs, or dietary supplements you use. Also tell them if you smoke, drink alcohol, or use illegal drugs. Some items may interact with your medicine.  What should I watch for while using this medicine?  If you are treating yourself for pain, tell your doctor or health care professional if the pain lasts more than 10 days, if it gets worse, or if there is a new or different kind of pain. Tell your doctor if you see redness or swelling. Also, check with your doctor if you have a fever that lasts for more than 3 days. Only take this medicine to prevent heart attacks or blood clotting if prescribed by your doctor or health care professional.  Do not take aspirin or aspirin-like medicines with this medicine. Too much aspirin can be dangerous. Always read the labels carefully.  This medicine can irritate your stomach or cause bleeding problems. Do not smoke cigarettes or drink alcohol while taking this medicine. Do not lie down for 30 minutes after taking this medicine to prevent irritation to your  throat.  If you are scheduled for any medical or dental procedure, tell your healthcare provider that you are taking this medicine. You may need to stop taking this medicine before the procedure.  This medicine may be used to treat migraines. If you take migraine medicines for 10 or more days a month, your migraines may get worse. Keep a diary of headache days and medicine use. Contact your healthcare professional if your migraine attacks occur more frequently.  What side effects may I notice from receiving this medicine?  Side effects that you should report to your doctor or health care professional as soon as possible:  · allergic reactions like skin rash, itching or hives, swelling of the face, lips, or tongue  · breathing problems  · changes in hearing, ringing in the ears  · confusion  · general ill feeling or flu-like symptoms  · pain on swallowing  · redness, blistering, peeling or loosening of the skin, including inside the mouth or nose  · signs and symptoms of bleeding such as bloody or black, tarry stools; red or dark-brown urine; spitting up blood or brown material that looks like coffee grounds; red spots on the skin; unusual bruising or bleeding from the eye, gums, or nose  · trouble passing urine or change in the amount of urine  · unusually weak or tired  · yellowing of the eyes or skin  Side effects that usually do not require medical attention (report to your doctor or health care professional if they continue or are bothersome):  · diarrhea or constipation  · headache  · nausea, vomiting  · stomach gas, heartburn  This list may not describe all possible side effects. Call your doctor for medical advice about side effects. You may report side effects to FDA at 1-486-FDA-6677.  Where should I keep my medicine?  Keep out of the reach of children.  Store at room temperature between 15 and 30 degrees C (59 and 86 degrees F). Protect from heat and moisture. Do not use this medicine if it has a strong  vinegar smell. Throw away any unused medicine after the expiration date.  NOTE: This sheet is a summary. It may not cover all possible information. If you have questions about this medicine, talk to your doctor, pharmacist, or health care provider.  © 2020 [employer] (2018-01-30 10:42:13)

## 2021-05-29 NOTE — PROGRESS NOTES
Discharging patient home per MD order.  Pt demonstrated understanding of discharge instructions, follow up appointments, home medications, prescriptions, and home care for surgical wound. Pt is voiding without difficulty, pain well controlled, tolerating oral medications, O2 greater than 90%. Hemovac removed.  Pt verbalized understanding of discharge instructions and educational handouts and all questions answered.  Pt discharged off unit with hospital escort.

## 2021-05-31 LAB
BACTERIA BLD CULT: NORMAL
SIGNIFICANT IND 70042: NORMAL
SITE SITE: NORMAL
SOURCE SOURCE: NORMAL

## 2021-06-01 LAB
BACTERIA BLD CULT: NORMAL
SIGNIFICANT IND 70042: NORMAL
SITE SITE: NORMAL
SOURCE SOURCE: NORMAL

## 2021-06-04 ENCOUNTER — HOSPITAL ENCOUNTER (OUTPATIENT)
Facility: MEDICAL CENTER | Age: 61
End: 2021-06-04
Attending: INTERNAL MEDICINE
Payer: COMMERCIAL

## 2021-06-04 LAB
ALBUMIN SERPL BCP-MCNC: 3.7 G/DL (ref 3.2–4.9)
ALBUMIN/GLOB SERPL: 1.3 G/DL
ALP SERPL-CCNC: 103 U/L (ref 30–99)
ALT SERPL-CCNC: 15 U/L (ref 2–50)
ANION GAP SERPL CALC-SCNC: 11 MMOL/L (ref 7–16)
AST SERPL-CCNC: 15 U/L (ref 12–45)
BILIRUB SERPL-MCNC: 0.2 MG/DL (ref 0.1–1.5)
BUN SERPL-MCNC: 18 MG/DL (ref 8–22)
CALCIUM SERPL-MCNC: 9.1 MG/DL (ref 8.5–10.5)
CHLORIDE SERPL-SCNC: 102 MMOL/L (ref 96–112)
CO2 SERPL-SCNC: 25 MMOL/L (ref 20–33)
CREAT SERPL-MCNC: 0.64 MG/DL (ref 0.5–1.4)
CRP SERPL HS-MCNC: 0.89 MG/DL (ref 0–0.75)
GLOBULIN SER CALC-MCNC: 2.9 G/DL (ref 1.9–3.5)
GLUCOSE SERPL-MCNC: 99 MG/DL (ref 65–99)
POTASSIUM SERPL-SCNC: 4.2 MMOL/L (ref 3.6–5.5)
PROT SERPL-MCNC: 6.6 G/DL (ref 6–8.2)
SODIUM SERPL-SCNC: 138 MMOL/L (ref 135–145)

## 2021-06-04 PROCEDURE — 86140 C-REACTIVE PROTEIN: CPT

## 2021-06-04 PROCEDURE — 85652 RBC SED RATE AUTOMATED: CPT

## 2021-06-04 PROCEDURE — 85025 COMPLETE CBC W/AUTO DIFF WBC: CPT

## 2021-06-04 PROCEDURE — 80053 COMPREHEN METABOLIC PANEL: CPT

## 2021-06-08 ENCOUNTER — HOSPITAL ENCOUNTER (OUTPATIENT)
Facility: MEDICAL CENTER | Age: 61
End: 2021-06-08
Attending: INTERNAL MEDICINE
Payer: COMMERCIAL

## 2021-06-08 ENCOUNTER — OFFICE VISIT (OUTPATIENT)
Dept: MEDICAL GROUP | Facility: PHYSICIAN GROUP | Age: 61
End: 2021-06-08
Payer: COMMERCIAL

## 2021-06-08 VITALS
OXYGEN SATURATION: 97 % | TEMPERATURE: 99.3 F | SYSTOLIC BLOOD PRESSURE: 106 MMHG | HEIGHT: 67 IN | WEIGHT: 116 LBS | RESPIRATION RATE: 14 BRPM | HEART RATE: 85 BPM | DIASTOLIC BLOOD PRESSURE: 60 MMHG | BODY MASS INDEX: 18.21 KG/M2

## 2021-06-08 DIAGNOSIS — G89.29 CHRONIC LEFT HIP PAIN: ICD-10-CM

## 2021-06-08 DIAGNOSIS — M25.552 CHRONIC LEFT HIP PAIN: ICD-10-CM

## 2021-06-08 LAB
BASOPHILS # BLD AUTO: 0.7 % (ref 0–1.8)
BASOPHILS # BLD: 0.05 K/UL (ref 0–0.12)
CK SERPL-CCNC: 38 U/L (ref 0–154)
CRP SERPL HS-MCNC: 0.37 MG/DL (ref 0–0.75)
EOSINOPHIL # BLD AUTO: 0.72 K/UL (ref 0–0.51)
EOSINOPHIL NFR BLD: 10.4 % (ref 0–6.9)
ERYTHROCYTE [DISTWIDTH] IN BLOOD BY AUTOMATED COUNT: 63.1 FL (ref 35.9–50)
ERYTHROCYTE [SEDIMENTATION RATE] IN BLOOD BY WESTERGREN METHOD: 18 MM/HOUR (ref 0–25)
HCT VFR BLD AUTO: 31.5 % (ref 37–47)
HGB BLD-MCNC: 9.1 G/DL (ref 12–16)
IMM GRANULOCYTES # BLD AUTO: 0.06 K/UL (ref 0–0.11)
IMM GRANULOCYTES NFR BLD AUTO: 0.9 % (ref 0–0.9)
LYMPHOCYTES # BLD AUTO: 1.41 K/UL (ref 1–4.8)
LYMPHOCYTES NFR BLD: 20.4 % (ref 22–41)
MCH RBC QN AUTO: 24.9 PG (ref 27–33)
MCHC RBC AUTO-ENTMCNC: 28.9 G/DL (ref 33.6–35)
MCV RBC AUTO: 86.3 FL (ref 81.4–97.8)
MONOCYTES # BLD AUTO: 0.72 K/UL (ref 0–0.85)
MONOCYTES NFR BLD AUTO: 10.4 % (ref 0–13.4)
NEUTROPHILS # BLD AUTO: 3.96 K/UL (ref 2–7.15)
NEUTROPHILS NFR BLD: 57.2 % (ref 44–72)
NRBC # BLD AUTO: 0 K/UL
NRBC BLD-RTO: 0 /100 WBC
PLATELET # BLD AUTO: 440 K/UL (ref 164–446)
PMV BLD AUTO: 10.2 FL (ref 9–12.9)
RBC # BLD AUTO: 3.65 M/UL (ref 4.2–5.4)
WBC # BLD AUTO: 6.9 K/UL (ref 4.8–10.8)

## 2021-06-08 PROCEDURE — 85652 RBC SED RATE AUTOMATED: CPT

## 2021-06-08 PROCEDURE — 85025 COMPLETE CBC W/AUTO DIFF WBC: CPT

## 2021-06-08 PROCEDURE — 86140 C-REACTIVE PROTEIN: CPT

## 2021-06-08 PROCEDURE — 99213 OFFICE O/P EST LOW 20 MIN: CPT | Performed by: PHYSICIAN ASSISTANT

## 2021-06-08 PROCEDURE — 82550 ASSAY OF CK (CPK): CPT

## 2021-06-08 RX ORDER — TRAMADOL HYDROCHLORIDE 50 MG/1
50 TABLET ORAL 3 TIMES DAILY
Qty: 90 TABLET | Refills: 0 | Status: SHIPPED | OUTPATIENT
Start: 2021-06-08 | End: 2021-07-08 | Stop reason: SDUPTHER

## 2021-06-08 ASSESSMENT — FIBROSIS 4 INDEX: FIB4 SCORE: 0.75

## 2021-06-08 NOTE — PROGRESS NOTES
CC:   Chief Complaint   Patient presents with   • Hospital Follow-up          HISTORY OF PRESENT ILLNESS: Patient is a 61 y.o. female established patient who presents today to follow up on the following conditions:       Chronic left hip pain  Patient presented to [facility] ER on May 24, 2021 with fever, chills and increased pain and warmth of her left hip.  CT evaluation of the hip showed a 5 x 6 cm abscess.  Patient underwent an incision and drainage and had a revision on May 25 without complications.  Per orthopedics she may need a revision in the future.  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  Patient's culture grew MSSA.  Patient was started on nafcillin.  She was instructed to continue daptomycin for 6 weeks upon discharge.  Home health and home infusion therapy was arranged, patient has a PICC line.    Patient has a follow up with ID on Friday.       Patient Active Problem List    Diagnosis Date Noted   • Staphylococcus aureus infection 05/26/2021   • Abscess of left hip 05/24/2021   • Allergy to adhesive 05/24/2021   • Rash 05/13/2021   • Anemia 09/11/2020   • Chronic left hip pain 11/06/2019   • Anxiety 01/10/2019   • Acute midline thoracic back pain 10/19/2016   • Hyperlipidemia 06/17/2013   • Menopause    • Osteopenia    • Insomnia       Allergies:Tape    Current Outpatient Medications   Medication Sig Dispense Refill   • traMADol (ULTRAM) 50 MG Tab Take 1 tablet by mouth in the morning, at noon, and at bedtime for 30 days. 90 tablet 0   • aspirin EC 81 MG EC tablet Take 1 tablet by mouth 2 times a day for 30 days. 60 tablet 0   • riFAMPin (RIFADINE) 300 MG Cap Take 1 capsule by mouth 2 times a day for 40 days. 80 capsule 0   • ferrous sulfate 325 (65 Fe) MG tablet Take 325 mg by mouth every evening.     • acetaminophen (TYLENOL) 500 MG Tab Take 500-1,000 mg by mouth every 6 hours as needed.     • triamcinolone acetonide (KENALOG) 0.025 %  "Cream Apply 1 Application topically 2 times a day. (Patient taking differently: Apply 1 Application topically 2 times a day as needed.) 80 g 0   • traZODone (DESYREL) 100 MG Tab TAKE 1 TABLET BY MOUTH AT BEDTIME AS NEEDED FOR SLEEP (Patient taking differently: Take 100 mg by mouth at bedtime.) 90 tablet 0   • Ascorbic Acid (VITAMIN C PO) Take 1 capsule by mouth every evening.     • simvastatin (ZOCOR) 20 MG Tab Take 1 Tab by mouth every evening. 90 Tab 3   • Coenzyme Q10 (COQ10 PO) Take 1 Tab by mouth every evening.     • therapeutic multivitamin-minerals (THERAGRAN-M) Tab Take 1 Tab by mouth every evening.     • CALCIUM PO Take 1 Tab by mouth every evening.     • ibuprofen (MOTRIN) 200 MG Tab Take 800 mg by mouth every 6 hours as needed.       No current facility-administered medications for this visit.       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         Family History   Adopted: Yes   Family history unknown: Yes       Review of Systems:    Constitutional: No Fevers, Chills  Eyes: No vision changes  ENT: No hearing changes  Resp: No Shortness of breath  CV: No Chest pain  GI: No Nausea/Vomiting  MSK: No weakness  Skin: No rashes  Neuro: No Headaches  Psych: No Suicidal ideations    All remaining systems reviewed and found to be negative, except as stated above.    Exam:    /60   Pulse 85   Temp 37.4 °C (99.3 °F) (Temporal)   Resp 14   Ht 1.702 m (5' 7\")   Wt 52.6 kg (116 lb)   SpO2 97%  Body mass index is 18.17 kg/m².    General:  " Well nourished, well developed female in NAD  HENT: Atraumatic, normocephalic  EYES: Extraocular movements intact  NECK: Supple, FROM  CHEST: No deformities, Equal chest expansion  RESP: Unlabored, no stridor or audible wheeze  HEART: Regular Rate and rhythm.   Extremities: No Clubbing, Cyanosis, or Edema  Skin: Warm/dry, without rashes  Neuro: A/O x 4, due to COVID-19- did not have patient remove face mask to test cranial nerves.  Motor/sensory grossly intact  Psych: Normal behavior, normal affect      Lab review:  Labs are reviewed and discussed with a patient  Lab Results   Component Value Date/Time    CHOLSTRLTOT 180 06/28/2019 06:43 AM    LDL 97 06/28/2019 06:43 AM    HDL 68 06/28/2019 06:43 AM    TRIGLYCERIDE 75 06/28/2019 06:43 AM       Lab Results   Component Value Date/Time    SODIUM 138 06/04/2021 11:45 AM    POTASSIUM 4.2 06/04/2021 11:45 AM    CHLORIDE 102 06/04/2021 11:45 AM    CO2 25 06/04/2021 11:45 AM    GLUCOSE 99 06/04/2021 11:45 AM    BUN 18 06/04/2021 11:45 AM    CREATININE 0.64 06/04/2021 11:45 AM    CREATININE 0.78 10/11/2011 01:52 PM    BUNCREATRAT 18 07/29/2016 07:20 AM    BUNCREATRAT 18 10/11/2011 01:52 PM     Lab Results   Component Value Date/Time    ALKPHOSPHAT 103 (H) 06/04/2021 11:45 AM    ASTSGOT 15 06/04/2021 11:45 AM    ALTSGPT 15 06/04/2021 11:45 AM    TBILIRUBIN 0.2 06/04/2021 11:45 AM      No results found for: HBA1C  No results found for: TSH  No results found for: FREET4    Lab Results   Component Value Date/Time    WBC 6.2 05/28/2021 03:29 AM    WBC 5.7 10/11/2011 01:52 PM    RBC 3.16 (L) 05/28/2021 03:29 AM    RBC 4.43 10/11/2011 01:52 PM    HEMOGLOBIN 7.5 (L) 05/28/2021 03:29 AM    HEMATOCRIT 25.0 (L) 05/28/2021 03:29 AM    MCV 79.1 (L) 05/28/2021 03:29 AM    MCV 87 10/11/2011 01:52 PM    MCH 23.7 (L) 05/28/2021 03:29 AM    MCH 29.1 10/11/2011 01:52 PM    MCHC 30.0 (L) 05/28/2021 03:29 AM    MPV 9.4 05/28/2021 03:29 AM    NEUTSPOLYS 69.70 05/24/2021 12:13 PM    LYMPHOCYTES  19.20 (L) 05/24/2021 12:13 PM    MONOCYTES 9.30 05/24/2021 12:13 PM    EOSINOPHILS 0.80 05/24/2021 12:13 PM    BASOPHILS 0.50 05/24/2021 12:13 PM    HYPOCHROMIA 1+ 12/28/2020 12:30 PM    ANISOCYTOSIS 1+ 02/24/2021 11:39 AM          Assessment/Plan:  1. Chronic left hip pain  - traMADol (ULTRAM) 50 MG Tab; Take 1 tablet by mouth in the morning, at noon, and at bedtime for 30 days.  Dispense: 90 tablet; Refill: 0  Patient informed me that she did get a refill from the hospital for quantity of #15 of her tramadol in which she did .  As soon as she runs out of this prescription I am okay her for her to  her normal dosage of tramadol taking up to 3 times a day.   reviewed.  Patient will follow up with infectious disease on Friday.  She will continue oral antibiotics and IV antibiotics and has home health and home infusion established.  Patient understands she will most likely need a revision of her hip in the future, our hopes are that she can continue to take care of her mother in the short-term until she passes and then she would elect to have the revision at that time.    I have reviewed the medical records and have determined that a controlled substance treatment is medically indicated:     I have conducted a physical exam and documented it. I have reviewed pt's prescription history as maintained by the [place] Prescription Monitoring Program.       Given the above, I believe the benefits of controlled substance therapy outweigh the risks.   Accordingly, I have discussed the risk and benefits, treatment plan, and alternative therapies with the patient.   Reviewed , appropriate.    Follow-up: Return in about 4 weeks (around 7/6/2021).    Please note that this dictation was created using voice recognition software. I have made every reasonable attempt to correct obvious errors, but I expect that there are errors of grammar and possibly content that I did not discover before finalizing the note.

## 2021-06-08 NOTE — ASSESSMENT & PLAN NOTE
Patient presented to [facility] ER on May 24, 2021 with fever, chills and increased pain and warmth of her left hip.  CT evaluation of the hip showed a 5 x 6 cm abscess.  Patient underwent an incision and drainage and had a revision on May 25 without complications.  Per orthopedics she may need a revision in the future.  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  Patient's culture grew MSSA.  Patient was started on nafcillin.  She was instructed to continue daptomycin for 6 weeks upon discharge.  Home health and home infusion therapy was arranged, patient has a PICC line.    Patient has a follow up with ID on Friday.

## 2021-06-11 ENCOUNTER — OFFICE VISIT (OUTPATIENT)
Dept: INFECTIOUS DISEASES | Facility: MEDICAL CENTER | Age: 61
End: 2021-06-11
Payer: COMMERCIAL

## 2021-06-11 VITALS
HEART RATE: 75 BPM | HEIGHT: 68 IN | BODY MASS INDEX: 17.58 KG/M2 | OXYGEN SATURATION: 97 % | RESPIRATION RATE: 16 BRPM | WEIGHT: 116 LBS | TEMPERATURE: 98.2 F | DIASTOLIC BLOOD PRESSURE: 96 MMHG | SYSTOLIC BLOOD PRESSURE: 104 MMHG

## 2021-06-11 DIAGNOSIS — A49.01 MSSA (METHICILLIN SUSCEPTIBLE STAPHYLOCOCCUS AUREUS) INFECTION: ICD-10-CM

## 2021-06-11 DIAGNOSIS — T84.52XD INFECTION ASSOCIATED WITH INTERNAL LEFT HIP PROSTHESIS, SUBSEQUENT ENCOUNTER: ICD-10-CM

## 2021-06-11 PROCEDURE — 99214 OFFICE O/P EST MOD 30 MIN: CPT | Performed by: NURSE PRACTITIONER

## 2021-06-11 RX ORDER — DAPTOMYCIN 50 MG/ML
INJECTION, POWDER, LYOPHILIZED, FOR SOLUTION INTRAVENOUS
COMMUNITY
End: 2021-07-06

## 2021-06-11 ASSESSMENT — FIBROSIS 4 INDEX: FIB4 SCORE: 0.55

## 2021-06-11 NOTE — PROGRESS NOTES
[facility]    Subjective:     Chief Complaint   Patient presents with   • Hospital Follow-up     Prosthetic joint infection left hip     This is my first time meeting Ms. Silva.      Interval History: 61 y.o. female with a past medical history of left hip fracture around 2018 with ongoing pain and complications with MSSA infection in the hip treated with cefazolin and then long-term Bactrim.  In November 2020 patient underwent removal of deep hardware to the left hip by Dr. Mendes, she was on the p.o. Bactrim leading up to that surgery, had been off antibiotics since that time.  Patient did continue to have ongoing pain to the left hip with increasing left thigh edema.  Hospitalized from 5/24-5/28/2021, admitted with fevers, chills and increasing left hip pain.  CT of the left hip showed a 5 x 6 abscess.  On 5/25, patient underwent left hip I&D and polylinear exchange with Dr. Mendes, a large amount of purulent material was found intraoperatively that tracked all the way down to the greater trochanter region.  OR culture positive MSSA.  Patient discharged on IV daptomycin 8 mg/kg daily and p.o. rifampin 300 mg twice daily x6 weeks, estimated end date 7/6/2021.    Hospital records reviewed    Today, 6/11/2021: Patient reports feeling well and has been tolerating the IV daptomycin and p.o. rifampin without adverse effect.  Denies feeling generally ill, fevers/chills, general malaise, headache, n/v/d, abdominal pain, chest pain, shortness of breath, cough, sore throat or rash.  Denies any new or worsening muscle aches or joint pain.  Was seen by Dr. Mendes last week for follow-up, was told that everything is going well and has another follow-up with him in 4 weeks.  States that she does have a chronic dull ache with intermittent sharp pains that averages 4-5/10 to the left hip that is improved with rest and pain medication.  The left hip is well-healed without any breakdown or drainage.  She is aware  "that she will need to go on chronic suppressive antibiotics once the IV antibiotics are complete.    ROS as above in HPI.    Past Medical History:   Diagnosis Date   • Acute midline thoracic back pain 10/19/2016   • Anesthesia 2018    post op nausea; shakes   • Arthritis     osteoarthritis; hip, thumbs, knees   • High cholesterol    • Hip pain     left   • History of anemia    • Hyperlipidemia 2013   • Insomnia    • Menopause    • Osteopenia    • PONV (postoperative nausea and vomiting)        Family History   Adopted: Yes   Family history unknown: Yes       Social History     Tobacco Use   • Smoking status: Former Smoker     Packs/day: 0.25     Years: 20.00     Pack years: 5.00     Types: Cigarettes     Quit date: 1999     Years since quittin.8   • Smokeless tobacco: Never Used   Vaping Use   • Vaping Use: Never used   Substance Use Topics   • Alcohol use: No     Alcohol/week: 0.0 oz     Comment: former heavy use, 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     Objective:     /96 (BP Location: Left arm, Patient Position: Sitting, BP Cuff Size: Adult)   Pulse 75   Temp 36.8 °C (98.2 °F) (Temporal)   Resp 16   Ht 1.727 m (5' 8\") Comment: pt reported  Wt 52.6 kg (116 lb)   SpO2 97%   BMI 17.64 kg/m²     Physical Exam  Vitals reviewed.   Constitutional:       General: She is not in acute distress.     Appearance: Normal appearance. She is not ill-appearing or diaphoretic.   HENT:      Head: Normocephalic and atraumatic.      Right Ear: External ear normal.      Left Ear: External ear normal.   Eyes:      Extraocular Movements: Extraocular movements intact.      Conjunctiva/sclera: Conjunctivae normal.      Pupils: Pupils are equal, round, and reactive to light.   Cardiovascular:      Rate and Rhythm: Normal rate and regular rhythm.      Heart sounds: Normal heart sounds. No murmur heard.     Pulmonary: "      Effort: Pulmonary effort is normal. No respiratory distress.      Breath sounds: Normal breath sounds. No wheezing.   Abdominal:      General: Abdomen is flat. Bowel sounds are normal. There is no distension.      Palpations: Abdomen is soft.      Tenderness: There is no abdominal tenderness.   Musculoskeletal:         General: No swelling or tenderness.      Cervical back: Normal range of motion and neck supple. No tenderness.      Comments: RUE PICC- CDI, non tender, no erythema.    Left hip, surgical site- well-healed and approximated without any breakdown or drainage, no erythematous surrounding tissue, no induration or fluctuance, nontender to palpation.   Skin:     General: Skin is warm and dry.      Findings: No erythema or rash.   Neurological:      Mental Status: She is alert and oriented to person, place, and time.      Cranial Nerves: No cranial nerve deficit.      Sensory: No sensory deficit.   Psychiatric:         Mood and Affect: Mood normal.         Behavior: Behavior normal.         Thought Content: Thought content normal.         Judgment: Judgment normal.         Labs:  WBC   Date/Time Value Ref Range Status   06/08/2021 11:50 AM 6.9 4.8 - 10.8 K/uL Final   10/11/2011 01:52 PM 5.7 4.0 - 10.5 x10E3/uL Final     RBC   Date/Time Value Ref Range Status   06/08/2021 11:50 AM 3.65 (L) 4.20 - 5.40 M/uL Final   10/11/2011 01:52 PM 4.43 3.80 - 5.10 x10E6/uL Final     Hemoglobin   Date/Time Value Ref Range Status   06/08/2021 11:50 AM 9.1 (L) 12.0 - 16.0 g/dL Final     Hematocrit   Date/Time Value Ref Range Status   06/08/2021 11:50 AM 31.5 (L) 37.0 - 47.0 % Final     MCV   Date/Time Value Ref Range Status   06/08/2021 11:50 AM 86.3 81.4 - 97.8 fL Final   10/11/2011 01:52 PM 87 80 - 98 fL Final     MCH   Date/Time Value Ref Range Status   06/08/2021 11:50 AM 24.9 (L) 27.0 - 33.0 pg Final   10/11/2011 01:52 PM 29.1 27.0 - 34.0 pg Final     MCHC   Date/Time Value Ref Range Status   06/08/2021 11:50 AM  28.9 (L) 33.6 - 35.0 g/dL Final     MPV   Date/Time Value Ref Range Status   06/08/2021 11:50 AM 10.2 9.0 - 12.9 fL Final        Sodium   Date/Time Value Ref Range Status   06/04/2021 11:45  135 - 145 mmol/L Final     Potassium   Date/Time Value Ref Range Status   06/04/2021 11:45 AM 4.2 3.6 - 5.5 mmol/L Final     Chloride   Date/Time Value Ref Range Status   06/04/2021 11:45  96 - 112 mmol/L Final     Co2   Date/Time Value Ref Range Status   06/04/2021 11:45 AM 25 20 - 33 mmol/L Final     Glucose   Date/Time Value Ref Range Status   06/04/2021 11:45 AM 99 65 - 99 mg/dL Final     Bun   Date/Time Value Ref Range Status   06/04/2021 11:45 AM 18 8 - 22 mg/dL Final     Creatinine   Date/Time Value Ref Range Status   06/04/2021 11:45 AM 0.64 0.50 - 1.40 mg/dL Final   10/11/2011 01:52 PM 0.78 0.57 - 1.00 mg/dL Final     Bun-Creatinine Ratio   Date/Time Value Ref Range Status   07/29/2016 07:20 AM 18 9 - 23 Final   10/11/2011 01:52 PM 18 9 - 23 Final       Alkaline Phosphatase   Date/Time Value Ref Range Status   06/04/2021 11:45  (H) 30 - 99 U/L Final     AST(SGOT)   Date/Time Value Ref Range Status   06/04/2021 11:45 AM 15 12 - 45 U/L Final     ALT(SGPT)   Date/Time Value Ref Range Status   06/04/2021 11:45 AM 15 2 - 50 U/L Final     Total Bilirubin   Date/Time Value Ref Range Status   06/04/2021 11:45 AM 0.2 0.1 - 1.5 mg/dL Final        CPK Total   Date/Time Value Ref Range Status   06/08/2021 11:50 AM 38 0 - 154 U/L Final      ESR 18  CRP 0.37    Assessment and Plan:   The following treatment plan was discussed with patient at length:    1. 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 rifampin 300 mg twice daily through 7/6/2021.  We will plan to follow acute course with chronic p.o. antibiotic (Bactrim versus doxy versus Augmentin) and rifampin for 3 additional months.  We will likely keep suppressive p.o. antibiotic  in place for 6 to 12 months due to retained infected hardware, possibly longer as patient is at high risk for reinfection.  -Follow-up with Dr. Mendes as directed.   2. MSSA (methicillin susceptible Staphylococcus aureus) infection      As above     Follow up: 3 weeks, RTC sooner if needed. FU with PCP for ongoing chronic medical conditions.     STEPHANIE Ford.       Please note that this dictation was created using voice recognition software. I have  worked with technical experts from [facility] to optimize the interface.  I have made every reasonable attempt to correct obvious errors, but there may be errors of grammar and possibly content that I did not discover before finalizing the note.

## 2021-06-15 ENCOUNTER — HOSPITAL ENCOUNTER (OUTPATIENT)
Facility: MEDICAL CENTER | Age: 61
End: 2021-06-15
Attending: INTERNAL MEDICINE
Payer: COMMERCIAL

## 2021-06-15 LAB
ALBUMIN SERPL BCP-MCNC: 3.9 G/DL (ref 3.2–4.9)
ALBUMIN/GLOB SERPL: 1.4 G/DL
ALP SERPL-CCNC: 90 U/L (ref 30–99)
ALT SERPL-CCNC: 19 U/L (ref 2–50)
ANION GAP SERPL CALC-SCNC: 11 MMOL/L (ref 7–16)
AST SERPL-CCNC: 23 U/L (ref 12–45)
BILIRUB SERPL-MCNC: 0.3 MG/DL (ref 0.1–1.5)
BUN SERPL-MCNC: 19 MG/DL (ref 8–22)
CALCIUM SERPL-MCNC: 9.3 MG/DL (ref 8.5–10.5)
CHLORIDE SERPL-SCNC: 105 MMOL/L (ref 96–112)
CK SERPL-CCNC: 51 U/L (ref 0–154)
CO2 SERPL-SCNC: 23 MMOL/L (ref 20–33)
CREAT SERPL-MCNC: 0.7 MG/DL (ref 0.5–1.4)
FASTING STATUS PATIENT QL REPORTED: NORMAL
GLOBULIN SER CALC-MCNC: 2.8 G/DL (ref 1.9–3.5)
GLUCOSE SERPL-MCNC: 139 MG/DL (ref 65–99)
POTASSIUM SERPL-SCNC: 4.2 MMOL/L (ref 3.6–5.5)
PROT SERPL-MCNC: 6.7 G/DL (ref 6–8.2)
SODIUM SERPL-SCNC: 139 MMOL/L (ref 135–145)

## 2021-06-15 PROCEDURE — 80053 COMPREHEN METABOLIC PANEL: CPT

## 2021-06-15 PROCEDURE — 82550 ASSAY OF CK (CPK): CPT

## 2021-06-17 ENCOUNTER — HOSPITAL ENCOUNTER (OUTPATIENT)
Facility: MEDICAL CENTER | Age: 61
End: 2021-06-17
Attending: INTERNAL MEDICINE
Payer: COMMERCIAL

## 2021-06-17 LAB
ANISOCYTOSIS BLD QL SMEAR: ABNORMAL
BASOPHILS # BLD AUTO: 1 % (ref 0–1.8)
BASOPHILS # BLD: 0.07 K/UL (ref 0–0.12)
BURR CELLS BLD QL SMEAR: NORMAL
COMMENT 1642: NORMAL
EOSINOPHIL # BLD AUTO: 0.5 K/UL (ref 0–0.51)
EOSINOPHIL NFR BLD: 6.9 % (ref 0–6.9)
ERYTHROCYTE [DISTWIDTH] IN BLOOD BY AUTOMATED COUNT: 67.3 FL (ref 35.9–50)
HCT VFR BLD AUTO: 36.9 % (ref 37–47)
HGB BLD-MCNC: 11.1 G/DL (ref 12–16)
IMM GRANULOCYTES # BLD AUTO: 0.04 K/UL (ref 0–0.11)
IMM GRANULOCYTES NFR BLD AUTO: 0.6 % (ref 0–0.9)
LYMPHOCYTES # BLD AUTO: 1.55 K/UL (ref 1–4.8)
LYMPHOCYTES NFR BLD: 21.3 % (ref 22–41)
MCH RBC QN AUTO: 26.2 PG (ref 27–33)
MCHC RBC AUTO-ENTMCNC: 30.1 G/DL (ref 33.6–35)
MCV RBC AUTO: 87 FL (ref 81.4–97.8)
MICROCYTES BLD QL SMEAR: ABNORMAL
MONOCYTES # BLD AUTO: 0.82 K/UL (ref 0–0.85)
MONOCYTES NFR BLD AUTO: 11.3 % (ref 0–13.4)
MORPHOLOGY BLD-IMP: NORMAL
NEUTROPHILS # BLD AUTO: 4.28 K/UL (ref 2–7.15)
NEUTROPHILS NFR BLD: 58.9 % (ref 44–72)
NRBC # BLD AUTO: 0 K/UL
NRBC BLD-RTO: 0 /100 WBC
OVALOCYTES BLD QL SMEAR: NORMAL
PLATELET # BLD AUTO: 299 K/UL (ref 164–446)
PLATELET BLD QL SMEAR: NORMAL
PMV BLD AUTO: 10.5 FL (ref 9–12.9)
POIKILOCYTOSIS BLD QL SMEAR: NORMAL
RBC # BLD AUTO: 4.24 M/UL (ref 4.2–5.4)
RBC BLD AUTO: PRESENT
WBC # BLD AUTO: 7.3 K/UL (ref 4.8–10.8)

## 2021-06-17 PROCEDURE — 85025 COMPLETE CBC W/AUTO DIFF WBC: CPT

## 2021-06-22 ENCOUNTER — HOSPITAL ENCOUNTER (OUTPATIENT)
Facility: MEDICAL CENTER | Age: 61
End: 2021-06-22
Attending: INTERNAL MEDICINE
Payer: COMMERCIAL

## 2021-06-22 LAB
ALBUMIN SERPL BCP-MCNC: 3.9 G/DL (ref 3.2–4.9)
ALBUMIN/GLOB SERPL: 1.4 G/DL
ALP SERPL-CCNC: 80 U/L (ref 30–99)
ALT SERPL-CCNC: 15 U/L (ref 2–50)
ANION GAP SERPL CALC-SCNC: 10 MMOL/L (ref 7–16)
AST SERPL-CCNC: 25 U/L (ref 12–45)
BASOPHILS # BLD AUTO: 0.8 % (ref 0–1.8)
BASOPHILS # BLD: 0.05 K/UL (ref 0–0.12)
BILIRUB SERPL-MCNC: 0.3 MG/DL (ref 0.1–1.5)
BUN SERPL-MCNC: 16 MG/DL (ref 8–22)
CALCIUM SERPL-MCNC: 9.4 MG/DL (ref 8.5–10.5)
CHLORIDE SERPL-SCNC: 104 MMOL/L (ref 96–112)
CK SERPL-CCNC: 46 U/L (ref 0–154)
CO2 SERPL-SCNC: 26 MMOL/L (ref 20–33)
CREAT SERPL-MCNC: 0.74 MG/DL (ref 0.5–1.4)
CRP SERPL HS-MCNC: <0.3 MG/DL (ref 0–0.75)
EOSINOPHIL # BLD AUTO: 0.51 K/UL (ref 0–0.51)
EOSINOPHIL NFR BLD: 7.8 % (ref 0–6.9)
ERYTHROCYTE [DISTWIDTH] IN BLOOD BY AUTOMATED COUNT: 68.6 FL (ref 35.9–50)
FASTING STATUS PATIENT QL REPORTED: NORMAL
GLOBULIN SER CALC-MCNC: 2.8 G/DL (ref 1.9–3.5)
GLUCOSE SERPL-MCNC: 67 MG/DL (ref 65–99)
HCT VFR BLD AUTO: 39 % (ref 37–47)
HGB BLD-MCNC: 11.6 G/DL (ref 12–16)
IMM GRANULOCYTES # BLD AUTO: 0.02 K/UL (ref 0–0.11)
IMM GRANULOCYTES NFR BLD AUTO: 0.3 % (ref 0–0.9)
LYMPHOCYTES # BLD AUTO: 1.49 K/UL (ref 1–4.8)
LYMPHOCYTES NFR BLD: 22.7 % (ref 22–41)
MCH RBC QN AUTO: 26.3 PG (ref 27–33)
MCHC RBC AUTO-ENTMCNC: 29.7 G/DL (ref 33.6–35)
MCV RBC AUTO: 88.4 FL (ref 81.4–97.8)
MONOCYTES # BLD AUTO: 0.7 K/UL (ref 0–0.85)
MONOCYTES NFR BLD AUTO: 10.7 % (ref 0–13.4)
NEUTROPHILS # BLD AUTO: 3.79 K/UL (ref 2–7.15)
NEUTROPHILS NFR BLD: 57.7 % (ref 44–72)
NRBC # BLD AUTO: 0 K/UL
NRBC BLD-RTO: 0 /100 WBC
PLATELET # BLD AUTO: 233 K/UL (ref 164–446)
PMV BLD AUTO: 11 FL (ref 9–12.9)
POTASSIUM SERPL-SCNC: 4.3 MMOL/L (ref 3.6–5.5)
PROT SERPL-MCNC: 6.7 G/DL (ref 6–8.2)
RBC # BLD AUTO: 4.41 M/UL (ref 4.2–5.4)
SODIUM SERPL-SCNC: 140 MMOL/L (ref 135–145)
WBC # BLD AUTO: 6.6 K/UL (ref 4.8–10.8)

## 2021-06-22 PROCEDURE — 82550 ASSAY OF CK (CPK): CPT

## 2021-06-22 PROCEDURE — 86140 C-REACTIVE PROTEIN: CPT

## 2021-06-22 PROCEDURE — 80053 COMPREHEN METABOLIC PANEL: CPT

## 2021-06-22 PROCEDURE — 85025 COMPLETE CBC W/AUTO DIFF WBC: CPT

## 2021-06-22 PROCEDURE — 85652 RBC SED RATE AUTOMATED: CPT

## 2021-06-23 LAB — ERYTHROCYTE [SEDIMENTATION RATE] IN BLOOD BY WESTERGREN METHOD: 2 MM/HOUR (ref 0–25)

## 2021-06-29 ENCOUNTER — HOSPITAL ENCOUNTER (OUTPATIENT)
Facility: MEDICAL CENTER | Age: 61
End: 2021-06-29
Attending: INTERNAL MEDICINE
Payer: COMMERCIAL

## 2021-06-29 LAB
ALBUMIN SERPL BCP-MCNC: 4.1 G/DL (ref 3.2–4.9)
ALBUMIN/GLOB SERPL: 1.5 G/DL
ALP SERPL-CCNC: 87 U/L (ref 30–99)
ALT SERPL-CCNC: 17 U/L (ref 2–50)
ANION GAP SERPL CALC-SCNC: 12 MMOL/L (ref 7–16)
AST SERPL-CCNC: 29 U/L (ref 12–45)
BASOPHILS # BLD AUTO: 0.9 % (ref 0–1.8)
BASOPHILS # BLD: 0.07 K/UL (ref 0–0.12)
BILIRUB SERPL-MCNC: 0.2 MG/DL (ref 0.1–1.5)
BUN SERPL-MCNC: 18 MG/DL (ref 8–22)
BURR CELLS BLD QL SMEAR: NORMAL
CALCIUM SERPL-MCNC: 9.4 MG/DL (ref 8.5–10.5)
CHLORIDE SERPL-SCNC: 104 MMOL/L (ref 96–112)
CK SERPL-CCNC: 96 U/L (ref 0–154)
CO2 SERPL-SCNC: 25 MMOL/L (ref 20–33)
COMMENT 1642: NORMAL
CREAT SERPL-MCNC: 0.74 MG/DL (ref 0.5–1.4)
EOSINOPHIL # BLD AUTO: 0.48 K/UL (ref 0–0.51)
EOSINOPHIL NFR BLD: 6.1 % (ref 0–6.9)
ERYTHROCYTE [DISTWIDTH] IN BLOOD BY AUTOMATED COUNT: 65.6 FL (ref 35.9–50)
ERYTHROCYTE [SEDIMENTATION RATE] IN BLOOD BY WESTERGREN METHOD: 6 MM/HOUR (ref 0–25)
GLOBULIN SER CALC-MCNC: 2.7 G/DL (ref 1.9–3.5)
GLUCOSE SERPL-MCNC: 100 MG/DL (ref 65–99)
HCT VFR BLD AUTO: 39.2 % (ref 37–47)
HGB BLD-MCNC: 12.1 G/DL (ref 12–16)
IMM GRANULOCYTES # BLD AUTO: 0.02 K/UL (ref 0–0.11)
IMM GRANULOCYTES NFR BLD AUTO: 0.3 % (ref 0–0.9)
LYMPHOCYTES # BLD AUTO: 1.89 K/UL (ref 1–4.8)
LYMPHOCYTES NFR BLD: 23.9 % (ref 22–41)
MCH RBC QN AUTO: 27.2 PG (ref 27–33)
MCHC RBC AUTO-ENTMCNC: 30.9 G/DL (ref 33.6–35)
MCV RBC AUTO: 88.1 FL (ref 81.4–97.8)
MONOCYTES # BLD AUTO: 0.99 K/UL (ref 0–0.85)
MONOCYTES NFR BLD AUTO: 12.5 % (ref 0–13.4)
MORPHOLOGY BLD-IMP: NORMAL
NEUTROPHILS # BLD AUTO: 4.45 K/UL (ref 2–7.15)
NEUTROPHILS NFR BLD: 56.3 % (ref 44–72)
NRBC # BLD AUTO: 0 K/UL
NRBC BLD-RTO: 0 /100 WBC
OVALOCYTES BLD QL SMEAR: NORMAL
PLATELET # BLD AUTO: 224 K/UL (ref 164–446)
PLATELET BLD QL SMEAR: NORMAL
PMV BLD AUTO: 11.1 FL (ref 9–12.9)
POIKILOCYTOSIS BLD QL SMEAR: NORMAL
POTASSIUM SERPL-SCNC: 4 MMOL/L (ref 3.6–5.5)
PROT SERPL-MCNC: 6.8 G/DL (ref 6–8.2)
RBC # BLD AUTO: 4.45 M/UL (ref 4.2–5.4)
RBC BLD AUTO: PRESENT
SODIUM SERPL-SCNC: 141 MMOL/L (ref 135–145)
WBC # BLD AUTO: 7.9 K/UL (ref 4.8–10.8)

## 2021-06-29 PROCEDURE — 80053 COMPREHEN METABOLIC PANEL: CPT

## 2021-06-29 PROCEDURE — 36415 COLL VENOUS BLD VENIPUNCTURE: CPT

## 2021-06-29 PROCEDURE — 85652 RBC SED RATE AUTOMATED: CPT

## 2021-06-29 PROCEDURE — 85025 COMPLETE CBC W/AUTO DIFF WBC: CPT

## 2021-06-29 PROCEDURE — 82550 ASSAY OF CK (CPK): CPT

## 2021-07-06 ENCOUNTER — OUTPATIENT INFUSION SERVICES (OUTPATIENT)
Dept: ONCOLOGY | Facility: MEDICAL CENTER | Age: 61
End: 2021-07-06
Attending: PHYSICIAN ASSISTANT
Payer: COMMERCIAL

## 2021-07-06 ENCOUNTER — OFFICE VISIT (OUTPATIENT)
Dept: INFECTIOUS DISEASES | Facility: MEDICAL CENTER | Age: 61
End: 2021-07-06
Payer: COMMERCIAL

## 2021-07-06 VITALS
RESPIRATION RATE: 18 BRPM | BODY MASS INDEX: 18.78 KG/M2 | TEMPERATURE: 99.1 F | DIASTOLIC BLOOD PRESSURE: 77 MMHG | HEIGHT: 66 IN | OXYGEN SATURATION: 97 % | SYSTOLIC BLOOD PRESSURE: 123 MMHG | HEART RATE: 68 BPM | WEIGHT: 116.84 LBS

## 2021-07-06 VITALS
WEIGHT: 117 LBS | HEART RATE: 85 BPM | SYSTOLIC BLOOD PRESSURE: 102 MMHG | BODY MASS INDEX: 17.73 KG/M2 | OXYGEN SATURATION: 96 % | TEMPERATURE: 99.8 F | DIASTOLIC BLOOD PRESSURE: 70 MMHG | HEIGHT: 68 IN | RESPIRATION RATE: 16 BRPM

## 2021-07-06 DIAGNOSIS — M85.80 OSTEOPENIA, UNSPECIFIED LOCATION: ICD-10-CM

## 2021-07-06 DIAGNOSIS — T84.52XD INFECTION ASSOCIATED WITH INTERNAL LEFT HIP PROSTHESIS, SUBSEQUENT ENCOUNTER: ICD-10-CM

## 2021-07-06 DIAGNOSIS — A49.01 MSSA (METHICILLIN SUSCEPTIBLE STAPHYLOCOCCUS AUREUS) INFECTION: ICD-10-CM

## 2021-07-06 PROCEDURE — 700111 HCHG RX REV CODE 636 W/ 250 OVERRIDE (IP): Performed by: PHYSICIAN ASSISTANT

## 2021-07-06 PROCEDURE — 96372 THER/PROPH/DIAG INJ SC/IM: CPT

## 2021-07-06 PROCEDURE — 99214 OFFICE O/P EST MOD 30 MIN: CPT | Performed by: NURSE PRACTITIONER

## 2021-07-06 RX ORDER — RIFAMPIN 300 MG/1
300 CAPSULE ORAL 2 TIMES DAILY
Qty: 180 CAPSULE | Refills: 0 | Status: SHIPPED | OUTPATIENT
Start: 2021-07-06 | End: 2021-09-28

## 2021-07-06 RX ORDER — SULFAMETHOXAZOLE AND TRIMETHOPRIM 800; 160 MG/1; MG/1
1 TABLET ORAL 2 TIMES DAILY
Qty: 28 TABLET | Refills: 0 | Status: SHIPPED | OUTPATIENT
Start: 2021-07-06 | End: 2021-07-20 | Stop reason: SDUPTHER

## 2021-07-06 RX ADMIN — DENOSUMAB 60 MG: 60 INJECTION SUBCUTANEOUS at 13:53

## 2021-07-06 ASSESSMENT — FIBROSIS 4 INDEX
FIB4 SCORE: 1.98
FIB4 SCORE: 1.98

## 2021-07-06 ASSESSMENT — PAIN SCALES - GENERAL: PAINLEVEL: 4=SLIGHT-MODERATE PAIN

## 2021-07-06 NOTE — PROGRESS NOTES
Pt arrived to [facility], ambulatory, for Prolia injection. Pt voices no complaints. Pt denies any recent or upcoming dental surgeries. Labs reviewed from 6/29, pt within parameters to treat today. Prolia injected into the L-back arm with no complications. Pt left IS with no s/sx of distress. Follow up appointment confirmed.

## 2021-07-06 NOTE — PROGRESS NOTES
[facility]    Subjective:     Chief Complaint   Patient presents with   • Follow-Up     infection left hip prosthesis     Interval History: 61 y.o. female with a past medical history of left hip fracture around 2018 with ongoing pain and complications with MSSA infection in the hip treated with cefazolin and then long-term Bactrim.  In November 2020 patient underwent removal of deep hardware to the left hip by Dr. Mendes, she was on the p.o. Bactrim leading up to that surgery, had been off antibiotics since that time.  Patient did continue to have ongoing pain to the left hip with increasing left thigh edema.  Hospitalized from 5/24-5/28/2021, admitted with fevers, chills and increasing left hip pain.  CT of the left hip showed a 5 x 6 abscess.  On 5/25, patient underwent left hip I&D and polylinear exchange with Dr. Mendes, a large amount of purulent material was found intraoperatively that tracked all the way down to the greater trochanter region.  OR culture positive MSSA.  Patient discharged on IV daptomycin 8 mg/kg daily and p.o. rifampin 300 mg twice daily x6 weeks, estimated end date 7/6/2021.    6/11/2021: Seen by FLORENCIO Worthington.  Was seen by Dr. Mendes last week for follow-up, was told that everything is going well and has another follow-up with him in 4 weeks.  The left hip is well-healed without any breakdown or drainage.  Continue IV daptomycin 8 mg/kg daily and p.o. rifampin 300 mg twice daily through 7/6/2021.  Will plan to follow acute course with chronic p.o. antibiotic (Bactrim versus doxy versus Augmentin) and rifampin for 3 additional months.  Will likely keep suppressive p.o. antibiotic in place for 6 to 12 months due to retained infected hardware, possibly longer as patient is at high risk for reinfection.    Today, 7/6/2021: Continues to take and tolerate the IV daptomycin and p.o. rifampin with minimal issue.  States that she has some itching to her left forearm and around  the right upper PICC, somewhat treated with Benadryl.  Otherwise, he denies feeling generally ill, fevers/chills, general malaise, headache, n/v/d, abdominal pain, chest pain, shortness of breath, cough, sore throat or rash.  Her mother passed away last Wednesday on .  Was seen by OVI Banks at [facility] on , patient stating that now that her mother is passed she now planning on progressing to a two-stage revision to the left hip.  Due to meeting her deductible, she would like to get all this done before the end of the year, which means that she will need to start the process sometime in September.  Left hip remains well-healed without any breakdown or drainage.  Says that the pain averages a 4/10 to the left hip that is improved with tramadol.  She has great range of motion, noting that she does Pilates.  Patient also stating that her greater trochanter is still fractured and needs to discuss with Dr. Mendes how this will be treated.  She is also considering getting a second opinion at the recommendation of her PCP.    LINDSAY as above in HPI.    Past Medical History:   Diagnosis Date   • Acute midline thoracic back pain 10/19/2016   • Anesthesia 2018    post op nausea; shakes   • Arthritis     osteoarthritis; hip, thumbs, knees   • High cholesterol    • Hip pain     left   • History of anemia    • Hyperlipidemia 2013   • Insomnia    • Menopause    • Osteopenia    • PONV (postoperative nausea and vomiting)        Family History   Adopted: Yes   Family history unknown: Yes       Social History     Tobacco Use   • Smoking status: Former Smoker     Packs/day: 0.25     Years: 20.00     Pack years: 5.00     Types: Cigarettes     Quit date: 1999     Years since quittin.9   • Smokeless tobacco: Never Used   Vaping Use   • Vaping Use: Never used   Substance Use Topics   • Alcohol use: No     Alcohol/week: 0.0 oz     Comment: former heavy use, 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     Objective:     /70 (BP Location: Left arm, Patient Position: Sitting, BP Cuff Size: Adult)   Pulse 85   Temp 37.7 °C (99.8 °F) (Temporal)   Resp 16   Ht 1.727 m (5' 8\") Comment: patient reported  Wt 53.1 kg (117 lb)   SpO2 96%   BMI 17.79 kg/m²     Physical Exam  Vitals reviewed.   Constitutional:       General: She is not in acute distress.     Appearance: Normal appearance. She is normal weight. She is not ill-appearing.   HENT:      Head: Normocephalic and atraumatic.      Right Ear: External ear normal.      Left Ear: External ear normal.   Eyes:      General: No scleral icterus.     Extraocular Movements: Extraocular movements intact.      Conjunctiva/sclera: Conjunctivae normal.      Pupils: Pupils are equal, round, and reactive to light.   Cardiovascular:      Rate and Rhythm: Normal rate and regular rhythm.      Heart sounds: Normal heart sounds. No murmur heard.   No friction rub.   Pulmonary:      Effort: Pulmonary effort is normal. No respiratory distress.      Breath sounds: Normal breath sounds. No wheezing or rales.   Abdominal:      General: Abdomen is flat. Bowel sounds are normal. There is no distension.      Palpations: Abdomen is soft.      Tenderness: There is no abdominal tenderness. There is no guarding or rebound.   Musculoskeletal:         General: No swelling or tenderness.      Cervical back: Normal range of motion and neck supple.      Comments: RUE PICC- CDI, non tender, no erythema.    Left hip, surgical site- remains well-healed and approximated without any breakdown or drainage, no erythema to surrounding tissue, no induration or fluctuance, nontender to palpation.   Skin:     General: Skin is warm and dry.      Findings: No erythema or rash.   Neurological:      Mental Status: She is alert and oriented to person, place, and time.      Cranial Nerves: No cranial nerve deficit.      Sensory: No sensory " deficit.      Motor: No weakness.      Gait: Gait normal.   Psychiatric:         Mood and Affect: Mood normal.         Behavior: Behavior normal.         Thought Content: Thought content normal.         Judgment: Judgment normal.      Comments: Pleasant         Labs:  WBC   Date/Time Value Ref Range Status   06/29/2021 03:53 PM 7.9 4.8 - 10.8 K/uL Final   10/11/2011 01:52 PM 5.7 4.0 - 10.5 x10E3/uL Final     RBC   Date/Time Value Ref Range Status   06/29/2021 03:53 PM 4.45 4.20 - 5.40 M/uL Final   10/11/2011 01:52 PM 4.43 3.80 - 5.10 x10E6/uL Final     Hemoglobin   Date/Time Value Ref Range Status   06/29/2021 03:53 PM 12.1 12.0 - 16.0 g/dL Final     Hematocrit   Date/Time Value Ref Range Status   06/29/2021 03:53 PM 39.2 37.0 - 47.0 % Final     MCV   Date/Time Value Ref Range Status   06/29/2021 03:53 PM 88.1 81.4 - 97.8 fL Final   10/11/2011 01:52 PM 87 80 - 98 fL Final     MCH   Date/Time Value Ref Range Status   06/29/2021 03:53 PM 27.2 27.0 - 33.0 pg Final   10/11/2011 01:52 PM 29.1 27.0 - 34.0 pg Final     MCHC   Date/Time Value Ref Range Status   06/29/2021 03:53 PM 30.9 (L) 33.6 - 35.0 g/dL Final     MPV   Date/Time Value Ref Range Status   06/29/2021 03:53 PM 11.1 9.0 - 12.9 fL Final        Sodium   Date/Time Value Ref Range Status   06/29/2021 03:53  135 - 145 mmol/L Final     Potassium   Date/Time Value Ref Range Status   06/29/2021 03:53 PM 4.0 3.6 - 5.5 mmol/L Final     Chloride   Date/Time Value Ref Range Status   06/29/2021 03:53  96 - 112 mmol/L Final     Co2   Date/Time Value Ref Range Status   06/29/2021 03:53 PM 25 20 - 33 mmol/L Final     Glucose   Date/Time Value Ref Range Status   06/29/2021 03:53  (H) 65 - 99 mg/dL Final     Bun   Date/Time Value Ref Range Status   06/29/2021 03:53 PM 18 8 - 22 mg/dL Final     Creatinine   Date/Time Value Ref Range Status   06/29/2021 03:53 PM 0.74 0.50 - 1.40 mg/dL Final   10/11/2011 01:52 PM 0.78 0.57 - 1.00 mg/dL Final     Bun-Creatinine  Ratio   Date/Time Value Ref Range Status   07/29/2016 07:20 AM 18 9 - 23 Final   10/11/2011 01:52 PM 18 9 - 23 Final       Alkaline Phosphatase   Date/Time Value Ref Range Status   06/29/2021 03:53 PM 87 30 - 99 U/L Final     AST(SGOT)   Date/Time Value Ref Range Status   06/29/2021 03:53 PM 29 12 - 45 U/L Final     ALT(SGPT)   Date/Time Value Ref Range Status   06/29/2021 03:53 PM 17 2 - 50 U/L Final     Total Bilirubin   Date/Time Value Ref Range Status   06/29/2021 03:53 PM 0.2 0.1 - 1.5 mg/dL Final        CPK Total   Date/Time Value Ref Range Status   06/29/2021 03:53 PM 96 0 - 154 U/L Final      ESR 6  CRP <0.30 on 6/22/21    Assessment and Plan:   The following treatment plan was discussed with patient at length:    1. 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    ---PICC line DC'd in office.  Tip intact, covered with gauze, directed to keep covered for 24 hours.  ER/hematoma warning signs given to patient.  -Starting on 7/11, patient to take p.o. Bactrim DS twice daily for suppression due to retained infected hardware.    -Monitor for s/sx of worsening transitioning from IV to PO abx: increased redness, pain, swelling, drainage, breakdown of surgical site, fevers, chills, general malaise, etc.  Notify ID or go to ER should these s/sx occur.  -Continue the p.o. rifampin 300 mg twice daily for 3 months, estimated end date mid October 2021.  -CBC, CMP, ESR and CRP to be done in about 10 to 12 days to monitor for leukocytosis, bone sterile suppression, thrombocytopenia, hyponatremia, hyperkalemia and trend inflammatory markers.  -Patient will likely go for two-stage revision to the left hip in September.  Patient to stay on antibiotics until that  time.  Would request new cultures to be obtained.  Patient will then need to be evaluated by ID inpatient for antibiotic treatment while antibiotic spacer is in place.  -Follow-up with Dr. Mendes at [facility] as directed.   2. MSSA (methicillin susceptible Staphylococcus aureus) infection      As above     Follow up: 2 weeks, RTC sooner if needed. FU with PCP for ongoing chronic medical conditions.     STEPHANIE Ford.       Please note that this dictation was created using voice recognition software. I have  worked with technical experts from [facility] to optimize the interface.  I have made every reasonable attempt to correct obvious errors, but there may be errors of grammar and possibly content that I did not discover before finalizing the note.

## 2021-07-08 ENCOUNTER — OFFICE VISIT (OUTPATIENT)
Dept: MEDICAL GROUP | Facility: PHYSICIAN GROUP | Age: 61
End: 2021-07-08
Payer: COMMERCIAL

## 2021-07-08 VITALS
TEMPERATURE: 98.2 F | BODY MASS INDEX: 18.8 KG/M2 | HEIGHT: 66 IN | HEART RATE: 74 BPM | OXYGEN SATURATION: 97 % | DIASTOLIC BLOOD PRESSURE: 64 MMHG | RESPIRATION RATE: 12 BRPM | SYSTOLIC BLOOD PRESSURE: 100 MMHG | WEIGHT: 117 LBS

## 2021-07-08 DIAGNOSIS — D64.9 ANEMIA, UNSPECIFIED TYPE: ICD-10-CM

## 2021-07-08 DIAGNOSIS — G89.29 CHRONIC LEFT HIP PAIN: ICD-10-CM

## 2021-07-08 DIAGNOSIS — M25.552 CHRONIC LEFT HIP PAIN: ICD-10-CM

## 2021-07-08 PROCEDURE — 99213 OFFICE O/P EST LOW 20 MIN: CPT | Performed by: PHYSICIAN ASSISTANT

## 2021-07-08 RX ORDER — TRAMADOL HYDROCHLORIDE 50 MG/1
50 TABLET ORAL 3 TIMES DAILY
Qty: 90 TABLET | Refills: 0 | Status: CANCELLED | OUTPATIENT
Start: 2021-07-08 | End: 2021-08-07

## 2021-07-08 RX ORDER — TRAMADOL HYDROCHLORIDE 50 MG/1
50 TABLET ORAL 3 TIMES DAILY
Qty: 90 TABLET | Refills: 0 | Status: SHIPPED | OUTPATIENT
Start: 2021-07-08 | End: 2021-08-05 | Stop reason: SDUPTHER

## 2021-07-08 ASSESSMENT — FIBROSIS 4 INDEX: FIB4 SCORE: 1.98

## 2021-07-08 NOTE — PROGRESS NOTES
CC:   Chief Complaint   Patient presents with   • Medication Refill   • Hip Pain          HISTORY OF PRESENT ILLNESS: Patient is a 61 y.o. female established patient who presents today to follow up on the following conditions:       Health Maintenance: Completed    Chronic left hip pain  Her and Dr. Mendes are planning a revision this fall- will be 2 surgeries. On Rifampin and bactrim daily for now. Port removed. Labs reviewed. She is feeling well overall. Her mother passed away recently. Expected, but still difficult. 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 She would like to move forward with the revision surgeries now. She continues to take tramadol three time a day for the hip pain. Tolerating without side effect.       Patient Active Problem List    Diagnosis Date Noted   • Staphylococcus aureus infection 05/26/2021   • Abscess of left hip 05/24/2021   • Allergy to adhesive 05/24/2021   • Rash 05/13/2021   • Anemia 09/11/2020   • Chronic left hip pain 11/06/2019   • Anxiety 01/10/2019   • Acute midline thoracic back pain 10/19/2016   • Hyperlipidemia 06/17/2013   • Menopause    • Osteopenia    • Insomnia       Allergies:Tape    Current Outpatient Medications   Medication Sig Dispense Refill   • sulfamethoxazole-trimethoprim (BACTRIM DS) 800-160 MG tablet Take 1 tablet by mouth 2 times a day for 14 days. 28 tablet 0   • riFAMPin (RIFADINE) 300 MG Cap Take 1 capsule by mouth 2 times a day for 90 days. 180 capsule 0   • traMADol (ULTRAM) 50 MG Tab Take 1 tablet by mouth in the morning, at noon, and at bedtime for 30 days. 90 tablet 0   • ferrous sulfate 325 (65 Fe) MG tablet Take 325 mg by mouth every evening.     • acetaminophen (TYLENOL) 500 MG Tab Take 500-1,000 mg by mouth every 6 hours as needed.     • triamcinolone acetonide (KENALOG) 0.025 % Cream Apply 1 Application topically 2 times a day. (Patient taking differently: Apply 1 Application topically 2 times a day as needed.) 80 g 0   • traZODone (DESYREL) 100  "MG Tab TAKE 1 TABLET BY MOUTH AT BEDTIME AS NEEDED FOR SLEEP (Patient taking differently: Take 100 mg by mouth at bedtime.) 90 tablet 0   • Ascorbic Acid (VITAMIN C PO) Take 1 capsule by mouth every evening.     • Coenzyme Q10 (COQ10 PO) Take 1 Tab by mouth every evening.     • therapeutic multivitamin-minerals (THERAGRAN-M) Tab Take 1 Tab by mouth every evening.     • CALCIUM PO Take 1 Tab by mouth every evening.     • ibuprofen (MOTRIN) 200 MG Tab Take 800 mg by mouth every 6 hours as needed.     • simvastatin (ZOCOR) 20 MG Tab Take 1 Tab by mouth every evening. 90 Tab 3     No current facility-administered medications for this visit.       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         Family History   Adopted: Yes   Family history unknown: Yes       Review of Systems:    Constitutional: No Fevers, Chills  Eyes: No vision changes  ENT: No hearing changes  Resp: No Shortness of breath  CV: No Chest pain  GI: No Nausea/Vomiting  MSK: No weakness  Skin: No rashes  Neuro: No Headaches  Psych: No Suicidal ideations    All remaining systems reviewed and found to be negative, except as stated above.    Exam:    /64   Pulse 74   Temp 36.8 °C (98.2 °F) (Temporal)   Resp 12   Ht 1.675 m (5' 5.95\")   Wt 53.1 kg (117 lb)   SpO2 97%  Body mass index is 18.91 kg/m².    General:  Well nourished, well developed female in NAD  HENT: Atraumatic, normocephalic  EYES: Extraocular movements intact  NECK: Supple, FROM  CHEST: No deformities, Equal " chest expansion  RESP: Unlabored, no stridor or audible wheeze  HEART: Regular Rate and rhythm.   Extremities: No Clubbing, Cyanosis, or Edema  Skin: Warm/dry, without rashes  Neuro: A/O x 4, due to COVID-19- did not have patient remove face mask to test cranial nerves.  Motor/sensory grossly intact  Psych: Normal behavior, normal affect      Lab review:  Labs are reviewed and discussed with a patient  Lab Results   Component Value Date/Time    CHOLSTRLTOT 180 06/28/2019 06:43 AM    LDL 97 06/28/2019 06:43 AM    HDL 68 06/28/2019 06:43 AM    TRIGLYCERIDE 75 06/28/2019 06:43 AM       Lab Results   Component Value Date/Time    SODIUM 141 06/29/2021 03:53 PM    POTASSIUM 4.0 06/29/2021 03:53 PM    CHLORIDE 104 06/29/2021 03:53 PM    CO2 25 06/29/2021 03:53 PM    GLUCOSE 100 (H) 06/29/2021 03:53 PM    BUN 18 06/29/2021 03:53 PM    CREATININE 0.74 06/29/2021 03:53 PM    CREATININE 0.78 10/11/2011 01:52 PM    BUNCREATRAT 18 07/29/2016 07:20 AM    BUNCREATRAT 18 10/11/2011 01:52 PM     Lab Results   Component Value Date/Time    ALKPHOSPHAT 87 06/29/2021 03:53 PM    ASTSGOT 29 06/29/2021 03:53 PM    ALTSGPT 17 06/29/2021 03:53 PM    TBILIRUBIN 0.2 06/29/2021 03:53 PM      No results found for: HBA1C  No results found for: TSH  No results found for: FREET4    Lab Results   Component Value Date/Time    WBC 7.9 06/29/2021 03:53 PM    WBC 5.7 10/11/2011 01:52 PM    RBC 4.45 06/29/2021 03:53 PM    RBC 4.43 10/11/2011 01:52 PM    HEMOGLOBIN 12.1 06/29/2021 03:53 PM    HEMATOCRIT 39.2 06/29/2021 03:53 PM    MCV 88.1 06/29/2021 03:53 PM    MCV 87 10/11/2011 01:52 PM    MCH 27.2 06/29/2021 03:53 PM    MCH 29.1 10/11/2011 01:52 PM    MCHC 30.9 (L) 06/29/2021 03:53 PM    MPV 11.1 06/29/2021 03:53 PM    NEUTSPOLYS 56.30 06/29/2021 03:53 PM    LYMPHOCYTES 23.90 06/29/2021 03:53 PM    MONOCYTES 12.50 06/29/2021 03:53 PM    EOSINOPHILS 6.10 06/29/2021 03:53 PM    BASOPHILS 0.90 06/29/2021 03:53 PM    HYPOCHROMIA 1+ 12/28/2020 12:30 PM     ANISOCYTOSIS 3+ (A) 06/17/2021 01:40 PM          Assessment/Plan:  1. Chronic left hip pain  Ok for refill of tramadol today. Continues to take TID, without side effect.     I have reviewed the medical records and have determined that a controlled substance treatment is medically indicated:     I have conducted a physical exam and documented it. I have reviewed pt's prescription history as maintained by the [place] Prescription Monitoring Program.    Given the above, I believe the benefits of controlled substance therapy outweigh the risks.   Accordingly, I have discussed the risk and benefits, treatment plan, and alternative therapies with the patient.   Reviewed , appropriate.    2. Anemia, unspecified type  - IRON/TOTAL IRON BIND; Future  - FERRITIN; Future   CBC improved, she is doing nutrient dense shakes daily. Will get iron levels with next set of labs.     Follow-up: Return in about 4 weeks (around 8/5/2021) for Follow up.    Please note that this dictation was created using voice recognition software. I have made every reasonable attempt to correct obvious errors, but I expect that there are errors of grammar and possibly content that I did not discover before finalizing the note.

## 2021-07-08 NOTE — ASSESSMENT & PLAN NOTE
Her and Dr. Mendes are planning a revision this fall- will be 2 surgeries. On Rifampin and bactrim daily for now. Port removed. Labs reviewed. She is feeling well overall. Her mother passed away recently. Expected, but still difficult. 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 She would like to move forward with the revision surgeries now. She continues to take tramadol three time a day for the hip pain. Tolerating without side effect.

## 2021-07-15 ENCOUNTER — HOSPITAL ENCOUNTER (OUTPATIENT)
Dept: LAB | Facility: MEDICAL CENTER | Age: 61
End: 2021-07-15
Attending: NURSE PRACTITIONER
Payer: COMMERCIAL

## 2021-07-15 ENCOUNTER — HOSPITAL ENCOUNTER (OUTPATIENT)
Dept: LAB | Facility: MEDICAL CENTER | Age: 61
End: 2021-07-15
Attending: PHYSICIAN ASSISTANT
Payer: COMMERCIAL

## 2021-07-15 DIAGNOSIS — G89.29 CHRONIC LEFT HIP PAIN: ICD-10-CM

## 2021-07-15 DIAGNOSIS — Z79.899 HIGH RISK MEDICATION USE: ICD-10-CM

## 2021-07-15 DIAGNOSIS — D64.9 ANEMIA, UNSPECIFIED TYPE: ICD-10-CM

## 2021-07-15 DIAGNOSIS — M25.552 CHRONIC LEFT HIP PAIN: ICD-10-CM

## 2021-07-15 DIAGNOSIS — D50.8 IRON DEFICIENCY ANEMIA SECONDARY TO INADEQUATE DIETARY IRON INTAKE: ICD-10-CM

## 2021-07-15 DIAGNOSIS — Z13.6 SCREENING FOR CARDIOVASCULAR CONDITION: ICD-10-CM

## 2021-07-15 DIAGNOSIS — T84.52XD INFECTION ASSOCIATED WITH INTERNAL LEFT HIP PROSTHESIS, SUBSEQUENT ENCOUNTER: ICD-10-CM

## 2021-07-15 DIAGNOSIS — Z13.29 SCREENING FOR ENDOCRINE DISORDER: ICD-10-CM

## 2021-07-15 LAB
ALBUMIN SERPL BCP-MCNC: 4 G/DL (ref 3.2–4.9)
ALBUMIN/GLOB SERPL: 1.7 G/DL
ALP SERPL-CCNC: 68 U/L (ref 30–99)
ALT SERPL-CCNC: 20 U/L (ref 2–50)
ANION GAP SERPL CALC-SCNC: 13 MMOL/L (ref 7–16)
AST SERPL-CCNC: 31 U/L (ref 12–45)
BILIRUB SERPL-MCNC: <0.2 MG/DL (ref 0.1–1.5)
BUN SERPL-MCNC: 11 MG/DL (ref 8–22)
CALCIUM SERPL-MCNC: 8.8 MG/DL (ref 8.5–10.5)
CHLORIDE SERPL-SCNC: 104 MMOL/L (ref 96–112)
CO2 SERPL-SCNC: 20 MMOL/L (ref 20–33)
CREAT SERPL-MCNC: 0.8 MG/DL (ref 0.5–1.4)
CRP SERPL HS-MCNC: <0.3 MG/DL (ref 0–0.75)
ERYTHROCYTE [DISTWIDTH] IN BLOOD BY AUTOMATED COUNT: 59.6 FL (ref 35.9–50)
FERRITIN SERPL-MCNC: 62.7 NG/ML (ref 10–291)
GLOBULIN SER CALC-MCNC: 2.3 G/DL (ref 1.9–3.5)
GLUCOSE SERPL-MCNC: 118 MG/DL (ref 65–99)
HCT VFR BLD AUTO: 40.1 % (ref 37–47)
HGB BLD-MCNC: 12.6 G/DL (ref 12–16)
IRON SATN MFR SERPL: 26 % (ref 15–55)
IRON SERPL-MCNC: 62 UG/DL (ref 40–170)
MCH RBC QN AUTO: 27.6 PG (ref 27–33)
MCHC RBC AUTO-ENTMCNC: 31.4 G/DL (ref 33.6–35)
MCV RBC AUTO: 87.9 FL (ref 81.4–97.8)
PLATELET # BLD AUTO: 256 K/UL (ref 164–446)
PMV BLD AUTO: 11.1 FL (ref 9–12.9)
POTASSIUM SERPL-SCNC: 4.3 MMOL/L (ref 3.6–5.5)
PROT SERPL-MCNC: 6.3 G/DL (ref 6–8.2)
RBC # BLD AUTO: 4.56 M/UL (ref 4.2–5.4)
SODIUM SERPL-SCNC: 137 MMOL/L (ref 135–145)
TIBC SERPL-MCNC: 238 UG/DL (ref 250–450)
UIBC SERPL-MCNC: 176 UG/DL (ref 110–370)
WBC # BLD AUTO: 6.3 K/UL (ref 4.8–10.8)

## 2021-07-15 PROCEDURE — 83540 ASSAY OF IRON: CPT

## 2021-07-15 PROCEDURE — 85027 COMPLETE CBC AUTOMATED: CPT

## 2021-07-15 PROCEDURE — 86140 C-REACTIVE PROTEIN: CPT

## 2021-07-15 PROCEDURE — 85652 RBC SED RATE AUTOMATED: CPT

## 2021-07-15 PROCEDURE — 83550 IRON BINDING TEST: CPT

## 2021-07-15 PROCEDURE — 80053 COMPREHEN METABOLIC PANEL: CPT

## 2021-07-15 PROCEDURE — 82728 ASSAY OF FERRITIN: CPT

## 2021-07-15 PROCEDURE — 36415 COLL VENOUS BLD VENIPUNCTURE: CPT

## 2021-07-16 ENCOUNTER — TELEPHONE (OUTPATIENT)
Dept: MEDICAL GROUP | Facility: PHYSICIAN GROUP | Age: 61
End: 2021-07-16

## 2021-07-16 LAB — ERYTHROCYTE [SEDIMENTATION RATE] IN BLOOD BY WESTERGREN METHOD: 4 MM/HOUR (ref 0–25)

## 2021-07-16 NOTE — TELEPHONE ENCOUNTER
----- Message from Rafaela Meyers P.A.-C. sent at 7/16/2021 12:56 PM PDT -----  Please call patient about their results.     Results showed: iron looks ok.     Thank you,    Rafaela Meyers PA-C

## 2021-07-19 ENCOUNTER — DOCUMENTATION (OUTPATIENT)
Dept: INFECTIOUS DISEASES | Facility: MEDICAL CENTER | Age: 61
End: 2021-07-19

## 2021-07-19 ENCOUNTER — APPOINTMENT (OUTPATIENT)
Dept: INFECTIOUS DISEASES | Facility: MEDICAL CENTER | Age: 61
End: 2021-07-19
Payer: COMMERCIAL

## 2021-07-20 ENCOUNTER — OFFICE VISIT (OUTPATIENT)
Dept: INFECTIOUS DISEASES | Facility: MEDICAL CENTER | Age: 61
End: 2021-07-20
Payer: COMMERCIAL

## 2021-07-20 VITALS
HEART RATE: 64 BPM | SYSTOLIC BLOOD PRESSURE: 102 MMHG | TEMPERATURE: 99.1 F | OXYGEN SATURATION: 97 % | BODY MASS INDEX: 18.96 KG/M2 | WEIGHT: 118 LBS | HEIGHT: 66 IN | DIASTOLIC BLOOD PRESSURE: 70 MMHG | RESPIRATION RATE: 16 BRPM

## 2021-07-20 DIAGNOSIS — T84.52XD INFECTION ASSOCIATED WITH INTERNAL LEFT HIP PROSTHESIS, SUBSEQUENT ENCOUNTER: ICD-10-CM

## 2021-07-20 DIAGNOSIS — A49.01 MSSA (METHICILLIN SUSCEPTIBLE STAPHYLOCOCCUS AUREUS) INFECTION: ICD-10-CM

## 2021-07-20 PROCEDURE — 99214 OFFICE O/P EST MOD 30 MIN: CPT | Performed by: NURSE PRACTITIONER

## 2021-07-20 RX ORDER — SULFAMETHOXAZOLE AND TRIMETHOPRIM 800; 160 MG/1; MG/1
1 TABLET ORAL 2 TIMES DAILY
Qty: 180 TABLET | Refills: 0 | Status: SHIPPED | OUTPATIENT
Start: 2021-07-20 | End: 2021-10-08 | Stop reason: SDUPTHER

## 2021-07-20 ASSESSMENT — FIBROSIS 4 INDEX: FIB4 SCORE: 1.71

## 2021-07-20 NOTE — PROGRESS NOTES
[facility]    Subjective:     Chief Complaint   Patient presents with   • Follow-Up     infection of internal hip prosthesis     Interval History: 61 y.o. female with a past medical history of left hip fracture around 2018 with ongoing pain and complications with MSSA infection in the hip treated with cefazolin and then long-term Bactrim.  In November 2020 patient underwent removal of deep hardware to the left hip by Dr. Mendes, she was on the p.o. Bactrim leading up to that surgery, had been off antibiotics since that time.  Patient did continue to have ongoing pain to the left hip with increasing left thigh edema.  Hospitalized from 5/24-5/28/2021, admitted with fevers, chills and increasing left hip pain.  CT of the left hip showed a 5 x 6 abscess.  On 5/25, patient underwent left hip I&D and polylinear exchange with Dr. Mendes, a large amount of purulent material was found intraoperatively that tracked all the way down to the greater trochanter region.  OR culture positive MSSA.  Patient discharged on IV daptomycin 8 mg/kg daily and p.o. rifampin 300 mg twice daily x6 weeks, estimated end date 7/6/2021.    6/11/2021: Seen by FLORENCIO Worthington.  Was seen by Dr. Mendes last week for follow-up, was told that everything is going well and has another follow-up with him in 4 weeks.  The left hip is well-healed without any breakdown or drainage.  Continue IV daptomycin 8 mg/kg daily and p.o. rifampin 300 mg twice daily through 7/6/2021.  Will plan to follow acute course with chronic p.o. antibiotic (Bactrim versus doxy versus Augmentin) and rifampin for 3 additional months.  Will likely keep suppressive p.o. antibiotic in place for 6 to 12 months due to retained infected hardware, possibly longer as patient is at high risk for reinfection.    7/6/2021: Seen by FLORENCIO Worthington.  Her mother passed away last Wednesday on 6/30.  Was seen by OVI Hogan at [facility] on 7/1, now planning on progressing to  a two-stage revision to the left hip.  Due to meeting her deductible, she would like to get all this done before the end of the year, which means that she will need to start the process sometime in September.  Left hip remains well-healed without any breakdown or drainage.  Patient also stating that her greater trochanter is still fractured and needs to discuss with Dr. Mendes how this will be treated.  She is also considering getting a second opinion at the recommendation of her PCP.  Completed IV daptomycin this morning.  Starting on 7/11, patient to take p.o. Bactrim DS twice daily for suppression due to retained infected hardware.  Continue the p.o. rifampin 300 mg twice daily for 3 months, estimated end date mid October 2021.  Patient will likely go for two-stage revision to the left hip in September.  Patient to stay on antibiotics until that time.  Would request new cultures to be obtained.  Patient will then need to be evaluated by ID inpatient for antibiotic treatment while antibiotic spacer is in place.    Today, 7/20/2021: Has been taking and tolerating the p.o. Bactrim without issue. Denies feeling generally ill, fevers/chills, general malaise, headache, n/v/d, abdominal pain, chest pain, shortness of breath, cough, sore throat or rash.  Left hip remains well-healed without any breakdown or drainage.  Reports minimal pain.  Has good range of motion.  Has a follow-up with Dr. Mendes on 9/1.  Still plans to proceed on to two-step for left hip, but does not want to do this until the beginning of October.    ROS as above in HPI.    Past Medical History:   Diagnosis Date   • Acute midline thoracic back pain 10/19/2016   • Anesthesia 11/19/2018    post op nausea; shakes   • Arthritis     osteoarthritis; hip, thumbs, knees   • High cholesterol    • Hip pain     left   • History of anemia    • Hyperlipidemia 6/17/2013   • Insomnia    • Menopause    • Osteopenia    • PONV (postoperative nausea and vomiting)   "      Family History   Adopted: Yes   Family history unknown: Yes       Social History     Tobacco Use   • Smoking status: Former Smoker     Packs/day: 0.25     Years: 20.00     Pack years: 5.00     Types: Cigarettes     Quit date: 1999     Years since quittin.9   • Smokeless tobacco: Never Used   Vaping Use   • Vaping Use: Never used   Substance Use Topics   • Alcohol use: No     Alcohol/week: 0.0 oz     Comment: former heavy use, 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     Objective:     /70 (BP Location: Left arm, Patient Position: Sitting, BP Cuff Size: Adult)   Pulse 64   Temp 37.3 °C (99.1 °F) (Temporal)   Resp 16   Ht 1.674 m (5' 5.9\") Comment: patient reported  Wt 53.5 kg (118 lb)   SpO2 97%   BMI 19.10 kg/m²     Physical Exam  Vitals reviewed.   Constitutional:       General: She is not in acute distress.     Appearance: Normal appearance. She is normal weight. She is not ill-appearing or diaphoretic.   HENT:      Head: Normocephalic and atraumatic.      Right Ear: External ear normal.      Left Ear: External ear normal.   Eyes:      Extraocular Movements: Extraocular movements intact.      Conjunctiva/sclera: Conjunctivae normal.      Pupils: Pupils are equal, round, and reactive to light.   Cardiovascular:      Rate and Rhythm: Normal rate and regular rhythm.      Heart sounds: Normal heart sounds. No murmur heard.     Pulmonary:      Effort: Pulmonary effort is normal. No respiratory distress.      Breath sounds: Normal breath sounds. No wheezing.   Abdominal:      General: Abdomen is flat. Bowel sounds are normal. There is no distension.      Palpations: Abdomen is soft.      Tenderness: There is no abdominal tenderness.   Musculoskeletal:         General: No swelling or tenderness. Normal range of motion.      Cervical back: Normal range of motion and neck supple. No tenderness.      Comments: " Left hip, surgical site- remains well-healed and approximated without any breakdown or drainage, no erythema to surrounding tissue, no induration or fluctuance, nontender to palpation.   Skin:     General: Skin is warm and dry.      Findings: No erythema or rash.   Neurological:      Mental Status: She is alert and oriented to person, place, and time.      Cranial Nerves: No cranial nerve deficit.      Sensory: No sensory deficit.      Motor: No weakness.      Coordination: Coordination normal.      Gait: Gait normal.   Psychiatric:         Mood and Affect: Mood normal.         Behavior: Behavior normal.         Thought Content: Thought content normal.         Judgment: Judgment normal.      Comments: Pleasant         Labs:  WBC   Date/Time Value Ref Range Status   07/15/2021 02:16 PM 6.3 4.8 - 10.8 K/uL Final   10/11/2011 01:52 PM 5.7 4.0 - 10.5 x10E3/uL Final     RBC   Date/Time Value Ref Range Status   07/15/2021 02:16 PM 4.56 4.20 - 5.40 M/uL Final   10/11/2011 01:52 PM 4.43 3.80 - 5.10 x10E6/uL Final     Hemoglobin   Date/Time Value Ref Range Status   07/15/2021 02:16 PM 12.6 12.0 - 16.0 g/dL Final     Hematocrit   Date/Time Value Ref Range Status   07/15/2021 02:16 PM 40.1 37.0 - 47.0 % Final     MCV   Date/Time Value Ref Range Status   07/15/2021 02:16 PM 87.9 81.4 - 97.8 fL Final   10/11/2011 01:52 PM 87 80 - 98 fL Final     MCH   Date/Time Value Ref Range Status   07/15/2021 02:16 PM 27.6 27.0 - 33.0 pg Final   10/11/2011 01:52 PM 29.1 27.0 - 34.0 pg Final     MCHC   Date/Time Value Ref Range Status   07/15/2021 02:16 PM 31.4 (L) 33.6 - 35.0 g/dL Final     MPV   Date/Time Value Ref Range Status   07/15/2021 02:16 PM 11.1 9.0 - 12.9 fL Final        Sodium   Date/Time Value Ref Range Status   07/15/2021 02:16  135 - 145 mmol/L Final     Potassium   Date/Time Value Ref Range Status   07/15/2021 02:16 PM 4.3 3.6 - 5.5 mmol/L Final     Chloride   Date/Time Value Ref Range Status   07/15/2021 02:16   96 - 112 mmol/L Final     Co2   Date/Time Value Ref Range Status   07/15/2021 02:16 PM 20 20 - 33 mmol/L Final     Glucose   Date/Time Value Ref Range Status   07/15/2021 02:16  (H) 65 - 99 mg/dL Final     Bun   Date/Time Value Ref Range Status   07/15/2021 02:16 PM 11 8 - 22 mg/dL Final     Creatinine   Date/Time Value Ref Range Status   07/15/2021 02:16 PM 0.80 0.50 - 1.40 mg/dL Final   10/11/2011 01:52 PM 0.78 0.57 - 1.00 mg/dL Final     Bun-Creatinine Ratio   Date/Time Value Ref Range Status   07/29/2016 07:20 AM 18 9 - 23 Final   10/11/2011 01:52 PM 18 9 - 23 Final       Alkaline Phosphatase   Date/Time Value Ref Range Status   07/15/2021 02:16 PM 68 30 - 99 U/L Final     AST(SGOT)   Date/Time Value Ref Range Status   07/15/2021 02:16 PM 31 12 - 45 U/L Final     ALT(SGPT)   Date/Time Value Ref Range Status   07/15/2021 02:16 PM 20 2 - 50 U/L Final     Total Bilirubin   Date/Time Value Ref Range Status   07/15/2021 02:16 PM <0.2 0.1 - 1.5 mg/dL Final       ESR 4  CRP <0.30    Assessment and Plan:   The following treatment plan was discussed with patient at length:    1. 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 Bactrim DS twice daily until she goes in for 2 staged left hip revision with Dr. Mendes.  Plans for this to be done beginning of October 2021.  -Continue p.o. rifampin 300 mg twice daily for 3 months, estimated end date mid October 2021.    -CMP, ESR and CRP to be done monthly to monitor for hepatonephrotoxicity, hyperkalemia, hyponatremia and trend inflammatory markers.  -CBC to be repeated in September to monitor for leukocytosis, bone marrow suppression.  -Follow-up with Dr. Mendes as directed.   2. MSSA (methicillin susceptible Staphylococcus aureus) infection      As above     Follow up: 2  months, RTC sooner if needed. FU with PCP for ongoing chronic medical conditions.     Josee Lao A.P.R.N.       Please note that this dictation was created using voice recognition software. I have  worked with technical experts from [facility] to optimize the interface.  I have made every reasonable attempt to correct obvious errors, but there may be errors of grammar and possibly content that I did not discover before finalizing the note.

## 2021-08-05 ENCOUNTER — OFFICE VISIT (OUTPATIENT)
Dept: MEDICAL GROUP | Facility: PHYSICIAN GROUP | Age: 61
End: 2021-08-05
Payer: COMMERCIAL

## 2021-08-05 VITALS
WEIGHT: 118 LBS | DIASTOLIC BLOOD PRESSURE: 72 MMHG | RESPIRATION RATE: 12 BRPM | BODY MASS INDEX: 18.96 KG/M2 | HEART RATE: 76 BPM | SYSTOLIC BLOOD PRESSURE: 100 MMHG | HEIGHT: 66 IN | OXYGEN SATURATION: 95 % | TEMPERATURE: 97 F

## 2021-08-05 DIAGNOSIS — G89.29 CHRONIC LEFT HIP PAIN: ICD-10-CM

## 2021-08-05 DIAGNOSIS — M25.552 CHRONIC LEFT HIP PAIN: ICD-10-CM

## 2021-08-05 DIAGNOSIS — D50.8 IRON DEFICIENCY ANEMIA SECONDARY TO INADEQUATE DIETARY IRON INTAKE: ICD-10-CM

## 2021-08-05 PROCEDURE — 99213 OFFICE O/P EST LOW 20 MIN: CPT | Performed by: PHYSICIAN ASSISTANT

## 2021-08-05 RX ORDER — TRAMADOL HYDROCHLORIDE 50 MG/1
50 TABLET ORAL 3 TIMES DAILY
Qty: 90 TABLET | Refills: 0 | Status: SHIPPED | OUTPATIENT
Start: 2021-08-05 | End: 2021-09-04

## 2021-08-05 ASSESSMENT — FIBROSIS 4 INDEX: FIB4 SCORE: 1.71

## 2021-08-05 NOTE — ASSESSMENT & PLAN NOTE
Since her last visit patient did have a follow-up with infectious disease.  Patient is to continue her Bactrim DS twice daily until she goes through two-stage left hip revision with Dr. Mendes.  This is to be scheduled in October.  She is also to continue rifampin 300 mg twice daily for 3 months.  Estimated end date mid-October 2021.  They will continue to do monthly labs on the patient.    Patient went back to work last week. She also restarted Pilates a couple weeks ago. Doing fine at work. Taking Ibuprofen for other sore muscles from exercises.     Tramadol TID continues to have improvement of her pain.

## 2021-08-05 NOTE — PROGRESS NOTES
CC:   Chief Complaint   Patient presents with   • Medication Refill   • Hip Pain          HISTORY OF PRESENT ILLNESS: Patient is a 61 y.o. female established patient who presents today to follow up on the following conditions:       Health Maintenance: Completed     Chronic left hip pain  Since her last visit patient did have a follow-up with infectious disease.  Patient is to continue her Bactrim DS twice daily until she goes through two-stage left hip revision with Dr. Mendes.  This is to be scheduled in October.  She is also to continue rifampin 300 mg twice daily for 3 months.  Estimated end date mid-October 2021.  They will continue to do monthly labs on the patient.    Patient went back to work last week. She also restarted Pilates a couple weeks ago. Doing fine at work. Taking Ibuprofen for other sore muscles from exercises.     Tramadol TID continues to have improvement of her pain.       Patient Active Problem List    Diagnosis Date Noted   • Staphylococcus aureus infection 05/26/2021   • Abscess of left hip 05/24/2021   • Allergy to adhesive 05/24/2021   • Rash 05/13/2021   • Anemia 09/11/2020   • Chronic left hip pain 11/06/2019   • Anxiety 01/10/2019   • Acute midline thoracic back pain 10/19/2016   • Hyperlipidemia 06/17/2013   • Menopause    • Osteopenia    • Insomnia       Allergies:Tape    Current Outpatient Medications   Medication Sig Dispense Refill   • traMADol (ULTRAM) 50 MG Tab Take 1 tablet by mouth in the morning, at noon, and at bedtime for 30 days. 90 tablet 0   • sulfamethoxazole-trimethoprim (BACTRIM DS) 800-160 MG tablet Take 1 tablet by mouth 2 times a day for 90 days. 180 tablet 0   • riFAMPin (RIFADINE) 300 MG Cap Take 1 capsule by mouth 2 times a day for 90 days. 180 capsule 0   • ferrous sulfate 325 (65 Fe) MG tablet Take 325 mg by mouth every evening.     • acetaminophen (TYLENOL) 500 MG Tab Take 500-1,000 mg by mouth every 6 hours as needed.     • triamcinolone acetonide (KENALOG)  "0.025 % Cream Apply 1 Application topically 2 times a day. (Patient taking differently: Apply 1 Application topically 2 times a day as needed.) 80 g 0   • traZODone (DESYREL) 100 MG Tab TAKE 1 TABLET BY MOUTH AT BEDTIME AS NEEDED FOR SLEEP (Patient taking differently: Take 100 mg by mouth at bedtime.) 90 tablet 0   • Ascorbic Acid (VITAMIN C PO) Take 1 capsule by mouth every evening.     • simvastatin (ZOCOR) 20 MG Tab Take 1 Tab by mouth every evening. 90 Tab 3   • Coenzyme Q10 (COQ10 PO) Take 1 Tab by mouth every evening.     • therapeutic multivitamin-minerals (THERAGRAN-M) Tab Take 1 Tab by mouth every evening.     • CALCIUM PO Take 1 Tab by mouth every evening.     • ibuprofen (MOTRIN) 200 MG Tab Take 800 mg by mouth every 6 hours as needed.       No current facility-administered medications for this visit.       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         Family History   Adopted: Yes   Family history unknown: Yes       Review of Systems:    Constitutional: No Fevers, Chills  Eyes: No vision changes  ENT: No hearing changes  Resp: No Shortness of breath  CV: No Chest pain  GI: No Nausea/Vomiting  MSK: No weakness  Skin: No rashes  Neuro: No Headaches  Psych: No Suicidal ideations    All remaining systems reviewed and found to be negative, except as stated above.    Exam:    /72   Pulse 76   Temp 36.1 °C (97 °F) (Temporal)   Resp 12   Ht 1.675 m (5' 5.95\")   Wt 53.5 kg (118 lb)   SpO2 95%  Body mass index is 19.07 " kg/m².    General:  Well nourished, well developed female in NAD  HENT: Atraumatic, normocephalic  EYES: Extraocular movements intact  NECK: Supple, FROM  CHEST: No deformities, Equal chest expansion  RESP: Unlabored, no stridor or audible wheeze  HEART: Regular Rate and rhythm. Extremities: No Clubbing, Cyanosis, or Edema  Skin: Warm/dry, without rashes  Neuro: A/O x 4, due to COVID-19- did not have patient remove face mask to test cranial nerves.  Motor/sensory grossly intact  Psych: Normal behavior, normal affect       Assessment/Plan:  1. Chronic left hip pain  - traMADol (ULTRAM) 50 MG Tab; Take 1 tablet by mouth in the morning, at noon, and at bedtime for 30 days.  Dispense: 90 tablet; Refill: 0  Patient okay for tramadol refill today.  She continues to use three times a day.  Does continue to prove her quality of life and reduce her pain.  She uses ibuprofen for breakthrough pain.  Denies any adverse reactions to the tramadol.  Patient is going to proceed with 2 part reconstruction procedure with Dr. Mendes in October.    I have reviewed the medical records and have determined that a controlled substance treatment is medically indicated:     I have conducted a physical exam and documented it. I have reviewed pt's prescription history as maintained by the [place] Prescription Monitoring Program.      Given the above, I believe the benefits of controlled substance therapy outweigh the risks.   Accordingly, I have discussed the risk and benefits, treatment plan, and alternative therapies with the patient.   Reviewed , appropriate.    2. Iron deficiency anemia secondary to inadequate dietary iron intake  - IRON/TOTAL IRON BIND; Future  - FERRITIN; Future  History of iron deficiency anemia, no longer supplementing at this time we will repeat labs in 4 weeks to make sure this stays stable.    Follow-up: Return in about 4 weeks (around 9/2/2021) for Follow up.    Please note that this dictation was created using  voice recognition software. I have made every reasonable attempt to correct obvious errors, but I expect that there are errors of grammar and possibly content that I did not discover before finalizing the note.

## 2021-08-31 ENCOUNTER — HOSPITAL ENCOUNTER (OUTPATIENT)
Dept: LAB | Facility: MEDICAL CENTER | Age: 61
End: 2021-08-31
Attending: NURSE PRACTITIONER
Payer: COMMERCIAL

## 2021-08-31 ENCOUNTER — HOSPITAL ENCOUNTER (OUTPATIENT)
Dept: LAB | Facility: MEDICAL CENTER | Age: 61
End: 2021-08-31
Attending: PHYSICIAN ASSISTANT
Payer: COMMERCIAL

## 2021-08-31 DIAGNOSIS — D50.8 IRON DEFICIENCY ANEMIA SECONDARY TO INADEQUATE DIETARY IRON INTAKE: ICD-10-CM

## 2021-08-31 DIAGNOSIS — T84.52XD INFECTION ASSOCIATED WITH INTERNAL LEFT HIP PROSTHESIS, SUBSEQUENT ENCOUNTER: ICD-10-CM

## 2021-08-31 LAB
ALBUMIN SERPL BCP-MCNC: 4.2 G/DL (ref 3.2–4.9)
ALBUMIN/GLOB SERPL: 1.8 G/DL
ALP SERPL-CCNC: 51 U/L (ref 30–99)
ALT SERPL-CCNC: 29 U/L (ref 2–50)
ANION GAP SERPL CALC-SCNC: 14 MMOL/L (ref 7–16)
AST SERPL-CCNC: 35 U/L (ref 12–45)
BILIRUB SERPL-MCNC: <0.2 MG/DL (ref 0.1–1.5)
BUN SERPL-MCNC: 11 MG/DL (ref 8–22)
CALCIUM SERPL-MCNC: 9.3 MG/DL (ref 8.5–10.5)
CHLORIDE SERPL-SCNC: 105 MMOL/L (ref 96–112)
CO2 SERPL-SCNC: 23 MMOL/L (ref 20–33)
CREAT SERPL-MCNC: 0.88 MG/DL (ref 0.5–1.4)
CRP SERPL HS-MCNC: <0.3 MG/DL (ref 0–0.75)
ERYTHROCYTE [DISTWIDTH] IN BLOOD BY AUTOMATED COUNT: 50.2 FL (ref 35.9–50)
ERYTHROCYTE [SEDIMENTATION RATE] IN BLOOD BY WESTERGREN METHOD: 4 MM/HOUR (ref 0–25)
FASTING STATUS PATIENT QL REPORTED: NORMAL
FERRITIN SERPL-MCNC: 41.4 NG/ML (ref 10–291)
GLOBULIN SER CALC-MCNC: 2.3 G/DL (ref 1.9–3.5)
GLUCOSE SERPL-MCNC: 100 MG/DL (ref 65–99)
HCT VFR BLD AUTO: 43.3 % (ref 37–47)
HGB BLD-MCNC: 14 G/DL (ref 12–16)
IRON SATN MFR SERPL: 30 % (ref 15–55)
IRON SERPL-MCNC: 83 UG/DL (ref 40–170)
MCH RBC QN AUTO: 28.1 PG (ref 27–33)
MCHC RBC AUTO-ENTMCNC: 32.3 G/DL (ref 33.6–35)
MCV RBC AUTO: 86.8 FL (ref 81.4–97.8)
PLATELET # BLD AUTO: 264 K/UL (ref 164–446)
PMV BLD AUTO: 10.7 FL (ref 9–12.9)
POTASSIUM SERPL-SCNC: 4 MMOL/L (ref 3.6–5.5)
PROT SERPL-MCNC: 6.5 G/DL (ref 6–8.2)
RBC # BLD AUTO: 4.99 M/UL (ref 4.2–5.4)
SODIUM SERPL-SCNC: 142 MMOL/L (ref 135–145)
TIBC SERPL-MCNC: 280 UG/DL (ref 250–450)
UIBC SERPL-MCNC: 197 UG/DL (ref 110–370)
WBC # BLD AUTO: 5.3 K/UL (ref 4.8–10.8)

## 2021-08-31 PROCEDURE — 82728 ASSAY OF FERRITIN: CPT

## 2021-08-31 PROCEDURE — 36415 COLL VENOUS BLD VENIPUNCTURE: CPT

## 2021-08-31 PROCEDURE — 83550 IRON BINDING TEST: CPT

## 2021-08-31 PROCEDURE — 83540 ASSAY OF IRON: CPT

## 2021-08-31 PROCEDURE — 85652 RBC SED RATE AUTOMATED: CPT

## 2021-08-31 PROCEDURE — 85027 COMPLETE CBC AUTOMATED: CPT

## 2021-08-31 PROCEDURE — 80053 COMPREHEN METABOLIC PANEL: CPT

## 2021-08-31 PROCEDURE — 86140 C-REACTIVE PROTEIN: CPT

## 2021-08-31 RX ORDER — SIMVASTATIN 20 MG
TABLET ORAL
Qty: 90 TABLET | Refills: 0 | Status: SHIPPED | OUTPATIENT
Start: 2021-08-31 | End: 2021-11-03 | Stop reason: DRUGHIGH

## 2021-09-01 ENCOUNTER — TELEPHONE (OUTPATIENT)
Dept: MEDICAL GROUP | Facility: PHYSICIAN GROUP | Age: 61
End: 2021-09-01

## 2021-09-01 NOTE — TELEPHONE ENCOUNTER
----- Message from Rafaela Meyers P.A.-C. sent at 9/1/2021  8:57 AM PDT -----  Please call patient about their results.     Results showed: iron remains stable.     Thank you,    Rafaela Meyers PA-C

## 2021-09-08 ENCOUNTER — OFFICE VISIT (OUTPATIENT)
Dept: MEDICAL GROUP | Facility: PHYSICIAN GROUP | Age: 61
End: 2021-09-08
Payer: COMMERCIAL

## 2021-09-08 VITALS
HEART RATE: 86 BPM | WEIGHT: 118 LBS | DIASTOLIC BLOOD PRESSURE: 72 MMHG | BODY MASS INDEX: 18.96 KG/M2 | SYSTOLIC BLOOD PRESSURE: 98 MMHG | HEIGHT: 66 IN | TEMPERATURE: 98.4 F | OXYGEN SATURATION: 96 % | RESPIRATION RATE: 12 BRPM

## 2021-09-08 DIAGNOSIS — G89.29 CHRONIC LEFT HIP PAIN: ICD-10-CM

## 2021-09-08 DIAGNOSIS — M85.80 OSTEOPENIA, UNSPECIFIED LOCATION: ICD-10-CM

## 2021-09-08 DIAGNOSIS — D50.8 IRON DEFICIENCY ANEMIA SECONDARY TO INADEQUATE DIETARY IRON INTAKE: ICD-10-CM

## 2021-09-08 DIAGNOSIS — F41.9 ANXIETY: ICD-10-CM

## 2021-09-08 DIAGNOSIS — M25.552 CHRONIC LEFT HIP PAIN: ICD-10-CM

## 2021-09-08 PROCEDURE — 99214 OFFICE O/P EST MOD 30 MIN: CPT | Performed by: PHYSICIAN ASSISTANT

## 2021-09-08 RX ORDER — LORAZEPAM 1 MG/1
1 TABLET ORAL
Qty: 20 TABLET | Refills: 0 | Status: SHIPPED | OUTPATIENT
Start: 2021-09-08 | End: 2021-10-08

## 2021-09-08 RX ORDER — TRAMADOL HYDROCHLORIDE 50 MG/1
50 TABLET ORAL 3 TIMES DAILY
Qty: 90 TABLET | Refills: 0 | Status: SHIPPED | OUTPATIENT
Start: 2021-09-08 | End: 2021-10-06 | Stop reason: SDUPTHER

## 2021-09-08 ASSESSMENT — FIBROSIS 4 INDEX: FIB4 SCORE: 1.55

## 2021-09-08 NOTE — ASSESSMENT & PLAN NOTE
Patient had follow-up with Dr. Mendes at the beginning of September.  They did discuss surgical options, they reviewed pros and cons of the multiple surgeries that would be needed to replace the hardware in her hip.  Has follow up with infectious disease next week. Her plan as of now is to hold off on surgery. Maintain antibiotics, and pain medication. She would be down for 6+ months for surgeries.     Analgesia  What was your pain level on average during the past week?: 4  What was your pain level at its worst during the past week?: 8  What percentage of your pain has been relieved during the past week?: 50 %  Is the amount of pain relief you are now obtaining from your current pain relievers enough to make a real difference in your life?: Yes  Query to Clinician: Is the patient's pain relief clinically significant?: Yes  Activities of Daily Living  Physical Functioning: Better  Family Relationships: Same  Social Relationships: Same  Mood: Same  Sleep Patterns: Same  Overall Functioning: Better  Adverse Events  Is patient experiencing any side effects from current pain relievers?: No  Patient's Overall Severity of Side Effects: None  Assessment  Is your overall impression that this patient is benefiting from opioid therapy?: Yes  Specific Analgesic Plan: Continue present regimen

## 2021-09-08 NOTE — PROGRESS NOTES
CC:   Chief Complaint   Patient presents with   • Medication Refill   • Chronic Opiate Therapy          HISTORY OF PRESENT ILLNESS: Patient is a 61 y.o. female established patient who presents today to follow up on the following conditions:       Health Maintenance: Completed    Anemia  History of iron deficiency anemia, iron panel and CBC remained stable.  Can maintain off iron for now.    Chronic left hip pain  Patient had follow-up with Dr. Mendes at the beginning of September.  They did discuss surgical options, they reviewed pros and cons of the multiple surgeries that would be needed to replace the hardware in her hip.  Has follow up with infectious disease next week. Her plan as of now is to hold off on surgery. Maintain antibiotics, and pain medication. She would be down for 6+ months for surgeries.     Analgesia  What was your pain level on average during the past week?: 4  What was your pain level at its worst during the past week?: 8  What percentage of your pain has been relieved during the past week?: 50 %  Is the amount of pain relief you are now obtaining from your current pain relievers enough to make a real difference in your life?: Yes  Query to Clinician: Is the patient's pain relief clinically significant?: Yes  Activities of Daily Living  Physical Functioning: Better  Family Relationships: Same  Social Relationships: Same  Mood: Same  Sleep Patterns: Same  Overall Functioning: Better  Adverse Events  Is patient experiencing any side effects from current pain relievers?: No  Patient's Overall Severity of Side Effects: None  Assessment  Is your overall impression that this patient is benefiting from opioid therapy?: Yes  Specific Analgesic Plan: Continue present regimen         Patient Active Problem List    Diagnosis Date Noted   • Staphylococcus aureus infection 05/26/2021   • Abscess of left hip 05/24/2021   • Allergy to adhesive 05/24/2021   • Rash 05/13/2021   • Anemia 09/11/2020   • Chronic  left hip pain 2019   • Anxiety 01/10/2019   • Acute midline thoracic back pain 10/19/2016   • Hyperlipidemia 2013   • Menopause    • Osteopenia    • Insomnia       Allergies:Tape    Current Outpatient Medications   Medication Sig Dispense Refill   • traMADol (ULTRAM) 50 MG Tab Take 1 Tablet by mouth in the morning, at noon, and at bedtime for 30 days. 90 Tablet 0   • LORazepam (ATIVAN) 1 MG Tab Take 1 Tablet by mouth 1 time a day as needed for Anxiety for up to 30 days. 20 Tablet 0   • simvastatin (ZOCOR) 20 MG Tab TAKE 1 TABLET BY MOUTH EVERY DAY IN THE EVENING 90 Tablet 0   • sulfamethoxazole-trimethoprim (BACTRIM DS) 800-160 MG tablet Take 1 tablet by mouth 2 times a day for 90 days. 180 tablet 0   • riFAMPin (RIFADINE) 300 MG Cap Take 1 capsule by mouth 2 times a day for 90 days. 180 capsule 0   • acetaminophen (TYLENOL) 500 MG Tab Take 500-1,000 mg by mouth every 6 hours as needed.     • triamcinolone acetonide (KENALOG) 0.025 % Cream Apply 1 Application topically 2 times a day. (Patient taking differently: Apply 1 Application topically 2 times a day as needed.) 80 g 0   • traZODone (DESYREL) 100 MG Tab TAKE 1 TABLET BY MOUTH AT BEDTIME AS NEEDED FOR SLEEP (Patient taking differently: Take 100 mg by mouth at bedtime.) 90 tablet 0   • Ascorbic Acid (VITAMIN C PO) Take 1 capsule by mouth every evening.     • Coenzyme Q10 (COQ10 PO) Take 1 Tab by mouth every evening.     • therapeutic multivitamin-minerals (THERAGRAN-M) Tab Take 1 Tab by mouth every evening.     • CALCIUM PO Take 1 Tab by mouth every evening.     • ibuprofen (MOTRIN) 200 MG Tab Take 800 mg by mouth every 6 hours as needed.       No current facility-administered medications for this visit.       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         Family History   Adopted: Yes   Family history unknown: Yes       Review of Systems:    Constitutional: No Fevers, Chills  Eyes: No vision changes  ENT: No hearing changes  Resp: No Shortness of breath  CV: No Chest pain  GI: No Nausea/Vomiting  MSK: No weakness  Skin: No rashes  Neuro: No Headaches  Psych: No Suicidal ideations    All remaining systems reviewed and found to be negative, except as stated above.    Exam:    BP (!) 98/72   Pulse 86   Temp 36.9 °C (98.4 °F) (Temporal)   Resp 12   Ht 1.676 m (5' 6\")   Wt 53.5 kg (118 lb)   SpO2 96%  Body mass index is 19.05 kg/m².    General:  Well nourished, well developed female in NAD  HENT: Atraumatic, normocephalic  EYES: Extraocular movements intact  NECK: Supple, FROM  CHEST: No deformities, Equal chest expansion  RESP: Unlabored, no stridor or audible wheeze  HEART: Regular Rate and rhythm.   Extremities: No Clubbing, Cyanosis, or Edema  Skin: Warm/dry, without rashes  Neuro: A/O x 4, due to COVID-19- did not have patient remove face mask to test cranial nerves.  Motor/sensory grossly intact  Psych: Normal behavior, normal affect      Lab review:  Labs are reviewed and discussed with a patient  Lab Results   Component Value Date/Time    CHOLSTRLTOT 180 06/28/2019 06:43 AM    LDL 97 06/28/2019 06:43 AM    HDL 68 06/28/2019 06:43 AM    TRIGLYCERIDE 75 06/28/2019 06:43 AM       Lab Results   Component Value Date/Time    SODIUM 142 08/31/2021 01:08 PM    POTASSIUM 4.0 08/31/2021 01:08 PM    CHLORIDE 105 08/31/2021 01:08 PM    CO2 23 08/31/2021 01:08 PM    GLUCOSE 100 (H) 08/31/2021 01:08 PM    BUN 11 08/31/2021 01:08 PM    CREATININE 0.88 08/31/2021 01:08 PM    CREATININE 0.78 " 10/11/2011 01:52 PM    BUNCREATRAT 18 07/29/2016 07:20 AM    BUNCREATRAT 18 10/11/2011 01:52 PM     Lab Results   Component Value Date/Time    ALKPHOSPHAT 51 08/31/2021 01:08 PM    ASTSGOT 35 08/31/2021 01:08 PM    ALTSGPT 29 08/31/2021 01:08 PM    TBILIRUBIN <0.2 08/31/2021 01:08 PM      No results found for: HBA1C  No results found for: TSH  No results found for: FREET4    Lab Results   Component Value Date/Time    WBC 5.3 08/31/2021 01:08 PM    WBC 5.7 10/11/2011 01:52 PM    RBC 4.99 08/31/2021 01:08 PM    RBC 4.43 10/11/2011 01:52 PM    HEMOGLOBIN 14.0 08/31/2021 01:08 PM    HEMATOCRIT 43.3 08/31/2021 01:08 PM    MCV 86.8 08/31/2021 01:08 PM    MCV 87 10/11/2011 01:52 PM    MCH 28.1 08/31/2021 01:08 PM    MCH 29.1 10/11/2011 01:52 PM    MCHC 32.3 (L) 08/31/2021 01:08 PM    MPV 10.7 08/31/2021 01:08 PM    NEUTSPOLYS 56.30 06/29/2021 03:53 PM    LYMPHOCYTES 23.90 06/29/2021 03:53 PM    MONOCYTES 12.50 06/29/2021 03:53 PM    EOSINOPHILS 6.10 06/29/2021 03:53 PM    BASOPHILS 0.90 06/29/2021 03:53 PM    HYPOCHROMIA 1+ 12/28/2020 12:30 PM    ANISOCYTOSIS 3+ (A) 06/17/2021 01:40 PM          Assessment/Plan:  1. Iron deficiency anemia secondary to inadequate dietary iron intake  Iron stable, continue without supplementation at this time.  2. Chronic left hip pain  - traMADol (ULTRAM) 50 MG Tab; Take 1 Tablet by mouth in the morning, at noon, and at bedtime for 30 days.  Dispense: 90 Tablet; Refill: 0  At this time patient is going to proceed with maintaining daily antibiotics, follow-up with infectious disease next week.  We will maintain pain control with tramadol 50 mg 3 times a day.  3. Anxiety  - LORazepam (ATIVAN) 1 MG Tab; Take 1 Tablet by mouth 1 time a day as needed for Anxiety for up to 30 days.  Dispense: 20 Tablet; Refill: 0  Patient has occasional panic attacks or insomnia issues.  Asked to change alprazolam to lorazepam today, she feels she has less side effect of this medication.   4. Osteopenia,  unspecified location  - REFERRAL TO OUTPATIENT INFUSION  Patient will be due for Prolia injection in January 2022, send a new referral for her.    Follow-up: Return in about 4 weeks (around 10/6/2021) for Follow up.    Please note that this dictation was created using voice recognition software. I have made every reasonable attempt to correct obvious errors, but I expect that there are errors of grammar and possibly content that I did not discover before finalizing the note.

## 2021-09-08 NOTE — ASSESSMENT & PLAN NOTE
History of iron deficiency anemia, iron panel and CBC remained stable.  Can maintain off iron for now.

## 2021-09-14 ENCOUNTER — OFFICE VISIT (OUTPATIENT)
Dept: INFECTIOUS DISEASES | Facility: MEDICAL CENTER | Age: 61
End: 2021-09-14
Payer: COMMERCIAL

## 2021-09-14 VITALS
TEMPERATURE: 99.5 F | RESPIRATION RATE: 16 BRPM | WEIGHT: 118 LBS | SYSTOLIC BLOOD PRESSURE: 102 MMHG | OXYGEN SATURATION: 96 % | HEART RATE: 73 BPM | HEIGHT: 66 IN | DIASTOLIC BLOOD PRESSURE: 62 MMHG | BODY MASS INDEX: 18.96 KG/M2

## 2021-09-14 DIAGNOSIS — T84.52XD INFECTION ASSOCIATED WITH INTERNAL LEFT HIP PROSTHESIS, SUBSEQUENT ENCOUNTER: ICD-10-CM

## 2021-09-14 DIAGNOSIS — A49.01 STAPHYLOCOCCUS AUREUS INFECTION: ICD-10-CM

## 2021-09-14 PROCEDURE — 99214 OFFICE O/P EST MOD 30 MIN: CPT | Performed by: INTERNAL MEDICINE

## 2021-09-14 ASSESSMENT — FIBROSIS 4 INDEX: FIB4 SCORE: 1.55

## 2021-09-14 NOTE — PROGRESS NOTES
Chief Complaint   Patient presents with   • Follow-Up     Infection left hip prosthesis       HISTORY OF PRESENT ILLNESS: Patient is a 61 y.o. female established patient who presents today for FU  s/p left hip fracture around 2018 with ongoing pain and complications with MSSA infection in the hip treated with cefazolin and then long-term Bactrim.  In November 2020 patient underwent removal of deep hardware to the left hip by Dr. Mendes, she was on the p.o. Bactrim leading up to that surgery, had been off antibiotics since that time.  Patient did continue to have ongoing pain to the left hip with increasing left thigh edema.  Hospitalized from 5/24-5/28/2021, admitted with fevers, chills and increasing left hip pain.  CT of the left hip showed a 5 x 6 abscess.  On 5/25, patient underwent left hip I&D and polylinear exchange with Dr. Mendes, a large amount of purulent material was found intraoperatively that tracked all the way down to the greater trochanter region.  OR culture positive MSSA.  Patient discharged on IV daptomycin 8 mg/kg daily and p.o. rifampin 300 mg twice daily x6 weeks, estimated end date 7/6/2021.    6/11/2021: Seen by FLORENCIO Worthington.  Was seen by Dr. Mendes last week for follow-up, was told that everything is going well and has another follow-up with him in 4 weeks.  The left hip is well-healed without any breakdown or drainage.  Continue IV daptomycin 8 mg/kg daily and p.o. rifampin 300 mg twice daily through 7/6/2021.  Will plan to follow acute course with chronic p.o. antibiotic (Bactrim versus doxy versus Augmentin) and rifampin for 3 additional months.  Will likely keep suppressive p.o. antibiotic in place for 6 to 12 months due to retained infected hardware, possibly longer as patient is at high risk for reinfection.    7/6/2021: Seen by FLORENCIO Worthington.  Her mother passed away last Wednesday on 6/30.  Was seen by OIV Hogan at [facility] on 7/1, now planning on progressing to a  two-stage revision to the left hip.  Due to meeting her deductible, she would like to get all this done before the end of the year, which means that she will need to start the process sometime in September.  Left hip remains well-healed without any breakdown or drainage.  Patient also stating that her greater trochanter is still fractured and needs to discuss with Dr. Mendes how this will be treated.  She is also considering getting a second opinion at the recommendation of her PCP.  Completed IV daptomycin this morning.  Starting on 7/11, patient to take p.o. Bactrim DS twice daily for suppression due to retained infected hardware.  Continue the p.o. rifampin 300 mg twice daily for 3 months, estimated end date mid October 2021.  Patient will likely go for two-stage revision to the left hip in September.  Patient to stay on antibiotics until that time.  Would request new cultures to be obtained.  Patient will then need to be evaluated by ID inpatient for antibiotic treatment while antibiotic spacer is in place.     7/20/2021: Has been taking and tolerating the p.o. Bactrim without issue. Denies feeling generally ill, fevers/chills, general malaise, headache, n/v/d, abdominal pain, chest pain, shortness of breath, cough, sore throat or rash.  Left hip remains well-healed without any breakdown or drainage.  Reports minimal pain.  Has good range of motion.  Has a follow-up with Dr. Mendes on 9/1.  Still plans to proceed on to two-step for left hip, but does not want to do this until the beginning of October.    9/14/2021  Has discussed surgical options and has significant concerns-told she would likely require 3 operations incl controlled break, resection of bone/prosthesis,spacer,  hardware placement and approx.  6 month recovery.  At this time pain is controlled, erythema has resolved. She is back to teaching Pilates, independent ADLs, and plans to travel.  She would prefer to delay surgery as long as feasible-has  already had 6 surgeries in the past 3 years  Tolerating meds well-denies SE        Patient Active Problem List    Diagnosis Date Noted   • Staphylococcus aureus infection 05/26/2021   • Abscess of left hip 05/24/2021   • Allergy to adhesive 05/24/2021   • Rash 05/13/2021   • Anemia 09/11/2020   • Chronic left hip pain 11/06/2019   • Anxiety 01/10/2019   • Acute midline thoracic back pain 10/19/2016   • Hyperlipidemia 06/17/2013   • Menopause    • Osteopenia    • Insomnia        Allergies:Tape    Current Outpatient Medications   Medication Sig Dispense Refill   • traMADol (ULTRAM) 50 MG Tab Take 1 Tablet by mouth in the morning, at noon, and at bedtime for 30 days. 90 Tablet 0   • LORazepam (ATIVAN) 1 MG Tab Take 1 Tablet by mouth 1 time a day as needed for Anxiety for up to 30 days. 20 Tablet 0   • simvastatin (ZOCOR) 20 MG Tab TAKE 1 TABLET BY MOUTH EVERY DAY IN THE EVENING 90 Tablet 0   • sulfamethoxazole-trimethoprim (BACTRIM DS) 800-160 MG tablet Take 1 tablet by mouth 2 times a day for 90 days. 180 tablet 0   • riFAMPin (RIFADINE) 300 MG Cap Take 1 capsule by mouth 2 times a day for 90 days. 180 capsule 0   • acetaminophen (TYLENOL) 500 MG Tab Take 500-1,000 mg by mouth every 6 hours as needed.     • triamcinolone acetonide (KENALOG) 0.025 % Cream Apply 1 Application topically 2 times a day. (Patient taking differently: Apply 1 Application topically 2 times a day as needed.) 80 g 0   • traZODone (DESYREL) 100 MG Tab TAKE 1 TABLET BY MOUTH AT BEDTIME AS NEEDED FOR SLEEP (Patient taking differently: Take 100 mg by mouth at bedtime.) 90 tablet 0   • Ascorbic Acid (VITAMIN C PO) Take 1 capsule by mouth every evening.     • Coenzyme Q10 (COQ10 PO) Take 1 Tab by mouth every evening.     • therapeutic multivitamin-minerals (THERAGRAN-M) Tab Take 1 Tab by mouth every evening.     • CALCIUM PO Take 1 Tab by mouth every evening.     • ibuprofen (MOTRIN) 200 MG Tab Take 800 mg by mouth every 6 hours as needed.       No  "current facility-administered medications for this visit.       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   Musculoskeletal: + joint pain.   Skin: Negative for rash and itching.      All other systems reviewed and are negative except as in HPI.      Exam:  /62 (BP Location: Left arm, Patient Position: Sitting, BP Cuff Size: Adult)   Pulse 73   Temp 37.5 °C (99.5 °F) (Temporal)   Resp 16   Ht 1.676 m (5' 6\")   Wt 53.5 kg (118 lb)   SpO2 96%   General:  Well nourished, well developed female in NAD. Pleasant and cooperative  Head: NCAT, Pupils are equal round reactive to light. Extraocular movements intact.   Neck: Supple.  No LAD  Pulmonary: Clear Unlabored  Cardiovascular: Regular rate   Abdominal: Nondistended.   Skin: No rashes.  Extremities: no clubbing, cyanosis, or edema. Well healed scars left hip-no erythema or swelling  Neurologic: Alert and oriented x4. Speech is fluent without dysarthria. Cranial nerves intact. Moving all extremities. Gait within normal limits.      Assessment/Plan:  1. 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 Staphylococcus aureus infection     Continue Bactrim and rif for suppression-reassess at next visit continued need for adjunctive rif  Anticipate will continue on Bactrim as long as prosthesis remains in place  BMP and LFTs stable last 3 months  BMP one month  FU 3 months or " kendall Sterling M.D.

## 2021-09-25 DIAGNOSIS — T84.52XD INFECTION ASSOCIATED WITH INTERNAL LEFT HIP PROSTHESIS, SUBSEQUENT ENCOUNTER: ICD-10-CM

## 2021-09-28 RX ORDER — RIFAMPIN 300 MG/1
300 CAPSULE ORAL 2 TIMES DAILY
Qty: 180 CAPSULE | Refills: 0 | Status: SHIPPED | OUTPATIENT
Start: 2021-09-28 | End: 2021-12-27 | Stop reason: SDUPTHER

## 2021-10-06 ENCOUNTER — OFFICE VISIT (OUTPATIENT)
Dept: MEDICAL GROUP | Facility: PHYSICIAN GROUP | Age: 61
End: 2021-10-06
Payer: COMMERCIAL

## 2021-10-06 VITALS
HEART RATE: 74 BPM | TEMPERATURE: 98.7 F | SYSTOLIC BLOOD PRESSURE: 96 MMHG | WEIGHT: 124 LBS | DIASTOLIC BLOOD PRESSURE: 72 MMHG | OXYGEN SATURATION: 94 % | RESPIRATION RATE: 12 BRPM | BODY MASS INDEX: 19.93 KG/M2 | HEIGHT: 66 IN

## 2021-10-06 DIAGNOSIS — M25.552 CHRONIC LEFT HIP PAIN: ICD-10-CM

## 2021-10-06 DIAGNOSIS — G89.29 CHRONIC LEFT HIP PAIN: ICD-10-CM

## 2021-10-06 DIAGNOSIS — M85.80 OSTEOPENIA, UNSPECIFIED LOCATION: ICD-10-CM

## 2021-10-06 DIAGNOSIS — Z23 NEED FOR VACCINATION: Primary | ICD-10-CM

## 2021-10-06 DIAGNOSIS — E78.2 MIXED HYPERLIPIDEMIA: ICD-10-CM

## 2021-10-06 PROCEDURE — 90686 IIV4 VACC NO PRSV 0.5 ML IM: CPT | Performed by: PHYSICIAN ASSISTANT

## 2021-10-06 PROCEDURE — 90471 IMMUNIZATION ADMIN: CPT | Performed by: PHYSICIAN ASSISTANT

## 2021-10-06 PROCEDURE — 99214 OFFICE O/P EST MOD 30 MIN: CPT | Mod: 25 | Performed by: PHYSICIAN ASSISTANT

## 2021-10-06 RX ORDER — TRAMADOL HYDROCHLORIDE 50 MG/1
50 TABLET ORAL 3 TIMES DAILY
Qty: 90 TABLET | Refills: 0 | Status: SHIPPED | OUTPATIENT
Start: 2021-10-06 | End: 2021-11-03 | Stop reason: SDUPTHER

## 2021-10-06 ASSESSMENT — FIBROSIS 4 INDEX: FIB4 SCORE: 1.55

## 2021-10-06 NOTE — ASSESSMENT & PLAN NOTE
Saw ID since last visit, they are keeping her on Bactrim and Rifampin and will see her back in 3 months. Tramadol 50 mg TID is still sufficient in controlling her hip pain.     She did recently injure her right rib cage. This was last Thursday. Leaned over in her car over the arm rest and felt crunch.

## 2021-10-06 NOTE — PROGRESS NOTES
CC:   Chief Complaint   Patient presents with   • Medication Refill   • Hip Pain          HISTORY OF PRESENT ILLNESS: Patient is a 61 y.o. female established patient who presents today to follow up on the following conditions:       Health Maintenance: Completed  Flu shot today.     Chronic left hip pain  Saw ID since last visit, they are keeping her on Bactrim and Rifampin and will see her back in 3 months. Tramadol 50 mg TID is still sufficient in controlling her hip pain.     She did recently injure her right rib cage. This was last Thursday. Leaned over in her car over the arm rest and felt crunch.     Osteopenia  Continue prolia infusions. Due for dexa again 04/23.       Patient Active Problem List    Diagnosis Date Noted   • Staphylococcus aureus infection 05/26/2021   • Abscess of left hip 05/24/2021   • Allergy to adhesive 05/24/2021   • Rash 05/13/2021   • Anemia 09/11/2020   • Chronic left hip pain 11/06/2019   • Anxiety 01/10/2019   • Acute midline thoracic back pain 10/19/2016   • Hyperlipidemia 06/17/2013   • Menopause    • Osteopenia    • Insomnia       Allergies:Tape    Current Outpatient Medications   Medication Sig Dispense Refill   • riFAMPin (RIFADINE) 300 MG Cap TAKE 1 CAPSULE BY MOUTH 2 TIMES A DAY FOR 90 DAYS. 180 Capsule 0   • traMADol (ULTRAM) 50 MG Tab Take 1 Tablet by mouth in the morning, at noon, and at bedtime for 30 days. 90 Tablet 0   • LORazepam (ATIVAN) 1 MG Tab Take 1 Tablet by mouth 1 time a day as needed for Anxiety for up to 30 days. 20 Tablet 0   • simvastatin (ZOCOR) 20 MG Tab TAKE 1 TABLET BY MOUTH EVERY DAY IN THE EVENING 90 Tablet 0   • sulfamethoxazole-trimethoprim (BACTRIM DS) 800-160 MG tablet Take 1 tablet by mouth 2 times a day for 90 days. 180 tablet 0   • acetaminophen (TYLENOL) 500 MG Tab Take 500-1,000 mg by mouth every 6 hours as needed.     • triamcinolone acetonide (KENALOG) 0.025 % Cream Apply 1 Application topically 2 times a day. (Patient taking differently:  "Apply 1 Application topically 2 times a day as needed.) 80 g 0   • traZODone (DESYREL) 100 MG Tab TAKE 1 TABLET BY MOUTH AT BEDTIME AS NEEDED FOR SLEEP (Patient taking differently: Take 100 mg by mouth at bedtime.) 90 tablet 0   • Ascorbic Acid (VITAMIN C PO) Take 1 capsule by mouth every evening.     • Coenzyme Q10 (COQ10 PO) Take 1 Tab by mouth every evening.     • therapeutic multivitamin-minerals (THERAGRAN-M) Tab Take 1 Tab by mouth every evening.     • CALCIUM PO Take 1 Tab by mouth every evening.     • ibuprofen (MOTRIN) 200 MG Tab Take 800 mg by mouth every 6 hours as needed.       No current facility-administered medications for this visit.       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         Family History   Adopted: Yes   Family history unknown: Yes       Review of Systems:    Constitutional: No Fevers, Chills  Eyes: No vision changes  ENT: No hearing changes  Resp: No Shortness of breath  CV: No Chest pain  GI: No Nausea/Vomiting  MSK: No weakness  Skin: No rashes  Neuro: No Headaches  Psych: No Suicidal ideations    All remaining systems reviewed and found to be negative, except as stated above.    Exam:    BP (!) 96/72 (BP Location: Left arm, Patient Position: Sitting, BP Cuff Size: Adult)   Pulse 74   Temp 37.1 °C (98.7 °F) (Temporal)   Resp 12   Ht 1.676 m (5' 6\")   Wt 56.2 kg (124 lb)   SpO2 94%  Body mass index is 20.01 kg/m².    General:  Well nourished, well developed female in NAD  HENT: Atraumatic, " normocephalic  EYES: Extraocular movements intact  NECK: Supple, FROM  CHEST: No deformities, Equal chest expansion  RESP: Unlabored, no stridor or audible wheeze  HEART: Regular Rate and rhythm.   Extremities: No Clubbing, Cyanosis, or Edema  Skin: Warm/dry, without rashes  Neuro: A/O x 4, due to COVID-19- did not have patient remove face mask to test cranial nerves.  Motor/sensory grossly intact  Psych: Normal behavior, normal affect      Lab review:  Labs are reviewed and discussed with a patient  Lab Results   Component Value Date/Time    CHOLSTRLTOT 180 06/28/2019 06:43 AM    LDL 97 06/28/2019 06:43 AM    HDL 68 06/28/2019 06:43 AM    TRIGLYCERIDE 75 06/28/2019 06:43 AM       Lab Results   Component Value Date/Time    SODIUM 142 08/31/2021 01:08 PM    POTASSIUM 4.0 08/31/2021 01:08 PM    CHLORIDE 105 08/31/2021 01:08 PM    CO2 23 08/31/2021 01:08 PM    GLUCOSE 100 (H) 08/31/2021 01:08 PM    BUN 11 08/31/2021 01:08 PM    CREATININE 0.88 08/31/2021 01:08 PM    CREATININE 0.78 10/11/2011 01:52 PM    BUNCREATRAT 18 07/29/2016 07:20 AM    BUNCREATRAT 18 10/11/2011 01:52 PM     Lab Results   Component Value Date/Time    ALKPHOSPHAT 51 08/31/2021 01:08 PM    ASTSGOT 35 08/31/2021 01:08 PM    ALTSGPT 29 08/31/2021 01:08 PM    TBILIRUBIN <0.2 08/31/2021 01:08 PM      No results found for: HBA1C  No results found for: TSH  No results found for: FREET4    Lab Results   Component Value Date/Time    WBC 5.3 08/31/2021 01:08 PM    WBC 5.7 10/11/2011 01:52 PM    RBC 4.99 08/31/2021 01:08 PM    RBC 4.43 10/11/2011 01:52 PM    HEMOGLOBIN 14.0 08/31/2021 01:08 PM    HEMATOCRIT 43.3 08/31/2021 01:08 PM    MCV 86.8 08/31/2021 01:08 PM    MCV 87 10/11/2011 01:52 PM    MCH 28.1 08/31/2021 01:08 PM    MCH 29.1 10/11/2011 01:52 PM    MCHC 32.3 (L) 08/31/2021 01:08 PM    MPV 10.7 08/31/2021 01:08 PM    NEUTSPOLYS 56.30 06/29/2021 03:53 PM    LYMPHOCYTES 23.90 06/29/2021 03:53 PM    MONOCYTES 12.50 06/29/2021 03:53 PM    EOSINOPHILS  6.10 06/29/2021 03:53 PM    BASOPHILS 0.90 06/29/2021 03:53 PM    HYPOCHROMIA 1+ 12/28/2020 12:30 PM    ANISOCYTOSIS 3+ (A) 06/17/2021 01:40 PM          Assessment/Plan:  1. Chronic left hip pain  Patient doing well. Using Tramadol 50 mg TID. No adverse effects.     I have reviewed the medical records and have determined that a controlled substance treatment is medically indicated:     I have conducted a physical exam and documented it. I have reviewed pt's prescription history as maintained by the [place] Prescription Monitoring Program.    Given the above, I believe the benefits of controlled substance therapy outweigh the risks.   Accordingly, I have discussed the risk and benefits, treatment plan, and alternative therapies with the patient.   Reviewed , appropriate.    2. Osteopenia, unspecified location  Continues Prolia, calcium, and vitamin D supplementation   3. Mixed hyperlipidemia  - Lipid Profile; Future  Due for updated labs, continues simvastatin 20 mg daily.   4. Need for vaccination  - INFLUENZA VACCINE QUAD INJ (PF)       Follow-up: Return in about 4 weeks (around 11/3/2021) for Follow up.    Please note that this dictation was created using voice recognition software. I have made every reasonable attempt to correct obvious errors, but I expect that there are errors of grammar and possibly content that I did not discover before finalizing the note.

## 2021-10-07 ENCOUNTER — PATIENT MESSAGE (OUTPATIENT)
Dept: MEDICAL GROUP | Facility: PHYSICIAN GROUP | Age: 61
End: 2021-10-07

## 2021-10-07 DIAGNOSIS — T84.52XD INFECTION ASSOCIATED WITH INTERNAL LEFT HIP PROSTHESIS, SUBSEQUENT ENCOUNTER: ICD-10-CM

## 2021-10-08 RX ORDER — TRAZODONE HYDROCHLORIDE 100 MG/1
100 TABLET ORAL NIGHTLY PRN
Qty: 7 TABLET | Refills: 0 | Status: SHIPPED | OUTPATIENT
Start: 2021-10-08 | End: 2021-10-15

## 2021-10-08 RX ORDER — SULFAMETHOXAZOLE AND TRIMETHOPRIM 800; 160 MG/1; MG/1
1 TABLET ORAL 2 TIMES DAILY
Qty: 14 TABLET | Refills: 0 | Status: SHIPPED | OUTPATIENT
Start: 2021-10-08 | End: 2021-10-28 | Stop reason: SDUPTHER

## 2021-10-08 NOTE — PROGRESS NOTES
Patient forgot some of her medications in [place], cannot go without these medications.  I will prescribe a weeks worth, and her  will overnight her prescriptions.

## 2021-10-18 ENCOUNTER — HOSPITAL ENCOUNTER (OUTPATIENT)
Dept: RADIOLOGY | Facility: MEDICAL CENTER | Age: 61
End: 2021-10-18
Attending: PHYSICIAN ASSISTANT
Payer: COMMERCIAL

## 2021-10-18 DIAGNOSIS — Z12.31 VISIT FOR SCREENING MAMMOGRAM: ICD-10-CM

## 2021-10-18 PROCEDURE — 77063 BREAST TOMOSYNTHESIS BI: CPT

## 2021-10-19 ENCOUNTER — TELEPHONE (OUTPATIENT)
Dept: MEDICAL GROUP | Facility: PHYSICIAN GROUP | Age: 61
End: 2021-10-19

## 2021-10-19 NOTE — TELEPHONE ENCOUNTER
----- Message from Rafaela Meyers P.A.-C. sent at 10/19/2021  9:08 AM PDT -----  Recent mammogram is negative for obvious abnormalities, but she has dense breasts which can lower sensitivity of the test. Recommend routine screening in one year.  There is additional breast imaging, Sonocine, that can be performed in addition to the mammogram to further evaluate those with dense breasts. Unfortunately, most insurances may not cover this exam.  Please let me know if patient is interested in test and I can place an order.    Thank you,     Rafaela Meyers PA-C

## 2021-10-28 DIAGNOSIS — T84.52XD INFECTION ASSOCIATED WITH INTERNAL LEFT HIP PROSTHESIS, SUBSEQUENT ENCOUNTER: ICD-10-CM

## 2021-10-28 RX ORDER — SULFAMETHOXAZOLE AND TRIMETHOPRIM 800; 160 MG/1; MG/1
1 TABLET ORAL 2 TIMES DAILY
Qty: 180 TABLET | Refills: 0 | Status: SHIPPED | OUTPATIENT
Start: 2021-10-28 | End: 2021-12-29 | Stop reason: SDUPTHER

## 2021-11-01 ENCOUNTER — HOSPITAL ENCOUNTER (OUTPATIENT)
Dept: LAB | Facility: MEDICAL CENTER | Age: 61
End: 2021-11-01
Attending: PHYSICIAN ASSISTANT
Payer: COMMERCIAL

## 2021-11-01 ENCOUNTER — HOSPITAL ENCOUNTER (OUTPATIENT)
Dept: LAB | Facility: MEDICAL CENTER | Age: 61
End: 2021-11-01
Attending: INTERNAL MEDICINE
Payer: COMMERCIAL

## 2021-11-01 DIAGNOSIS — E78.2 MIXED HYPERLIPIDEMIA: ICD-10-CM

## 2021-11-01 DIAGNOSIS — T84.52XD INFECTION ASSOCIATED WITH INTERNAL LEFT HIP PROSTHESIS, SUBSEQUENT ENCOUNTER: ICD-10-CM

## 2021-11-01 LAB
ANION GAP SERPL CALC-SCNC: 10 MMOL/L (ref 7–16)
BUN SERPL-MCNC: 13 MG/DL (ref 8–22)
CALCIUM SERPL-MCNC: 9.5 MG/DL (ref 8.5–10.5)
CHLORIDE SERPL-SCNC: 105 MMOL/L (ref 96–112)
CHOLEST SERPL-MCNC: 283 MG/DL (ref 100–199)
CO2 SERPL-SCNC: 25 MMOL/L (ref 20–33)
CREAT SERPL-MCNC: 0.91 MG/DL (ref 0.5–1.4)
FASTING STATUS PATIENT QL REPORTED: NORMAL
GLUCOSE SERPL-MCNC: 86 MG/DL (ref 65–99)
HDLC SERPL-MCNC: 97 MG/DL
LDLC SERPL CALC-MCNC: 166 MG/DL
POTASSIUM SERPL-SCNC: 4.5 MMOL/L (ref 3.6–5.5)
SODIUM SERPL-SCNC: 140 MMOL/L (ref 135–145)
TRIGL SERPL-MCNC: 98 MG/DL (ref 0–149)

## 2021-11-01 PROCEDURE — 36415 COLL VENOUS BLD VENIPUNCTURE: CPT

## 2021-11-01 PROCEDURE — 80061 LIPID PANEL: CPT

## 2021-11-01 PROCEDURE — 80048 BASIC METABOLIC PNL TOTAL CA: CPT

## 2021-11-03 ENCOUNTER — OFFICE VISIT (OUTPATIENT)
Dept: MEDICAL GROUP | Facility: PHYSICIAN GROUP | Age: 61
End: 2021-11-03
Payer: COMMERCIAL

## 2021-11-03 ENCOUNTER — TELEPHONE (OUTPATIENT)
Dept: MEDICAL GROUP | Facility: PHYSICIAN GROUP | Age: 61
End: 2021-11-03

## 2021-11-03 VITALS
TEMPERATURE: 98.7 F | OXYGEN SATURATION: 98 % | RESPIRATION RATE: 16 BRPM | WEIGHT: 121.8 LBS | SYSTOLIC BLOOD PRESSURE: 100 MMHG | DIASTOLIC BLOOD PRESSURE: 72 MMHG | BODY MASS INDEX: 19.58 KG/M2 | HEIGHT: 66 IN | HEART RATE: 80 BPM

## 2021-11-03 DIAGNOSIS — G89.29 CHRONIC LEFT HIP PAIN: ICD-10-CM

## 2021-11-03 DIAGNOSIS — E78.2 MIXED HYPERLIPIDEMIA: ICD-10-CM

## 2021-11-03 DIAGNOSIS — L02.416 ABSCESS OF LEFT HIP: ICD-10-CM

## 2021-11-03 DIAGNOSIS — M25.552 CHRONIC LEFT HIP PAIN: ICD-10-CM

## 2021-11-03 DIAGNOSIS — Z79.899 HIGH RISK MEDICATION USE: ICD-10-CM

## 2021-11-03 PROCEDURE — 99213 OFFICE O/P EST LOW 20 MIN: CPT | Performed by: PHYSICIAN ASSISTANT

## 2021-11-03 RX ORDER — SIMVASTATIN 40 MG
40 TABLET ORAL EVERY EVENING
Qty: 30 TABLET | Refills: 11 | Status: SHIPPED | OUTPATIENT
Start: 2021-11-03 | End: 2022-12-02 | Stop reason: SDUPTHER

## 2021-11-03 RX ORDER — TRAMADOL HYDROCHLORIDE 50 MG/1
50 TABLET ORAL 3 TIMES DAILY
Qty: 90 TABLET | Refills: 0 | Status: SHIPPED | OUTPATIENT
Start: 2021-11-03 | End: 2021-12-02 | Stop reason: SDUPTHER

## 2021-11-03 RX ORDER — TRAZODONE HYDROCHLORIDE 100 MG/1
200 TABLET ORAL NIGHTLY
COMMUNITY
End: 2021-12-29 | Stop reason: SDUPTHER

## 2021-11-03 ASSESSMENT — FIBROSIS 4 INDEX: FIB4 SCORE: 1.55

## 2021-11-03 NOTE — TELEPHONE ENCOUNTER
VOICEMAIL  1. Caller Name: Kemi Silva                        Call Back Number: [phone] (home)       2. Message: pt was seen today and thought a rx for tramadol was going to be sent in    3. Patient approves office to leave a detailed voicemail/MyChart message: N\A

## 2021-11-03 NOTE — ASSESSMENT & PLAN NOTE
Patient continues to do well tramadol TID, continues Bactrim and Rifampin. She has ID in December.

## 2021-11-03 NOTE — ASSESSMENT & PLAN NOTE
This is a chronic condition.   Latest Labs:   Lab Results   Component Value Date/Time    CHOLSTRLTOT 283 (H) 11/01/2021 07:38 AM     (H) 11/01/2021 07:38 AM    HDL 97 11/01/2021 07:38 AM    TRIGLYCERIDE 98 11/01/2021 07:38 AM      Medications: simvastatin 20 mg    Risk calculator: The 10-year ASCVD risk score (Donald TAVERAS Jr., et al., 2013) is: 2.6%

## 2021-11-03 NOTE — PROGRESS NOTES
CC:   Chief Complaint   Patient presents with   • Follow-Up     chronic pain follow up           HISTORY OF PRESENT ILLNESS: Patient is a 61 y.o. female established patient who presents today to follow up on the following conditions:       Health Maintenance: Completed    Hyperlipidemia  This is a chronic condition.   Latest Labs:   Lab Results   Component Value Date/Time    CHOLSTRLTOT 283 (H) 11/01/2021 07:38 AM     (H) 11/01/2021 07:38 AM    HDL 97 11/01/2021 07:38 AM    TRIGLYCERIDE 98 11/01/2021 07:38 AM      Medications: simvastatin 20 mg    Risk calculator: The 10-year ASCVD risk score (Donald TAVERAS Jr., et al., 2013) is: 2.6%       Chronic left hip pain  Patient continues to do well tramadol TID, continues Bactrim and Rifampin. She has ID in December.       Patient Active Problem List    Diagnosis Date Noted   • Staphylococcus aureus infection 05/26/2021   • Abscess of left hip 05/24/2021   • Allergy to adhesive 05/24/2021   • Rash 05/13/2021   • Anemia 09/11/2020   • Chronic left hip pain 11/06/2019   • Anxiety 01/10/2019   • Acute midline thoracic back pain 10/19/2016   • Hyperlipidemia 06/17/2013   • Menopause    • Osteopenia    • Insomnia       Allergies:Tape    Current Outpatient Medications   Medication Sig Dispense Refill   • traZODone (DESYREL) 100 MG Tab Take 200 mg by mouth every evening.     • simvastatin (ZOCOR) 40 MG Tab Take 1 Tablet by mouth every evening. 30 Tablet 11   • traMADol (ULTRAM) 50 MG Tab Take 1 Tablet by mouth in the morning, at noon, and at bedtime for 30 days. 90 Tablet 0   • sulfamethoxazole-trimethoprim (BACTRIM DS) 800-160 MG tablet Take 1 Tablet by mouth 2 times a day for 90 days. 180 Tablet 0   • riFAMPin (RIFADINE) 300 MG Cap TAKE 1 CAPSULE BY MOUTH 2 TIMES A DAY FOR 90 DAYS. 180 Capsule 0   • acetaminophen (TYLENOL) 500 MG Tab Take 500-1,000 mg by mouth every 6 hours as needed.     • triamcinolone acetonide (KENALOG) 0.025 % Cream Apply 1 Application topically 2 times a  "day. (Patient taking differently: Apply 1 Application topically 2 times a day as needed.) 80 g 0   • Coenzyme Q10 (COQ10 PO) Take 1 Tab by mouth every evening.     • therapeutic multivitamin-minerals (THERAGRAN-M) Tab Take 1 Tab by mouth every evening.     • CALCIUM PO Take 1 Tab by mouth every evening.     • ibuprofen (MOTRIN) 200 MG Tab Take 800 mg by mouth every 6 hours as needed.       No current facility-administered medications for this visit.       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         Family History   Adopted: Yes   Family history unknown: Yes       Review of Systems:    Constitutional: No Fevers, Chills  Eyes: No vision changes  ENT: No hearing changes  Resp: No Shortness of breath  CV: No Chest pain  GI: No Nausea/Vomiting  MSK: No weakness  Skin: No rashes  Neuro: No Headaches  Psych: No Suicidal ideations    All remaining systems reviewed and found to be negative, except as stated above.    Exam:    /72 (BP Location: Right arm, Patient Position: Sitting, BP Cuff Size: Adult)   Pulse 80   Temp 37.1 °C (98.7 °F) (Temporal)   Resp 16   Ht 1.676 m (5' 6\")   Wt 55.2 kg (121 lb 12.8 oz)   SpO2 98%  Body mass index is 19.66 kg/m².    General:  Well nourished, well developed female in NAD  HENT: Atraumatic, normocephalic  EYES: Extraocular movements intact  NECK: Supple, FROM  CHEST: No deformities, Equal chest expansion  RESP: Unlabored, no stridor or audible wheeze  HEART: Regular Rate and rhythm.   Extremities: No " Clubbing, Cyanosis, or Edema  Skin: Warm/dry, without rashes  Neuro: A/O x 4, due to COVID-19- did not have patient remove face mask to test cranial nerves.  Motor/sensory grossly intact  Psych: Normal behavior, normal affect      Lab review:  Labs are reviewed and discussed with a patient  Lab Results   Component Value Date/Time    CHOLSTRLTOT 283 (H) 11/01/2021 07:38 AM     (H) 11/01/2021 07:38 AM    HDL 97 11/01/2021 07:38 AM    TRIGLYCERIDE 98 11/01/2021 07:38 AM       Lab Results   Component Value Date/Time    SODIUM 140 11/01/2021 07:39 AM    POTASSIUM 4.5 11/01/2021 07:39 AM    CHLORIDE 105 11/01/2021 07:39 AM    CO2 25 11/01/2021 07:39 AM    GLUCOSE 86 11/01/2021 07:39 AM    BUN 13 11/01/2021 07:39 AM    CREATININE 0.91 11/01/2021 07:39 AM    CREATININE 0.78 10/11/2011 01:52 PM    BUNCREATRAT 18 07/29/2016 07:20 AM    BUNCREATRAT 18 10/11/2011 01:52 PM     Lab Results   Component Value Date/Time    ALKPHOSPHAT 51 08/31/2021 01:08 PM    ASTSGOT 35 08/31/2021 01:08 PM    ALTSGPT 29 08/31/2021 01:08 PM    TBILIRUBIN <0.2 08/31/2021 01:08 PM      No results found for: HBA1C  No results found for: TSH  No results found for: FREET4    Lab Results   Component Value Date/Time    WBC 5.3 08/31/2021 01:08 PM    WBC 5.7 10/11/2011 01:52 PM    RBC 4.99 08/31/2021 01:08 PM    RBC 4.43 10/11/2011 01:52 PM    HEMOGLOBIN 14.0 08/31/2021 01:08 PM    HEMATOCRIT 43.3 08/31/2021 01:08 PM    MCV 86.8 08/31/2021 01:08 PM    MCV 87 10/11/2011 01:52 PM    MCH 28.1 08/31/2021 01:08 PM    MCH 29.1 10/11/2011 01:52 PM    MCHC 32.3 (L) 08/31/2021 01:08 PM    MPV 10.7 08/31/2021 01:08 PM    NEUTSPOLYS 56.30 06/29/2021 03:53 PM    LYMPHOCYTES 23.90 06/29/2021 03:53 PM    MONOCYTES 12.50 06/29/2021 03:53 PM    EOSINOPHILS 6.10 06/29/2021 03:53 PM    BASOPHILS 0.90 06/29/2021 03:53 PM    HYPOCHROMIA 1+ 12/28/2020 12:30 PM    ANISOCYTOSIS 3+ (A) 06/17/2021 01:40 PM          Assessment/Plan:  1. Abscess of left hip/ chronic left hip  pain   Patient continues to take tramadol 3 times daily, effective for quality of life.  Needs refill today.    I have reviewed the medical records and have determined that a controlled substance treatment is medically indicated:     I have conducted a physical exam and documented it. I have reviewed pt's prescription history as maintained by the [place] Prescription Monitoring Program.      Given the above, I believe the benefits of controlled substance therapy outweigh the risks.   Accordingly, I have discussed the risk and benefits, treatment plan, and alternative therapies with the patient.   Reviewed , appropriate.  UDS ordered.    2. Mixed hyperlipidemia  - Lipid Profile; Future  LDL elevated, discussed the potential impact of rifampin and simvastatin.  Interaction check confirms that rifampin can decrease the efficacy of simvastatin.  Will trial increasing simvastatin to 40 mg repeat labs in 90 days.  3. High risk medication use  - HEPATIC FUNCTION PANEL; Future      Other orders  - traZODone (DESYREL) 100 MG Tab; Take 200 mg by mouth every evening.  - simvastatin (ZOCOR) 40 MG Tab; Take 1 Tablet by mouth every evening.  Dispense: 30 Tablet; Refill: 11       Follow-up: Return in about 4 weeks (around 12/1/2021) for Follow up.    Please note that this dictation was created using voice recognition software. I have made every reasonable attempt to correct obvious errors, but I expect that there are errors of grammar and possibly content that I did not discover before finalizing the note.

## 2021-12-02 ENCOUNTER — OFFICE VISIT (OUTPATIENT)
Dept: MEDICAL GROUP | Facility: PHYSICIAN GROUP | Age: 61
End: 2021-12-02
Payer: COMMERCIAL

## 2021-12-02 VITALS
BODY MASS INDEX: 19.29 KG/M2 | HEIGHT: 66 IN | SYSTOLIC BLOOD PRESSURE: 108 MMHG | HEART RATE: 65 BPM | RESPIRATION RATE: 14 BRPM | TEMPERATURE: 99.1 F | DIASTOLIC BLOOD PRESSURE: 70 MMHG | WEIGHT: 120 LBS | OXYGEN SATURATION: 95 %

## 2021-12-02 DIAGNOSIS — G89.29 CHRONIC LEFT HIP PAIN: ICD-10-CM

## 2021-12-02 DIAGNOSIS — F41.9 ANXIETY: ICD-10-CM

## 2021-12-02 DIAGNOSIS — M25.552 CHRONIC LEFT HIP PAIN: ICD-10-CM

## 2021-12-02 PROCEDURE — 99213 OFFICE O/P EST LOW 20 MIN: CPT | Performed by: PHYSICIAN ASSISTANT

## 2021-12-02 RX ORDER — LORAZEPAM 1 MG/1
1 TABLET ORAL
Qty: 20 TABLET | Refills: 0 | Status: SHIPPED | OUTPATIENT
Start: 2021-12-02 | End: 2021-12-22

## 2021-12-02 RX ORDER — TRAMADOL HYDROCHLORIDE 50 MG/1
50 TABLET ORAL 3 TIMES DAILY
Qty: 90 TABLET | Refills: 0 | Status: SHIPPED | OUTPATIENT
Start: 2021-12-02 | End: 2021-12-27 | Stop reason: SDUPTHER

## 2021-12-02 ASSESSMENT — FIBROSIS 4 INDEX: FIB4 SCORE: 1.55

## 2021-12-02 NOTE — ASSESSMENT & PLAN NOTE
Patient asking for refill of her lorazepam today, she uses only as needed for acute anxiety and panic.

## 2021-12-02 NOTE — ASSESSMENT & PLAN NOTE
Patient is here today for chronic pain management.  States they are stable on current pain medication which is: Tramadol 50 mg three times daily.    Patient suffers with chronic left hip pain and states the pain has not changed in nature since last visit.     Is this amount of pain relief enough to make a difference in your life? Yes    Patient does state that physical functioning , family relationships and general mood is better with pain medications and pain relief.  Sleep pattern is improved and overall function is improved.  Are you experiencing any side effects from current pain relievers? none

## 2021-12-02 NOTE — PROGRESS NOTES
CC:   Chief Complaint   Patient presents with   • Medication Follow-up     Tramadol          HISTORY OF PRESENT ILLNESS: Patient is a 61 y.o. female established patient who presents today to follow up on the following conditions:       Chronic left hip pain  Patient is here today for chronic pain management.  States they are stable on current pain medication which is: Tramadol 50 mg three times daily.    Patient suffers with chronic left hip pain and states the pain has not changed in nature since last visit.     Is this amount of pain relief enough to make a difference in your life? Yes    Patient does state that physical functioning , family relationships and general mood is better with pain medications and pain relief.  Sleep pattern is improved and overall function is improved.  Are you experiencing any side effects from current pain relievers? none      Anxiety  Patient asking for refill of her lorazepam today, she uses only as needed for acute anxiety and panic.      Patient Active Problem List    Diagnosis Date Noted   • Staphylococcus aureus infection 05/26/2021   • Abscess of left hip 05/24/2021   • Allergy to adhesive 05/24/2021   • Rash 05/13/2021   • Anemia 09/11/2020   • Chronic left hip pain 11/06/2019   • Anxiety 01/10/2019   • Acute midline thoracic back pain 10/19/2016   • Hyperlipidemia 06/17/2013   • Menopause    • Osteopenia    • Insomnia       Allergies:Tape    Current Outpatient Medications   Medication Sig Dispense Refill   • LORazepam (ATIVAN) 1 MG Tab Take 1 Tablet by mouth 1 time a day as needed for Anxiety for up to 20 days. 20 Tablet 0   • traMADol (ULTRAM) 50 MG Tab Take 1 Tablet by mouth in the morning, at noon, and at bedtime for 30 days. 90 Tablet 0   • traZODone (DESYREL) 100 MG Tab Take 200 mg by mouth every evening.     • simvastatin (ZOCOR) 40 MG Tab Take 1 Tablet by mouth every evening. 30 Tablet 11   • sulfamethoxazole-trimethoprim (BACTRIM DS) 800-160 MG tablet Take 1 Tablet by  "mouth 2 times a day for 90 days. 180 Tablet 0   • riFAMPin (RIFADINE) 300 MG Cap TAKE 1 CAPSULE BY MOUTH 2 TIMES A DAY FOR 90 DAYS. 180 Capsule 0   • acetaminophen (TYLENOL) 500 MG Tab Take 500-1,000 mg by mouth every 6 hours as needed.     • triamcinolone acetonide (KENALOG) 0.025 % Cream Apply 1 Application topically 2 times a day. (Patient taking differently: Apply 1 Application topically 2 times a day as needed.) 80 g 0   • Coenzyme Q10 (COQ10 PO) Take 1 Tab by mouth every evening.     • therapeutic multivitamin-minerals (THERAGRAN-M) Tab Take 1 Tab by mouth every evening.     • CALCIUM PO Take 1 Tab by mouth every evening.     • ibuprofen (MOTRIN) 200 MG Tab Take 800 mg by mouth every 6 hours as needed.       No current facility-administered medications for this visit.       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         Family History   Adopted: Yes   Family history unknown: Yes       Review of Systems:    Constitutional: No Fevers, Chills  Eyes: No vision changes  ENT: No hearing changes  Resp: No Shortness of breath  CV: No Chest pain  GI: No Nausea/Vomiting  MSK: No weakness  Skin: No rashes  Neuro: No Headaches  Psych: No Suicidal ideations    All remaining systems reviewed and found to be negative, except as stated above.    Exam:    /70   Pulse 65   Temp 37.3 °C (99.1 °F)   Resp 14   Ht 1.676 m (5' 6\")   Wt 54.4 kg (120 lb)   SpO2 95%  Body mass index is 19.37 kg/m².    General:  Well nourished, well developed female in " NAD  HENT: Atraumatic, normocephalic  EYES: Extraocular movements intact  NECK: Supple, FROM  CHEST: No deformities, Equal chest expansion  RESP: Unlabored, no stridor or audible wheeze  HEART: Regular Rate and rhythm.   Extremities: No Clubbing, Cyanosis, or Edema  Skin: Warm/dry, without rashes  Neuro: A/O x 4, due to COVID-19- did not have patient remove face mask to test cranial nerves.  Motor/sensory grossly intact  Psych: Normal behavior, normal affect      Assessment/Plan:  1. Chronic left hip pain  - traMADol (ULTRAM) 50 MG Tab; Take 1 Tablet by mouth in the morning, at noon, and at bedtime for 30 days.  Dispense: 90 Tablet; Refill: 0  Patient's pain remains stable on tramadol 3 times daily.  She is now working at [employer] and having a bit of breakthrough pain if she work shifts longer than 4 hours, however she will take ibuprofen right now for breakthrough te pain.  Does improve her quality of life, tolerating without side effect.    I have reviewed the medical records and have determined that a controlled substance treatment is medically indicated:     I have conducted a physical exam and documented it. I have reviewed pt's prescription history as maintained by the [place] Prescription Monitoring Program.      Given the above, I believe the benefits of controlled substance therapy outweigh the risks.   Accordingly, I have discussed the risk and benefits, treatment plan, and alternative therapies with the patient.   Reviewed , appropriate.    2. Anxiety  - LORazepam (ATIVAN) 1 MG Tab; Take 1 Tablet by mouth 1 time a day as needed for Anxiety for up to 20 days.  Dispense: 20 Tablet; Refill: 0  Patient uses lorazepam intermittently for acute anxiety and panic, tolerates well without side effect, not using with tramadol. Ok for refill today.     Follow-up: Return in about 4 weeks (around 12/30/2021) for Follow up with Nicole to establish care. .    Please note that this dictation was created using voice  recognition software. I have made every reasonable attempt to correct obvious errors, but I expect that there are errors of grammar and possibly content that I did not discover before finalizing the note.

## 2021-12-08 ENCOUNTER — TELEPHONE (OUTPATIENT)
Dept: INFECTIOUS DISEASES | Facility: MEDICAL CENTER | Age: 61
End: 2021-12-08

## 2021-12-08 NOTE — TELEPHONE ENCOUNTER
Fito Serrato M.D.  You 3 hours ago (10:40 AM)         We will respect the patient's decision.  Sometimes, we can consider getting off the antibiotics after 6 months to a year of therapy rather than just keeping it on lifelong.  Okay to discuss with PCP. Can always come back to ID clinic if new questions.    Message text

## 2021-12-08 NOTE — TELEPHONE ENCOUNTER
Pt cxl'd 3 month fv appointment does not feel that appointment is needed and she can follow up with PCP regarding abx maintenance . Is patient ok to discontinue care with our department and have PCP take over.

## 2021-12-27 DIAGNOSIS — T84.52XD INFECTION ASSOCIATED WITH INTERNAL LEFT HIP PROSTHESIS, SUBSEQUENT ENCOUNTER: ICD-10-CM

## 2021-12-27 DIAGNOSIS — G89.29 CHRONIC LEFT HIP PAIN: ICD-10-CM

## 2021-12-27 DIAGNOSIS — M25.552 CHRONIC LEFT HIP PAIN: ICD-10-CM

## 2021-12-27 RX ORDER — RIFAMPIN 300 MG/1
300 CAPSULE ORAL 2 TIMES DAILY
Qty: 180 CAPSULE | Refills: 0 | Status: SHIPPED | OUTPATIENT
Start: 2021-12-27 | End: 2022-03-22

## 2021-12-27 RX ORDER — TRAMADOL HYDROCHLORIDE 50 MG/1
50 TABLET ORAL 3 TIMES DAILY
Qty: 90 TABLET | Refills: 0 | Status: SHIPPED | OUTPATIENT
Start: 2021-12-27 | End: 2022-01-26 | Stop reason: SDUPTHER

## 2021-12-27 NOTE — PROGRESS NOTES
1. Chronic left hip pain  - traMADol (ULTRAM) 50 MG Tab; Take 1 Tablet by mouth in the morning, at noon, and at bedtime for 30 days.  Dispense: 90 Tablet; Refill: 0    2. 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  Dispense: 180 Capsule; Refill: 0    Pt scheduled to establish with me on 12/29 - rescheduled to provider Covid.  Patient known to me - need to refill controlled - PDMP appropriate - Chart review appropriate - Refilled and will establish in a few weeks.    Obtained and reviewed patient utilization report from [facility] pharmacy database on 12/27/2021 6:49 AM  prior to writing prescription for controlled substance II, III or IV per [place] bill . Based on assessment of the report, the prescription is medically necessary.

## 2021-12-29 ENCOUNTER — APPOINTMENT (OUTPATIENT)
Dept: MEDICAL GROUP | Facility: PHYSICIAN GROUP | Age: 61
End: 2021-12-29
Payer: COMMERCIAL

## 2021-12-29 DIAGNOSIS — F51.01 PRIMARY INSOMNIA: ICD-10-CM

## 2021-12-29 DIAGNOSIS — T84.52XD INFECTION ASSOCIATED WITH INTERNAL LEFT HIP PROSTHESIS, SUBSEQUENT ENCOUNTER: ICD-10-CM

## 2021-12-29 RX ORDER — SULFAMETHOXAZOLE AND TRIMETHOPRIM 800; 160 MG/1; MG/1
1 TABLET ORAL 2 TIMES DAILY
Qty: 180 TABLET | Refills: 0 | Status: SHIPPED | OUTPATIENT
Start: 2021-12-29 | End: 2022-01-24 | Stop reason: SDUPTHER

## 2021-12-29 RX ORDER — TRAZODONE HYDROCHLORIDE 100 MG/1
200 TABLET ORAL NIGHTLY PRN
Qty: 60 TABLET | Refills: 3 | Status: SHIPPED | OUTPATIENT
Start: 2021-12-29 | End: 2022-04-18 | Stop reason: SDUPTHER

## 2022-01-01 NOTE — OP REPORT
DATE OF SERVICE:  11/21/2019    INDICATION FOR SURGERY:  Nonunion greater trochanter fracture.    PREOPERATIVE DIAGNOSIS:  Nonunion greater trochanter fracture.    POSTOPERATIVE DIAGNOSIS:  Nonunion greater trochanter fracture.    PROCEDURE:    1.  Removal of hardware, deep.  2.  Placement of greater trochanter plate.    ANESTHESIA:  General.    COMPLICATIONS:  None.    SURGEON:  Julian Mendes MD    ASSISTANT:  Pair Samuel PA-C    DESCRIPTION OF PROCEDURE:  Patient was identified in the preoperative area,   site was marked, taken back to the operating room and underwent general   anesthesia.  Left lower extremity was prepped and draped in sterile manner.    Preoperative timeout was held.  Antibiotics were given.  Incision was made   over the greater trochanter.  Soft tissue dissected down to fascia, fascia was   split in line with the IT band and gluteus fascia and then soft tissue   dissected down to bone.  It was found that there was clear nonunion in the   greater trochanter segment.  The other part of the bone that had been fixed   was well healed.  Cables removed with cable cutters, heterotopic ossification   was removed as well.  Fracture edges were refreshed and then greater   trochanter plate was used to fix the greater trochanter segment using cables   and screws.  Wound was then thoroughly irrigated.  Vicryl was used to close   the vastus fascia, IT band and gluteus fascia was closed with Vicryl as well.    Monocryl soft tissue skin Dermabond for final skin layer.  Patient was woken   up, taken back to PACU, will be weightbear as tolerated.       ____________________________________     MD REY Ross / JERRICA    DD:  11/21/2019 08:35:32  DT:  11/21/2019 09:09:53    D#:  [id number]  Job#:  [id number]  
Male

## 2022-01-07 ENCOUNTER — OUTPATIENT INFUSION SERVICES (OUTPATIENT)
Dept: ONCOLOGY | Facility: MEDICAL CENTER | Age: 62
End: 2022-01-07
Attending: PHYSICIAN ASSISTANT
Payer: COMMERCIAL

## 2022-01-07 VITALS
TEMPERATURE: 98.5 F | BODY MASS INDEX: 19.17 KG/M2 | OXYGEN SATURATION: 95 % | WEIGHT: 119.27 LBS | SYSTOLIC BLOOD PRESSURE: 109 MMHG | DIASTOLIC BLOOD PRESSURE: 67 MMHG | HEART RATE: 77 BPM | RESPIRATION RATE: 18 BRPM | HEIGHT: 66 IN

## 2022-01-07 DIAGNOSIS — M85.80 OSTEOPENIA, UNSPECIFIED LOCATION: ICD-10-CM

## 2022-01-07 LAB
CA-I BLD ISE-SCNC: 1.35 MMOL/L (ref 1.1–1.3)
CREAT BLD-MCNC: 1 MG/DL (ref 0.5–1.4)

## 2022-01-07 PROCEDURE — 36415 COLL VENOUS BLD VENIPUNCTURE: CPT

## 2022-01-07 PROCEDURE — 82330 ASSAY OF CALCIUM: CPT

## 2022-01-07 PROCEDURE — 82565 ASSAY OF CREATININE: CPT

## 2022-01-07 PROCEDURE — 700111 HCHG RX REV CODE 636 W/ 250 OVERRIDE (IP): Performed by: PHYSICIAN ASSISTANT

## 2022-01-07 PROCEDURE — 96372 THER/PROPH/DIAG INJ SC/IM: CPT

## 2022-01-07 RX ADMIN — DENOSUMAB 60 MG: 60 INJECTION SUBCUTANEOUS at 11:51

## 2022-01-07 ASSESSMENT — FIBROSIS 4 INDEX: FIB4 SCORE: 1.55

## 2022-01-07 NOTE — PROGRESS NOTES
Kemi arrives to [facility] for q 6 month Prolia. Last tx was 7/6/21. Patient voices no acute health concerns. Denies active infections; denies s/s hypocalcemia; denies recent/upcoming dental procedures. Patient confirms taking supplementary PO calcium and Vitamin D. ISTATs collected: serum creatinine = 1.0; ionized calcium = 1.35. Patient meets established parameters to receive treatment today. Prolia administered SQ to back of left arm without issues. Emailed scheduling regarding next appt. Discharged home to self care in no apparent distress.

## 2022-01-20 DIAGNOSIS — R21 RASH: ICD-10-CM

## 2022-01-21 RX ORDER — TRIAMCINOLONE ACETONIDE 0.25 MG/G
CREAM TOPICAL
Qty: 80 G | Refills: 0 | Status: SHIPPED | OUTPATIENT
Start: 2022-01-21

## 2022-01-21 NOTE — TELEPHONE ENCOUNTER
Requested Prescriptions     Pending Prescriptions Disp Refills   • triamcinolone acetonide (KENALOG) 0.025 % Cream [Pharmacy Med Name: TRIAMCINOLONE 0.025% CREAM] 80 g 0     Sig: APPLY TO AFFECTED AREA TWICE A DAY

## 2022-01-24 DIAGNOSIS — T84.52XD INFECTION ASSOCIATED WITH INTERNAL LEFT HIP PROSTHESIS, SUBSEQUENT ENCOUNTER: ICD-10-CM

## 2022-01-24 RX ORDER — SULFAMETHOXAZOLE AND TRIMETHOPRIM 800; 160 MG/1; MG/1
1 TABLET ORAL 2 TIMES DAILY
Qty: 180 TABLET | Refills: 0 | Status: SHIPPED | OUTPATIENT
Start: 2022-01-24 | End: 2022-04-18 | Stop reason: SDUPTHER

## 2022-01-24 NOTE — TELEPHONE ENCOUNTER
----- Message from Kemi Silva sent at 1/23/2022  1:24 PM PST -----  Regarding: Bactrim refill  Hi I see you wed but I’m out of Bactrim tomorrow and I don’t want to skip any dosages since I’m on it for life to prevent a hip infection recurrence. Will you call in refills tomorrow please? See you wed!

## 2022-01-26 ENCOUNTER — OFFICE VISIT (OUTPATIENT)
Dept: MEDICAL GROUP | Facility: PHYSICIAN GROUP | Age: 62
End: 2022-01-26
Payer: COMMERCIAL

## 2022-01-26 VITALS
WEIGHT: 118 LBS | TEMPERATURE: 98.8 F | BODY MASS INDEX: 17.88 KG/M2 | HEIGHT: 68 IN | DIASTOLIC BLOOD PRESSURE: 62 MMHG | SYSTOLIC BLOOD PRESSURE: 102 MMHG | HEART RATE: 75 BPM | RESPIRATION RATE: 14 BRPM | OXYGEN SATURATION: 96 %

## 2022-01-26 DIAGNOSIS — Z79.899 HIGH RISK MEDICATION USE: ICD-10-CM

## 2022-01-26 DIAGNOSIS — Z11.59 NEED FOR HEPATITIS C SCREENING TEST: ICD-10-CM

## 2022-01-26 DIAGNOSIS — M25.552 CHRONIC LEFT HIP PAIN: Primary | ICD-10-CM

## 2022-01-26 DIAGNOSIS — G89.29 CHRONIC LEFT HIP PAIN: Primary | ICD-10-CM

## 2022-01-26 DIAGNOSIS — T84.52XD INFECTION ASSOCIATED WITH INTERNAL LEFT HIP PROSTHESIS, SUBSEQUENT ENCOUNTER: ICD-10-CM

## 2022-01-26 DIAGNOSIS — E78.2 MIXED HYPERLIPIDEMIA: ICD-10-CM

## 2022-01-26 PROCEDURE — 99214 OFFICE O/P EST MOD 30 MIN: CPT | Performed by: NURSE PRACTITIONER

## 2022-01-26 RX ORDER — TRAMADOL HYDROCHLORIDE 50 MG/1
50 TABLET ORAL 3 TIMES DAILY
Qty: 90 TABLET | Refills: 0 | Status: SHIPPED | OUTPATIENT
Start: 2022-03-27 | End: 2022-04-18 | Stop reason: SDUPTHER

## 2022-01-26 RX ORDER — TRAMADOL HYDROCHLORIDE 50 MG/1
50 TABLET ORAL 3 TIMES DAILY
Qty: 90 TABLET | Refills: 0 | Status: SHIPPED | OUTPATIENT
Start: 2022-01-26 | End: 2022-02-25

## 2022-01-26 RX ORDER — TRAMADOL HYDROCHLORIDE 50 MG/1
50 TABLET ORAL 3 TIMES DAILY
Qty: 90 TABLET | Refills: 0 | Status: SHIPPED | OUTPATIENT
Start: 2022-02-25 | End: 2022-03-27

## 2022-01-26 ASSESSMENT — FIBROSIS 4 INDEX: FIB4 SCORE: 1.55

## 2022-01-26 ASSESSMENT — PATIENT HEALTH QUESTIONNAIRE - PHQ9: CLINICAL INTERPRETATION OF PHQ2 SCORE: 0

## 2022-01-26 NOTE — ASSESSMENT & PLAN NOTE
HPI:   Chronic pain recheck for: Chronic left hip pain  Last dose of controlled substance: 2022.  Chronic pain treated with tramadol 50 mg taken 3 times a day    She reports that she does not drink alcohol.  She reports that she does not use drugs.    Interval history:   Any major change in health since last appointment? No    Consequences of Chronic Opiate therapy:  (5 A's)  Analgesia: Compared to no treatment or prior treatment, pain is currently not changed  Activity: not changed  Adverse Events:She denies constipation, dry mouth, itchy skin, nausea and sedation  Aberrant Behaviors: She reports She is taking medication as prescribed and is not veering from agreed treatment regimen or provider recommendations. There have been no inappropriate refills or lost/stolen meds reported.  Affect/Mood: Pain is not impacting patient's mood.  Patient denies depression/anxiety.    Nonnarcotic treatments that are being used: NSAIDs/POSEY-2 and physical therapy.     Last imagin2021    Last order of CONTROLLED SUBSTANCE TREATMENT AGREEMENT was found on 2022 from Office Visit on 2022        UDS Summary     This patient has no relevant Health Maintenance data.            Opiate Risk Score: 0  Date of Last therapy agreement/ update 2022  Date of last UDS 2021.    Discrepancies:No     Most recent UDS reviewed today and is consistent with prescribed medications.     Interpretation of Opioid Risk Score   Score 0-3 = Low risk of abuse. Do UDS at least once per year.  Score 4-7 = Moderate risk of abuse. Do UDS 1-4 times per year.  Score 8+ = High risk of abuse. Refer to specialist.    I have reviewed the medical records, the Prescription Monitoring Program and I have determined that controlled substance treatment is medically indicated.       Past medical, surgical, family, and social history is reviewed and updated in Epic chart by me today.   Medications and allergies reviewed and updated in  Epic chart by me today.     ROS:   As documented in history of present illness above    Exam:    Constitutional: Alert, no distress, plus 3 vital signs  Skin:  Warm, dry, no rashes invisible areas  Eye: Equal, round and reactive, conjunctiva clear  ENMT: Lips without lesions, good dentition, oropharynx clear    Neck: Trachea midline, no masses, no thyromegaly  Respiratory: Unlabored respiration, lungs clear to auscultation, no wheezes, no rhonchi  Cardiovascular: Normal rate and rhythm, no murmur, no edema  Abdomen: Soft, nontender, no masses or hepatosplenomegaly  Psych: Alert, pleasant, well-groomed, normal affect    Assessment/ Plan / Medical Decision makin. Chronic pain from: Left total hip, revision x3, with chronic femoral head fracture.    2. Chronic use of opiate drugs therapeutic purposes  -Overall impression that this patient is benefiting from opioid therapy and that the benefits outweigh the risks of continued use.   - Controlled Substance Use Agreement is current.   - Urine drug screen current and reviewed / compliant with prescription medications  - Additional lab: CMP to assess liver and renal function - UTD   - Rx refill prescriptions are done for 3 months with no early refills.   - Referral to pain management not necessary at this time.   In prescribing controlled substances to this patient, I certify that I have obtained and reviewed the medical history.. I have also made a good priyanka effort to obtain applicable records from other providers who have treated the patient and records did not demonstrate any increased risk of substance abuse that would prevent me from prescribing controlled substances.     I have conducted a physical exam and documented it. I have reviewed Kemi Silva's prescription history as maintained by the [place] Prescription Monitoring Program.     I have assessed the patient’s risk for abuse, dependency, and addiction using the validated Opioid Risk Tool  available at https://www.Fast[employer].com/wuxlmw-fxnn-mfuv-ort-narcotic-abuse.     Given the above, I believe the benefits of controlled substance therapy outweigh the risks. The reasons for prescribing controlled substances include non-narcotic, oral analgesic alternatives have been inadequate for pain control. Accordingly, I have discussed the risk and benefits, treatment plan, and alternative therapies with the patient.     Obtained and reviewed patient utilization report from [facility] pharmacy database on 1/26/2022 11:10 AM  prior to writing prescription for controlled substance II, III or IV per [place] bill . Based on assessment of the report, the prescription is medically necessary.

## 2022-01-27 NOTE — ASSESSMENT & PLAN NOTE
Chronic condition, stable.  Patient takes simvastatin 40 mg daily, and tolerates this well without adverse side effects.  She is due for updated labs today.  She will continue her current regimen.

## 2022-01-27 NOTE — PROGRESS NOTES
Subjective:     CC: The primary encounter diagnosis was Chronic left hip pain. 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      HPI:   Kemi is a new patient to me today wanting to establish care.  We discussed the following problems:    1. Chronic left hip pain  Chronic condition, stable.  Patient had left total hip, with revision x3, chronic femoral head fracture.    2. Mixed hyperlipidemia  Chronic condition, stable.  Due for updated labs.    3. 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      4. 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      5. High risk medication use  Orders for labs placed today.    6. Need for hepatitis C screening test  Lifetime screening hep C test ordered today.       Past Medical History:   Diagnosis Date   • Acute midline thoracic back pain 10/19/2016   • Anesthesia 2018    post op nausea; shakes   • Arthritis     osteoarthritis; hip, thumbs, knees   • High cholesterol    • Hip pain     left   • History of anemia    • Hyperlipidemia 2013   • Insomnia    • Menopause    • Osteopenia    • PONV (postoperative nausea and vomiting)        Social History     Tobacco Use   • Smoking status: Former Smoker     Packs/day: 0.25     Years: 20.00     Pack years: 5.00     Types: Cigarettes     Quit date: 1999     Years since quittin.4   • Smokeless tobacco: Never Used   Vaping Use   • Vaping Use: Never used   Substance Use Topics   • Alcohol use: No     Alcohol/week: 0.0 oz     Comment: former heavy use, 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 90 Tablet 0   • [START ON 2/25/2022] traMADol (ULTRAM) 50 MG Tab Take 1 Tablet by mouth in the morning, at noon, and at bedtime for 30 days. 90 Tablet 0   • [START ON 3/27/2022] traMADol (ULTRAM) 50 MG Tab Take 1 Tablet by mouth in the morning, at noon, and at bedtime for 30 days. 90 Tablet 0   • sulfamethoxazole-trimethoprim (BACTRIM DS) 800-160 MG tablet Take 1 Tablet by mouth 2 times a day for 90 days. 180 Tablet 0   • triamcinolone acetonide (KENALOG) 0.025 % Cream APPLY TO AFFECTED AREA TWICE A DAY 80 g 0   • traZODone (DESYREL) 100 MG Tab Take 2 Tablets by mouth at bedtime as needed for Sleep. 60 Tablet 3   • riFAMPin (RIFADINE) 300 MG Cap Take 1 Capsule by mouth 2 times a day for 90 days. 180 Capsule 0   • simvastatin (ZOCOR) 40 MG Tab Take 1 Tablet by mouth every evening. 30 Tablet 11   • acetaminophen (TYLENOL) 500 MG Tab Take 500-1,000 mg by mouth every 6 hours as needed.     • Coenzyme Q10 (COQ10 PO) Take 1 Tab by mouth every evening.     • therapeutic multivitamin-minerals (THERAGRAN-M) Tab Take 1 Tab by mouth every evening.     • CALCIUM PO Take 1 Tab by mouth every evening.     • ibuprofen (MOTRIN) 200 MG Tab Take 800 mg by mouth every 6 hours as needed.       No current Epic-ordered facility-administered medications on file.       Allergies:  Tape    Health Maintenance: Completed    ROS:  Gen: no fevers/chills, no changes in weight  Eyes: no changes in vision  ENT: no sore throat, no hearing loss, no bloody nose  Pulm: no sob, no cough  CV: no chest pain, no palpitations  GI: no nausea/vomiting, no diarrhea  : no dysuria  MSk: no myalgias  Skin: no rash  Neuro: no headaches, no numbness/tingling  Heme/Lymph: no easy bruising      Objective:       Exam:  /62   Pulse 75   Temp 37.1 °C (98.8 °F)   Resp 14   Ht 1.727 m (5' 8\")   Wt 53.5 kg (118 lb)  "  SpO2 96%   BMI 17.94 kg/m²  Body mass index is 17.94 kg/m².    Gen: Alert and oriented, No apparent distress.  Neck: Neck is supple without lymphadenopathy.  No carotid bruits.  Lungs: Normal effort, CTA bilaterally, no wheezes, rhonchi, or rales  CV: Regular rate and rhythm. No murmurs, rubs, or gallops.  Ext: No clubbing, cyanosis, edema.    Labs: None    Assessment & Plan:     61 y.o. female with the following -     Chronic left hip pain      HPI:   Chronic pain recheck for: Chronic left hip pain  Last dose of controlled substance: 2022.  Chronic pain treated with tramadol 50 mg taken 3 times a day    She reports that she does not drink alcohol.  She reports that she does not use drugs.    Interval history:   Any major change in health since last appointment? No    Consequences of Chronic Opiate therapy:  (5 A's)  Analgesia: Compared to no treatment or prior treatment, pain is currently not changed  Activity: not changed  Adverse Events:She denies constipation, dry mouth, itchy skin, nausea and sedation  Aberrant Behaviors: She reports She is taking medication as prescribed and is not veering from agreed treatment regimen or provider recommendations. There have been no inappropriate refills or lost/stolen meds reported.  Affect/Mood: Pain is not impacting patient's mood.  Patient denies depression/anxiety.    Nonnarcotic treatments that are being used: NSAIDs/POSEY-2 and physical therapy.     Last imagin2021    Last order of CONTROLLED SUBSTANCE TREATMENT AGREEMENT was found on 2022 from Office Visit on 2022        UDS Summary     This patient has no relevant Health Maintenance data.            Opiate Risk Score: 0  Date of Last therapy agreement/ update 2022  Date of last UDS 2021.    Discrepancies:No     Most recent UDS reviewed today and is consistent with prescribed medications.     Interpretation of Opioid Risk Score   Score 0-3 = Low risk of abuse. Do UDS at least  once per year.  Score 4-7 = Moderate risk of abuse. Do UDS 1-4 times per year.  Score 8+ = High risk of abuse. Refer to specialist.    I have reviewed the medical records, the Prescription Monitoring Program and I have determined that controlled substance treatment is medically indicated.       Past medical, surgical, family, and social history is reviewed and updated in Epic chart by me today.   Medications and allergies reviewed and updated in Epic chart by me today.     ROS:   As documented in history of present illness above    Exam:    Constitutional: Alert, no distress, plus 3 vital signs  Skin:  Warm, dry, no rashes invisible areas  Eye: Equal, round and reactive, conjunctiva clear  ENMT: Lips without lesions, good dentition, oropharynx clear    Neck: Trachea midline, no masses, no thyromegaly  Respiratory: Unlabored respiration, lungs clear to auscultation, no wheezes, no rhonchi  Cardiovascular: Normal rate and rhythm, no murmur, no edema  Abdomen: Soft, nontender, no masses or hepatosplenomegaly  Psych: Alert, pleasant, well-groomed, normal affect    Assessment/ Plan / Medical Decision makin. Chronic pain from: Left total hip, revision x3, with chronic femoral head fracture.    2. Chronic use of opiate drugs therapeutic purposes  -Overall impression that this patient is benefiting from opioid therapy and that the benefits outweigh the risks of continued use.   - Controlled Substance Use Agreement is current.   - Urine drug screen current and reviewed / compliant with prescription medications  - Additional lab: CMP to assess liver and renal function - UTD   - Rx refill prescriptions are done for 3 months with no early refills.   - Referral to pain management not necessary at this time.   In prescribing controlled substances to this patient, I certify that I have obtained and reviewed the medical history.. I have also made a good priyanka effort to obtain applicable records from other providers who have  treated the patient and records did not demonstrate any increased risk of substance abuse that would prevent me from prescribing controlled substances.     I have conducted a physical exam and documented it. I have reviewed Kemi Silva's prescription history as maintained by the [place] Prescription Monitoring Program.     I have assessed the patient’s risk for abuse, dependency, and addiction using the validated Opioid Risk Tool available at https://www.mdcalc.com/lqktag-zrsu-nhgs-ort-narcotic-abuse.     Given the above, I believe the benefits of controlled substance therapy outweigh the risks. The reasons for prescribing controlled substances include non-narcotic, oral analgesic alternatives have been inadequate for pain control. Accordingly, I have discussed the risk and benefits, treatment plan, and alternative therapies with the patient.     Obtained and reviewed patient utilization report from [facility] pharmacy database on 2022 11:10 AM  prior to writing prescription for controlled substance II, III or IV per [place] bill . Based on assessment of the report, the prescription is medically necessary.       Hyperlipidemia  Chronic condition, stable.  Patient takes simvastatin 40 mg daily, and tolerates this well without adverse side effects.  She is due for updated labs today.  She will continue her current regimen.      Orders Placed This Encounter   • HCV Scrn ( [id number] 1xLife)   • Lipid Profile   • Comp Metabolic Panel   • CBC WITHOUT DIFFERENTIAL   • traMADol (ULTRAM) 50 MG Tab   • traMADol (ULTRAM) 50 MG Tab   • traMADol (ULTRAM) 50 MG Tab   • Consent for Opiate Prescription   • Controlled Substance Treatment Agreement          Return in about 3 months (around 2022).    I have placed the below orders and discussed them with an approved delegating provider.  The MA is performing the below orders under the direction of Parul Galan MD.    Please note that this dictation was  created using voice recognition software. I have made every reasonable attempt to correct obvious errors, but I expect that there are errors of grammar and possibly content that I did not discover before finalizing the note.

## 2022-03-01 DIAGNOSIS — N81.10 VAGINAL PROLAPSE: ICD-10-CM

## 2022-03-22 DIAGNOSIS — T84.52XD INFECTION ASSOCIATED WITH INTERNAL LEFT HIP PROSTHESIS, SUBSEQUENT ENCOUNTER: ICD-10-CM

## 2022-03-22 RX ORDER — RIFAMPIN 300 MG/1
300 CAPSULE ORAL 2 TIMES DAILY
Qty: 180 CAPSULE | Refills: 0 | Status: SHIPPED | OUTPATIENT
Start: 2022-03-22 | End: 2022-05-02

## 2022-04-18 ENCOUNTER — OFFICE VISIT (OUTPATIENT)
Dept: MEDICAL GROUP | Facility: PHYSICIAN GROUP | Age: 62
End: 2022-04-18
Payer: COMMERCIAL

## 2022-04-18 VITALS
SYSTOLIC BLOOD PRESSURE: 116 MMHG | WEIGHT: 122.6 LBS | BODY MASS INDEX: 18.58 KG/M2 | OXYGEN SATURATION: 95 % | HEART RATE: 68 BPM | RESPIRATION RATE: 18 BRPM | DIASTOLIC BLOOD PRESSURE: 68 MMHG | HEIGHT: 68 IN | TEMPERATURE: 99.1 F

## 2022-04-18 DIAGNOSIS — T84.52XD INFECTION ASSOCIATED WITH INTERNAL LEFT HIP PROSTHESIS, SUBSEQUENT ENCOUNTER: ICD-10-CM

## 2022-04-18 DIAGNOSIS — M00.9 CHRONIC INFECTION OF LEFT HIP, CURRENTLY ON ANTIBIOTICS (HCC): Primary | ICD-10-CM

## 2022-04-18 DIAGNOSIS — F51.01 PRIMARY INSOMNIA: ICD-10-CM

## 2022-04-18 DIAGNOSIS — M25.552 CHRONIC LEFT HIP PAIN: ICD-10-CM

## 2022-04-18 DIAGNOSIS — G89.29 CHRONIC LEFT HIP PAIN: ICD-10-CM

## 2022-04-18 PROBLEM — T84.52XA PROSTHETIC JOINT INFECTION OF LEFT HIP (HCC): Status: ACTIVE | Noted: 2022-04-18

## 2022-04-18 PROBLEM — T84.52XA PROSTHETIC JOINT INFECTION OF LEFT HIP (HCC): Chronic | Status: ACTIVE | Noted: 2022-04-18

## 2022-04-18 PROCEDURE — 99214 OFFICE O/P EST MOD 30 MIN: CPT | Performed by: NURSE PRACTITIONER

## 2022-04-18 RX ORDER — TRAMADOL HYDROCHLORIDE 50 MG/1
50 TABLET ORAL 3 TIMES DAILY
Qty: 90 TABLET | Refills: 0 | Status: SHIPPED | OUTPATIENT
Start: 2022-05-24 | End: 2022-06-23

## 2022-04-18 RX ORDER — SULFAMETHOXAZOLE AND TRIMETHOPRIM 800; 160 MG/1; MG/1
1 TABLET ORAL 2 TIMES DAILY
Qty: 180 TABLET | Refills: 3 | Status: SHIPPED | OUTPATIENT
Start: 2022-04-18 | End: 2022-07-17

## 2022-04-18 RX ORDER — TRAMADOL HYDROCHLORIDE 50 MG/1
50 TABLET ORAL 3 TIMES DAILY
Qty: 90 TABLET | Refills: 0 | Status: SHIPPED | OUTPATIENT
Start: 2022-04-24 | End: 2022-05-24

## 2022-04-18 RX ORDER — TRAZODONE HYDROCHLORIDE 100 MG/1
200 TABLET ORAL NIGHTLY PRN
Qty: 60 TABLET | Refills: 3 | Status: SHIPPED | OUTPATIENT
Start: 2022-04-18 | End: 2022-09-07

## 2022-04-18 RX ORDER — LORAZEPAM 1 MG/1
1 TABLET ORAL
COMMUNITY
End: 2022-05-24 | Stop reason: SDUPTHER

## 2022-04-18 RX ORDER — TRAMADOL HYDROCHLORIDE 50 MG/1
50 TABLET ORAL 3 TIMES DAILY
Qty: 90 TABLET | Refills: 0 | Status: SHIPPED | OUTPATIENT
Start: 2022-06-23 | End: 2022-07-14 | Stop reason: SDUPTHER

## 2022-04-18 ASSESSMENT — FIBROSIS 4 INDEX: FIB4 SCORE: 1.58

## 2022-04-18 NOTE — ASSESSMENT & PLAN NOTE
Chronic condition, stable.  Patient takes Bactrim -160 mg BID as well as rifampin 300 mg BID for her chronically infected left hip prosthesis.  She reports no signs or symptoms of systemic infection and feels well on this regimen.  She will continue her current regimen.

## 2022-04-18 NOTE — ASSESSMENT & PLAN NOTE
Chronic condition, stable.  Patient states that she has difficulty sleeping and less she takes trazodone 50 mg nightly.  This regimen works well for her, and she does not feel she needs to make changes.  She will continue her current regimen.

## 2022-04-18 NOTE — ASSESSMENT & PLAN NOTE
Chronic condition, stable.  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  She is doing well on her current antibiotics and we will continue her current regimen.

## 2022-04-18 NOTE — PROGRESS NOTES
Subjective:     CC: The primary encounter diagnosis was Chronic infection of left hip, currently on antibiotics ([facility]). 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      HPI:   Kemi is an established patient of [facility] here for follow-up.  We discussed the following problems:    1. Chronic infection of left hip, currently on antibiotics ([facility])  2. Chronic left hip pain  Chronic condition, stable. Patient is here for follow up today. She is maintained on tramadol 50 mg TID, which controls her pain well.      3. 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  Patient is here for follow up today.    4. Primary insomnia  Chronic condition, stable.  Patient is here for follow up today.       Past Medical History:   Diagnosis Date   • Acute midline thoracic back pain 10/19/2016   • Anesthesia 2018    post op nausea; shakes   • Arthritis     osteoarthritis; hip, thumbs, knees   • High cholesterol    • Hip pain     left   • History of anemia    • Hyperlipidemia 2013   • Insomnia    • Menopause    • Osteopenia    • PONV (postoperative nausea and vomiting)        Social History     Tobacco Use   • Smoking status: Former Smoker     Packs/day: 0.25     Years: 20.00     Pack years: 5.00     Types: Cigarettes     Quit date: 1999     Years since quittin.6   • Smokeless tobacco: Never Used   Vaping Use   • Vaping Use: Never used   Substance Use Topics   • Alcohol use: No     Alcohol/week: 0.0 oz     Comment: former heavy use, 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 90 Tablet 0   • [START ON 2022]  "traMADol (ULTRAM) 50 MG Tab Take 1 Tablet by mouth in the morning, at noon, and at bedtime for 30 days. 90 Tablet 0   • [START ON 6/23/2022] traMADol (ULTRAM) 50 MG Tab Take 1 Tablet by mouth in the morning, at noon, and at bedtime for 30 days. 90 Tablet 0   • LORazepam (ATIVAN) 1 MG Tab Take 1 mg by mouth 1 time a day as needed.     • sulfamethoxazole-trimethoprim (BACTRIM DS) 800-160 MG tablet Take 1 Tablet by mouth 2 times a day for 90 days. 180 Tablet 3   • traZODone (DESYREL) 100 MG Tab Take 2 Tablets by mouth at bedtime as needed for Sleep. 60 Tablet 3   • riFAMPin (RIFADINE) 300 MG Cap TAKE 1 CAPSULE BY MOUTH 2 TIMES A DAY FOR 90 DAYS. 180 Capsule 0   • triamcinolone acetonide (KENALOG) 0.025 % Cream APPLY TO AFFECTED AREA TWICE A DAY 80 g 0   • simvastatin (ZOCOR) 40 MG Tab Take 1 Tablet by mouth every evening. 30 Tablet 11   • acetaminophen (TYLENOL) 500 MG Tab Take 500-1,000 mg by mouth every 6 hours as needed.     • Coenzyme Q10 (COQ10 PO) Take 1 Tab by mouth every evening.     • therapeutic multivitamin-minerals (THERAGRAN-M) Tab Take 1 Tab by mouth every evening.     • CALCIUM PO Take 1 Tab by mouth every evening.     • ibuprofen (MOTRIN) 200 MG Tab Take 800 mg by mouth every 6 hours as needed.       No current Epic-ordered facility-administered medications on file.       Allergies:  Tape    Health Maintenance: Completed    ROS:  Gen: no fevers/chills, no changes in weight  Eyes: no changes in vision  ENT: no sore throat, no hearing loss, no bloody nose  Pulm: no sob, no cough  CV: no chest pain, no palpitations  GI: no nausea/vomiting, no diarrhea  : no dysuria  MSk: no myalgias  Skin: no rash  Neuro: no headaches, no numbness/tingling  Heme/Lymph: no easy bruising      Objective:       Exam:  /68 (BP Location: Right arm, Patient Position: Sitting, BP Cuff Size: Adult)   Pulse 68   Temp 37.3 °C (99.1 °F) (Temporal)   Resp 18   Ht 1.727 m (5' 8\")   Wt 55.6 kg (122 lb 9.6 oz)   SpO2 95%   " BMI 18.64 kg/m²  Body mass index is 18.64 kg/m².    Gen: Alert and oriented, No apparent distress.  Neck: Neck is supple without lymphadenopathy.   Lungs: Normal effort, CTA bilaterally, no wheezes, rhonchi, or rales  CV: Regular rate and rhythm. No murmurs, rubs, or gallops.  Ext: No clubbing, cyanosis, edema.    Labs: Dated: None    Assessment & Plan:     62 y.o. female with the following -     Chronic left hip pain      HPI:   Chronic pain recheck for: Left hip pain, chronic infection of prosthetic  Last dose of controlled substance: 2022  Chronic pain treated with tramadol 50 mg taken 3 times a day    She reports that she does not drink alcohol.  She reports that she does not use drugs.    Interval history:   Any major change in health since last appointment? No    Consequences of Chronic Opiate therapy:  (5 A's)  Analgesia: Compared to no treatment or prior treatment, pain is currently improved  Activity: improved  Adverse Events:She denies constipation, dry mouth, itchy skin, nausea and sedation  Aberrant Behaviors: She reports She is taking medication as prescribed and is not veering from agreed treatment regimen or provider recommendations. There have been no inappropriate refills or lost/stolen meds reported.  Affect/Mood: Pain is not impacting patient's mood.  Patient denies depression/anxiety.    Nonnarcotic treatments that are being used: Tylenol, physical therapy, ice and heat.     Last imagin2021    Last order of CONTROLLED SUBSTANCE TREATMENT AGREEMENT was found on 2022 from Office Visit on 2022        UDS Summary     This patient has no relevant Health Maintenance data.            Opiate Risk Score: 0  Date of Last therapy agreement/ update 22  Date of last UDS 2021 - Ordered today    Discrepancies:No     Most recent UDS reviewed today and is consistent with prescribed medications.     Interpretation of Opioid Risk Score   Score 0-3 = Low risk of abuse. Do UDS at least  once per year.  Score 4-7 = Moderate risk of abuse. Do UDS 1-4 times per year.  Score 8+ = High risk of abuse. Refer to specialist.    I have reviewed the medical records, the Prescription Monitoring Program and I have determined that controlled substance treatment is medically indicated.       Past medical, surgical, family, and social history is reviewed and updated in Epic chart by me today.   Medications and allergies reviewed and updated in Epic chart by me today.     ROS:   As documented in history of present illness above    Exam:    Constitutional: Alert, no distress, plus 3 vital signs  Skin:  Warm, dry, no rashes invisible areas  Eye: Equal, round and reactive, conjunctiva clear  ENMT: Lips without lesions, good dentition, oropharynx clear    Neck: Trachea midline, no masses, no thyromegaly  Respiratory: Unlabored respiration, lungs clear to auscultation, no wheezes, no rhonchi  Cardiovascular: Normal rate and rhythm, no murmur, no edema  Abdomen: Soft, nontender, no masses or hepatosplenomegaly  Psych: Alert, pleasant, well-groomed, normal affect    Assessment/ Plan / Medical Decision makin. Chronic pain from:  Left hip chronic infection of prosthesis    2. Chronic use of opiate drugs therapeutic purposes  -Overall impression that this patient is benefiting from opioid therapy and that the benefits outweigh the risks of continued use.   - Controlled Substance Use Agreement is current.   - Urine drug screen current and reviewed / compliant with prescription medications  - Additional lab: CMP to assess liver and renal function - UTD   - Rx refill prescriptions are done for 3 months with no early refills.   - Referral to pain management not necessary at this time.   In prescribing controlled substances to this patient, I certify that I have obtained and reviewed the medical history.. I have also made a good priyanka effort to obtain applicable records from other providers who have treated the patient and  records did not demonstrate any increased risk of substance abuse that would prevent me from prescribing controlled substances.     I have conducted a physical exam and documented it. I have reviewed Kemi Silva's prescription history as maintained by the [place] Prescription Monitoring Program.     I have assessed the patient’s risk for abuse, dependency, and addiction using the validated Opioid Risk Tool available at https://www.mdcalc.com/hxvwkb-xxvz-sxgi-ort-narcotic-abuse.     Given the above, I believe the benefits of controlled substance therapy outweigh the risks. The reasons for prescribing controlled substances include non-narcotic, oral analgesic alternatives have been inadequate for pain control. Accordingly, I have discussed the risk and benefits, treatment plan, and alternative therapies with the patient.   Obtained and reviewed patient utilization report from [facility] pharmacy database on 4/18/2022 11:23 AM  prior to writing prescription for controlled substance II, III or IV per [place] bill . Based on assessment of the report, the prescription is medically necessary.       Insomnia  Chronic condition, stable.  Patient states that she has difficulty sleeping and less she takes trazodone 50 mg nightly.  This regimen works well for her, and she does not feel she needs to make changes.  She will continue her current regimen.    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  She is doing well on her current antibiotics and we will continue her current regimen.     Chronic infection of left hip, currently on antibiotics (HCC)  Chronic condition, stable.  Patient takes Bactrim -160 mg BID as well as rifampin 300 mg BID for her chronically infected left hip prosthesis.  She reports no signs or symptoms of systemic infection  and feels well on this regimen.  She will continue her current regimen.       Orders Placed This Encounter   • PAIN MANAGEMENT SCRN, UR   • traMADol (ULTRAM) 50 MG Tab   • traMADol (ULTRAM) 50 MG Tab   • traMADol (ULTRAM) 50 MG Tab   • LORazepam (ATIVAN) 1 MG Tab   • sulfamethoxazole-trimethoprim (BACTRIM DS) 800-160 MG tablet   • traZODone (DESYREL) 100 MG Tab          Return in about 3 months (around 7/18/2022).    I have placed the below orders and discussed them with an approved delegating provider.  The MA is performing the below orders under the direction of Parul Galan MD.    Please note that this dictation was created using voice recognition software. I have made every reasonable attempt to correct obvious errors, but I expect that there are errors of grammar and possibly content that I did not discover before finalizing the note.

## 2022-04-18 NOTE — ASSESSMENT & PLAN NOTE
HPI:   Chronic pain recheck for: Left hip pain, chronic infection of prosthetic  Last dose of controlled substance: 2022  Chronic pain treated with tramadol 50 mg taken 3 times a day    She reports that she does not drink alcohol.  She reports that she does not use drugs.    Interval history:   Any major change in health since last appointment? No    Consequences of Chronic Opiate therapy:  (5 A's)  Analgesia: Compared to no treatment or prior treatment, pain is currently improved  Activity: improved  Adverse Events:She denies constipation, dry mouth, itchy skin, nausea and sedation  Aberrant Behaviors: She reports She is taking medication as prescribed and is not veering from agreed treatment regimen or provider recommendations. There have been no inappropriate refills or lost/stolen meds reported.  Affect/Mood: Pain is not impacting patient's mood.  Patient denies depression/anxiety.    Nonnarcotic treatments that are being used: Tylenol, physical therapy, ice and heat.     Last imagin2021    Last order of CONTROLLED SUBSTANCE TREATMENT AGREEMENT was found on 2022 from Office Visit on 2022        UDS Summary     This patient has no relevant Health Maintenance data.            Opiate Risk Score: 0  Date of Last therapy agreement/ update 22  Date of last UDS 2021 - Ordered today    Discrepancies:No     Most recent UDS reviewed today and is consistent with prescribed medications.     Interpretation of Opioid Risk Score   Score 0-3 = Low risk of abuse. Do UDS at least once per year.  Score 4-7 = Moderate risk of abuse. Do UDS 1-4 times per year.  Score 8+ = High risk of abuse. Refer to specialist.    I have reviewed the medical records, the Prescription Monitoring Program and I have determined that controlled substance treatment is medically indicated.       Past medical, surgical, family, and social history is reviewed and updated in Epic chart by me today.   Medications and  allergies reviewed and updated in Epic chart by me today.     ROS:   As documented in history of present illness above    Exam:    Constitutional: Alert, no distress, plus 3 vital signs  Skin:  Warm, dry, no rashes invisible areas  Eye: Equal, round and reactive, conjunctiva clear  ENMT: Lips without lesions, good dentition, oropharynx clear    Neck: Trachea midline, no masses, no thyromegaly  Respiratory: Unlabored respiration, lungs clear to auscultation, no wheezes, no rhonchi  Cardiovascular: Normal rate and rhythm, no murmur, no edema  Abdomen: Soft, nontender, no masses or hepatosplenomegaly  Psych: Alert, pleasant, well-groomed, normal affect    Assessment/ Plan / Medical Decision makin. Chronic pain from:  Left hip chronic infection of prosthesis    2. Chronic use of opiate drugs therapeutic purposes  -Overall impression that this patient is benefiting from opioid therapy and that the benefits outweigh the risks of continued use.   - Controlled Substance Use Agreement is current.   - Urine drug screen current and reviewed / compliant with prescription medications  - Additional lab: CMP to assess liver and renal function - UTD   - Rx refill prescriptions are done for 3 months with no early refills.   - Referral to pain management not necessary at this time.   In prescribing controlled substances to this patient, I certify that I have obtained and reviewed the medical history.. I have also made a good priyanka effort to obtain applicable records from other providers who have treated the patient and records did not demonstrate any increased risk of substance abuse that would prevent me from prescribing controlled substances.     I have conducted a physical exam and documented it. I have reviewed Kemi Silva's prescription history as maintained by the [place] Prescription Monitoring Program.     I have assessed the patient’s risk for abuse, dependency, and addiction using the validated Opioid Risk  Tool available at https://www.mdcalc.com/fnzelp-bphf-sirs-ort-narcotic-abuse.     Given the above, I believe the benefits of controlled substance therapy outweigh the risks. The reasons for prescribing controlled substances include non-narcotic, oral analgesic alternatives have been inadequate for pain control. Accordingly, I have discussed the risk and benefits, treatment plan, and alternative therapies with the patient.   Obtained and reviewed patient utilization report from [facility] pharmacy database on 4/18/2022 11:23 AM  prior to writing prescription for controlled substance II, III or IV per [place] bill . Based on assessment of the report, the prescription is medically necessary.

## 2022-05-02 DIAGNOSIS — T84.52XD INFECTION ASSOCIATED WITH INTERNAL LEFT HIP PROSTHESIS, SUBSEQUENT ENCOUNTER: ICD-10-CM

## 2022-05-02 RX ORDER — RIFAMPIN 300 MG/1
300 CAPSULE ORAL 2 TIMES DAILY
Qty: 180 CAPSULE | Refills: 0 | Status: SHIPPED | OUTPATIENT
Start: 2022-05-02 | End: 2022-07-31

## 2022-05-24 DIAGNOSIS — F41.9 ANXIETY: ICD-10-CM

## 2022-05-24 RX ORDER — LORAZEPAM 1 MG/1
1 TABLET ORAL
Qty: 30 TABLET | Refills: 0 | Status: SHIPPED | OUTPATIENT
Start: 2022-05-24 | End: 2022-06-23

## 2022-05-24 NOTE — PROGRESS NOTES
1. Anxiety  - LORazepam (ATIVAN) 1 MG Tab; Take 1 Tablet by mouth 1 time a day as needed for Anxiety for up to 30 days.  Dispense: 30 Tablet; Refill: 0    Obtained and reviewed patient utilization report from [facility] pharmacy database on 5/24/2022 7:21 AM  prior to writing prescription for controlled substance II, III or IV per [place] bill . Based on assessment of the report, the prescription is medically necessary.

## 2022-07-08 ENCOUNTER — OUTPATIENT INFUSION SERVICES (OUTPATIENT)
Dept: ONCOLOGY | Facility: MEDICAL CENTER | Age: 62
End: 2022-07-08
Attending: PHYSICIAN ASSISTANT
Payer: COMMERCIAL

## 2022-07-08 VITALS
TEMPERATURE: 98 F | BODY MASS INDEX: 18.86 KG/M2 | WEIGHT: 120.15 LBS | HEIGHT: 67 IN | DIASTOLIC BLOOD PRESSURE: 61 MMHG | OXYGEN SATURATION: 93 % | RESPIRATION RATE: 18 BRPM | SYSTOLIC BLOOD PRESSURE: 102 MMHG | HEART RATE: 79 BPM

## 2022-07-08 DIAGNOSIS — M85.80 OSTEOPENIA, UNSPECIFIED LOCATION: ICD-10-CM

## 2022-07-08 LAB
CA-I BLD ISE-SCNC: 1.31 MMOL/L (ref 1.1–1.3)
CREAT BLD-MCNC: 1.1 MG/DL (ref 0.5–1.4)

## 2022-07-08 PROCEDURE — 36415 COLL VENOUS BLD VENIPUNCTURE: CPT

## 2022-07-08 PROCEDURE — 82565 ASSAY OF CREATININE: CPT

## 2022-07-08 PROCEDURE — 82330 ASSAY OF CALCIUM: CPT

## 2022-07-08 PROCEDURE — 700111 HCHG RX REV CODE 636 W/ 250 OVERRIDE (IP): Performed by: PHYSICIAN ASSISTANT

## 2022-07-08 PROCEDURE — 96372 THER/PROPH/DIAG INJ SC/IM: CPT

## 2022-07-08 RX ORDER — LORAZEPAM 1 MG/1
1 TABLET ORAL NIGHTLY PRN
COMMUNITY
End: 2022-10-06 | Stop reason: SDUPTHER

## 2022-07-08 RX ADMIN — DENOSUMAB 60 MG: 60 INJECTION SUBCUTANEOUS at 11:26

## 2022-07-08 ASSESSMENT — FIBROSIS 4 INDEX: FIB4 SCORE: 1.58

## 2022-07-08 NOTE — PROGRESS NOTES
Ms. Silva arrived to the infusion center for Prolia. She is in great spirits today. She confirms taking calcium and vitamin D and has not had any oral surgeries in the past four weeks or any coming up in the next four weeks. Labs drawn from [facility] and calcium and creatinine met parameters to treat today. Prolia given to the back of her right arm. Left infusion center with no apparent distress. She will make next appointment.

## 2022-07-11 ENCOUNTER — HOSPITAL ENCOUNTER (OUTPATIENT)
Dept: LAB | Facility: MEDICAL CENTER | Age: 62
End: 2022-07-11
Attending: NURSE PRACTITIONER
Payer: COMMERCIAL

## 2022-07-11 DIAGNOSIS — T84.52XD INFECTION ASSOCIATED WITH INTERNAL LEFT HIP PROSTHESIS, SUBSEQUENT ENCOUNTER: ICD-10-CM

## 2022-07-11 DIAGNOSIS — Z79.899 HIGH RISK MEDICATION USE: ICD-10-CM

## 2022-07-11 DIAGNOSIS — E78.2 MIXED HYPERLIPIDEMIA: ICD-10-CM

## 2022-07-11 DIAGNOSIS — Z11.59 NEED FOR HEPATITIS C SCREENING TEST: ICD-10-CM

## 2022-07-11 LAB
ALBUMIN SERPL BCP-MCNC: 4 G/DL (ref 3.2–4.9)
ALBUMIN/GLOB SERPL: 1.9 G/DL
ALP SERPL-CCNC: 44 U/L (ref 30–99)
ALT SERPL-CCNC: 16 U/L (ref 2–50)
ANION GAP SERPL CALC-SCNC: 9 MMOL/L (ref 7–16)
AST SERPL-CCNC: 24 U/L (ref 12–45)
BILIRUB SERPL-MCNC: 0.2 MG/DL (ref 0.1–1.5)
BUN SERPL-MCNC: 16 MG/DL (ref 8–22)
CALCIUM SERPL-MCNC: 9 MG/DL (ref 8.5–10.5)
CHLORIDE SERPL-SCNC: 103 MMOL/L (ref 96–112)
CHOLEST SERPL-MCNC: 244 MG/DL (ref 100–199)
CO2 SERPL-SCNC: 24 MMOL/L (ref 20–33)
CREAT SERPL-MCNC: 0.97 MG/DL (ref 0.5–1.4)
ERYTHROCYTE [DISTWIDTH] IN BLOOD BY AUTOMATED COUNT: 47.6 FL (ref 35.9–50)
FASTING STATUS PATIENT QL REPORTED: NORMAL
GFR SERPLBLD CREATININE-BSD FMLA CKD-EPI: 65 ML/MIN/1.73 M 2
GLOBULIN SER CALC-MCNC: 2.1 G/DL (ref 1.9–3.5)
GLUCOSE SERPL-MCNC: 84 MG/DL (ref 65–99)
HCT VFR BLD AUTO: 39.2 % (ref 37–47)
HCV AB SER QL: NORMAL
HDLC SERPL-MCNC: 79 MG/DL
HGB BLD-MCNC: 12.7 G/DL (ref 12–16)
LDLC SERPL CALC-MCNC: 144 MG/DL
MCH RBC QN AUTO: 30.8 PG (ref 27–33)
MCHC RBC AUTO-ENTMCNC: 32.4 G/DL (ref 33.6–35)
MCV RBC AUTO: 95.1 FL (ref 81.4–97.8)
PLATELET # BLD AUTO: 223 K/UL (ref 164–446)
PMV BLD AUTO: 10.8 FL (ref 9–12.9)
POTASSIUM SERPL-SCNC: 4.6 MMOL/L (ref 3.6–5.5)
PROT SERPL-MCNC: 6.1 G/DL (ref 6–8.2)
RBC # BLD AUTO: 4.12 M/UL (ref 4.2–5.4)
SODIUM SERPL-SCNC: 136 MMOL/L (ref 135–145)
TRIGL SERPL-MCNC: 105 MG/DL (ref 0–149)
WBC # BLD AUTO: 4.1 K/UL (ref 4.8–10.8)

## 2022-07-11 PROCEDURE — 80053 COMPREHEN METABOLIC PANEL: CPT

## 2022-07-11 PROCEDURE — G0472 HEP C SCREEN HIGH RISK/OTHER: HCPCS

## 2022-07-11 PROCEDURE — 80061 LIPID PANEL: CPT

## 2022-07-11 PROCEDURE — 85027 COMPLETE CBC AUTOMATED: CPT

## 2022-07-11 PROCEDURE — 36415 COLL VENOUS BLD VENIPUNCTURE: CPT

## 2022-07-14 ENCOUNTER — OFFICE VISIT (OUTPATIENT)
Dept: MEDICAL GROUP | Facility: PHYSICIAN GROUP | Age: 62
End: 2022-07-14
Payer: COMMERCIAL

## 2022-07-14 VITALS
SYSTOLIC BLOOD PRESSURE: 98 MMHG | BODY MASS INDEX: 18.9 KG/M2 | HEIGHT: 67 IN | HEART RATE: 77 BPM | DIASTOLIC BLOOD PRESSURE: 60 MMHG | WEIGHT: 120.4 LBS | RESPIRATION RATE: 16 BRPM | OXYGEN SATURATION: 95 % | TEMPERATURE: 98.4 F

## 2022-07-14 DIAGNOSIS — M25.552 CHRONIC LEFT HIP PAIN: ICD-10-CM

## 2022-07-14 DIAGNOSIS — M00.9 CHRONIC INFECTION OF LEFT HIP, CURRENTLY ON ANTIBIOTICS (HCC): Primary | Chronic | ICD-10-CM

## 2022-07-14 DIAGNOSIS — G89.29 CHRONIC LEFT HIP PAIN: ICD-10-CM

## 2022-07-14 DIAGNOSIS — F11.20 OPIOID DEPENDENCE IN CONTROLLED ENVIRONMENT (HCC): ICD-10-CM

## 2022-07-14 PROCEDURE — 99214 OFFICE O/P EST MOD 30 MIN: CPT | Performed by: NURSE PRACTITIONER

## 2022-07-14 RX ORDER — TRAMADOL HYDROCHLORIDE 50 MG/1
50 TABLET ORAL 3 TIMES DAILY
Qty: 90 TABLET | Refills: 2 | Status: SHIPPED | OUTPATIENT
Start: 2022-07-14 | End: 2022-08-13

## 2022-07-14 ASSESSMENT — FIBROSIS 4 INDEX: FIB4 SCORE: 1.72

## 2022-07-14 NOTE — PROGRESS NOTES
Subjective:     CC: The primary encounter diagnosis was Chronic infection of left hip, currently on antibiotics ([facility]). Diagnoses of Chronic left hip pain and Opioid dependence in controlled environment ([facility]) were also pertinent to this visit.      HPI:   Kemi is an established patient of [facility] here for follow-up.  We discussed the following problems:    1. Chronic infection of left hip, currently on antibiotics ([facility])  2. Chronic left hip pain  3. Opioid dependence in controlled environment ([facility])  Chronic condition, stable. Patient is here for evaluation today.     Past Medical History:   Diagnosis Date   • Acute midline thoracic back pain 10/19/2016   • Anesthesia 2018    post op nausea; shakes   • Arthritis     osteoarthritis; hip, thumbs, knees   • High cholesterol    • Hip pain     left   • History of anemia    • Hyperlipidemia 2013   • Insomnia    • Menopause    • Osteopenia    • PONV (postoperative nausea and vomiting)        Social History     Tobacco Use   • Smoking status: Former Smoker     Packs/day: 0.25     Years: 20.00     Pack years: 5.00     Types: Cigarettes     Quit date: 1999     Years since quittin.9   • Smokeless tobacco: Never Used   Vaping Use   • Vaping Use: Never used   Substance Use Topics   • Alcohol use: No     Alcohol/week: 0.0 oz     Comment: former heavy use, went to rehab and AA; sober- 12 years now   • Drug use: No       Current Outpatient Medications Ordered in Epic   Medication Sig Dispense Refill   • traMADol (ULTRAM) 50 MG Tab Take 1 Tablet by mouth in the morning, at noon, and at bedtime for 30 days. 90 Tablet 2   • vitamin D3 (CHOLECALCIFEROL) 400 UNIT Tab Take 1,000 Units by mouth every day.     • LORazepam (ATIVAN) 1 MG Tab Take 1 mg by mouth at bedtime as needed for Anxiety. Indications: PRN for sleep     • riFAMPin (RIFADINE) 300 MG Cap TAKE 1 CAPSULE BY MOUTH 2 TIMES A DAY FOR 90 DAYS. 180 Capsule 0   • sulfamethoxazole-trimethoprim (BACTRIM DS)  "800-160 MG tablet Take 1 Tablet by mouth 2 times a day for 90 days. 180 Tablet 3   • traZODone (DESYREL) 100 MG Tab Take 2 Tablets by mouth at bedtime as needed for Sleep. 60 Tablet 3   • triamcinolone acetonide (KENALOG) 0.025 % Cream APPLY TO AFFECTED AREA TWICE A DAY 80 g 0   • simvastatin (ZOCOR) 40 MG Tab Take 1 Tablet by mouth every evening. 30 Tablet 11   • acetaminophen (TYLENOL) 500 MG Tab Take 500-1,000 mg by mouth every 6 hours as needed.     • Coenzyme Q10 (COQ10 PO) Take 1 Tab by mouth every evening.     • therapeutic multivitamin-minerals (THERAGRAN-M) Tab Take 1 Tab by mouth every evening.     • CALCIUM PO Take 1 Tab by mouth every evening.     • ibuprofen (MOTRIN) 200 MG Tab Take 800 mg by mouth every 6 hours as needed.       No current Epic-ordered facility-administered medications on file.       Allergies:  Tape    Health Maintenance: Completed    ROS:  Gen: no fevers/chills, no changes in weight  Eyes: no changes in vision  ENT: no sore throat, no hearing loss, no bloody nose  Pulm: no sob, no cough  CV: no chest pain, no palpitations  GI: no nausea/vomiting, no diarrhea  : no dysuria  MSk: no myalgias  Skin: no rash  Neuro: no headaches, no numbness/tingling  Heme/Lymph: no easy bruising      Objective:       Exam:  BP (!) 98/60 (BP Location: Left arm, Patient Position: Sitting, BP Cuff Size: Small adult)   Pulse 77   Temp 36.9 °C (98.4 °F) (Temporal)   Resp 16   Ht 1.69 m (5' 6.54\")   Wt 54.6 kg (120 lb 6.4 oz)   SpO2 95%   BMI 19.12 kg/m²  Body mass index is 19.12 kg/m².    Gen: Alert and oriented, No apparent distress.  Neck: Neck is supple without lymphadenopathy.   Lungs: Normal effort, CTA bilaterally, no wheezes, rhonchi, or rales  CV: Regular rate and rhythm. No murmurs, rubs, or gallops.  Ext: No clubbing, cyanosis, edema.    Labs: Dated: 7/11/2022, discussed results today and all questions answered.  Assessment & Plan:     62 y.o. female with the following -     Assessment/ " Plan / Medical Decision makin. Chronic pain from:  Left hip chronic infection of prosthesis   2. Chronic use of opiate drugs therapeutic purposes  3.  Chronic left hip pain    HPI:   Chronic pain recheck for: Left hip pain, chronic infection of prosthetic  Last dose of controlled substance: 2022  Chronic pain treated with tramadol 50 mg taken 3 times a day     She reports that she does not drink alcohol.  She reports that she does not use drugs.     Interval history:   Any major change in health since last appointment? No     Consequences of Chronic Opiate therapy:  (5 A's)  Analgesia: Compared to no treatment or prior treatment, pain is currently improved  Activity: improved  Adverse Events:She denies constipation, dry mouth, itchy skin, nausea and sedation  Aberrant Behaviors: She reports She is taking medication as prescribed and is not veering from agreed treatment regimen or provider recommendations. There have been no inappropriate refills or lost/stolen meds reported.  Affect/Mood: Pain is not impacting patient's mood.  Patient denies depression/anxiety.     Nonnarcotic treatments that are being used: Tylenol, physical therapy, ice and heat.      Last imagin2021     Last order of CONTROLLED SUBSTANCE TREATMENT AGREEMENT was found on 2022 from Office Visit on 2022             UDS Summary      This patient has no relevant Health Maintenance data.              Opiate Risk Score: 0  Date of Last therapy agreement/ update 22  Date of last UDS 2021 - Ordered today                    Discrepancies:No      Most recent UDS reviewed today and is consistent with prescribed medications.      Interpretation of Opioid Risk Score   Score 0-3 = Low risk of abuse. Do UDS at least once per year.  Score 4-7 = Moderate risk of abuse. Do UDS 1-4 times per year.  Score 8+ = High risk of abuse. Refer to specialist.     I have reviewed the medical records, the Prescription Monitoring Program and I  have determined that controlled substance treatment is medically indicated.         Past medical, surgical, family, and social history is reviewed and updated in Epic chart by me today.   Medications and allergies reviewed and updated in Epic chart by me today.      ROS:   As documented in history of present illness above     Exam:     Constitutional: Alert, no distress, plus 3 vital signs  Skin:  Warm, dry, no rashes invisible areas  Eye: Equal, round and reactive, conjunctiva clear  ENMT: Lips without lesions, good dentition, oropharynx clear    Neck: Trachea midline, no masses, no thyromegaly  Respiratory: Unlabored respiration, lungs clear to auscultation, no wheezes, no rhonchi  Cardiovascular: Normal rate and rhythm, no murmur, no edema  Abdomen: Soft, nontender, no masses or hepatosplenomegaly  Psych: Alert, pleasant, well-groomed, normal affect    -Overall impression that this patient is benefiting from opioid therapy and that the benefits outweigh the risks of continued use.   - Controlled Substance Use Agreement is current.   - Urine drug screen current and reviewed / compliant with prescription medications  - Additional lab: CMP to assess liver and renal function - UTD   - Rx refill prescriptions are done for 3 months with no early refills.   - Referral to pain management not necessary at this time.   In prescribing controlled substances to this patient, I certify that I have obtained and reviewed the medical history.. I have also made a good priyanka effort to obtain applicable records from other providers who have treated the patient and records did not demonstrate any increased risk of substance abuse that would prevent me from prescribing controlled substances.      I have conducted a physical exam and documented it. I have reviewed Kemi Leggett Ricardo's prescription history as maintained by the [place] Prescription Monitoring Program.      I have assessed the patient’s risk for abuse, dependency, and  addiction using the validated Opioid Risk Tool available at https://www.mdcalc.com/gfmxxy-mdlx-qarw-ort-narcotic-abuse.       Obtained and reviewed patient utilization report from [facility] pharmacy database on 7/14/2022 10:43 AM  prior to writing prescription for controlled substance II, III or IV per [place] bill . Based on assessment of the report, the prescription is medically necessary.         Orders Placed This Encounter   • PAIN MANAGEMENT SCRN, UR   • traMADol (ULTRAM) 50 MG Tab   • Consent for Opiate Prescription          Return in about 3 months (around 10/14/2022).    I have placed the below orders and discussed them with an approved delegating provider.  The MA is performing the below orders under the direction of Parul Galan MD.    Please note that this dictation was created using voice recognition software. I have made every reasonable attempt to correct obvious errors, but I expect that there are errors of grammar and possibly content that I did not discover before finalizing the note.

## 2022-07-14 NOTE — ASSESSMENT & PLAN NOTE
HPI:   Chronic pain recheck for: Left hip pain, chronic infection of prosthetic  Last dose of controlled substance: 2022  Chronic pain treated with tramadol 50 mg taken 3 times a day     She reports that she does not drink alcohol.  She reports that she does not use drugs.     Interval history:   Any major change in health since last appointment? No     Consequences of Chronic Opiate therapy:  (5 A's)  Analgesia: Compared to no treatment or prior treatment, pain is currently improved  Activity: improved  Adverse Events:She denies constipation, dry mouth, itchy skin, nausea and sedation  Aberrant Behaviors: She reports She is taking medication as prescribed and is not veering from agreed treatment regimen or provider recommendations. There have been no inappropriate refills or lost/stolen meds reported.  Affect/Mood: Pain is not impacting patient's mood.  Patient denies depression/anxiety.     Nonnarcotic treatments that are being used: Tylenol, physical therapy, ice and heat.      Last imagin2021     Last order of CONTROLLED SUBSTANCE TREATMENT AGREEMENT was found on 2022 from Office Visit on 2022             UDS Summary      This patient has no relevant Health Maintenance data.              Opiate Risk Score: 0  Date of Last therapy agreement/ update 22  Date of last UDS 2021 - Ordered today                    Discrepancies:No      Most recent UDS reviewed today and is consistent with prescribed medications.      Interpretation of Opioid Risk Score   Score 0-3 = Low risk of abuse. Do UDS at least once per year.  Score 4-7 = Moderate risk of abuse. Do UDS 1-4 times per year.  Score 8+ = High risk of abuse. Refer to specialist.     I have reviewed the medical records, the Prescription Monitoring Program and I have determined that controlled substance treatment is medically indicated.         Past medical, surgical, family, and social history is reviewed and updated in Epic chart by me  today.   Medications and allergies reviewed and updated in Epic chart by me today.      ROS:   As documented in history of present illness above     Exam:     Constitutional: Alert, no distress, plus 3 vital signs  Skin:  Warm, dry, no rashes invisible areas  Eye: Equal, round and reactive, conjunctiva clear  ENMT: Lips without lesions, good dentition, oropharynx clear    Neck: Trachea midline, no masses, no thyromegaly  Respiratory: Unlabored respiration, lungs clear to auscultation, no wheezes, no rhonchi  Cardiovascular: Normal rate and rhythm, no murmur, no edema  Abdomen: Soft, nontender, no masses or hepatosplenomegaly  Psych: Alert, pleasant, well-groomed, normal affect     Assessment/ Plan / Medical Decision makin. Chronic pain from:  Left hip chronic infection of prosthesis     2. Chronic use of opiate drugs therapeutic purposes  -Overall impression that this patient is benefiting from opioid therapy and that the benefits outweigh the risks of continued use.   - Controlled Substance Use Agreement is current.   - Urine drug screen current and reviewed / compliant with prescription medications  - Additional lab: CMP to assess liver and renal function - UTD   - Rx refill prescriptions are done for 3 months with no early refills.   - Referral to pain management not necessary at this time.   In prescribing controlled substances to this patient, I certify that I have obtained and reviewed the medical history.. I have also made a good priyanka effort to obtain applicable records from other providers who have treated the patient and records did not demonstrate any increased risk of substance abuse that would prevent me from prescribing controlled substances.      I have conducted a physical exam and documented it. I have reviewed Kemidre Silva's prescription history as maintained by the [place] Prescription Monitoring Program.      I have assessed the patient’s risk for abuse, dependency, and addiction  using the validated Opioid Risk Tool available at https://www.mdcalc.com/ksbzyt-huxt-tidf-ort-narcotic-abuse.       Obtained and reviewed patient utilization report from [facility] pharmacy database on 7/14/2022 10:43 AM  prior to writing prescription for controlled substance II, III or IV per [place] bill . Based on assessment of the report, the prescription is medically necessary.

## 2022-08-22 ENCOUNTER — OFFICE VISIT (OUTPATIENT)
Dept: MEDICAL GROUP | Facility: PHYSICIAN GROUP | Age: 62
End: 2022-08-22
Payer: COMMERCIAL

## 2022-08-22 VITALS
SYSTOLIC BLOOD PRESSURE: 106 MMHG | HEART RATE: 77 BPM | RESPIRATION RATE: 16 BRPM | DIASTOLIC BLOOD PRESSURE: 68 MMHG | OXYGEN SATURATION: 96 % | TEMPERATURE: 99.1 F | WEIGHT: 119.9 LBS | HEIGHT: 67 IN | BODY MASS INDEX: 18.82 KG/M2

## 2022-08-22 DIAGNOSIS — M25.552 CHRONIC LEFT HIP PAIN: Chronic | ICD-10-CM

## 2022-08-22 DIAGNOSIS — M00.9 CHRONIC INFECTION OF LEFT HIP, CURRENTLY ON ANTIBIOTICS (HCC): Chronic | ICD-10-CM

## 2022-08-22 DIAGNOSIS — G89.29 CHRONIC LEFT HIP PAIN: Chronic | ICD-10-CM

## 2022-08-22 PROCEDURE — 99214 OFFICE O/P EST MOD 30 MIN: CPT | Performed by: NURSE PRACTITIONER

## 2022-08-22 RX ORDER — TRAMADOL HYDROCHLORIDE 50 MG/1
50 TABLET ORAL 3 TIMES DAILY PRN
Qty: 90 TABLET | Refills: 0 | Status: SHIPPED | OUTPATIENT
Start: 2022-11-06 | End: 2022-12-06

## 2022-08-22 RX ORDER — TRAMADOL HYDROCHLORIDE 50 MG/1
50 TABLET ORAL 3 TIMES DAILY PRN
Qty: 90 TABLET | Refills: 0 | Status: SHIPPED | OUTPATIENT
Start: 2022-09-07 | End: 2022-10-07

## 2022-08-22 RX ORDER — TRAMADOL HYDROCHLORIDE 50 MG/1
50 TABLET ORAL 3 TIMES DAILY PRN
Qty: 90 TABLET | Refills: 0 | Status: SHIPPED | OUTPATIENT
Start: 2022-10-07 | End: 2022-11-06

## 2022-08-22 ASSESSMENT — FIBROSIS 4 INDEX: FIB4 SCORE: 1.72

## 2022-08-29 NOTE — PROGRESS NOTES
Subjective:     CC: Diagnoses of Chronic left hip pain and Chronic infection of left hip, currently on antibiotics (HCC) were pertinent to this visit.      HPI:   Kemi is an established patient of [facility] here for follow-up.  We discussed the following problems:    1. Chronic left hip pain  Chronic condition, stable. Patient is here for follow up today.    2. Chronic infection of left hip, currently on antibiotics (HCC)  Chronic condition, stable. Patient is here for follow up today.       Past Medical History:   Diagnosis Date    Acute midline thoracic back pain 10/19/2016    Anesthesia 2018    post op nausea; shakes    Arthritis     osteoarthritis; hip, thumbs, knees    High cholesterol     Hip pain     left    History of anemia     Hyperlipidemia 2013    Insomnia     Menopause     Osteopenia     PONV (postoperative nausea and vomiting)        Social History     Tobacco Use    Smoking status: Former     Packs/day: 0.25     Years: 20.00     Pack years: 5.00     Types: Cigarettes     Quit date: 1999     Years since quittin.0    Smokeless tobacco: Never   Vaping Use    Vaping Use: Never used   Substance Use Topics    Alcohol use: No     Alcohol/week: 0.0 oz     Comment: former heavy use, 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 90 Tablet 0    [START ON 10/7/2022] traMADol (ULTRAM) 50 MG Tab Take 1 Tablet by mouth 3 times a day as needed for Moderate Pain for up to 30 days. 90 Tablet 0    [START ON 2022] traMADol (ULTRAM) 50 MG Tab Take 1 Tablet by mouth 3 times a day as needed for Moderate Pain for up to 30 days. 90 Tablet 0    vitamin D3 (CHOLECALCIFEROL) 400 UNIT Tab Take 1,000 Units by mouth every day.      LORazepam (ATIVAN) 1 MG Tab Take 1 mg by mouth  "at bedtime as needed for Anxiety. Indications: PRN for sleep      traZODone (DESYREL) 100 MG Tab Take 2 Tablets by mouth at bedtime as needed for Sleep. 60 Tablet 3    triamcinolone acetonide (KENALOG) 0.025 % Cream APPLY TO AFFECTED AREA TWICE A DAY 80 g 0    simvastatin (ZOCOR) 40 MG Tab Take 1 Tablet by mouth every evening. 30 Tablet 11    acetaminophen (TYLENOL) 500 MG Tab Take 500-1,000 mg by mouth every 6 hours as needed.      Coenzyme Q10 (COQ10 PO) Take 1 Tab by mouth every evening.      therapeutic multivitamin-minerals (THERAGRAN-M) Tab Take 1 Tab by mouth every evening.      CALCIUM PO Take 1 Tab by mouth every evening.      ibuprofen (MOTRIN) 200 MG Tab Take 800 mg by mouth every 6 hours as needed.       No current Epic-ordered facility-administered medications on file.       Allergies:  Tape    Health Maintenance: Completed    ROS:  Gen: no fevers/chills, no changes in weight  Eyes: no changes in vision  ENT: no sore throat, no hearing loss, no bloody nose  Pulm: no sob, no cough  CV: no chest pain, no palpitations  GI: no nausea/vomiting, no diarrhea  : no dysuria  MSk: no myalgias  Skin: no rash  Neuro: no headaches, no numbness/tingling  Heme/Lymph: no easy bruising      Objective:       Exam:  /68 (BP Location: Right arm, Patient Position: Sitting, BP Cuff Size: Small adult)   Pulse 77   Temp 37.3 °C (99.1 °F) (Temporal)   Resp 16   Ht 1.69 m (5' 6.54\")   Wt 54.4 kg (119 lb 14.4 oz)   SpO2 96%   BMI 19.04 kg/m²  Body mass index is 19.04 kg/m².    Gen: Alert and oriented, No apparent distress.  Neck: Neck is supple without lymphadenopathy.  No carotid bruits.  Lungs: Normal effort, CTA bilaterally, no wheezes, rhonchi, or rales  CV: Regular rate and rhythm. No murmurs, rubs, or gallops.  Ext: No clubbing, cyanosis, edema.    Labs: Dated: None    Assessment & Plan:     62 y.o. female with the following -     Chronic infection of left hip, currently on antibiotics (HCC)  Chronic " condition, stable.  Patient takes Bactrim -160 mg BID as well as rifampin 300 mg BID for her chronically infected left hip prosthesis.  She reports no signs or symptoms of systemic infection and feels well on this regimen.  She will continue her current regimen.     Chronic left hip pain  HPI:   Chronic pain recheck for: Left hip pain, chronic infection of prosthetic  Last dose of controlled substance: 2022  Chronic pain treated with tramadol 50 mg taken 3 times a day     She reports that she does not drink alcohol.  She reports that she does not use drugs.     Interval history:   Any major change in health since last appointment? No     Consequences of Chronic Opiate therapy:  (5 A's)  Analgesia: Compared to no treatment or prior treatment, pain is currently improved  Activity: improved  Adverse Events:She denies constipation, dry mouth, itchy skin, nausea and sedation  Aberrant Behaviors: She reports She is taking medication as prescribed and is not veering from agreed treatment regimen or provider recommendations. There have been no inappropriate refills or lost/stolen meds reported.  Affect/Mood: Pain is not impacting patient's mood.  Patient denies depression/anxiety.     Nonnarcotic treatments that are being used: Tylenol, physical therapy, ice and heat.      Last imagin2021     Last order of CONTROLLED SUBSTANCE TREATMENT AGREEMENT was found on 2022 from Office Visit on 2022                    UDS Summary      This patient has no relevant Health Maintenance data.                Opiate Risk Score: 0  Date of Last therapy agreement/ update 22  Date of last UDS 2021 - Ordered today                    Discrepancies:No      Most recent UDS reviewed today and is consistent with prescribed medications.      Interpretation of Opioid Risk Score   Score 0-3 = Low risk of abuse. Do UDS at least once per year.  Score 4-7 = Moderate risk of abuse. Do UDS 1-4 times per year.  Score 8+ =  High risk of abuse. Refer to specialist.     I have reviewed the medical records, the Prescription Monitoring Program and I have determined that controlled substance treatment is medically indicated.         Past medical, surgical, family, and social history is reviewed and updated in Epic chart by me today.   Medications and allergies reviewed and updated in Epic chart by me today.      ROS:   As documented in history of present illness above     Exam:     Constitutional: Alert, no distress, plus 3 vital signs  Skin:  Warm, dry, no rashes invisible areas  Eye: Equal, round and reactive, conjunctiva clear  ENMT: Lips without lesions, good dentition, oropharynx clear    Neck: Trachea midline, no masses, no thyromegaly  Respiratory: Unlabored respiration, lungs clear to auscultation, no wheezes, no rhonchi  Cardiovascular: Normal rate and rhythm, no murmur, no edema  Abdomen: Soft, nontender, no masses or hepatosplenomegaly  Psych: Alert, pleasant, well-groomed, normal affect     Assessment/ Plan / Medical Decision makin. Chronic pain from:  Left hip chronic infection of prosthesis     2. Chronic use of opiate drugs therapeutic purposes  -Overall impression that this patient is benefiting from opioid therapy and that the benefits outweigh the risks of continued use.   - Controlled Substance Use Agreement is current.   - Urine drug screen current and reviewed / compliant with prescription medications  - Additional lab: CMP to assess liver and renal function - UTD   - Rx refill prescriptions are done for 3 months with no early refills.   - Referral to pain management not necessary at this time.   In prescribing controlled substances to this patient, I certify that I have obtained and reviewed the medical history.. I have also made a good priyanka effort to obtain applicable records from other providers who have treated the patient and records did not demonstrate any increased risk of substance abuse that would  prevent me from prescribing controlled substances.      I have conducted a physical exam and documented it. I have reviewed Kemi Silva's prescription history as maintained by the [place] Prescription Monitoring Program.      I have assessed the patient’s risk for abuse, dependency, and addiction using the validated Opioid Risk Tool available at https://www.mdcalc.com/zzthen-tjgx-vuax-ort-narcotic-abuse.         Obtained and reviewed patient utilization report from [facility] pharmacy database on 8/22/2022 3:41 AM  prior to writing prescription for controlled substance II, III or IV per [place] bill . Based on assessment of the report, the prescription is medically necessary.       Orders Placed This Encounter    traMADol (ULTRAM) 50 MG Tab    traMADol (ULTRAM) 50 MG Tab    traMADol (ULTRAM) 50 MG Tab    Consent for Opiate Prescription          Return in about 3 months (around 11/22/2022).    I have placed the below orders and discussed them with an approved delegating provider.  The MA is performing the below orders under the direction of Parul Galan MD.    Please note that this dictation was created using voice recognition software. I have made every reasonable attempt to correct obvious errors, but I expect that there are errors of grammar and possibly content that I did not discover before finalizing the note.

## 2022-08-29 NOTE — ASSESSMENT & PLAN NOTE
HPI:   Chronic pain recheck for: Left hip pain, chronic infection of prosthetic  Last dose of controlled substance: 2022  Chronic pain treated with tramadol 50 mg taken 3 times a day     She reports that she does not drink alcohol.  She reports that she does not use drugs.     Interval history:   Any major change in health since last appointment? No     Consequences of Chronic Opiate therapy:  (5 A's)  Analgesia: Compared to no treatment or prior treatment, pain is currently improved  Activity: improved  Adverse Events:She denies constipation, dry mouth, itchy skin, nausea and sedation  Aberrant Behaviors: She reports She is taking medication as prescribed and is not veering from agreed treatment regimen or provider recommendations. There have been no inappropriate refills or lost/stolen meds reported.  Affect/Mood: Pain is not impacting patient's mood.  Patient denies depression/anxiety.     Nonnarcotic treatments that are being used: Tylenol, physical therapy, ice and heat.      Last imagin2021     Last order of CONTROLLED SUBSTANCE TREATMENT AGREEMENT was found on 2022 from Office Visit on 2022                    UDS Summary      This patient has no relevant Health Maintenance data.                Opiate Risk Score: 0  Date of Last therapy agreement/ update 22  Date of last UDS 2021 - Ordered today                    Discrepancies:No      Most recent UDS reviewed today and is consistent with prescribed medications.      Interpretation of Opioid Risk Score   Score 0-3 = Low risk of abuse. Do UDS at least once per year.  Score 4-7 = Moderate risk of abuse. Do UDS 1-4 times per year.  Score 8+ = High risk of abuse. Refer to specialist.     I have reviewed the medical records, the Prescription Monitoring Program and I have determined that controlled substance treatment is medically indicated.         Past medical, surgical, family, and social history is reviewed and updated in Epic chart  by me today.   Medications and allergies reviewed and updated in Epic chart by me today.      ROS:   As documented in history of present illness above     Exam:     Constitutional: Alert, no distress, plus 3 vital signs  Skin:  Warm, dry, no rashes invisible areas  Eye: Equal, round and reactive, conjunctiva clear  ENMT: Lips without lesions, good dentition, oropharynx clear    Neck: Trachea midline, no masses, no thyromegaly  Respiratory: Unlabored respiration, lungs clear to auscultation, no wheezes, no rhonchi  Cardiovascular: Normal rate and rhythm, no murmur, no edema  Abdomen: Soft, nontender, no masses or hepatosplenomegaly  Psych: Alert, pleasant, well-groomed, normal affect     Assessment/ Plan / Medical Decision makin. Chronic pain from:  Left hip chronic infection of prosthesis     2. Chronic use of opiate drugs therapeutic purposes  -Overall impression that this patient is benefiting from opioid therapy and that the benefits outweigh the risks of continued use.   - Controlled Substance Use Agreement is current.   - Urine drug screen current and reviewed / compliant with prescription medications  - Additional lab: CMP to assess liver and renal function - UTD   - Rx refill prescriptions are done for 3 months with no early refills.   - Referral to pain management not necessary at this time.   In prescribing controlled substances to this patient, I certify that I have obtained and reviewed the medical history.. I have also made a good priyanka effort to obtain applicable records from other providers who have treated the patient and records did not demonstrate any increased risk of substance abuse that would prevent me from prescribing controlled substances.      I have conducted a physical exam and documented it. I have reviewed Kemidre Silva's prescription history as maintained by the [place] Prescription Monitoring Program.      I have assessed the patient’s risk for abuse, dependency, and  addiction using the validated Opioid Risk Tool available at https://www.mdcalc.com/rhrcon-noag-uqer-ort-narcotic-abuse.         Obtained and reviewed patient utilization report from [facility] pharmacy database on 8/22/2022 3:41 AM  prior to writing prescription for controlled substance II, III or IV per [place] bill . Based on assessment of the report, the prescription is medically necessary.

## 2022-09-07 DIAGNOSIS — F51.01 PRIMARY INSOMNIA: ICD-10-CM

## 2022-09-07 RX ORDER — TRAZODONE HYDROCHLORIDE 100 MG/1
200 TABLET ORAL NIGHTLY PRN
Qty: 60 TABLET | Refills: 3 | Status: SHIPPED | OUTPATIENT
Start: 2022-09-07 | End: 2022-12-30 | Stop reason: SDUPTHER

## 2022-10-06 DIAGNOSIS — F41.9 ANXIETY: ICD-10-CM

## 2022-10-06 DIAGNOSIS — F51.01 PRIMARY INSOMNIA: ICD-10-CM

## 2022-10-06 RX ORDER — LORAZEPAM 1 MG/1
1 TABLET ORAL NIGHTLY PRN
Qty: 30 TABLET | Refills: 0 | Status: SHIPPED | OUTPATIENT
Start: 2022-10-06 | End: 2023-02-28

## 2022-10-26 ENCOUNTER — OFFICE VISIT (OUTPATIENT)
Dept: MEDICAL GROUP | Facility: PHYSICIAN GROUP | Age: 62
End: 2022-10-26
Payer: COMMERCIAL

## 2022-10-26 VITALS
RESPIRATION RATE: 16 BRPM | OXYGEN SATURATION: 92 % | TEMPERATURE: 97.6 F | DIASTOLIC BLOOD PRESSURE: 64 MMHG | BODY MASS INDEX: 18.8 KG/M2 | WEIGHT: 117 LBS | HEIGHT: 66 IN | HEART RATE: 86 BPM | SYSTOLIC BLOOD PRESSURE: 112 MMHG

## 2022-10-26 DIAGNOSIS — M25.552 CHRONIC LEFT HIP PAIN: Primary | Chronic | ICD-10-CM

## 2022-10-26 DIAGNOSIS — Z23 NEED FOR VACCINATION: ICD-10-CM

## 2022-10-26 DIAGNOSIS — Z12.31 ENCOUNTER FOR SCREENING MAMMOGRAM FOR BREAST CANCER: ICD-10-CM

## 2022-10-26 DIAGNOSIS — G89.29 CHRONIC LEFT HIP PAIN: Primary | Chronic | ICD-10-CM

## 2022-10-26 DIAGNOSIS — T84.52XD INFECTION ASSOCIATED WITH INTERNAL LEFT HIP PROSTHESIS, SUBSEQUENT ENCOUNTER: Chronic | ICD-10-CM

## 2022-10-26 PROCEDURE — 90686 IIV4 VACC NO PRSV 0.5 ML IM: CPT | Performed by: NURSE PRACTITIONER

## 2022-10-26 PROCEDURE — 99213 OFFICE O/P EST LOW 20 MIN: CPT | Mod: 25 | Performed by: NURSE PRACTITIONER

## 2022-10-26 PROCEDURE — 90471 IMMUNIZATION ADMIN: CPT | Performed by: NURSE PRACTITIONER

## 2022-10-26 RX ORDER — SULFAMETHOXAZOLE AND TRIMETHOPRIM 800; 160 MG/1; MG/1
1 TABLET ORAL 2 TIMES DAILY
COMMUNITY
Start: 2022-10-09 | End: 2023-04-19

## 2022-10-26 ASSESSMENT — FIBROSIS 4 INDEX: FIB4 SCORE: 1.72

## 2022-10-27 ENCOUNTER — TELEPHONE (OUTPATIENT)
Dept: MEDICAL GROUP | Facility: PHYSICIAN GROUP | Age: 62
End: 2022-10-27
Payer: COMMERCIAL

## 2022-10-27 RX ORDER — TRAMADOL HYDROCHLORIDE 50 MG/1
50 TABLET ORAL 3 TIMES DAILY PRN
Qty: 90 TABLET | Refills: 0 | Status: SHIPPED | OUTPATIENT
Start: 2022-12-02 | End: 2022-12-05

## 2022-10-27 NOTE — TELEPHONE ENCOUNTER
Called the pharmacy and pharmacist stated the earliest pt can fill the prescription is November 2nd and pt just need to call them on November 2nd and they will fill it.     [email or web address] sent to pt.

## 2022-10-27 NOTE — PROGRESS NOTES
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      HPI:   Kemi is an established patient of [facility] here for follow-up.  We discussed the following problems:    1. Chronic left hip pain  2. 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  Patient here for follow up.    3. Need for vaccination  Patient offered and given the influenza vaccine.    4. Encounter for screening mammogram for breast cancer  Screening mammogram ordered for patient today.       Past Medical History:   Diagnosis Date    Acute midline thoracic back pain 10/19/2016    Anesthesia 2018    post op nausea; shakes    Arthritis     osteoarthritis; hip, thumbs, knees    High cholesterol     Hip pain     left    History of anemia     Hyperlipidemia 2013    Insomnia     Menopause     Osteopenia     PONV (postoperative nausea and vomiting)        Social History     Tobacco Use    Smoking status: Former     Packs/day: 0.25     Years: 20.00     Pack years: 5.00     Types: Cigarettes     Quit date: 1999     Years since quittin.2    Smokeless tobacco: Never   Vaping Use    Vaping Use: Never used   Substance Use Topics    Alcohol use: No     Alcohol/week: 0.0 oz     Comment: former heavy use, 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 90 Tablet 0    sulfamethoxazole-trimethoprim (BACTRIM DS) 800-160 MG tablet Take 1 Tablet by mouth 2 times a day.      LORazepam (ATIVAN) 1 MG Tab Take 1 Tablet by mouth at bedtime as needed for Anxiety for up to 30 days.  "Indications: PRN for sleep 30 Tablet 0    riFAMPin (RIFADINE) 300 MG Cap TAKE 1 CAPSULE BY MOUTH 2 TIMES A DAY FOR 90 DAYS. 180 Capsule 2    traZODone (DESYREL) 100 MG Tab TAKE 2 TABLETS BY MOUTH AT BEDTIME AS NEEDED FOR SLEEP. 60 Tablet 3    traMADol (ULTRAM) 50 MG Tab Take 1 Tablet by mouth 3 times a day as needed for Moderate Pain for up to 30 days. 90 Tablet 0    [START ON 11/6/2022] traMADol (ULTRAM) 50 MG Tab Take 1 Tablet by mouth 3 times a day as needed for Moderate Pain for up to 30 days. 90 Tablet 0    vitamin D3 (CHOLECALCIFEROL) 400 UNIT Tab Take 1,000 Units by mouth every day.      triamcinolone acetonide (KENALOG) 0.025 % Cream APPLY TO AFFECTED AREA TWICE A DAY 80 g 0    simvastatin (ZOCOR) 40 MG Tab Take 1 Tablet by mouth every evening. 30 Tablet 11    acetaminophen (TYLENOL) 500 MG Tab Take 500-1,000 mg by mouth every 6 hours as needed.      Coenzyme Q10 (COQ10 PO) Take 1 Tab by mouth every evening.      therapeutic multivitamin-minerals (THERAGRAN-M) Tab Take 1 Tab by mouth every evening.      CALCIUM PO Take 1 Tab by mouth every evening.      ibuprofen (MOTRIN) 200 MG Tab Take 800 mg by mouth every 6 hours as needed.       No current Epic-ordered facility-administered medications on file.       Allergies:  Tape    Health Maintenance: Completed    ROS:  Gen: no fevers/chills, no changes in weight  Eyes: no changes in vision  ENT: no sore throat, no hearing loss, no bloody nose  Pulm: no sob, no cough  CV: no chest pain, no palpitations  GI: no nausea/vomiting, no diarrhea  : no dysuria  MSk: no myalgias  Skin: no rash  Neuro: no headaches, no numbness/tingling  Heme/Lymph: no easy bruising      Objective:       Exam:  /64 (BP Location: Right arm, Patient Position: Sitting, BP Cuff Size: Adult)   Pulse 86   Temp 36.4 °C (97.6 °F) (Temporal)   Resp 16   Ht 1.676 m (5' 6\")   Wt 53.1 kg (117 lb)   SpO2 92%   BMI 18.88 kg/m²  Body mass index is 18.88 kg/m².    Gen: Alert and oriented, No " apparent distress.  Neck: Neck is supple without lymphadenopathy.  Ext: No clubbing, cyanosis, edema.    Labs: Dated: None    Assessment & Plan:     62 y.o. female with the following -     Chronic left hip pain  HPI:   Chronic pain recheck for: Left hip pain, chronic infection of prosthetic  Last dose of controlled substance: 10/26/2022  Chronic pain treated with tramadol 50 mg taken 3 times a day.  Patient did take some extra due to her recent move and the extra pain caused with that. She did advise me of his prior to doing it, and I said it was okay. Will advise pharmacy okay for early refill on 2022.      She reports that she does not drink alcohol.  She reports that she does not use drugs.     Interval history:   Any major change in health since last appointment? No     Consequences of Chronic Opiate therapy:  (5 A's)  Analgesia: Compared to no treatment or prior treatment, pain is currently improved  Activity: improved  Adverse Events:She denies constipation, dry mouth, itchy skin, nausea and sedation  Aberrant Behaviors: She reports She is taking medication as prescribed and is not veering from agreed treatment regimen or provider recommendations. There have been no inappropriate refills or lost/stolen meds reported.  Affect/Mood: Pain is not impacting patient's mood.  Patient denies depression/anxiety.     Nonnarcotic treatments that are being used: Tylenol, physical therapy, ice and heat.      Last imagin2021     Last order of CONTROLLED SUBSTANCE TREATMENT AGREEMENT was found on 2022 from Office Visit on 2022                       UDS Summary      This patient has no relevant Health Maintenance data.                  Opiate Risk Score: 0  Date of Last therapy agreement/ update 22  Date of last UDS 2021 - Ordered today                    Discrepancies:No      Most recent UDS reviewed today and is consistent with prescribed medications.      Interpretation of Opioid Risk Score    Score 0-3 = Low risk of abuse. Do UDS at least once per year.  Score 4-7 = Moderate risk of abuse. Do UDS 1-4 times per year.  Score 8+ = High risk of abuse. Refer to specialist.     I have reviewed the medical records, the Prescription Monitoring Program and I have determined that controlled substance treatment is medically indicated.         Past medical, surgical, family, and social history is reviewed and updated in Epic chart by me today.   Medications and allergies reviewed and updated in Epic chart by me today.      ROS:   As documented in history of present illness above     Exam:     Constitutional: Alert, no distress, plus 3 vital signs  Skin:  Warm, dry, no rashes invisible areas  Eye: Equal, round and reactive, conjunctiva clear  ENMT: Lips without lesions, good dentition, oropharynx clear    Neck: Trachea midline, no masses, no thyromegaly  Respiratory: Unlabored respiration, lungs clear to auscultation, no wheezes, no rhonchi  Cardiovascular: Normal rate and rhythm, no murmur, no edema  Abdomen: Soft, nontender, no masses or hepatosplenomegaly  Psych: Alert, pleasant, well-groomed, normal affect     Assessment/ Plan / Medical Decision makin. Chronic pain from:  Left hip chronic infection of prosthesis     2. Chronic use of opiate drugs therapeutic purposes  -Overall impression that this patient is benefiting from opioid therapy and that the benefits outweigh the risks of continued use.   - Controlled Substance Use Agreement is current.   - Urine drug screen current and reviewed / compliant with prescription medications  - Additional lab: CMP to assess liver and renal function - UTD   - Rx refill prescriptions are done for 3 months with no early refills.   - Referral to pain management not necessary at this time.   In prescribing controlled substances to this patient, I certify that I have obtained and reviewed the medical history.. I have also made a good priyanka effort to obtain applicable  records from other providers who have treated the patient and records did not demonstrate any increased risk of substance abuse that would prevent me from prescribing controlled substances.      I have conducted a physical exam and documented it. I have reviewed Kemi Silva's prescription history as maintained by the [place] Prescription Monitoring Program.      I have assessed the patient’s risk for abuse, dependency, and addiction using the validated Opioid Risk Tool available at https://www.mdcalc.com/dbrpbw-umbu-bvlk-ort-narcotic-abuse.     Obtained and reviewed patient utilization report from [facility] pharmacy database on 10/27/2022 7:47 AM  prior to writing prescription for controlled substance II, III or IV per [place] bill . Based on assessment of the report, the prescription is medically necessary.       Orders Placed This Encounter    MA-SCREENING MAMMO BILAT W/TOMOSYNTHESIS W/CAD    INFLUENZA VACCINE QUAD INJ (PF)    sulfamethoxazole-trimethoprim (BACTRIM DS) 800-160 MG tablet    traMADol (ULTRAM) 50 MG Tab    Consent for Opiate Prescription          Return in about 6 weeks (around 12/7/2022).    I have placed the below orders and discussed them with an approved delegating provider.  The MA is performing the below orders under the direction of Parul Galan MD.    Please note that this dictation was created using voice recognition software. I have made every reasonable attempt to correct obvious errors, but I expect that there are errors of grammar and possibly content that I did not discover before finalizing the note.

## 2022-10-27 NOTE — ASSESSMENT & PLAN NOTE
HPI:   Chronic pain recheck for: Left hip pain, chronic infection of prosthetic  Last dose of controlled substance: 10/26/2022  Chronic pain treated with tramadol 50 mg taken 3 times a day.  Patient did take some extra due to her recent move and the extra pain caused with that. She did advise me of his prior to doing it, and I said it was okay. Will advise pharmacy okay for early refill on 2022.      She reports that she does not drink alcohol.  She reports that she does not use drugs.     Interval history:   Any major change in health since last appointment? No     Consequences of Chronic Opiate therapy:  (5 A's)  Analgesia: Compared to no treatment or prior treatment, pain is currently improved  Activity: improved  Adverse Events:She denies constipation, dry mouth, itchy skin, nausea and sedation  Aberrant Behaviors: She reports She is taking medication as prescribed and is not veering from agreed treatment regimen or provider recommendations. There have been no inappropriate refills or lost/stolen meds reported.  Affect/Mood: Pain is not impacting patient's mood.  Patient denies depression/anxiety.     Nonnarcotic treatments that are being used: Tylenol, physical therapy, ice and heat.      Last imagin2021     Last order of CONTROLLED SUBSTANCE TREATMENT AGREEMENT was found on 2022 from Office Visit on 2022                       UDS Summary      This patient has no relevant Health Maintenance data.                  Opiate Risk Score: 0  Date of Last therapy agreement/ update 22  Date of last UDS 2021 - Ordered today                    Discrepancies:No      Most recent UDS reviewed today and is consistent with prescribed medications.      Interpretation of Opioid Risk Score   Score 0-3 = Low risk of abuse. Do UDS at least once per year.  Score 4-7 = Moderate risk of abuse. Do UDS 1-4 times per year.  Score 8+ = High risk of abuse. Refer to specialist.     I have reviewed the medical  records, the Prescription Monitoring Program and I have determined that controlled substance treatment is medically indicated.         Past medical, surgical, family, and social history is reviewed and updated in Epic chart by me today.   Medications and allergies reviewed and updated in Epic chart by me today.      ROS:   As documented in history of present illness above     Exam:     Constitutional: Alert, no distress, plus 3 vital signs  Skin:  Warm, dry, no rashes invisible areas  Eye: Equal, round and reactive, conjunctiva clear  ENMT: Lips without lesions, good dentition, oropharynx clear    Neck: Trachea midline, no masses, no thyromegaly  Respiratory: Unlabored respiration, lungs clear to auscultation, no wheezes, no rhonchi  Cardiovascular: Normal rate and rhythm, no murmur, no edema  Abdomen: Soft, nontender, no masses or hepatosplenomegaly  Psych: Alert, pleasant, well-groomed, normal affect     Assessment/ Plan / Medical Decision makin. Chronic pain from:  Left hip chronic infection of prosthesis     2. Chronic use of opiate drugs therapeutic purposes  -Overall impression that this patient is benefiting from opioid therapy and that the benefits outweigh the risks of continued use.   - Controlled Substance Use Agreement is current.   - Urine drug screen current and reviewed / compliant with prescription medications  - Additional lab: CMP to assess liver and renal function - UTD   - Rx refill prescriptions are done for 3 months with no early refills.   - Referral to pain management not necessary at this time.   In prescribing controlled substances to this patient, I certify that I have obtained and reviewed the medical history.. I have also made a good priyanka effort to obtain applicable records from other providers who have treated the patient and records did not demonstrate any increased risk of substance abuse that would prevent me from prescribing controlled substances.      I have conducted a  physical exam and documented it. I have reviewed Kemi Silva's prescription history as maintained by the [place] Prescription Monitoring Program.      I have assessed the patient’s risk for abuse, dependency, and addiction using the validated Opioid Risk Tool available at https://www.mdcalc.com/awybdg-itvf-iqbz-ort-narcotic-abuse.     Obtained and reviewed patient utilization report from [place] [facility] pharmacy database on 10/27/2022 7:47 AM  prior to writing prescription for controlled substance II, III or IV per [place] bill . Based on assessment of the report, the prescription is medically necessary.

## 2022-10-27 NOTE — ASSESSMENT & PLAN NOTE
HPI:   Chronic pain recheck for: Left hip pain, chronic infection of prosthetic  Last dose of controlled substance: 2022  Chronic pain treated with tramadol 50 mg taken 3 times a day     She reports that she does not drink alcohol.  She reports that she does not use drugs.     Interval history:   Any major change in health since last appointment? No     Consequences of Chronic Opiate therapy:  (5 A's)  Analgesia: Compared to no treatment or prior treatment, pain is currently improved  Activity: improved  Adverse Events:She denies constipation, dry mouth, itchy skin, nausea and sedation  Aberrant Behaviors: She reports She is taking medication as prescribed and is not veering from agreed treatment regimen or provider recommendations. There have been no inappropriate refills or lost/stolen meds reported.  Affect/Mood: Pain is not impacting patient's mood.  Patient denies depression/anxiety.     Nonnarcotic treatments that are being used: Tylenol, physical therapy, ice and heat.      Last imagin2021     Last order of CONTROLLED SUBSTANCE TREATMENT AGREEMENT was found on 2022 from Office Visit on 2022                       UDS Summary      This patient has no relevant Health Maintenance data.                  Opiate Risk Score: 0  Date of Last therapy agreement/ update 22  Date of last UDS 2021 - Ordered today                    Discrepancies:No      Most recent UDS reviewed today and is consistent with prescribed medications.      Interpretation of Opioid Risk Score   Score 0-3 = Low risk of abuse. Do UDS at least once per year.  Score 4-7 = Moderate risk of abuse. Do UDS 1-4 times per year.  Score 8+ = High risk of abuse. Refer to specialist.     I have reviewed the medical records, the Prescription Monitoring Program and I have determined that controlled substance treatment is medically indicated.         Past medical, surgical, family, and social history is reviewed and updated in Epic  chart by me today.   Medications and allergies reviewed and updated in Epic chart by me today.      ROS:   As documented in history of present illness above     Exam:     Constitutional: Alert, no distress, plus 3 vital signs  Skin:  Warm, dry, no rashes invisible areas  Eye: Equal, round and reactive, conjunctiva clear  ENMT: Lips without lesions, good dentition, oropharynx clear    Neck: Trachea midline, no masses, no thyromegaly  Respiratory: Unlabored respiration, lungs clear to auscultation, no wheezes, no rhonchi  Cardiovascular: Normal rate and rhythm, no murmur, no edema  Abdomen: Soft, nontender, no masses or hepatosplenomegaly  Psych: Alert, pleasant, well-groomed, normal affect     Assessment/ Plan / Medical Decision makin. Chronic pain from:  Left hip chronic infection of prosthesis     2. Chronic use of opiate drugs therapeutic purposes  -Overall impression that this patient is benefiting from opioid therapy and that the benefits outweigh the risks of continued use.   - Controlled Substance Use Agreement is current.   - Urine drug screen current and reviewed / compliant with prescription medications  - Additional lab: CMP to assess liver and renal function - UTD   - Rx refill prescriptions are done for 3 months with no early refills.   - Referral to pain management not necessary at this time.   In prescribing controlled substances to this patient, I certify that I have obtained and reviewed the medical history.. I have also made a good priyanka effort to obtain applicable records from other providers who have treated the patient and records did not demonstrate any increased risk of substance abuse that would prevent me from prescribing controlled substances.      I have conducted a physical exam and documented it. I have reviewed Kemi Silva's prescription history as maintained by the [place] Prescription Monitoring Program.      I have assessed the patient’s risk for abuse, dependency, and  addiction using the validated Opioid Risk Tool available at https://www.mdcalc.com/apfbtz-hicj-ajos-ort-narcotic-abuse.         Obtained and reviewed patient utilization report from [facility] pharmacy database on 2022 3:41 AM  prior to writing prescription for controlled substance II, III or IV per [place] bill . Based on assessment of the report, the prescription is medically necessary.            Office Visit (2022)   - ANDREW Guevara  HPI:   Chronic pain recheck for: Left hip pain, chronic infection of prosthetic  Last dose of controlled substance: 2022  Chronic pain treated with tramadol 50 mg taken 3 times a day     She reports that she does not drink alcohol.  She reports that she does not use drugs.     Interval history:   Any major change in health since last appointment? No     Consequences of Chronic Opiate therapy:  (5 A's)  Analgesia: Compared to no treatment or prior treatment, pain is currently improved  Activity: improved  Adverse Events:She denies constipation, dry mouth, itchy skin, nausea and sedation  Aberrant Behaviors: She reports She is taking medication as prescribed and is not veering from agreed treatment regimen or provider recommendations. There have been no inappropriate refills or lost/stolen meds reported.  Affect/Mood: Pain is not impacting patient's mood.  Patient denies depression/anxiety.     Nonnarcotic treatments that are being used: Tylenol, physical therapy, ice and heat.      Last imagin2021     Last order of CONTROLLED SUBSTANCE TREATMENT AGREEMENT was found on 2022 from Office Visit on 2022                    UDS Summary      This patient has no relevant Health Maintenance data.                Opiate Risk Score: 0  Date of Last therapy agreement/ update 22  Date of last UDS 2021 - Ordered today                    Discrepancies:No      Most recent UDS reviewed today and is consistent with prescribed medications.       Interpretation of Opioid Risk Score   Score 0-3 = Low risk of abuse. Do UDS at least once per year.  Score 4-7 = Moderate risk of abuse. Do UDS 1-4 times per year.  Score 8+ = High risk of abuse. Refer to specialist.     I have reviewed the medical records, the Prescription Monitoring Program and I have determined that controlled substance treatment is medically indicated.         Past medical, surgical, family, and social history is reviewed and updated in Epic chart by me today.   Medications and allergies reviewed and updated in Epic chart by me today.      ROS:   As documented in history of present illness above     Exam:     Constitutional: Alert, no distress, plus 3 vital signs  Skin:  Warm, dry, no rashes invisible areas  Eye: Equal, round and reactive, conjunctiva clear  ENMT: Lips without lesions, good dentition, oropharynx clear    Neck: Trachea midline, no masses, no thyromegaly  Respiratory: Unlabored respiration, lungs clear to auscultation, no wheezes, no rhonchi  Cardiovascular: Normal rate and rhythm, no murmur, no edema  Abdomen: Soft, nontender, no masses or hepatosplenomegaly  Psych: Alert, pleasant, well-groomed, normal affect     Assessment/ Plan / Medical Decision makin. Chronic pain from:  Left hip chronic infection of prosthesis     2. Chronic use of opiate drugs therapeutic purposes  -Overall impression that this patient is benefiting from opioid therapy and that the benefits outweigh the risks of continued use.   - Controlled Substance Use Agreement is current.   - Urine drug screen current and reviewed / compliant with prescription medications  - Additional lab: CMP to assess liver and renal function - UTD   - Rx refill prescriptions are done for 3 months with no early refills.   - Referral to pain management not necessary at this time.   In prescribing controlled substances to this patient, I certify that I have obtained and reviewed the medical history.. I have also made a  good priyanka effort to obtain applicable records from other providers who have treated the patient and records did not demonstrate any increased risk of substance abuse that would prevent me from prescribing controlled substances.      I have conducted a physical exam and documented it. I have reviewed Kemi Silva's prescription history as maintained by the [place] Prescription Monitoring Program.      I have assessed the patient’s risk for abuse, dependency, and addiction using the validated Opioid Risk Tool available at https://www.mdcalc.com/sfqukv-bvbi-irig-ort-narcotic-abuse.

## 2022-10-27 NOTE — TELEPHONE ENCOUNTER
----- Message from ANDREW Guevara sent at 10/27/2022  7:53 AM PDT -----  Can you call this patient's pharmacy and authorize an early refill for her tramadol for 11/2/2022.  There should be a prescription for her there now,  Nicole

## 2022-11-07 ENCOUNTER — APPOINTMENT (OUTPATIENT)
Dept: MEDICAL GROUP | Facility: PHYSICIAN GROUP | Age: 62
End: 2022-11-07
Payer: COMMERCIAL

## 2022-12-02 SDOH — HEALTH STABILITY: PHYSICAL HEALTH: ON AVERAGE, HOW MANY DAYS PER WEEK DO YOU ENGAGE IN MODERATE TO STRENUOUS EXERCISE (LIKE A BRISK WALK)?: 6 DAYS

## 2022-12-02 SDOH — ECONOMIC STABILITY: HOUSING INSECURITY
IN THE LAST 12 MONTHS, WAS THERE A TIME WHEN YOU DID NOT HAVE A STEADY PLACE TO SLEEP OR SLEPT IN A SHELTER (INCLUDING NOW)?: NO

## 2022-12-02 SDOH — ECONOMIC STABILITY: FOOD INSECURITY: WITHIN THE PAST 12 MONTHS, YOU WORRIED THAT YOUR FOOD WOULD RUN OUT BEFORE YOU GOT MONEY TO BUY MORE.: NEVER TRUE

## 2022-12-02 SDOH — ECONOMIC STABILITY: INCOME INSECURITY: HOW HARD IS IT FOR YOU TO PAY FOR THE VERY BASICS LIKE FOOD, HOUSING, MEDICAL CARE, AND HEATING?: HARD

## 2022-12-02 SDOH — ECONOMIC STABILITY: TRANSPORTATION INSECURITY
IN THE PAST 12 MONTHS, HAS LACK OF TRANSPORTATION KEPT YOU FROM MEETINGS, WORK, OR FROM GETTING THINGS NEEDED FOR DAILY LIVING?: NO

## 2022-12-02 SDOH — ECONOMIC STABILITY: INCOME INSECURITY: IN THE LAST 12 MONTHS, WAS THERE A TIME WHEN YOU WERE NOT ABLE TO PAY THE MORTGAGE OR RENT ON TIME?: NO

## 2022-12-02 SDOH — ECONOMIC STABILITY: TRANSPORTATION INSECURITY
IN THE PAST 12 MONTHS, HAS LACK OF RELIABLE TRANSPORTATION KEPT YOU FROM MEDICAL APPOINTMENTS, MEETINGS, WORK OR FROM GETTING THINGS NEEDED FOR DAILY LIVING?: NO

## 2022-12-02 SDOH — ECONOMIC STABILITY: FOOD INSECURITY: WITHIN THE PAST 12 MONTHS, THE FOOD YOU BOUGHT JUST DIDN'T LAST AND YOU DIDN'T HAVE MONEY TO GET MORE.: NEVER TRUE

## 2022-12-02 SDOH — ECONOMIC STABILITY: HOUSING INSECURITY: IN THE LAST 12 MONTHS, HOW MANY PLACES HAVE YOU LIVED?: 2

## 2022-12-02 SDOH — HEALTH STABILITY: PHYSICAL HEALTH: ON AVERAGE, HOW MANY MINUTES DO YOU ENGAGE IN EXERCISE AT THIS LEVEL?: 70 MIN

## 2022-12-02 SDOH — ECONOMIC STABILITY: TRANSPORTATION INSECURITY
IN THE PAST 12 MONTHS, HAS THE LACK OF TRANSPORTATION KEPT YOU FROM MEDICAL APPOINTMENTS OR FROM GETTING MEDICATIONS?: NO

## 2022-12-02 SDOH — HEALTH STABILITY: MENTAL HEALTH
STRESS IS WHEN SOMEONE FEELS TENSE, NERVOUS, ANXIOUS, OR CAN'T SLEEP AT NIGHT BECAUSE THEIR MIND IS TROUBLED. HOW STRESSED ARE YOU?: ONLY A LITTLE

## 2022-12-02 ASSESSMENT — SOCIAL DETERMINANTS OF HEALTH (SDOH)
HOW OFTEN DO YOU ATTEND CHURCH OR RELIGIOUS SERVICES?: NEVER
HOW OFTEN DO YOU HAVE SIX OR MORE DRINKS ON ONE OCCASION: NEVER
HOW HARD IS IT FOR YOU TO PAY FOR THE VERY BASICS LIKE FOOD, HOUSING, MEDICAL CARE, AND HEATING?: HARD
HOW OFTEN DO YOU ATTENT MEETINGS OF THE CLUB OR ORGANIZATION YOU BELONG TO?: NEVER
HOW OFTEN DO YOU HAVE A DRINK CONTAINING ALCOHOL: NEVER
DO YOU BELONG TO ANY CLUBS OR ORGANIZATIONS SUCH AS CHURCH GROUPS UNIONS, FRATERNAL OR ATHLETIC GROUPS, OR SCHOOL GROUPS?: NO
WITHIN THE PAST 12 MONTHS, YOU WORRIED THAT YOUR FOOD WOULD RUN OUT BEFORE YOU GOT THE MONEY TO BUY MORE: NEVER TRUE
HOW OFTEN DO YOU ATTEND CHURCH OR RELIGIOUS SERVICES?: NEVER
HOW OFTEN DO YOU ATTENT MEETINGS OF THE CLUB OR ORGANIZATION YOU BELONG TO?: NEVER
HOW OFTEN DO YOU GET TOGETHER WITH FRIENDS OR RELATIVES?: ONCE A WEEK
HOW OFTEN DO YOU GET TOGETHER WITH FRIENDS OR RELATIVES?: ONCE A WEEK
DO YOU BELONG TO ANY CLUBS OR ORGANIZATIONS SUCH AS CHURCH GROUPS UNIONS, FRATERNAL OR ATHLETIC GROUPS, OR SCHOOL GROUPS?: NO
IN A TYPICAL WEEK, HOW MANY TIMES DO YOU TALK ON THE PHONE WITH FAMILY, FRIENDS, OR NEIGHBORS?: MORE THAN THREE TIMES A WEEK
HOW MANY DRINKS CONTAINING ALCOHOL DO YOU HAVE ON A TYPICAL DAY WHEN YOU ARE DRINKING: PATIENT DOES NOT DRINK
IN A TYPICAL WEEK, HOW MANY TIMES DO YOU TALK ON THE PHONE WITH FAMILY, FRIENDS, OR NEIGHBORS?: MORE THAN THREE TIMES A WEEK

## 2022-12-02 ASSESSMENT — LIFESTYLE VARIABLES
HOW MANY STANDARD DRINKS CONTAINING ALCOHOL DO YOU HAVE ON A TYPICAL DAY: PATIENT DOES NOT DRINK
AUDIT-C TOTAL SCORE: 0
HOW OFTEN DO YOU HAVE SIX OR MORE DRINKS ON ONE OCCASION: NEVER
HOW OFTEN DO YOU HAVE A DRINK CONTAINING ALCOHOL: NEVER
SKIP TO QUESTIONS 9-10: 1

## 2022-12-05 ENCOUNTER — OFFICE VISIT (OUTPATIENT)
Dept: MEDICAL GROUP | Facility: PHYSICIAN GROUP | Age: 62
End: 2022-12-05
Payer: COMMERCIAL

## 2022-12-05 VITALS
SYSTOLIC BLOOD PRESSURE: 100 MMHG | HEIGHT: 66 IN | HEART RATE: 73 BPM | TEMPERATURE: 99.4 F | RESPIRATION RATE: 16 BRPM | BODY MASS INDEX: 18.9 KG/M2 | OXYGEN SATURATION: 94 % | WEIGHT: 117.6 LBS | DIASTOLIC BLOOD PRESSURE: 52 MMHG

## 2022-12-05 DIAGNOSIS — G89.29 CHRONIC LEFT HIP PAIN: Chronic | ICD-10-CM

## 2022-12-05 DIAGNOSIS — F41.9 ANXIETY: ICD-10-CM

## 2022-12-05 DIAGNOSIS — E78.2 MIXED HYPERLIPIDEMIA: ICD-10-CM

## 2022-12-05 DIAGNOSIS — F51.01 PRIMARY INSOMNIA: Chronic | ICD-10-CM

## 2022-12-05 DIAGNOSIS — M25.552 CHRONIC LEFT HIP PAIN: Chronic | ICD-10-CM

## 2022-12-05 PROCEDURE — 99214 OFFICE O/P EST MOD 30 MIN: CPT

## 2022-12-05 RX ORDER — TRAMADOL HYDROCHLORIDE 50 MG/1
50 TABLET ORAL 3 TIMES DAILY PRN
Qty: 90 TABLET | Refills: 2 | Status: SHIPPED | OUTPATIENT
Start: 2022-12-30 | End: 2023-03-13 | Stop reason: SDUPTHER

## 2022-12-05 ASSESSMENT — FIBROSIS 4 INDEX: FIB4 SCORE: 1.72

## 2022-12-05 NOTE — PROGRESS NOTES
"CC:   Chief Complaint   Patient presents with    Establish Care    Medication Refill        HISTORY OF PRESENT ILLNESS: Patient is a 62 y.o. female established patient who presents today to discuss the following problems below:     Chronic left hip pain  Patient is on chronic lifetime antibiotics with chronic fracture of the femoral head. She has had two surgeries, with post op wound infection. She had a subsequent surgery with another infection, and decided against another revision. She has tried to get off of pain medications, but has a very poor quality of life.     Insomnia  Currently on trazodone 200mg per night for management, no side effects, manages well.     Hyperlipidemia  Currently also on simvastatin, 40mg daily. Was on 20mg dose.     Anxiety  Patient reports that she uses lorazepam for anxiety once or twice per week.  She does not need a refill of this today.       Past Medical History:   Diagnosis Date    Acute midline thoracic back pain 10/19/2016    Anesthesia 11/19/2018    post op nausea; shakes    Arthritis     osteoarthritis; hip, thumbs, knees    High cholesterol     Hip pain     left    History of anemia     Hyperlipidemia 6/17/2013    Insomnia     Menopause     Osteopenia     PONV (postoperative nausea and vomiting)        Allergies:Tape    Review of Systems: Otherwise negative except for as stated above.      Exam: /52 (BP Location: Right arm, Patient Position: Sitting, BP Cuff Size: Adult)   Pulse 73   Temp 37.4 °C (99.4 °F) (Temporal)   Resp 16   Ht 1.676 m (5' 6\")   Wt 53.3 kg (117 lb 9.6 oz)   SpO2 94%  Body mass index is 18.98 kg/m².    Gen: Alert and oriented x4. Well developed, well-nourished female in no apparent distress.  Skin: Warm, dry, good turgor, no rashes in visible areas or lacerations appreciated.   Eye: EOM intact, pupils equal, round and reactive, conjunctiva clear, lids normal.  Neck: Trachea midline, no masses, no thyromegaly  MSK: Normal gait, moves all " extremities.  Limited range of motion to the left hip.  Neuro: Alert and oriented x 4, non-focal exam with motor and sensory grossly intact.  Ext: No clubbing, cyanosis, edema.  Psych: Normal behavior, affect and mood.      Assessment/Plan:  62 y.o. female with the following -    1. Chronic left hip pain  Chronic, stable.  Reviewed PDMP, patient did just receive a refill from previous PCP.  New prescription postdated for when she will be due, which is in approximately 3 weeks.  Opioid Risk Score: 2    Interpretation of Opioid Risk Score   Score 0-3 = Low risk of abuse. Do UDS at least once per year.  Score 4-7 = Moderate risk of abuse. Do UDS 1-4 times per year.  Score 8+ = High risk of abuse. Refer to specialist.    I have obtained and reviewed patient utilization report from [facility] pharmacy database on 12/14/2022 11:01 AM  prior to writing prescription for controlled substance II, III or IV per [place] bill . Based on assessment of the report, my physical exam, and the patient's health problem, the prescription is medically necessary.     Additionally, I have discussed the risk and benefits, treatment plan, and alternative therapies with the patient.       - traMADol (ULTRAM) 50 MG Tab; Take 1 Tablet by mouth 3 times a day as needed for Moderate Pain for up to 90 days.  Dispense: 90 Tablet; Refill: 2    2. Primary insomnia  Chronic, stable.  Continue trazodone 200 mg every evening.    3. Mixed hyperlipidemia  Chronic, not at goal.  Continue co-Q10 supplement.   Lab Results   Component Value Date/Time    CHOLSTRLTOT 244 (H) 07/11/2022 06:19 AM    TRIGLYCERIDE 105 07/11/2022 06:19 AM    HDL 79 07/11/2022 06:19 AM     (H) 07/11/2022 06:19 AM   ]    - Lipid Profile; Future  - Comp Metabolic Panel; Future    4. Anxiety  Chronic, stable. Updated controlled substance agreement. Obtain updated UDS.   - PAIN MANAGEMENT SCRN, UR; Future  - Controlled Substance Treatment Agreement      Follow-up: Return in  about 4 months (around 4/5/2023) for controlled substance.    Health Maintenance: Completed      Please note that this dictation was created using voice recognition software. I have made every reasonable attempt to correct obvious errors, but I expect that there are errors of grammar and possibly content that I did not discover before finalizing the note.    Electronically signed by JL Myers on December 5, 2022

## 2022-12-05 NOTE — ASSESSMENT & PLAN NOTE
Patient is on chronic lifetime antibiotics with chronic fracture of the femoral head. She has had two surgeries, with post op wound infection. She had a subsequent surgery with another infection, and decided against another revision. She has tried to get off of pain medications, but has a very poor quality of life.

## 2022-12-05 NOTE — ASSESSMENT & PLAN NOTE
Patient reports that she uses lorazepam for anxiety once or twice per week.  She does not need a refill of this today.

## 2022-12-08 ENCOUNTER — TELEPHONE (OUTPATIENT)
Dept: HEALTH INFORMATION MANAGEMENT | Facility: OTHER | Age: 62
End: 2022-12-08
Payer: COMMERCIAL

## 2022-12-08 RX ORDER — SIMVASTATIN 40 MG
40 TABLET ORAL EVERY EVENING
Qty: 30 TABLET | Refills: 0 | Status: SHIPPED | OUTPATIENT
Start: 2022-12-08 | End: 2023-01-31

## 2022-12-30 DIAGNOSIS — F51.01 PRIMARY INSOMNIA: ICD-10-CM

## 2022-12-30 RX ORDER — TRAZODONE HYDROCHLORIDE 100 MG/1
200 TABLET ORAL NIGHTLY PRN
Qty: 60 TABLET | Refills: 0 | Status: SHIPPED | OUTPATIENT
Start: 2022-12-30 | End: 2023-01-25

## 2023-01-25 DIAGNOSIS — F51.01 PRIMARY INSOMNIA: ICD-10-CM

## 2023-01-25 RX ORDER — TRAZODONE HYDROCHLORIDE 100 MG/1
200 TABLET ORAL NIGHTLY PRN
Qty: 60 TABLET | Refills: 0 | Status: SHIPPED | OUTPATIENT
Start: 2023-01-25 | End: 2023-02-23

## 2023-02-03 NOTE — TELEPHONE ENCOUNTER
Received request via: Pharmacy    Was the patient seen in the last year in this department? Yes    Does the patient have an active prescription (recently filled or refills available) for medication(s) requested? No    Does the patient have senior living Plus and need 100 day supply (blood pressure, diabetes and cholesterol meds only)? Patient does not have SCP   non weight bearing left hand/ arm

## 2023-02-23 DIAGNOSIS — F51.01 PRIMARY INSOMNIA: ICD-10-CM

## 2023-02-23 RX ORDER — TRAZODONE HYDROCHLORIDE 100 MG/1
200 TABLET ORAL NIGHTLY PRN
Qty: 60 TABLET | Refills: 0 | Status: SHIPPED | OUTPATIENT
Start: 2023-02-23 | End: 2023-03-30

## 2023-02-27 DIAGNOSIS — F41.9 ANXIETY: ICD-10-CM

## 2023-02-27 DIAGNOSIS — F51.01 PRIMARY INSOMNIA: ICD-10-CM

## 2023-02-27 RX ORDER — SIMVASTATIN 40 MG
TABLET ORAL
Qty: 30 TABLET | Refills: 0 | Status: SHIPPED | OUTPATIENT
Start: 2023-02-27 | End: 2023-03-30

## 2023-02-28 RX ORDER — LORAZEPAM 1 MG/1
TABLET ORAL
Qty: 30 TABLET | Refills: 0 | Status: SHIPPED | OUTPATIENT
Start: 2023-02-28 | End: 2023-03-02 | Stop reason: SDUPTHER

## 2023-03-02 DIAGNOSIS — F51.01 PRIMARY INSOMNIA: ICD-10-CM

## 2023-03-02 DIAGNOSIS — F41.9 ANXIETY: ICD-10-CM

## 2023-03-02 RX ORDER — LORAZEPAM 1 MG/1
TABLET ORAL
Qty: 30 TABLET | Refills: 0 | Status: SHIPPED | OUTPATIENT
Start: 2023-03-02 | End: 2023-03-13 | Stop reason: SDUPTHER

## 2023-03-02 NOTE — TELEPHONE ENCOUNTER
Received request via: Patient    Was the patient seen in the last year in this department? Yes    Does the patient have an active prescription (recently filled or refills available) for medication(s) requested? No    Does the patient have retirement Plus and need 100 day supply (blood pressure, diabetes and cholesterol meds only)? Medication is not for cholesterol, blood pressure or diabetes

## 2023-03-13 ENCOUNTER — OFFICE VISIT (OUTPATIENT)
Dept: MEDICAL GROUP | Facility: PHYSICIAN GROUP | Age: 63
End: 2023-03-13
Payer: MEDICARE

## 2023-03-13 ENCOUNTER — HOSPITAL ENCOUNTER (OUTPATIENT)
Facility: MEDICAL CENTER | Age: 63
End: 2023-03-13
Payer: MEDICARE

## 2023-03-13 VITALS
RESPIRATION RATE: 20 BRPM | OXYGEN SATURATION: 98 % | HEIGHT: 66 IN | TEMPERATURE: 99 F | SYSTOLIC BLOOD PRESSURE: 100 MMHG | DIASTOLIC BLOOD PRESSURE: 68 MMHG | WEIGHT: 126.13 LBS | BODY MASS INDEX: 20.27 KG/M2 | HEART RATE: 70 BPM

## 2023-03-13 DIAGNOSIS — M25.552 CHRONIC LEFT HIP PAIN: Chronic | ICD-10-CM

## 2023-03-13 DIAGNOSIS — F51.01 PRIMARY INSOMNIA: ICD-10-CM

## 2023-03-13 DIAGNOSIS — Z78.0 ASYMPTOMATIC MENOPAUSAL STATE: ICD-10-CM

## 2023-03-13 DIAGNOSIS — M85.80 OSTEOPENIA, UNSPECIFIED LOCATION: ICD-10-CM

## 2023-03-13 DIAGNOSIS — F41.9 ANXIETY: ICD-10-CM

## 2023-03-13 DIAGNOSIS — E78.2 MIXED HYPERLIPIDEMIA: ICD-10-CM

## 2023-03-13 DIAGNOSIS — F11.20 UNCOMPLICATED OPIOID DEPENDENCE (HCC): ICD-10-CM

## 2023-03-13 DIAGNOSIS — G89.29 CHRONIC LEFT HIP PAIN: Chronic | ICD-10-CM

## 2023-03-13 DIAGNOSIS — Z23 NEED FOR VACCINATION: ICD-10-CM

## 2023-03-13 PROCEDURE — 90677 PCV20 VACCINE IM: CPT

## 2023-03-13 PROCEDURE — G0481 DRUG TEST DEF 8-14 CLASSES: HCPCS

## 2023-03-13 PROCEDURE — 99214 OFFICE O/P EST MOD 30 MIN: CPT | Mod: 25

## 2023-03-13 PROCEDURE — G0009 ADMIN PNEUMOCOCCAL VACCINE: HCPCS

## 2023-03-13 RX ORDER — LORAZEPAM 1 MG/1
TABLET ORAL
Qty: 30 TABLET | Refills: 0 | Status: SHIPPED | OUTPATIENT
Start: 2023-03-13 | End: 2023-04-13

## 2023-03-13 RX ORDER — TRAMADOL HYDROCHLORIDE 50 MG/1
50 TABLET ORAL 3 TIMES DAILY PRN
Qty: 90 TABLET | Refills: 2 | Status: SHIPPED | OUTPATIENT
Start: 2023-03-13 | End: 2023-06-05 | Stop reason: SDUPTHER

## 2023-03-13 ASSESSMENT — PATIENT HEALTH QUESTIONNAIRE - PHQ9: CLINICAL INTERPRETATION OF PHQ2 SCORE: 0

## 2023-03-13 ASSESSMENT — FIBROSIS 4 INDEX: FIB4 SCORE: 1.75

## 2023-03-13 NOTE — ASSESSMENT & PLAN NOTE
Patient continues on tramadol 50mg TID for management of her chronic left hip pain secondary to multiple surgeries and need for lifelong antibiotics.

## 2023-03-13 NOTE — PROGRESS NOTES
Annual Health Assessment Questions:    1.  Are you currently engaging in any exercise or physical activity? Yes    2.  How would you describe your mood or emotional well-being today? good    3.  Have you had any falls in the last year? Yes    4.  Have you noticed any problems with your balance or had difficulty walking? No    5.  In the last six months have you experienced any leakage of urine? No    6. DPA/Advanced Directive: Patient has Advance Directive on file.

## 2023-03-13 NOTE — ASSESSMENT & PLAN NOTE
Patient continues on tramadol 50 mg 3 times daily for management of chronic pain secondary to multiple surgeries of the left hip resulting in hardware failure and need for long-term antibiotic use.  Controlled substance agreement is on file.  UDS updated today

## 2023-03-13 NOTE — PROGRESS NOTES
"CC:   Chief Complaint   Patient presents with    Medication Refill        HISTORY OF PRESENT ILLNESS: Patient is a 63 y.o. female established patient who presents today to discuss the following problems below:     Chronic left hip pain  Patient continues on tramadol 50mg TID for management of her chronic left hip pain secondary to multiple surgeries and need for lifelong antibiotics.     Anxiety  Patient continues on lorazepam 1mg twice weekly. She does need a refill of this today.  She uses this prior to sleep if she is unable to get to sleep, but notes that this is a rare occasion.    Osteopenia  Patient was being treated for osteopenia with Prolia injections, which she has been on for 2 years.  She did skip her December injection for insurance reasons.  She is due for an updated DEXA scan in April 2023 to monitor her progress.     Uncomplicated opioid dependence (HCC)  Patient continues on tramadol 50 mg 3 times daily for management of chronic pain secondary to multiple surgeries of the left hip resulting in hardware failure and need for long-term antibiotic use.  Controlled substance agreement is on file.  UDS updated today      Review of Systems: Otherwise negative except for as stated above.      Exam: /68 (BP Location: Left arm, Patient Position: Sitting, BP Cuff Size: Adult)   Pulse 70   Temp 37.2 °C (99 °F) (Temporal)   Resp 20   Ht 1.676 m (5' 6\")   Wt 57.2 kg (126 lb 2 oz)   SpO2 98%  Body mass index is 20.36 kg/m².    Physical Exam  Constitutional:       Appearance: Normal appearance.   Cardiovascular:      Rate and Rhythm: Normal rate and regular rhythm.      Heart sounds: Normal heart sounds.   Pulmonary:      Effort: Pulmonary effort is normal.      Breath sounds: Normal breath sounds.   Musculoskeletal:      Cervical back: Normal range of motion and neck supple.   Lymphadenopathy:      Cervical: No cervical adenopathy.   Neurological:      General: No focal deficit present.      Mental " Status: She is alert and oriented to person, place, and time.   Psychiatric:         Mood and Affect: Mood normal.         Behavior: Behavior normal.       Assessment/Plan:  63 y.o. female with the following -    1. Uncomplicated opioid dependence (HCC)  2. Chronic left hip pain  Chronic, stable.  Urine drug screen updated today.  I have obtained and reviewed patient utilization report from [facility] pharmacy database on 3/13/2023 4:07 PM  prior to writing prescription for controlled substance II, III or IV per [place] bill . Based on assessment of the report, my physical exam, and the patient's health problem, the prescription is medically necessary.     Additionally, I have discussed the risk and benefits, treatment plan, and alternative therapies with the patient.     - traMADol (ULTRAM) 50 MG Tab; Take 1 Tablet by mouth 3 times a day as needed for Moderate Pain for up to 90 days.  Dispense: 90 Tablet; Refill: 2  - Pain Management Screen; Future    3. Anxiety  4. Primary insomnia  Chronic, stable.  Patient uses Ativan sparingly, 1-2 times per week.  She is requesting a refill today  I have obtained and reviewed patient utilization report from [facility] pharmacy database on 3/13/2023 4:07 PM  prior to writing prescription for controlled substance II, III or IV per [place] bill . Based on assessment of the report, my physical exam, and the patient's health problem, the prescription is medically necessary.     Additionally, I have discussed the risk and benefits, treatment plan, and alternative therapies with the patient.     - LORazepam (ATIVAN) 1 MG Tab; TAKE 1 TABLET BY MOUTH AT BEDTIME AS NEEDED FOR ANXIETY F41 up to 30 days  Indications: Feeling Anxious  Dispense: 30 Tablet; Refill: 0    5. Osteopenia, unspecified location  6. Asymptomatic menopausal state  Chronic, stable.  Discussed with patient that I would like to obtain an updated DEXA scan prior to restarting her on Prolia injections.   Patient is agreeable to plan  - DS-BONE DENSITY STUDY (DEXA); Future    7. Mixed hyperlipidemia  Chronic, stable.  Secondary to continued Bactrim use.  Patient's simvastatin was increased from 20 mg to 40 mg last fall, she is due for an updated lipid panel  - Lipid Profile; Future  - Comp Metabolic Panel; Future    8. Need for vaccination  - Pneumococcal Conjugate Vaccine 20-Valent (19 yrs+)      Follow-up: Return in about 3 months (around 6/13/2023) for controlled substance.    Health Maintenance: Completed      Please note that this dictation was created using voice recognition software. I have made every reasonable attempt to correct obvious errors, but I expect that there are errors of grammar and possibly content that I did not discover before finalizing the note.    Electronically signed by JL Myers on March 13, 2023

## 2023-03-13 NOTE — ASSESSMENT & PLAN NOTE
Patient continues on lorazepam 1mg twice weekly. She does need a refill of this today.  She uses this prior to sleep if she is unable to get to sleep, but notes that this is a rare occasion.

## 2023-03-13 NOTE — ASSESSMENT & PLAN NOTE
Patient was being treated for osteopenia with Prolia injections, which she has been on for 2 years.  She did skip her December injection for insurance reasons.  She is due for an updated DEXA scan in April 2023 to monitor her progress.

## 2023-03-17 LAB
1OH-MIDAZOLAM UR QL SCN: NOT DETECTED
6MAM UR QL: NOT DETECTED
7AMINOCLONAZEPAM UR QL: NOT DETECTED
A-OH ALPRAZ UR QL: NOT DETECTED
ALPRAZ UR QL: NOT DETECTED
AMPHET UR QL SCN: NOT DETECTED
ANNOTATION COMMENT IMP: NORMAL
ANNOTATION COMMENT IMP: NORMAL
BARBITURATES UR QL: NOT DETECTED
BUPRENORPHINE UR QL: NOT DETECTED
BZE UR QL: NOT DETECTED
CARBOXYTHC UR QL: NOT DETECTED
CARISOPRODOL UR QL: NOT DETECTED
CLONAZEPAM UR QL: NOT DETECTED
CODEINE UR QL: NOT DETECTED
DIAZEPAM UR QL: NOT DETECTED
ETHYL GLUCURONIDE UR QL: NOT DETECTED
FENTANYL UR QL: NOT DETECTED
GABAPENTIN UR QL: NOT DETECTED
HYDROCODONE UR QL: NOT DETECTED
HYDROMORPHONE UR QL: NOT DETECTED
LORAZEPAM UR QL: NOT DETECTED
MDA UR QL: NOT DETECTED
MDEA UR QL: NOT DETECTED
MDMA UR QL: NOT DETECTED
MEPERIDINE UR QL: NOT DETECTED
METHADONE UR QL: NOT DETECTED
METHAMPHET UR QL: NOT DETECTED
MIDAZOLAM UR QL SCN: NOT DETECTED
MORPHINE UR QL: NOT DETECTED
NALOXONE UR QL SCN: NOT DETECTED
NORBUPRENORPHINE UR QL CFM: NOT DETECTED
NORDIAZEPAM UR QL: NOT DETECTED
NORFENTANYL UR QL: NOT DETECTED
NORHYDROCODONE UR QL CFM: NOT DETECTED
NOROXYCODONE UR QL CFM: NOT DETECTED
NOROXYMORPH CO100 Q0458: NOT DETECTED
OXAZEPAM UR QL: NOT DETECTED
OXYCODONE UR QL: NOT DETECTED
OXYMORPHONE UR QL: NOT DETECTED
PATHOLOGY STUDY: NORMAL
PCP UR QL: NOT DETECTED
PHENTERMINE UR QL: NOT DETECTED
PPAA UR QL: NOT DETECTED
PREGABALIN UR QL SCN: NOT DETECTED
SERVICE CMNT-IMP: NORMAL
TAPENADOL OSULF CO200 Q0473: NOT DETECTED
TAPENTADOL UR QL SCN: NOT DETECTED
TEMAZEPAM UR QL: NOT DETECTED
TRAMADOL UR QL: PRESENT
ZOLPIDEM PHENYL-4-CARB UR QL SCN: NOT DETECTED
ZOLPIDEM UR QL: NOT DETECTED

## 2023-03-25 DIAGNOSIS — F51.01 PRIMARY INSOMNIA: ICD-10-CM

## 2023-03-27 NOTE — TELEPHONE ENCOUNTER
Received request via: Pharmacy    Was the patient seen in the last year in this department? Yes    Does the patient have an active prescription (recently filled or refills available) for medication(s) requested? No    Does the patient have alf Plus and need 100 day supply (blood pressure, diabetes and cholesterol meds only)? Yes, quantity updated to 100 days

## 2023-03-30 RX ORDER — SIMVASTATIN 40 MG
TABLET ORAL
Qty: 30 TABLET | Refills: 0 | Status: SHIPPED | OUTPATIENT
Start: 2023-03-30 | End: 2023-04-04 | Stop reason: SDUPTHER

## 2023-03-30 RX ORDER — TRAZODONE HYDROCHLORIDE 100 MG/1
200 TABLET ORAL NIGHTLY PRN
Qty: 60 TABLET | Refills: 0 | Status: SHIPPED | OUTPATIENT
Start: 2023-03-30 | End: 2023-04-28

## 2023-04-04 RX ORDER — SIMVASTATIN 40 MG
40 TABLET ORAL EVERY EVENING
Qty: 100 TABLET | Refills: 0 | Status: SHIPPED | OUTPATIENT
Start: 2023-04-04 | End: 2023-04-28

## 2023-04-19 ENCOUNTER — HOSPITAL ENCOUNTER (OUTPATIENT)
Dept: RADIOLOGY | Facility: MEDICAL CENTER | Age: 63
End: 2023-04-19
Attending: NURSE PRACTITIONER
Payer: MEDICARE

## 2023-04-19 ENCOUNTER — HOSPITAL ENCOUNTER (OUTPATIENT)
Dept: RADIOLOGY | Facility: MEDICAL CENTER | Age: 63
End: 2023-04-19
Payer: MEDICARE

## 2023-04-19 DIAGNOSIS — Z78.0 ASYMPTOMATIC MENOPAUSAL STATE: ICD-10-CM

## 2023-04-19 DIAGNOSIS — Z12.31 ENCOUNTER FOR SCREENING MAMMOGRAM FOR BREAST CANCER: ICD-10-CM

## 2023-04-19 DIAGNOSIS — M85.80 OSTEOPENIA, UNSPECIFIED LOCATION: ICD-10-CM

## 2023-04-19 PROCEDURE — 77063 BREAST TOMOSYNTHESIS BI: CPT

## 2023-04-19 PROCEDURE — 77080 DXA BONE DENSITY AXIAL: CPT

## 2023-04-20 ENCOUNTER — PATIENT MESSAGE (OUTPATIENT)
Dept: MEDICAL GROUP | Facility: PHYSICIAN GROUP | Age: 63
End: 2023-04-20
Payer: MEDICARE

## 2023-06-05 ENCOUNTER — OFFICE VISIT (OUTPATIENT)
Dept: MEDICAL GROUP | Facility: PHYSICIAN GROUP | Age: 63
End: 2023-06-05
Payer: MEDICARE

## 2023-06-05 VITALS
WEIGHT: 123.4 LBS | HEART RATE: 68 BPM | SYSTOLIC BLOOD PRESSURE: 94 MMHG | BODY MASS INDEX: 19.83 KG/M2 | TEMPERATURE: 99.1 F | HEIGHT: 66 IN | OXYGEN SATURATION: 90 % | RESPIRATION RATE: 19 BRPM | DIASTOLIC BLOOD PRESSURE: 68 MMHG

## 2023-06-05 DIAGNOSIS — Z13.21 SCREENING FOR ENDOCRINE, NUTRITIONAL, METABOLIC AND IMMUNITY DISORDER: ICD-10-CM

## 2023-06-05 DIAGNOSIS — Z13.0 SCREENING FOR IRON DEFICIENCY ANEMIA: ICD-10-CM

## 2023-06-05 DIAGNOSIS — F51.01 PRIMARY INSOMNIA: Chronic | ICD-10-CM

## 2023-06-05 DIAGNOSIS — M25.552 CHRONIC LEFT HIP PAIN: Chronic | ICD-10-CM

## 2023-06-05 DIAGNOSIS — Z13.29 SCREENING FOR ENDOCRINE, NUTRITIONAL, METABOLIC AND IMMUNITY DISORDER: ICD-10-CM

## 2023-06-05 DIAGNOSIS — G89.29 CHRONIC LEFT HIP PAIN: Chronic | ICD-10-CM

## 2023-06-05 DIAGNOSIS — E78.2 MIXED HYPERLIPIDEMIA: ICD-10-CM

## 2023-06-05 DIAGNOSIS — Z13.228 SCREENING FOR ENDOCRINE, NUTRITIONAL, METABOLIC AND IMMUNITY DISORDER: ICD-10-CM

## 2023-06-05 DIAGNOSIS — Z13.0 SCREENING FOR ENDOCRINE, NUTRITIONAL, METABOLIC AND IMMUNITY DISORDER: ICD-10-CM

## 2023-06-05 PROCEDURE — 99214 OFFICE O/P EST MOD 30 MIN: CPT

## 2023-06-05 PROCEDURE — 3074F SYST BP LT 130 MM HG: CPT

## 2023-06-05 PROCEDURE — 3078F DIAST BP <80 MM HG: CPT

## 2023-06-05 RX ORDER — TRAMADOL HYDROCHLORIDE 50 MG/1
50 TABLET ORAL 3 TIMES DAILY PRN
Qty: 90 TABLET | Refills: 2 | Status: SHIPPED | OUTPATIENT
Start: 2023-06-05 | End: 2023-09-03

## 2023-06-05 ASSESSMENT — FIBROSIS 4 INDEX: FIB4 SCORE: 1.75

## 2023-06-05 NOTE — ASSESSMENT & PLAN NOTE
Patient presents for refill of her tramadol.  She continues to have chronic left hip pain secondary to a left hip arthroplasty requiring multiple revisions due to multiple infections.  She continues on rifampin 300 mg daily as well as Bactrim 800-160 mg twice daily, lifetime dosing.

## 2023-06-05 NOTE — ASSESSMENT & PLAN NOTE
Patient continues on coenzyme Q 10 as well as simvastatin 40 mg nightly.  She is due for updated labs

## 2023-06-05 NOTE — PROGRESS NOTES
"CC:   Chief Complaint   Patient presents with    Medication Follow-up     Cs refill tramadol         HISTORY OF PRESENT ILLNESS: Patient is a 63 y.o. female established patient who presents today to discuss the following problems below:     Chronic left hip pain  Patient presents for refill of her tramadol.  She continues to have chronic left hip pain secondary to a left hip arthroplasty requiring multiple revisions due to multiple infections.  She continues on rifampin 300 mg daily as well as Bactrim 800-160 mg twice daily, lifetime dosing.    Insomnia  Currently 200mg QHS, uses lorazepam very sparingly as needed. No refill is needed.     Hyperlipidemia  Patient continues on coenzyme Q 10 as well as simvastatin 40 mg nightly.  She is due for updated labs    Review of Systems: Otherwise negative except for as stated above.      Exam: BP 94/68   Pulse 68   Temp 37.3 °C (99.1 °F) (Temporal)   Resp 19   Ht 1.676 m (5' 6\")   Wt 56 kg (123 lb 6.4 oz)   SpO2 90%  Body mass index is 19.92 kg/m².    Physical Exam  Constitutional:       Appearance: Normal appearance.   Eyes:      Extraocular Movements: Extraocular movements intact.   Pulmonary:      Effort: Pulmonary effort is normal.   Musculoskeletal:      Cervical back: Normal range of motion.   Neurological:      General: No focal deficit present.      Mental Status: She is alert and oriented to person, place, and time.   Psychiatric:         Mood and Affect: Mood normal.         Behavior: Behavior normal.       Assessment/Plan:  63 y.o. female with the following -    1. Chronic left hip pain  Chronic, stable. Continue medications as below.   I have obtained and reviewed patient utilization report from [facility] pharmacy database on 6/5/2023 11:41 AM  prior to writing prescription for controlled substance II, III or IV per [place] bill . Based on assessment of the report, my physical exam, and the patient's health problem, the prescription is medically " necessary.     Additionally, I have discussed the risk and benefits, treatment plan, and alternative therapies with the patient.     - traMADol (ULTRAM) 50 MG Tab; Take 1 Tablet by mouth 3 times a day as needed for Moderate Pain for up to 90 days.  Dispense: 90 Tablet; Refill: 2    2. Mixed hyperlipidemia  Chronic, stable. Continue simbastatin 40mg QHS, and coQ10. Due for updated labs.   - Lipid Profile; Future    3. Screening for endocrine, nutritional, metabolic and immunity disorder  - Comp Metabolic Panel; Future  - TSH WITH REFLEX TO FT4; Future    4. Screening for iron deficiency anemia  - CBC WITHOUT DIFFERENTIAL; Future    5. Primary insomnia  Chronic stable. Continue trazodone 200mg qhs prn, lorazepam sparingly as needed, no refill needed today.     Follow-up: Return in about 3 months (around 9/5/2023) for controlled substance.    Health Maintenance: Completed      Please note that this dictation was created using voice recognition software. I have made every reasonable attempt to correct obvious errors, but I expect that there are errors of grammar and possibly content that I did not discover before finalizing the note.    Electronically signed by JL Myers on June 5, 2023

## 2023-06-13 ENCOUNTER — PATIENT MESSAGE (OUTPATIENT)
Dept: MEDICAL GROUP | Facility: PHYSICIAN GROUP | Age: 63
End: 2023-06-13
Payer: MEDICARE

## 2023-06-13 NOTE — PATIENT COMMUNICATION
Received request via: Patient    Was the patient seen in the last year in this department? Yes    Does the patient have an active prescription (recently filled or refills available) for medication(s) requested? No    Does the patient have alf Plus and need 100 day supply (blood pressure, diabetes and cholesterol meds only)? Medication is not for cholesterol, blood pressure or diabetes

## 2023-06-15 RX ORDER — RIFAMPIN 300 MG/1
CAPSULE ORAL
Qty: 180 CAPSULE | Refills: 2 | Status: SHIPPED | OUTPATIENT
Start: 2023-06-15 | End: 2024-01-15

## 2023-06-26 ENCOUNTER — HOSPITAL ENCOUNTER (OUTPATIENT)
Dept: LAB | Facility: MEDICAL CENTER | Age: 63
End: 2023-06-26
Payer: MEDICARE

## 2023-06-26 DIAGNOSIS — Z13.29 SCREENING FOR ENDOCRINE, NUTRITIONAL, METABOLIC AND IMMUNITY DISORDER: ICD-10-CM

## 2023-06-26 DIAGNOSIS — Z13.21 SCREENING FOR ENDOCRINE, NUTRITIONAL, METABOLIC AND IMMUNITY DISORDER: ICD-10-CM

## 2023-06-26 DIAGNOSIS — E78.2 MIXED HYPERLIPIDEMIA: ICD-10-CM

## 2023-06-26 DIAGNOSIS — Z13.228 SCREENING FOR ENDOCRINE, NUTRITIONAL, METABOLIC AND IMMUNITY DISORDER: ICD-10-CM

## 2023-06-26 DIAGNOSIS — Z13.0 SCREENING FOR ENDOCRINE, NUTRITIONAL, METABOLIC AND IMMUNITY DISORDER: ICD-10-CM

## 2023-06-26 DIAGNOSIS — Z13.0 SCREENING FOR IRON DEFICIENCY ANEMIA: ICD-10-CM

## 2023-06-26 LAB
ALBUMIN SERPL BCP-MCNC: 4.2 G/DL (ref 3.2–4.9)
ALBUMIN/GLOB SERPL: 1.9 G/DL
ALP SERPL-CCNC: 50 U/L (ref 30–99)
ALT SERPL-CCNC: 19 U/L (ref 2–50)
ANION GAP SERPL CALC-SCNC: 9 MMOL/L (ref 7–16)
AST SERPL-CCNC: 22 U/L (ref 12–45)
BILIRUB SERPL-MCNC: 0.2 MG/DL (ref 0.1–1.5)
BUN SERPL-MCNC: 15 MG/DL (ref 8–22)
CALCIUM ALBUM COR SERPL-MCNC: 9.3 MG/DL (ref 8.5–10.5)
CALCIUM SERPL-MCNC: 9.5 MG/DL (ref 8.5–10.5)
CHLORIDE SERPL-SCNC: 106 MMOL/L (ref 96–112)
CHOLEST SERPL-MCNC: 239 MG/DL (ref 100–199)
CO2 SERPL-SCNC: 27 MMOL/L (ref 20–33)
CREAT SERPL-MCNC: 0.93 MG/DL (ref 0.5–1.4)
ERYTHROCYTE [DISTWIDTH] IN BLOOD BY AUTOMATED COUNT: 48 FL (ref 35.9–50)
FASTING STATUS PATIENT QL REPORTED: NORMAL
GFR SERPLBLD CREATININE-BSD FMLA CKD-EPI: 68 ML/MIN/1.73 M 2
GLOBULIN SER CALC-MCNC: 2.2 G/DL (ref 1.9–3.5)
GLUCOSE SERPL-MCNC: 88 MG/DL (ref 65–99)
HCT VFR BLD AUTO: 43.1 % (ref 37–47)
HDLC SERPL-MCNC: 79 MG/DL
HGB BLD-MCNC: 13.5 G/DL (ref 12–16)
LDLC SERPL CALC-MCNC: 138 MG/DL
MCH RBC QN AUTO: 29.8 PG (ref 27–33)
MCHC RBC AUTO-ENTMCNC: 31.3 G/DL (ref 32.2–35.5)
MCV RBC AUTO: 95.1 FL (ref 81.4–97.8)
PLATELET # BLD AUTO: 265 K/UL (ref 164–446)
PMV BLD AUTO: 10.5 FL (ref 9–12.9)
POTASSIUM SERPL-SCNC: 4.5 MMOL/L (ref 3.6–5.5)
PROT SERPL-MCNC: 6.4 G/DL (ref 6–8.2)
RBC # BLD AUTO: 4.53 M/UL (ref 4.2–5.4)
SODIUM SERPL-SCNC: 142 MMOL/L (ref 135–145)
T4 FREE SERPL-MCNC: 0.96 NG/DL (ref 0.93–1.7)
TRIGL SERPL-MCNC: 111 MG/DL (ref 0–149)
TSH SERPL DL<=0.005 MIU/L-ACNC: 5.9 UIU/ML (ref 0.38–5.33)
WBC # BLD AUTO: 4.5 K/UL (ref 4.8–10.8)

## 2023-06-26 PROCEDURE — 84439 ASSAY OF FREE THYROXINE: CPT

## 2023-06-26 PROCEDURE — 36415 COLL VENOUS BLD VENIPUNCTURE: CPT

## 2023-06-26 PROCEDURE — 84443 ASSAY THYROID STIM HORMONE: CPT

## 2023-06-26 PROCEDURE — 85027 COMPLETE CBC AUTOMATED: CPT

## 2023-06-26 PROCEDURE — 80053 COMPREHEN METABOLIC PANEL: CPT

## 2023-06-26 PROCEDURE — 80061 LIPID PANEL: CPT

## 2023-08-11 ENCOUNTER — OUTPATIENT INFUSION SERVICES (OUTPATIENT)
Dept: ONCOLOGY | Facility: MEDICAL CENTER | Age: 63
End: 2023-08-11
Payer: MEDICARE

## 2023-08-11 VITALS
TEMPERATURE: 97.5 F | OXYGEN SATURATION: 96 % | WEIGHT: 121.25 LBS | HEART RATE: 63 BPM | RESPIRATION RATE: 17 BRPM | DIASTOLIC BLOOD PRESSURE: 63 MMHG | BODY MASS INDEX: 20.2 KG/M2 | HEIGHT: 65 IN | SYSTOLIC BLOOD PRESSURE: 116 MMHG

## 2023-08-11 DIAGNOSIS — M85.80 OSTEOPENIA, UNSPECIFIED LOCATION: ICD-10-CM

## 2023-08-11 LAB
CA-I BLD ISE-SCNC: 1.4 MMOL/L (ref 1.1–1.3)
CREAT BLD-MCNC: 1.5 MG/DL (ref 0.5–1.4)

## 2023-08-11 PROCEDURE — 82330 ASSAY OF CALCIUM: CPT

## 2023-08-11 PROCEDURE — 82565 ASSAY OF CREATININE: CPT

## 2023-08-11 PROCEDURE — 96372 THER/PROPH/DIAG INJ SC/IM: CPT

## 2023-08-11 PROCEDURE — 36415 COLL VENOUS BLD VENIPUNCTURE: CPT

## 2023-08-11 PROCEDURE — 700111 HCHG RX REV CODE 636 W/ 250 OVERRIDE (IP): Mod: JZ | Performed by: PHYSICIAN ASSISTANT

## 2023-08-11 RX ADMIN — DENOSUMAB 60 MG: 60 INJECTION SUBCUTANEOUS at 10:57

## 2023-08-11 ASSESSMENT — FIBROSIS 4 INDEX: FIB4 SCORE: 1.24

## 2023-08-11 NOTE — PROGRESS NOTES
Pt arrived ambulatory to [facility] for prolia. POC discussed and pt verbalized agreement/understanding. Pt denies recent/planned dental procedures in the past or future 4 weeks, confirms taking supplements today, reports she feels well and without symptoms. Labs drawn from [facility] x1 attempt with 23G butterfly needle and pt tolerated well. Site covered with sterile gauze/coban. Labs reviewed by pharmacy and Prolia administered per MAR; bandaid applied to site. Email sent to  for next appt and pt discharged ambulatory in [facility].

## 2023-08-18 RX ORDER — SIMVASTATIN 40 MG
40 TABLET ORAL EVERY EVENING
Qty: 100 TABLET | Refills: 0 | Status: SHIPPED | OUTPATIENT
Start: 2023-08-18 | End: 2024-01-10

## 2023-09-05 ENCOUNTER — APPOINTMENT (OUTPATIENT)
Dept: MEDICAL GROUP | Facility: PHYSICIAN GROUP | Age: 63
End: 2023-09-05
Payer: MEDICARE

## 2023-09-08 ENCOUNTER — OFFICE VISIT (OUTPATIENT)
Dept: MEDICAL GROUP | Facility: PHYSICIAN GROUP | Age: 63
End: 2023-09-08
Payer: MEDICARE

## 2023-09-08 VITALS
WEIGHT: 122.2 LBS | HEART RATE: 58 BPM | DIASTOLIC BLOOD PRESSURE: 74 MMHG | BODY MASS INDEX: 19.64 KG/M2 | TEMPERATURE: 98.5 F | HEIGHT: 66 IN | RESPIRATION RATE: 16 BRPM | OXYGEN SATURATION: 93 % | SYSTOLIC BLOOD PRESSURE: 120 MMHG

## 2023-09-08 DIAGNOSIS — G89.29 CHRONIC LEFT HIP PAIN: Chronic | ICD-10-CM

## 2023-09-08 DIAGNOSIS — F51.01 PRIMARY INSOMNIA: Chronic | ICD-10-CM

## 2023-09-08 DIAGNOSIS — M25.552 CHRONIC LEFT HIP PAIN: Chronic | ICD-10-CM

## 2023-09-08 DIAGNOSIS — E78.2 MIXED HYPERLIPIDEMIA: ICD-10-CM

## 2023-09-08 PROCEDURE — 3078F DIAST BP <80 MM HG: CPT

## 2023-09-08 PROCEDURE — 3074F SYST BP LT 130 MM HG: CPT

## 2023-09-08 PROCEDURE — 99214 OFFICE O/P EST MOD 30 MIN: CPT

## 2023-09-08 RX ORDER — TRAMADOL HYDROCHLORIDE 50 MG/1
50 TABLET ORAL 3 TIMES DAILY PRN
Qty: 90 TABLET | Refills: 2 | Status: SHIPPED | OUTPATIENT
Start: 2023-09-08 | End: 2023-10-08

## 2023-09-08 ASSESSMENT — FIBROSIS 4 INDEX: FIB4 SCORE: 1.24

## 2023-09-08 NOTE — ASSESSMENT & PLAN NOTE
Patient presents for a refill of her tramadol. She continues to have chronic left hip pain secondary to left hip arthroplasty requiring multiple revisions due to infection. Using tramadol 50mg TID for pain management in addition to pilates. She did sell her her house over the summer, her sons are doing well. UDS up to date 3/3023 CS agreement on file.

## 2023-09-08 NOTE — PROGRESS NOTES
"CC:   Chief Complaint   Patient presents with    Medication Refill     Tramadol         HISTORY OF PRESENT ILLNESS: Patient is a 63 y.o. female established patient who presents today to discuss the following problems below:     Chronic left hip pain  Patient presents for a refill of her tramadol. She continues to have chronic left hip pain secondary to left hip arthroplasty requiring multiple revisions due to infection. Using tramadol 50mg TID for pain management in addition to pilates. She did sell her her house over the summer, her sons are doing well. UDS up to date 3/3023 CS agreement on file.     Insomnia  Patient uses lorazepam sparingly for insomnia, requesting a refill today. Last filled in March of 2023.     Hyperlipidemia  The 10-year ASCVD risk score (Anam DK, et al., 2019) is: 4.6%      Review of Systems: Otherwise negative except for as stated above.      Exam: /74   Pulse (!) 58   Temp 36.9 °C (98.5 °F) (Temporal)   Resp 16   Ht 1.676 m (5' 6\")   Wt 55.4 kg (122 lb 3.2 oz)   SpO2 93%  Body mass index is 19.72 kg/m².    Physical Exam  Constitutional:       Appearance: Normal appearance.   Eyes:      Extraocular Movements: Extraocular movements intact.   Pulmonary:      Effort: Pulmonary effort is normal.   Musculoskeletal:      Cervical back: Normal range of motion.   Neurological:      General: No focal deficit present.      Mental Status: She is alert and oriented to person, place, and time.   Psychiatric:         Mood and Affect: Mood normal.         Behavior: Behavior normal.         Assessment/Plan:  63 y.o. female with the following -    1. Chronic left hip pain  Chronic, stable. Continue medications as below.   I have obtained and reviewed patient utilization report from [facility] pharmacy database on 9/8/2023 3:00 PM  prior to writing prescription for controlled substance II, III or IV per [place] bill . Based on assessment of the report, my physical exam, and the patient's " health problem, the prescription is medically necessary.     Additionally, I have discussed the risk and benefits, treatment plan, and alternative therapies with the patient.     - traMADol (ULTRAM) 50 MG Tab; Take 1 Tablet by mouth 3 times a day as needed for Moderate Pain for up to 30 days.  Dispense: 90 Tablet; Refill: 2    2. Primary insomnia  Chronic, stable. Continue medications as below.   I have obtained and reviewed patient utilization report from [facility] pharmacy database on 9/8/2023  prior to writing prescription for controlled substance II, III or IV per [place] bill . Based on assessment of the report, my physical exam, and the patient's health problem, the prescription is medically necessary.     Additionally, I have discussed the risk and benefits, treatment plan, and alternative therapies with the patient.     - LORazepam (ATIVAN) 1 MG Tab; Take 1 Tablet by mouth every four hours as needed for Anxiety for up to 30 days. Indications: Trouble Sleeping  Dispense: 30 Tablet; Refill: 0    3. Mixed hyperlipidemia  Chronic, not at goal. However very high HDL, active and healthy lifestyle. Continue simvastatin 40mg QHS. The 10-year ASCVD risk score (Anam DK, et al., 2019) is: 4.6%    Lab Results   Component Value Date/Time    CHOLSTRLTOT 239 (H) 06/26/2023 07:21 AM    TRIGLYCERIDE 111 06/26/2023 07:21 AM    HDL 79 06/26/2023 07:21 AM     (H) 06/26/2023 07:21 AM   ]        Follow-up: Return in about 3 months (around 12/8/2023) for controlled substance.    Health Maintenance: Completed      Please note that this dictation was created using voice recognition software. I have made every reasonable attempt to correct obvious errors, but I expect that there are errors of grammar and possibly content that I did not discover before finalizing the note.    Electronically signed by JL Myers on September 8, 2023

## 2023-09-08 NOTE — ASSESSMENT & PLAN NOTE
Patient uses lorazepam sparingly for insomnia, requesting a refill today. Last filled in March of 2023.

## 2023-09-12 RX ORDER — LORAZEPAM 1 MG/1
1 TABLET ORAL EVERY 4 HOURS PRN
Qty: 30 TABLET | Refills: 0 | Status: SHIPPED | OUTPATIENT
Start: 2023-09-12 | End: 2023-10-12

## 2023-12-11 ENCOUNTER — OFFICE VISIT (OUTPATIENT)
Dept: MEDICAL GROUP | Facility: PHYSICIAN GROUP | Age: 63
End: 2023-12-11
Payer: MEDICARE

## 2023-12-11 VITALS
BODY MASS INDEX: 19.81 KG/M2 | DIASTOLIC BLOOD PRESSURE: 62 MMHG | WEIGHT: 123.25 LBS | HEART RATE: 62 BPM | SYSTOLIC BLOOD PRESSURE: 106 MMHG | HEIGHT: 66 IN | TEMPERATURE: 98.3 F | OXYGEN SATURATION: 96 % | RESPIRATION RATE: 20 BRPM

## 2023-12-11 DIAGNOSIS — G89.29 CHRONIC LEFT HIP PAIN: Chronic | ICD-10-CM

## 2023-12-11 DIAGNOSIS — M25.552 CHRONIC LEFT HIP PAIN: Chronic | ICD-10-CM

## 2023-12-11 PROCEDURE — 3074F SYST BP LT 130 MM HG: CPT

## 2023-12-11 PROCEDURE — 3078F DIAST BP <80 MM HG: CPT

## 2023-12-11 PROCEDURE — 99213 OFFICE O/P EST LOW 20 MIN: CPT

## 2023-12-11 RX ORDER — TRAMADOL HYDROCHLORIDE 50 MG/1
50 TABLET ORAL 3 TIMES DAILY
Qty: 90 TABLET | Refills: 0 | Status: SHIPPED | OUTPATIENT
Start: 2024-01-09 | End: 2024-02-08

## 2023-12-11 RX ORDER — TRAMADOL HYDROCHLORIDE 50 MG/1
50 TABLET ORAL 3 TIMES DAILY
Qty: 90 TABLET | Refills: 0 | Status: SHIPPED | OUTPATIENT
Start: 2024-02-07 | End: 2024-01-10 | Stop reason: SDUPTHER

## 2023-12-11 RX ORDER — TRAMADOL HYDROCHLORIDE 50 MG/1
50 TABLET ORAL 3 TIMES DAILY
Qty: 90 TABLET | Refills: 0 | Status: SHIPPED | OUTPATIENT
Start: 2023-12-11 | End: 2024-01-10

## 2023-12-11 ASSESSMENT — ENCOUNTER SYMPTOMS
DIARRHEA: 0
ABDOMINAL PAIN: 0
WEIGHT LOSS: 0
CONSTIPATION: 0
BLOOD IN STOOL: 0
FEVER: 0
CHILLS: 0
HEADACHES: 0
PALPITATIONS: 0

## 2023-12-11 ASSESSMENT — FIBROSIS 4 INDEX: FIB4 SCORE: 1.24

## 2023-12-11 NOTE — ASSESSMENT & PLAN NOTE
Chronic problem.  Currently taking tramadol 50 mg 3 times daily for pain management.  History significant for left hip arthroplasty requiring multiple revisions due to infection.  Patient is very active; participates in Pilates.  Denies constipation.  Using MiraLAX daily.  UDS up to date 3/3023 CS agreement on file  States that she had an appointment with her PCP last week, which had to be rescheduled.  Patient notes that she has been without medications for the last couple of days.

## 2023-12-11 NOTE — PROGRESS NOTES
"Subjective:     Chief Complaint   Patient presents with    Medication Refill     TRAMADOL      HPI: Kemi presents today with    Chronic left hip pain  Chronic problem.  Currently taking tramadol 50 mg 3 times daily for pain management.  History significant for left hip arthroplasty requiring multiple revisions due to infection.  Patient is very active; participates in [employer].  Denies constipation.  Using MiraLAX daily.  UDS up to date 3/3023 CS agreement on file  States that she had an appointment with her PCP last week, which had to be rescheduled.  Patient notes that she has been without medications for the last couple of days.    Allergies: Tape  Review of Systems   Constitutional:  Negative for chills, fever and weight loss.   Cardiovascular:  Negative for chest pain and palpitations.   Gastrointestinal:  Negative for abdominal pain, blood in stool, constipation and diarrhea.   Genitourinary:  Negative for hematuria.   Musculoskeletal:  Positive for joint pain.   Neurological:  Negative for headaches.     Health Maintenance: Deferred at this time   Objective:     /62   Pulse 62   Temp 36.8 °C (98.3 °F) (Temporal)   Resp 20   Ht 1.676 m (5' 6\")   Wt 55.9 kg (123 lb 4 oz)   SpO2 96%   Breastfeeding No   BMI 19.89 kg/m²  Body mass index is 19.89 kg/m².     Physical Exam  Vitals reviewed.   Constitutional:       Appearance: Normal appearance.   Cardiovascular:      Rate and Rhythm: Normal rate and regular rhythm.      Pulses: Normal pulses.   Pulmonary:      Effort: Pulmonary effort is normal.      Breath sounds: Normal breath sounds.   Abdominal:      General: Abdomen is flat.      Palpations: Abdomen is soft.   Musculoskeletal:      Cervical back: Normal range of motion.   Skin:     General: Skin is warm and dry.   Neurological:      General: No focal deficit present.      Mental Status: She is alert and oriented to person, place, and time.   Psychiatric:         Mood and Affect: Mood normal.    "      Behavior: Behavior normal.       Assessment and Plan:     The following treatment plan was discussed through shared decision making with the patient:    1. Chronic left hip pain  Chronic, controlled.   Controlled substance visit today for chronic hip Pain.  Patient has been stable on tramadol 50 mg 3 times daily.   shows no abnormal prescribing or filling behavior.  Controlled substance agreement up-to-date.  Urine drug screen up-to-date. We will continue the current regimen. Risks versus benefits were reviewed.  Encouraged patient to review fluid and fiber intake, and to increase as tolerated to avoid constipation and use of daily MiraLAX.  Obtained and reviewed patient utilization report from [facility] pharmacy database on 12/11/2023 8:55 AM  prior to writing prescription for controlled substance II, III or IV per [place] bill . Based on assessment of the report, the prescription is medically necessary.   - traMADol (ULTRAM) 50 MG Tab; Take 1 Tablet by mouth in the morning, at noon, and at bedtime for 30 days.  Dispense: 90 Tablet; Refill: 0  - traMADol (ULTRAM) 50 MG Tab; Take 1 Tablet by mouth in the morning, at noon, and at bedtime for 30 days.  Dispense: 90 Tablet; Refill: 0  - traMADol (ULTRAM) 50 MG Tab; Take 1 Tablet by mouth in the morning, at noon, and at bedtime for 30 days.  Dispense: 90 Tablet; Refill: 0    Return in about 3 months (around 3/11/2024) for Controlled Substance with PCP.         Please note that this note was created using dictation with voice recognition software. I have made every reasonable attempt to correct obvious errors, but I expect that there are errors of grammar and possibly content that I did not discover before finalizing the note.    FLORENCIO Grande  [facility]  [facility]

## 2024-01-10 DIAGNOSIS — M25.552 CHRONIC LEFT HIP PAIN: Chronic | ICD-10-CM

## 2024-01-10 DIAGNOSIS — G89.29 CHRONIC LEFT HIP PAIN: Chronic | ICD-10-CM

## 2024-01-10 RX ORDER — SIMVASTATIN 40 MG
40 TABLET ORAL EVERY EVENING
Qty: 100 TABLET | Refills: 0 | Status: SHIPPED | OUTPATIENT
Start: 2024-01-10

## 2024-01-10 RX ORDER — TRAMADOL HYDROCHLORIDE 50 MG/1
50 TABLET ORAL 3 TIMES DAILY
Qty: 90 TABLET | Refills: 1 | Status: SHIPPED | OUTPATIENT
Start: 2024-02-07 | End: 2024-03-08

## 2024-01-10 NOTE — TELEPHONE ENCOUNTER
Received request via: Patient    Was the patient seen in the last year in this department? Yes    Does the patient have an active prescription (recently filled or refills available) for medication(s) requested? No    Does the patient have [facility] Plus and need 100 day supply (blood pressure, diabetes and cholesterol meds only)? Medication is not for cholesterol, blood pressure or diabetes      Pt needs refill as the other rx was only 30 days

## 2024-01-15 RX ORDER — RIFAMPIN 300 MG/1
CAPSULE ORAL
Qty: 180 CAPSULE | Refills: 2 | Status: SHIPPED | OUTPATIENT
Start: 2024-01-15

## 2024-02-03 DIAGNOSIS — F51.01 PRIMARY INSOMNIA: ICD-10-CM

## 2024-02-05 NOTE — TELEPHONE ENCOUNTER
Received request via: Pharmacy    Was the patient seen in the last year in this department? Yes    Does the patient have an active prescription (recently filled or refills available) for medication(s) requested? No    Pharmacy Name:     [facility]/pharmacy #[id number] - [place], [place] - [address] [place] [address] [address]. [phone]             Does the patient have [facility] Plus and need 100 day supply (blood pressure, diabetes and cholesterol meds only)? Medication is not for cholesterol, blood pressure or diabetes

## 2024-02-06 ENCOUNTER — PATIENT MESSAGE (OUTPATIENT)
Dept: MEDICAL GROUP | Facility: PHYSICIAN GROUP | Age: 64
End: 2024-02-06
Payer: MEDICARE

## 2024-02-06 DIAGNOSIS — F51.01 PRIMARY INSOMNIA: ICD-10-CM

## 2024-02-06 RX ORDER — TRAZODONE HYDROCHLORIDE 100 MG/1
200 TABLET ORAL NIGHTLY PRN
Qty: 180 TABLET | Refills: 2 | OUTPATIENT
Start: 2024-02-06

## 2024-02-06 NOTE — PATIENT COMMUNICATION
Received request via: Pharmacy    Was the patient seen in the last year in this department? Yes    Does the patient have an active prescription (recently filled or refills available) for medication(s) requested? No    [facility]     Does the patient have longterm Plus and need 100 day supply (blood pressure, diabetes and cholesterol meds only)? Medication is not for cholesterol, blood pressure or diabetes

## 2024-02-07 RX ORDER — TRAZODONE HYDROCHLORIDE 100 MG/1
200 TABLET ORAL NIGHTLY PRN
Qty: 180 TABLET | Refills: 2 | Status: SHIPPED | OUTPATIENT
Start: 2024-02-07

## 2024-02-16 ENCOUNTER — OUTPATIENT INFUSION SERVICES (OUTPATIENT)
Dept: ONCOLOGY | Facility: MEDICAL CENTER | Age: 64
End: 2024-02-16
Payer: MEDICARE

## 2024-02-16 VITALS
RESPIRATION RATE: 18 BRPM | DIASTOLIC BLOOD PRESSURE: 65 MMHG | TEMPERATURE: 98 F | SYSTOLIC BLOOD PRESSURE: 116 MMHG | OXYGEN SATURATION: 91 % | HEART RATE: 81 BPM | WEIGHT: 126.76 LBS | HEIGHT: 67 IN | BODY MASS INDEX: 19.9 KG/M2

## 2024-02-16 DIAGNOSIS — M85.80 OSTEOPENIA, UNSPECIFIED LOCATION: ICD-10-CM

## 2024-02-16 LAB
CA-I BLD ISE-SCNC: 1.19 MMOL/L (ref 1.1–1.3)
CREAT BLD-MCNC: 1.1 MG/DL (ref 0.5–1.4)

## 2024-02-16 PROCEDURE — 36415 COLL VENOUS BLD VENIPUNCTURE: CPT

## 2024-02-16 PROCEDURE — 82565 ASSAY OF CREATININE: CPT

## 2024-02-16 PROCEDURE — 82330 ASSAY OF CALCIUM: CPT

## 2024-02-16 PROCEDURE — 700111 HCHG RX REV CODE 636 W/ 250 OVERRIDE (IP): Mod: JZ

## 2024-02-16 PROCEDURE — 96372 THER/PROPH/DIAG INJ SC/IM: CPT

## 2024-02-16 RX ADMIN — DENOSUMAB 60 MG: 60 INJECTION SUBCUTANEOUS at 10:49

## 2024-02-16 ASSESSMENT — FIBROSIS 4 INDEX: FIB4 SCORE: 1.24

## 2024-02-16 NOTE — PROGRESS NOTES
Pt arrives to [facility] for Prolia.  Pt denies any s/sx of infection or recent/planned dental procedures.  ISTAT calcium and creatinine drawn via 23g butterfly needle to R-AC.  Labs reviewed and ok to give Prolia per pharmacy.  Prolia given SC to back of [facility].  Email sent to scheduling dept to set up next appt in 6 months.  Pt dc home to self care.

## 2024-03-04 ENCOUNTER — OFFICE VISIT (OUTPATIENT)
Dept: MEDICAL GROUP | Facility: PHYSICIAN GROUP | Age: 64
End: 2024-03-04
Payer: MEDICARE

## 2024-03-04 VITALS
HEART RATE: 64 BPM | SYSTOLIC BLOOD PRESSURE: 110 MMHG | WEIGHT: 125.2 LBS | HEIGHT: 66 IN | BODY MASS INDEX: 20.12 KG/M2 | OXYGEN SATURATION: 95 % | RESPIRATION RATE: 16 BRPM | DIASTOLIC BLOOD PRESSURE: 84 MMHG | TEMPERATURE: 98.3 F

## 2024-03-04 DIAGNOSIS — M00.9 CHRONIC INFECTION OF LEFT HIP, CURRENTLY ON ANTIBIOTICS (HCC): Chronic | ICD-10-CM

## 2024-03-04 DIAGNOSIS — F11.20 UNCOMPLICATED OPIOID DEPENDENCE (HCC): ICD-10-CM

## 2024-03-04 DIAGNOSIS — M25.552 CHRONIC LEFT HIP PAIN: Chronic | ICD-10-CM

## 2024-03-04 DIAGNOSIS — M85.80 OSTEOPENIA, UNSPECIFIED LOCATION: ICD-10-CM

## 2024-03-04 DIAGNOSIS — Z13.29 SCREENING FOR ENDOCRINE, NUTRITIONAL, METABOLIC AND IMMUNITY DISORDER: ICD-10-CM

## 2024-03-04 DIAGNOSIS — Z13.0 SCREENING FOR IRON DEFICIENCY ANEMIA: ICD-10-CM

## 2024-03-04 DIAGNOSIS — Z13.0 SCREENING FOR ENDOCRINE, NUTRITIONAL, METABOLIC AND IMMUNITY DISORDER: ICD-10-CM

## 2024-03-04 DIAGNOSIS — G89.29 CHRONIC LEFT HIP PAIN: Chronic | ICD-10-CM

## 2024-03-04 DIAGNOSIS — Z13.228 SCREENING FOR ENDOCRINE, NUTRITIONAL, METABOLIC AND IMMUNITY DISORDER: ICD-10-CM

## 2024-03-04 DIAGNOSIS — Z13.21 SCREENING FOR ENDOCRINE, NUTRITIONAL, METABOLIC AND IMMUNITY DISORDER: ICD-10-CM

## 2024-03-04 DIAGNOSIS — E78.2 MIXED HYPERLIPIDEMIA: ICD-10-CM

## 2024-03-04 DIAGNOSIS — N81.10 VAGINAL PROLAPSE: ICD-10-CM

## 2024-03-04 PROCEDURE — 99214 OFFICE O/P EST MOD 30 MIN: CPT

## 2024-03-04 PROCEDURE — 3079F DIAST BP 80-89 MM HG: CPT

## 2024-03-04 PROCEDURE — 3074F SYST BP LT 130 MM HG: CPT

## 2024-03-04 RX ORDER — TRAMADOL HYDROCHLORIDE 50 MG/1
50 TABLET ORAL 3 TIMES DAILY
Qty: 90 TABLET | Refills: 2 | Status: SHIPPED | OUTPATIENT
Start: 2024-03-04 | End: 2024-04-03

## 2024-03-04 ASSESSMENT — PATIENT HEALTH QUESTIONNAIRE - PHQ9: CLINICAL INTERPRETATION OF PHQ2 SCORE: 0

## 2024-03-04 ASSESSMENT — FIBROSIS 4 INDEX: FIB4 SCORE: 1.26

## 2024-03-04 NOTE — ASSESSMENT & PLAN NOTE
This is a chronic, stable medical condition.   Continue bactrim and rifampine, regimen arranged by infectious disease for lifelong therapy.

## 2024-03-04 NOTE — PROGRESS NOTES
CC:   Chief Complaint   Patient presents with    Medication Refill     Tramadol         HPI: Patient is a 64 y.o. female presenting with the following issues:     Problem List Items Addressed This Visit       Osteopenia     This is a chronic, stable medical condition.   Recommended to take supplements of vitamin D3 1000 units daily and a calcium supplement of 1250mg daily. Weight bearing exercises are important to maintain muscle and bone strength as well.   Currently on prolia infusions for management.     4/19/2023  IMPRESSION:     According to the World Health Organization classification, bone mineral density of this patient is osteopenic for the lumbar spine and osteopenic for the right femur. Increase in bone density in the lumbar spine since the most recent exam is   statistically significant. Decrease in bone density in the right femur since the most recent exam is not statistically significant.         Relevant Orders    VITAMIN D,25 HYDROXY (DEFICIENCY)    Hyperlipidemia    Relevant Orders    Lipid Profile    Chronic left hip pain (Chronic)     This is a chronic, stable medical condition.   Patient is currently on tramadol 50mg TID and lifetime antibiotic therapy with bactrim 800-160mg BID and rifampin 300mg BID.   She needs a refill of her tramadol today.   I have obtained and reviewed patient utilization report from [facility] pharmacy database on 3/4/2024 10:54 AM  prior to writing prescription for controlled substance II, III or IV per [place] bill . Based on assessment of the report, my physical exam, and the patient's health problem, the prescription is medically necessary.     Additionally, I have discussed the risk and benefits, treatment plan, and alternative therapies with the patient.              Relevant Medications    traMADol (ULTRAM) 50 MG Tab    Chronic infection of left hip, currently on antibiotics (HCC) (Chronic)     This is a chronic, stable medical condition.   Continue bactrim  and rifampine, regimen arranged by infectious disease for lifelong therapy.          Uncomplicated opioid dependence (HCC)     This is a chronic, stable medical condition.   Secondary to lifelong pain management of tramadol 50mg TID due to multiple hip surgeries complicated by infection.   CS agreement on file.   Refill sent in today.          Vaginal prolapse     This is a chronic, uncontrolled medical condition .   Currently managed by a vaginal pessary ring but this is not holding well. [facility]. Referral placed to have on file.          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 90 Tablet 2    traZODone (DESYREL) 100 MG Tab Take 2 Tablets by mouth at bedtime as needed for Sleep. 180 Tablet 2    riFAMPin (RIFADINE) 300 MG Cap TAKE 1 CAPSULE BY MOUTH TWICE A  Capsule 2    simvastatin (ZOCOR) 40 MG Tab TAKE  "1 TABLET BY MOUTH EVERY DAY IN THE EVENING 100 Tablet 0    traMADol (ULTRAM) 50 MG Tab Take 1 Tablet by mouth in the morning, at noon, and at bedtime for 30 days. 90 Tablet 1    sulfamethoxazole-trimethoprim (BACTRIM DS) 800-160 MG tablet TAKE 1 TABLET BY MOUTH 2 TIMES A  Tablet 3    vitamin D3 (CHOLECALCIFEROL) 400 UNIT Tab Take 1,000 Units by mouth every day.      triamcinolone acetonide (KENALOG) 0.025 % Cream APPLY TO AFFECTED AREA TWICE A DAY 80 g 0    acetaminophen (TYLENOL) 500 MG Tab Take 500-1,000 mg by mouth every 6 hours as needed.      Coenzyme Q10 (COQ10 PO) Take 1 Tab by mouth every evening.      therapeutic multivitamin-minerals (THERAGRAN-M) Tab Take 1 Tab by mouth every evening.      CALCIUM PO Take 1 Tab by mouth every evening.      ibuprofen (MOTRIN) 200 MG Tab Take 800 mg by mouth every 6 hours as needed.       No current facility-administered medications for this visit.       Allergies as of 03/04/2024 - Reviewed 03/04/2024   Allergen Reaction Noted    Tape  05/25/2021       Health Maintenance: Outstanding health maintenance items were ordered if applicable to patient including screening and preventative measures.     Review of Systems: Otherwise negative except for as stated above.      Objective/Assessment:    Exam: /84   Pulse 64   Temp 36.8 °C (98.3 °F) (Temporal)   Resp 16   Ht 1.676 m (5' 6\")   Wt 56.8 kg (125 lb 3.2 oz)   SpO2 95%  Body mass index is 20.21 kg/m².    Physical Exam    Vital signs reviewed  Physical Exam  Constitutional:       Appearance: Normal appearance.   Eyes:      Extraocular Movements: Extraocular movements intact.   Pulmonary:      Effort: Pulmonary effort is normal.   Neurological:      General: No focal deficit present.      Mental Status: She is alert and oriented to person, place, and time.   Psychiatric:         Mood and Affect: Mood normal.         Behavior: Behavior normal.       Follow-up: Return in about 3 months (around 6/4/2024) for " controlled substance.    Please note that this dictation was created using voice recognition software. I have made every reasonable attempt to correct obvious errors, but I expect that there are errors of grammar and possibly content that I did not discover before finalizing the note.    Electronically signed by JL Myers on March 4, 2024

## 2024-03-04 NOTE — ASSESSMENT & PLAN NOTE
This is a chronic, stable medical condition.   Patient is currently on tramadol 50mg TID and lifetime antibiotic therapy with bactrim 800-160mg BID and rifampin 300mg BID.   She needs a refill of her tramadol today.   I have obtained and reviewed patient utilization report from [facility] pharmacy database on 3/4/2024 10:54 AM  prior to writing prescription for controlled substance II, III or IV per [place] bill . Based on assessment of the report, my physical exam, and the patient's health problem, the prescription is medically necessary.     Additionally, I have discussed the risk and benefits, treatment plan, and alternative therapies with the patient.

## 2024-03-04 NOTE — ASSESSMENT & PLAN NOTE
This is a chronic, stable medical condition.   Secondary to lifelong pain management of tramadol 50mg TID due to multiple hip surgeries complicated by infection.   CS agreement on file.   Refill sent in today.

## 2024-03-04 NOTE — ASSESSMENT & PLAN NOTE
This is a chronic, stable medical condition.   Recommended to take supplements of vitamin D3 1000 units daily and a calcium supplement of 1250mg daily. Weight bearing exercises are important to maintain muscle and bone strength as well.   Currently on prolia infusions for management.     4/19/2023  IMPRESSION:     According to the World Health Organization classification, bone mineral density of this patient is osteopenic for the lumbar spine and osteopenic for the right femur. Increase in bone density in the lumbar spine since the most recent exam is   statistically significant. Decrease in bone density in the right femur since the most recent exam is not statistically significant.

## 2024-03-04 NOTE — ASSESSMENT & PLAN NOTE
This is a chronic, uncontrolled medical condition .   Currently managed by a vaginal pessary ring but this is not holding well. [facility] consultation. Referral placed to have on file.

## 2024-03-22 ENCOUNTER — TELEPHONE (OUTPATIENT)
Dept: HEALTH INFORMATION MANAGEMENT | Facility: OTHER | Age: 64
End: 2024-03-22
Payer: MEDICARE

## 2024-03-26 DIAGNOSIS — T84.52XD INFECTION ASSOCIATED WITH INTERNAL LEFT HIP PROSTHESIS, SUBSEQUENT ENCOUNTER: Chronic | ICD-10-CM

## 2024-03-27 NOTE — TELEPHONE ENCOUNTER
Received request via: Pharmacy    Was the patient seen in the last year in this department? Yes    Does the patient have an active prescription (recently filled or refills available) for medication(s) requested? No    [facility]    Does the patient have residential Plus and need 100 day supply (blood pressure, diabetes and cholesterol meds only)? Medication is not for cholesterol, blood pressure or diabetes

## 2024-03-28 RX ORDER — SULFAMETHOXAZOLE AND TRIMETHOPRIM 800; 160 MG/1; MG/1
1 TABLET ORAL 2 TIMES DAILY
Qty: 180 TABLET | Refills: 3 | Status: SHIPPED | OUTPATIENT
Start: 2024-03-28

## 2024-04-03 NOTE — TELEPHONE ENCOUNTER
Received request via: Pharmacy    Was the patient seen in the last year in this department? Yes    Does the patient have an active prescription (recently filled or refills available) for medication(s) requested? No    Ph[facility]    Does the patient have long-term Plus and need 100 day supply (blood pressure, diabetes and cholesterol meds only)? Yes, quantity updated to 100 days

## 2024-04-08 RX ORDER — SIMVASTATIN 40 MG
40 TABLET ORAL EVERY EVENING
Qty: 100 TABLET | Refills: 3 | Status: SHIPPED | OUTPATIENT
Start: 2024-04-08

## 2024-05-29 DIAGNOSIS — M25.552 CHRONIC LEFT HIP PAIN: Chronic | ICD-10-CM

## 2024-05-29 DIAGNOSIS — G89.29 CHRONIC LEFT HIP PAIN: Chronic | ICD-10-CM

## 2024-05-30 ENCOUNTER — OFFICE VISIT (OUTPATIENT)
Dept: MEDICAL GROUP | Facility: PHYSICIAN GROUP | Age: 64
End: 2024-05-30
Payer: MEDICARE

## 2024-05-30 VITALS
DIASTOLIC BLOOD PRESSURE: 62 MMHG | HEIGHT: 67 IN | OXYGEN SATURATION: 93 % | BODY MASS INDEX: 19.53 KG/M2 | HEART RATE: 87 BPM | RESPIRATION RATE: 16 BRPM | SYSTOLIC BLOOD PRESSURE: 98 MMHG | TEMPERATURE: 99.4 F | WEIGHT: 124.4 LBS

## 2024-05-30 DIAGNOSIS — F41.9 ANXIETY: ICD-10-CM

## 2024-05-30 DIAGNOSIS — F51.01 PRIMARY INSOMNIA: ICD-10-CM

## 2024-05-30 DIAGNOSIS — M00.9 CHRONIC INFECTION OF LEFT HIP, CURRENTLY ON ANTIBIOTICS (HCC): Chronic | ICD-10-CM

## 2024-05-30 DIAGNOSIS — E78.2 MIXED HYPERLIPIDEMIA: ICD-10-CM

## 2024-05-30 RX ORDER — TRAMADOL HYDROCHLORIDE 50 MG/1
TABLET ORAL
Qty: 90 TABLET | Refills: 2 | Status: SHIPPED | OUTPATIENT
Start: 2024-05-30 | End: 2024-06-29

## 2024-05-30 RX ORDER — SIMVASTATIN 40 MG
40 TABLET ORAL EVERY EVENING
Qty: 100 TABLET | Refills: 3 | Status: SHIPPED | OUTPATIENT
Start: 2024-05-30

## 2024-05-30 RX ORDER — TRAMADOL HYDROCHLORIDE 50 MG/1
TABLET ORAL
COMMUNITY
Start: 2024-05-01 | End: 2024-05-30 | Stop reason: SDUPTHER

## 2024-05-30 RX ORDER — LORAZEPAM 1 MG/1
1 TABLET ORAL EVERY 4 HOURS PRN
Qty: 30 TABLET | Refills: 0 | Status: SHIPPED | OUTPATIENT
Start: 2024-05-30 | End: 2024-06-29

## 2024-05-30 RX ORDER — ESTRADIOL 0.1 MG/G
CREAM VAGINAL
COMMUNITY
Start: 2024-04-18

## 2024-05-30 ASSESSMENT — FIBROSIS 4 INDEX: FIB4 SCORE: 1.26

## 2024-05-30 NOTE — PROGRESS NOTES
Verbal consent was acquired by the patient to use [employer] ambient listening note generation during this visit     Subjective:     HPI:   History of Present Illness  The patient is a 64-year-old female, using she and her pronouns.    The patient is on a regimen of tramadol 50 mg, administered thrice daily, which she reports as effectively managing her pain.  She is seeking a refill of her tramadol prescription.    The patient is due for her annual laboratory tests, which she plans to complete prior to her next visit. She expresses concern about her cholesterol levels, which have shown an increase due to the rifampin treatment. She expresses a desire to discontinue rifampin and Bactrim, having previously consulted with Dr. Denise Sterling. She has a history of two hardware removal procedures.    The patient requests a refill of her Ativan prescription. She reports that her use of Ativan varies, although she has not taken it in approximately two months. During a recent cruise, she experienced sleep disturbances, necessitating the use of Ativan like three nights a week. She typically takes Ativan upon waking at 1 AM or if she is unable to sleep for extended periods.         Assessment & Plan:     1. Chronic infection of left hip, currently on antibiotics (HCC)  traMADol (ULTRAM) 50 MG Tab    Referral to Infectious Disease      2. Mixed hyperlipidemia  simvastatin (ZOCOR) 40 MG Tab      3. Anxiety        4. Primary insomnia  LORazepam (ATIVAN) 1 MG Tab          Assessment & Plan  1. Chronic left hip pain secondary to chronic infection of the left hip.  The patient is currently undergoing antibiotic therapy. The patient's pain is effectively managed with tramadol 50 mg thrice daily. A refill was prescribed today to continue the current pain management regimen. Additionally, a referral back to infectious disease will be initiated to reassess the patient's long-term antibiotic use and determine whether we can  discontinue these versus lifelong treatment due to recurrent joint infections.    I have obtained and reviewed patient utilization report from [facility] pharmacy database on 5/30/2024 10:43 AM  prior to writing prescription for controlled substance II, III or IV per [place] bill . Based on assessment of the report, my physical exam, and the patient's health problem, the prescription is medically necessary.     Additionally, I have discussed the risk and benefits, treatment plan, and alternative therapies with the patient.       2. Mixed hyperlipidemia.  The patient's hyperlipidemia is chronic and stable. Lipid panel test is pending. The patient will maintain her current regimen of simvastatin 40 mg daily.    3. Primary insomnia.  The patient's condition is chronic and stable. She uses lorazepam 1 mg at bedtime as needed, very sparingly. The last refill was in 09/2024 for a quantity of 30 tablets. She does not combine her tramadol with her lorazepam and remains sober from alcohol.  I have obtained and reviewed patient utilization report from [facility] pharmacy database on 5/30/2024 10:43 AM  prior to writing prescription for controlled substance II, III or IV per [place] bill . Based on assessment of the report, my physical exam, and the patient's health problem, the prescription is medically necessary.     Additionally, I have discussed the risk and benefits, treatment plan, and alternative therapies with the patient.       Health Maintenance: Completed    Objective:     Exam:  Objective:  Vitals:    05/30/24 0938   BP: 98/62   Pulse: 87   Resp: 16   Temp: 37.4 °C (99.4 °F)   SpO2: 93%     Physical Exam  Physical Exam    Constitutional:       Appearance: Normal appearance.   Eyes:      Extraocular Movements: Extraocular movements intact.   Pulmonary:      Effort: Pulmonary effort is normal.   Neurological:      General: No focal deficit present.      Mental Status: She is alert and oriented to person,  place, and time.   Psychiatric:         Mood and Affect: Mood normal.         Behavior: Behavior normal.       Results      Return in about 3 months (around 8/30/2024) for lab follow up.    Please note that this dictation was created using voice recognition software. I have made every reasonable attempt to correct obvious errors, but I expect that there are errors of grammar and possibly content that I did not discover before finalizing the note.

## 2024-05-31 RX ORDER — TRAMADOL HYDROCHLORIDE 50 MG/1
50 TABLET ORAL 3 TIMES DAILY
Qty: 90 TABLET | Refills: 2 | OUTPATIENT
Start: 2024-05-31 | End: 2024-06-30

## 2024-06-28 ENCOUNTER — APPOINTMENT (OUTPATIENT)
Dept: INFECTIOUS DISEASES | Facility: MEDICAL CENTER | Age: 64
End: 2024-06-28
Attending: NURSE PRACTITIONER
Payer: MEDICARE

## 2024-07-23 ENCOUNTER — TELEPHONE (OUTPATIENT)
Dept: INFECTIOUS DISEASES | Facility: MEDICAL CENTER | Age: 64
End: 2024-07-23
Payer: MEDICARE

## 2024-07-26 ENCOUNTER — APPOINTMENT (OUTPATIENT)
Dept: INFECTIOUS DISEASES | Facility: MEDICAL CENTER | Age: 64
End: 2024-07-26
Payer: MEDICARE

## 2024-07-30 ENCOUNTER — OFFICE VISIT (OUTPATIENT)
Dept: MEDICAL GROUP | Facility: PHYSICIAN GROUP | Age: 64
End: 2024-07-30
Payer: MEDICARE

## 2024-07-30 ENCOUNTER — PATIENT MESSAGE (OUTPATIENT)
Dept: MEDICAL GROUP | Facility: PHYSICIAN GROUP | Age: 64
End: 2024-07-30

## 2024-07-30 VITALS
DIASTOLIC BLOOD PRESSURE: 70 MMHG | WEIGHT: 121.2 LBS | OXYGEN SATURATION: 93 % | HEART RATE: 63 BPM | RESPIRATION RATE: 16 BRPM | SYSTOLIC BLOOD PRESSURE: 120 MMHG | BODY MASS INDEX: 19.48 KG/M2 | HEIGHT: 66 IN | TEMPERATURE: 98 F

## 2024-07-30 DIAGNOSIS — F51.01 PRIMARY INSOMNIA: Chronic | ICD-10-CM

## 2024-07-30 DIAGNOSIS — M00.9 CHRONIC INFECTION OF LEFT HIP, CURRENTLY ON ANTIBIOTICS (HCC): Chronic | ICD-10-CM

## 2024-07-30 RX ORDER — TRAMADOL HYDROCHLORIDE 50 MG/1
50 TABLET ORAL 3 TIMES DAILY
Qty: 90 TABLET | Refills: 2 | Status: SHIPPED | OUTPATIENT
Start: 2024-07-30 | End: 2024-08-29

## 2024-07-30 ASSESSMENT — FIBROSIS 4 INDEX: FIB4 SCORE: 1.26

## 2024-08-05 RX ORDER — LORAZEPAM 1 MG/1
1 TABLET ORAL NIGHTLY PRN
Qty: 30 TABLET | Refills: 0 | Status: SHIPPED | OUTPATIENT
Start: 2024-08-05 | End: 2024-09-04

## 2024-08-21 ENCOUNTER — OFFICE VISIT (OUTPATIENT)
Dept: INFECTIOUS DISEASES | Facility: MEDICAL CENTER | Age: 64
End: 2024-08-21
Attending: NURSE PRACTITIONER
Payer: MEDICARE

## 2024-08-21 VITALS
OXYGEN SATURATION: 94 % | BODY MASS INDEX: 18.99 KG/M2 | HEART RATE: 77 BPM | SYSTOLIC BLOOD PRESSURE: 104 MMHG | TEMPERATURE: 98.8 F | HEIGHT: 67 IN | RESPIRATION RATE: 18 BRPM | DIASTOLIC BLOOD PRESSURE: 80 MMHG | WEIGHT: 121 LBS

## 2024-08-21 DIAGNOSIS — A49.01 STAPHYLOCOCCUS AUREUS INFECTION: ICD-10-CM

## 2024-08-21 DIAGNOSIS — T84.52XD INFECTION ASSOCIATED WITH INTERNAL LEFT HIP PROSTHESIS, SUBSEQUENT ENCOUNTER: Chronic | ICD-10-CM

## 2024-08-21 DIAGNOSIS — M25.552 CHRONIC LEFT HIP PAIN: Chronic | ICD-10-CM

## 2024-08-21 DIAGNOSIS — G89.29 CHRONIC LEFT HIP PAIN: Chronic | ICD-10-CM

## 2024-08-21 PROCEDURE — 99213 OFFICE O/P EST LOW 20 MIN: CPT | Performed by: NURSE PRACTITIONER

## 2024-08-21 PROCEDURE — 3074F SYST BP LT 130 MM HG: CPT | Performed by: NURSE PRACTITIONER

## 2024-08-21 PROCEDURE — 3079F DIAST BP 80-89 MM HG: CPT | Performed by: NURSE PRACTITIONER

## 2024-08-21 PROCEDURE — 99212 OFFICE O/P EST SF 10 MIN: CPT | Performed by: NURSE PRACTITIONER

## 2024-08-21 ASSESSMENT — ENCOUNTER SYMPTOMS
HEADACHES: 0
DOUBLE VISION: 0
COUGH: 0
BRUISES/BLEEDS EASILY: 0
VOMITING: 0
NAUSEA: 0
PALPITATIONS: 0
ABDOMINAL PAIN: 0
SHORTNESS OF BREATH: 0
CHILLS: 0
BLURRED VISION: 0
SPUTUM PRODUCTION: 0
WEIGHT LOSS: 0
DIZZINESS: 0
FEVER: 0
FOCAL WEAKNESS: 0
NERVOUS/ANXIOUS: 0
WHEEZING: 0
MYALGIAS: 0

## 2024-08-21 ASSESSMENT — FIBROSIS 4 INDEX: FIB4 SCORE: 1.26

## 2024-08-21 NOTE — PROGRESS NOTES
INFECTIOUS  DISEASE  OUTPATIENT CLINIC  NOTE   Subjective   Primary care provider: ANDREW Riojas.     Reason for Follow Up:   Follow-up for   1. 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 Staphylococcus aureus infection        3. Chronic left hip pain          HPI: New patient, known to ID clinic  Kemi Silva is a 64 y.o. female with a history of arthritis, chronic left hip pain, osteopenia,.  Patient with a chronic history of left hip pain.  Original procedure for total left hip arthroplasty in 11/29 of 2018.  Return to the OR multiple times. s/p left hip fracture around 2018 with ongoing pain and complications with MSSA infection in the hip treated with cefazolin and then long-term Bactrim.  In November 2020 patient underwent removal of deep hardware to the left hip by Dr. Mendes, she was on the p.o. Bactrim leading up to that surgery, had been off antibiotics since that time. Patient did continue to have ongoing pain to the left hip with increasing left thigh edema.  Hospitalized from 5/24-5/28/2021, admitted with fevers, chills and increasing left hip pain.  CT of the left hip showed a 5 x 6 abscess.  On 5/25, patient underwent left hip I&D and polylinear exchange with Dr. Mendes, a large amount of purulent material was found intraoperatively that tracked all the way down to the greater trochanter region.  OR culture positive MSSA.  Patient discharged on IV daptomycin 8 mg/kg daily and p.o. rifampin 300 mg twice daily x6 weeks, estimated end date 7/6/2021. Patient to take p.o. Bactrim DS twice daily for suppression due to retained infected hardware. Keep suppressive p.o. antibiotic in place for 6 to 12 months due to retained infected hardware, possibly longer as patient is at high risk for reinfection.     9/14/21-patient was continued on p.o. Bactrim due to retained infected hardware.  Medication management was turned over to PCP    08/21/24-  Today Patient reports feeling well. Patient referred back to ID clinic for review of long-term antibiotics and whether she can discontinue these as she has been on antibiotic therapy for 3 years.  Patient completed 6 weeks of IV antibiotic therapy and p.o. rifampin which ended on 7/6 of 2021.  She continued on Bactrim DS twice daily and p.o. rifampin 300 mg twice daily for the past 3 years.  She has had a total of 5 surgeries on her left hip.  Previous labs from 6/26/2023 reviewed.  Discussed long-term effects of staying on p.o. Bactrim and rifampin.  Supportive study provided by [facility] for suppressive antibiotics given retention for prosthetic joint that patient has little benefit from SAT as she has limited risk factors.  Risks outweigh benefits and long-term suppression.  Recommend to stop all antibiotic therapy today.  Patient is scheduled to go on a trip to [place] and does not want to increase her risk of infection reoccurring at that time.  Recommend that she continue Bactrim  DS 1 tab daily until she gets back from her trip.  Recommend to stop rifampin today.  After she gets back from her trip she will stop all antibiotic therapy and monitor for site for signs or symptoms of recurrence of infection.  Discussed IF infection was to recur would most likely require intervention and then be on lifelong suppression with either doxycycline or amoxicillin.  Declined repeat labs as routine lab work does not have great evidence of showing recurrence of infection.     Current Antimicrobials:   Previous Antimicrobials:    Other Current Medications:  Home Medications    Medication Sig Taking? Last Dose Authorizing Provider   LORazepam (ATIVAN) 1 MG Tab Take 1 Tablet by mouth at bedtime as needed for Anxiety for up to 30 days. Yes Taking Adama Pino, A.P.R.N.   traMADol (ULTRAM) 50 MG Tab Take 1 Tablet by mouth in the morning, at noon, and at bedtime for 30 days. Yes Taking Adama Pino, A.P.R.N.   estradiol  (ESTRACE) 0.1 MG/GM vaginal cream INSERT 1 GM VAGINALLY AT BEDTIME 1-2 TIMES WEEKLY Yes Taking Physician Outpatient   simvastatin (ZOCOR) 40 MG Tab Take 1 Tablet by mouth every evening. Yes Taking Adama Pino A.P.R.N.   sulfamethoxazole-trimethoprim (BACTRIM DS) 800-160 MG tablet TAKE 1 TABLET BY MOUTH TWICE A DAY Yes Taking Adama Pino A.P.R.N.   traZODone (DESYREL) 100 MG Tab Take 2 Tablets by mouth at bedtime as needed for Sleep. Yes Taking Adama Pino A.P.R.N.   riFAMPin (RIFADINE) 300 MG Cap TAKE 1 CAPSULE BY MOUTH TWICE A DAY Yes Taking MACIEJ Riojas.P.R.N.   vitamin D3 (CHOLECALCIFEROL) 400 UNIT Tab Take 1,000 Units by mouth every day. Yes Taking Physician Outpatient   triamcinolone acetonide (KENALOG) 0.025 % Cream APPLY TO AFFECTED AREA TWICE A DAY Yes Taking MACIEJ France.P.R.JONY.   acetaminophen (TYLENOL) 500 MG Tab Take 500-1,000 mg by mouth every 6 hours as needed. Yes Taking Physician Outpatient   Coenzyme Q10 (COQ10 PO) Take 1 Tab by mouth every evening. Yes Taking Physician Outpatient   therapeutic multivitamin-minerals (THERAGRAN-M) Tab Take 1 Tab by mouth every evening. Yes Taking Physician Outpatient   CALCIUM PO Take 1 Tab by mouth every evening. Yes Taking Physician Outpatient   ibuprofen (MOTRIN) 200 MG Tab Take 800 mg by mouth every 6 hours as needed. Yes Taking Physician Outpatient        PMH:  Past Medical History:   Diagnosis Date    Acute midline thoracic back pain 10/19/2016    Anesthesia 11/19/2018    post op nausea; shakes    Arthritis     osteoarthritis; hip, thumbs, knees    High cholesterol     Hip pain     left    History of anemia     Hyperlipidemia 6/17/2013    Insomnia     Menopause     Osteopenia     PONV (postoperative nausea and vomiting)      Past Surgical History:   Procedure Laterality Date    HIP REVISION TOTAL Left 5/25/2021    Procedure: REVISION, TOTAL ARTHROPLASTY, HIP. IRRIGATION AND DEBREIDMENT WITH LINER HEAD EXCHANGE;  Surgeon: Julian NEUMANN  SRINIVASA Mendes;  Location: [facility];  Service: Orthopedics    HARDWARE REMOVAL ORTHO Left 11/16/2020    Procedure: REMOVAL, HARDWARE - HIP;  Surgeon: Julian Mendes M.D.;  Location: [facility];  Service: Orthopedics    IRRIGATION & DEBRIDEMENT HIP Left 3/31/2020    Procedure: IRRIGATION AND DEBRIDEMENT, HIP;  Surgeon: Julian Mendes M.D.;  Location: SURGERY [facility];  Service: Orthopedics    ORIF, HIP Left 3/5/2020    Procedure: ORIF, HIP;  Surgeon: Julian Mendes M.D.;  Location: SURGERY [facility];  Service: Orthopedics    ORIF, HIP Left 11/21/2019    Procedure: ORIF, HIP greater trochanter;  Surgeon: Julian Mendes M.D.;  Location: SURGERY [facility];  Service: Orthopedics    HIP ARTH ANTERIOR TOTAL Left 11/29/2018    Procedure: HIP ARTHROPLASTY ANTERIOR TOTAL;  Surgeon: Julian Mendes M.D.;  Location: SURGERY [facility];  Service: Orthopedics    SHOULDER DECOMPRESSION ARTHROSCOPIC Left 7/23/2018    Procedure: SHOULDER DECOMPRESSION ARTHROSCOPIC- SUBACROMIAL;  Surgeon: Tuan Collins M.D.;  Location: [facility];  Service: Orthopedics    SHOULDER ARTHROSCOPY W/ ROTATOR CUFF REPAIR  7/23/2018    Procedure: SHOULDER ARTHROSCOPY W/ ROTATOR CUFF REPAIR- WITH OPEN BICEP TENODESIS AND SUBCAPULAR REPAIR;  Surgeon: Tuan Collins M.D.;  Location: [facility];  Service: Orthopedics    ORIF, FRACTURE, ULNA Left 2010    and radius    UMBILICAL HERNIA REPAIR  10/21/2009    Performed by FLORIAN BHANDARI at SURGERY SAME DAY [facility]    OTHER ORTHOPEDIC SURGERY Bilateral 2009    hammertoe correction bilat     Family History   Adopted: Yes   Family history unknown: Yes     Social History     Socioeconomic History    Marital status:      Spouse name: Not on file    Number of children: Not on file    Years of education: Not on file    Highest education level: Bachelor's degree (e.g., BA, AB, BS)   Occupational History    Occupation:       Employer: OTHER   Tobacco Use    Smoking status: Former     Current packs/day: 0.00     Average packs/day: 0.3 packs/day for 20.0 years (5.0 ttl pk-yrs)     Types: Cigarettes     Start date: 1979     Quit date: 1999     Years since quittin.0    Smokeless tobacco: Never   Vaping Use    Vaping status: Never Used   Substance and Sexual Activity    Alcohol use: No     Alcohol/week: 0.0 oz     Comment: former heavy use, 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       Social Determinants of Health     Financial Resource Strain: High Risk (2022)    Overall Financial Resource Strain (CARDIA)     Difficulty of Paying Living Expenses: Hard   Food Insecurity: No Food Insecurity (2022)    Hunger Vital Sign     Worried About Running Out of Food in the Last Year: Never true     Ran Out of Food in the Last Year: Never true   Transportation Needs: No Transportation Needs (2022)    PRAPARE - Transportation     Lack of Transportation (Medical): No     Lack of Transportation (Non-Medical): No   Physical Activity: Sufficiently Active (2022)    Exercise Vital Sign     Days of Exercise per Week: 6 days     Minutes of Exercise per Session: 70 min   Stress: No Stress Concern Present (2022)    [employer] - Occupational Stress Questionnaire     Feeling of Stress : Only a little   Social Connections: Moderately Isolated (2022)    Social Connection and Isolation Panel [NHANES]     Frequency of Communication with Friends and Family: More than three times a week     Frequency of Social Gatherings with Friends and Family: Once a week     Attends Cheondoism Services: Never     Active Member of Clubs or Organizations: No     Attends Club or Organization Meetings: Never     Marital  "Status:    Intimate Partner Violence: Not on file   Housing Stability: Low Risk  (12/2/2022)    Housing Stability Vital Sign     Unable to Pay for Housing in the Last Year: No     Number of Places Lived in the Last Year: 2     Unstable Housing in the Last Year: No           Allergies/Intolerances:  Allergies   Allergen Reactions    Tape      Rash, Itchy        ROS:   Review of Systems   Constitutional:  Negative for chills, fever, malaise/fatigue and weight loss.   HENT:  Negative for congestion and hearing loss.    Eyes:  Negative for blurred vision and double vision.   Respiratory:  Negative for cough, sputum production, shortness of breath and wheezing.    Cardiovascular:  Negative for chest pain and palpitations.   Gastrointestinal:  Negative for abdominal pain, nausea and vomiting.   Genitourinary:  Negative for dysuria.   Musculoskeletal:  Positive for joint pain (Left hip). Negative for myalgias.   Skin:  Negative for itching and rash.   Neurological:  Negative for dizziness, focal weakness and headaches.   Endo/Heme/Allergies:  Does not bruise/bleed easily.   Psychiatric/Behavioral:  The patient is not nervous/anxious.       ROS was reviewed and were negative except as above.    Objective    Most Recent Vital Signs:  /80 (BP Location: Left arm, Patient Position: Sitting, BP Cuff Size: Adult)   Pulse 77   Temp 37.1 °C (98.8 °F) (Temporal)   Resp 18   Ht 1.702 m (5' 7\")   Wt 54.9 kg (121 lb)   SpO2 94%   BMI 18.95 kg/m²     Physical Exam:  Physical Exam  Vitals and nursing note reviewed.   Constitutional:       General: She is not in acute distress.     Appearance: Normal appearance. She is not ill-appearing.   HENT:      Head: Normocephalic and atraumatic.      Nose: Nose normal.      Mouth/Throat:      Mouth: Mucous membranes are moist.   Eyes:      Pupils: Pupils are equal, round, and reactive to light.   Cardiovascular:      Rate and Rhythm: Normal rate and regular rhythm.   Pulmonary: "      Effort: Pulmonary effort is normal. No respiratory distress.      Breath sounds: Normal breath sounds. No stridor.   Musculoskeletal:      Cervical back: Normal range of motion.      Right lower leg: No edema.      Left lower leg: No edema.      Comments: Left hip with multiple surgical scars.  No surrounding erythema, swelling or drainage.  No open wounds.  No signs of infection   Skin:     General: Skin is warm and dry.      Capillary Refill: Capillary refill takes less than 2 seconds.      Coloration: Skin is not jaundiced or pale.   Neurological:      General: No focal deficit present.      Mental Status: She is alert and oriented to person, place, and time.   Psychiatric:         Mood and Affect: Mood normal.         Behavior: Behavior normal.          Pertinent Lab/Imaging Results:  [unfilled]  @CMP@  WBC   Date/Time Value Ref Range Status   06/26/2023 07:21 AM 4.5 (L) 4.8 - 10.8 K/uL Final   10/11/2011 01:52 PM 5.7 4.0 - 10.5 x10E3/uL Final     RBC   Date/Time Value Ref Range Status   06/26/2023 07:21 AM 4.53 4.20 - 5.40 M/uL Final   10/11/2011 01:52 PM 4.43 3.80 - 5.10 x10E6/uL Final     Hemoglobin   Date/Time Value Ref Range Status   06/26/2023 07:21 AM 13.5 12.0 - 16.0 g/dL Final     Hematocrit   Date/Time Value Ref Range Status   06/26/2023 07:21 AM 43.1 37.0 - 47.0 % Final     MCV   Date/Time Value Ref Range Status   06/26/2023 07:21 AM 95.1 81.4 - 97.8 fL Final   10/11/2011 01:52 PM 87 80 - 98 fL Final     MCH   Date/Time Value Ref Range Status   06/26/2023 07:21 AM 29.8 27.0 - 33.0 pg Final   10/11/2011 01:52 PM 29.1 27.0 - 34.0 pg Final     MCHC   Date/Time Value Ref Range Status   06/26/2023 07:21 AM 31.3 (L) 32.2 - 35.5 g/dL Final     Comment:     Please note new reference range effective 05/22/2023.     MPV   Date/Time Value Ref Range Status   06/26/2023 07:21 AM 10.5 9.0 - 12.9 fL Final      Sodium   Date/Time Value Ref Range Status   06/26/2023 07:21  135 - 145 mmol/L Final  "    Potassium   Date/Time Value Ref Range Status   06/26/2023 07:21 AM 4.5 3.6 - 5.5 mmol/L Final     Chloride   Date/Time Value Ref Range Status   06/26/2023 07:21  96 - 112 mmol/L Final     Co2   Date/Time Value Ref Range Status   06/26/2023 07:21 AM 27 20 - 33 mmol/L Final     Glucose   Date/Time Value Ref Range Status   06/26/2023 07:21 AM 88 65 - 99 mg/dL Final     Bun   Date/Time Value Ref Range Status   06/26/2023 07:21 AM 15 8 - 22 mg/dL Final     Creatinine   Date/Time Value Ref Range Status   06/26/2023 07:21 AM 0.93 0.50 - 1.40 mg/dL Final   10/11/2011 01:52 PM 0.78 0.57 - 1.00 mg/dL Final     Bun-Creatinine Ratio   Date/Time Value Ref Range Status   07/29/2016 07:20 AM 18 9 - 23 Final   10/11/2011 01:52 PM 18 9 - 23 Final     Alkaline Phosphatase   Date/Time Value Ref Range Status   06/26/2023 07:21 AM 50 30 - 99 U/L Final     AST(SGOT)   Date/Time Value Ref Range Status   06/26/2023 07:21 AM 22 12 - 45 U/L Final     ALT(SGPT)   Date/Time Value Ref Range Status   06/26/2023 07:21 AM 19 2 - 50 U/L Final     Total Bilirubin   Date/Time Value Ref Range Status   06/26/2023 07:21 AM 0.2 0.1 - 1.5 mg/dL Final      CPK Total   Date/Time Value Ref Range Status   06/29/2021 03:53 PM 96 0 - 154 U/L Final          No results found for: \"BLOODCULTU\", \"BLDCULT\", \"BCHOLD\"    No results found for: \"BLOODCULTU\", \"BLDCULT\", \"BCHOLD\"       ntains abnormal data CULTURE WOUND W/ GRAM STAIN  Order: [id number] - Reflex for Order [id number]   Status: Final result       Visible to patient: Yes (not seen)       Next appt: 08/23/2024 at 10:30 AM in Oncology (INFUSION QUICK INJECT)    Specimen Information: Wound   0 Result Notes      Component 3 yr ago   Significant Indicator POS Positive (POS)   Source WND   Site left hip fluids   Culture Result - Abnormal    Gram Stain Result Few WBCs.  Few Gram positive cocci.   Culture Result  Abnormal   Staphylococcus aureus  Moderate growth    Resulting Agency M        Susceptibility     " Staphylococcus aureus     MAURICIO     Ampicillin/sulbactam <=8/4 mcg/mL Sensitive     Azithromycin <=2 mcg/mL Sensitive     Cefazolin <=8 mcg/mL Sensitive     Cefepime <=4 mcg/mL Sensitive     Ceftaroline <=0.5 mcg/mL Sensitive     Clindamycin <=0.25 mcg/mL Sensitive     Daptomycin <=0.5 mcg/mL Sensitive     Erythromycin <=0.25 mcg/mL Sensitive     Oxacillin 0.5 mcg/mL Sensitive     Pip/Tazobactam <=8 mcg/mL Sensitive     Tetracycline <=4 mcg/mL Sensitive     Trimeth/Sulfa <=0.5/9.5 m... Sensitive     Vancomycin 1 mcg/mL Sensitive                  Narrative  Performed by: M  Surgery - swabs received      Specimen Collected: 05/25/21 11:40 AM Last Resulted: 05/27/21  9:35 AM        Impression/Assessment      1. 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 Staphylococcus aureus infection        3. Chronic left hip pain          Kemi Silva is a 64 y.o. female with a history of arthritis, chronic left hip pain, osteopenia,.  Patient with a chronic history of left hip pain.  Original procedure for total left hip arthroplasty in 11/29 of 2018.  Return to the OR multiple times. s/p left hip fracture around 2018 with ongoing pain and complications with MSSA infection in the hip treated with cefazolin and then long-term Bactrim.  In November 2020 patient underwent removal of deep hardware to the left hip by Dr. Mendes, she was on the p.o. Bactrim leading up to that surgery, had been off antibiotics since that time. Patient did continue to have ongoing pain to the left hip with increasing left thigh edema.  Hospitalized from 5/24-5/28/2021, admitted with fevers, chills and increasing left hip pain.  CT of the left hip showed a 5 x 6 abscess.  On 5/25, patient underwent left hip I&D and polylinear exchange with Dr. Mendes, a large amount of purulent material was found intraoperatively that tracked all the way down to the greater trochanter region.  OR culture positive MSSA.   Patient discharged on IV daptomycin 8 mg/kg daily and p.o. rifampin 300 mg twice daily x6 weeks, estimated end date 7/6/2021. Patient to take p.o. Bactrim DS twice daily for suppression due to retained infected hardware. Keep suppressive p.o. antibiotic in place for 6 to 12 months due to retained infected hardware, possibly longer as patient is at high risk for reinfection.     9/14/21-patient was continued on p.o. Bactrim due to retained infected hardware.  Medication management was turned over to PCP    08/21/24- Today Patient reports feeling well. Patient referred back to ID clinic for review of long-term antibiotics and whether she can discontinue these as she has been on antibiotic therapy for 3 years.  Patient completed 6 weeks of IV antibiotic therapy and p.o. rifampin which ended on 7/6 of 2021.  She continued on Bactrim DS twice daily and p.o. rifampin 300 mg twice daily for the past 3 years.  She has had a total of 5 surgeries on her left hip.  Previous labs from 6/26/2023 reviewed.  Discussed long-term effects of staying on p.o. Bactrim and rifampin.  Supportive study provided by [facility] for suppressive antibiotics given retention for prosthetic joint that patient has little benefit from SAT as she has limited risk factors.  Risks outweigh benefits and long-term suppression.  Recommend to stop all antibiotic therapy today.  Patient is scheduled to go on a trip to [place] and does not want to increase her risk of infection reoccurring at that time.  Recommend that she continue Bactrim  DS 1 tab daily until she gets back from her trip.  Recommend to stop rifampin today.  After she gets back from her trip she will stop all antibiotic therapy and monitor for site for signs or symptoms of recurrence of infection.  Discussed IF infection was to recur would most likely require intervention and then be on lifelong suppression with either doxycycline or amoxicillin.  Declined repeat labs as routine lab work does not  have great evidence of showing recurrence of infection.     PLAN:   - Recommend that she continue Bactrim  DS 1 tab daily until she gets back from her trip.  Recommend to stop rifampin today.  After she gets back from her trip she will stop all antibiotic therapy  - Medication education provided and S/S of side effects discussed   - Recommend routine follow up with PCP  - Education provided on S/S of infection and when to report to ER/ call 911       Return visit: PRN. Follow up with primary care physician for chronic medical problems      I have performed a physical exam,  updated ROS and plan today. I have reviewed previous images, labs, and provider notes.      STEPHANIE William.    All Patients should seek medical re-evaluation or report to the ER for new, increasing or worsening symptoms. In some circumstances medical conditions can change from the initial evaluation and may require emergent medical re-evaluation. This includes but is not limited to chest pain, shortness of breath, atypical abdominal pain, atypical headache, ALOC, fever >101, low blood pressure, high respiratory rate (above 30), low oxygen saturation (below 90%), acute delirium, abnormal bleeding, inability to tolerate any intake, weakness on one side of the body, any worsened or concerning conditions.    Please note that this dictation was created using voice recognition software. I have worked with technical experts from [employer] to optimize the interface.  I have made every reasonable attempt to correct obvious errors, but there may be errors of grammar and possibly content that I did not discover before finalizing the note.

## 2024-08-23 ENCOUNTER — OUTPATIENT INFUSION SERVICES (OUTPATIENT)
Dept: ONCOLOGY | Facility: MEDICAL CENTER | Age: 64
End: 2024-08-23
Payer: MEDICARE

## 2024-08-23 VITALS
HEIGHT: 67 IN | OXYGEN SATURATION: 93 % | RESPIRATION RATE: 16 BRPM | DIASTOLIC BLOOD PRESSURE: 77 MMHG | BODY MASS INDEX: 19.27 KG/M2 | TEMPERATURE: 97.2 F | SYSTOLIC BLOOD PRESSURE: 119 MMHG | HEART RATE: 74 BPM | WEIGHT: 122.8 LBS

## 2024-08-23 DIAGNOSIS — M85.80 OSTEOPENIA, UNSPECIFIED LOCATION: ICD-10-CM

## 2024-08-23 LAB
CA-I BLD ISE-SCNC: 1.28 MMOL/L (ref 1.1–1.3)
CREAT BLD-MCNC: 1 MG/DL (ref 0.5–1.4)

## 2024-08-23 PROCEDURE — 82565 ASSAY OF CREATININE: CPT

## 2024-08-23 PROCEDURE — 82330 ASSAY OF CALCIUM: CPT

## 2024-08-23 PROCEDURE — 700111 HCHG RX REV CODE 636 W/ 250 OVERRIDE (IP): Mod: JZ,JG

## 2024-08-23 PROCEDURE — 96372 THER/PROPH/DIAG INJ SC/IM: CPT

## 2024-08-23 PROCEDURE — 36415 COLL VENOUS BLD VENIPUNCTURE: CPT

## 2024-08-23 RX ADMIN — DENOSUMAB 60 MG: 60 INJECTION SUBCUTANEOUS at 10:46

## 2024-08-23 ASSESSMENT — FIBROSIS 4 INDEX: FIB4 SCORE: 1.26

## 2024-08-23 NOTE — PROGRESS NOTES
Pt arrives to [facility] for Prolia.  Pt denies any s/sx of infection or recent/planned dental procedures.  ISTAT calcium and creatinine drawn via 23g butterfly needle to L-AC.  Labs reviewed and ok to give Prolia per pharmacy.  Prolia given [place] to back of [facility].  Email sent to scheduling dept to set up next appt in 6 months.  Pt dc home to self care.

## 2024-10-01 ENCOUNTER — APPOINTMENT (OUTPATIENT)
Dept: MEDICAL GROUP | Facility: PHYSICIAN GROUP | Age: 64
End: 2024-10-01
Payer: MEDICARE

## 2024-10-01 VITALS
HEART RATE: 67 BPM | DIASTOLIC BLOOD PRESSURE: 66 MMHG | BODY MASS INDEX: 19.29 KG/M2 | SYSTOLIC BLOOD PRESSURE: 100 MMHG | TEMPERATURE: 98.2 F | WEIGHT: 120 LBS | HEIGHT: 66 IN | OXYGEN SATURATION: 93 %

## 2024-10-01 DIAGNOSIS — G89.29 CHRONIC LEFT HIP PAIN: Chronic | ICD-10-CM

## 2024-10-01 DIAGNOSIS — M25.552 CHRONIC LEFT HIP PAIN: Chronic | ICD-10-CM

## 2024-10-01 PROCEDURE — 99213 OFFICE O/P EST LOW 20 MIN: CPT

## 2024-10-01 PROCEDURE — 3074F SYST BP LT 130 MM HG: CPT

## 2024-10-01 PROCEDURE — 3078F DIAST BP <80 MM HG: CPT

## 2024-10-01 RX ORDER — TRAMADOL HYDROCHLORIDE 50 MG/1
50 TABLET ORAL 3 TIMES DAILY PRN
Qty: 90 TABLET | Refills: 2 | Status: SHIPPED | OUTPATIENT
Start: 2024-10-01 | End: 2024-10-31

## 2024-10-01 ASSESSMENT — FIBROSIS 4 INDEX: FIB4 SCORE: 1.26

## 2024-10-20 DIAGNOSIS — F51.01 PRIMARY INSOMNIA: ICD-10-CM

## 2024-10-21 RX ORDER — TRAZODONE HYDROCHLORIDE 100 MG/1
200 TABLET ORAL NIGHTLY PRN
Qty: 180 TABLET | Refills: 2 | Status: SHIPPED | OUTPATIENT
Start: 2024-10-21

## 2024-11-25 ENCOUNTER — HOSPITAL ENCOUNTER (OUTPATIENT)
Dept: LAB | Facility: MEDICAL CENTER | Age: 64
End: 2024-11-25
Payer: MEDICARE

## 2024-11-25 DIAGNOSIS — M85.80 OSTEOPENIA, UNSPECIFIED LOCATION: ICD-10-CM

## 2024-11-25 DIAGNOSIS — E78.2 MIXED HYPERLIPIDEMIA: ICD-10-CM

## 2024-11-25 DIAGNOSIS — Z13.29 SCREENING FOR ENDOCRINE, NUTRITIONAL, METABOLIC AND IMMUNITY DISORDER: ICD-10-CM

## 2024-11-25 DIAGNOSIS — Z13.21 SCREENING FOR ENDOCRINE, NUTRITIONAL, METABOLIC AND IMMUNITY DISORDER: ICD-10-CM

## 2024-11-25 DIAGNOSIS — Z13.228 SCREENING FOR ENDOCRINE, NUTRITIONAL, METABOLIC AND IMMUNITY DISORDER: ICD-10-CM

## 2024-11-25 DIAGNOSIS — Z13.0 SCREENING FOR ENDOCRINE, NUTRITIONAL, METABOLIC AND IMMUNITY DISORDER: ICD-10-CM

## 2024-11-25 LAB
25(OH)D3 SERPL-MCNC: 54 NG/ML (ref 30–100)
ALBUMIN SERPL BCP-MCNC: 4 G/DL (ref 3.2–4.9)
ALBUMIN/GLOB SERPL: 1.9 G/DL
ALP SERPL-CCNC: 51 U/L (ref 30–99)
ALT SERPL-CCNC: 15 U/L (ref 2–50)
ANION GAP SERPL CALC-SCNC: 10 MMOL/L (ref 7–16)
AST SERPL-CCNC: 23 U/L (ref 12–45)
BILIRUB SERPL-MCNC: 0.3 MG/DL (ref 0.1–1.5)
BUN SERPL-MCNC: 16 MG/DL (ref 8–22)
CALCIUM ALBUM COR SERPL-MCNC: 9.1 MG/DL (ref 8.5–10.5)
CALCIUM SERPL-MCNC: 9.1 MG/DL (ref 8.5–10.5)
CHLORIDE SERPL-SCNC: 106 MMOL/L (ref 96–112)
CHOLEST SERPL-MCNC: 172 MG/DL (ref 100–199)
CO2 SERPL-SCNC: 25 MMOL/L (ref 20–33)
CREAT SERPL-MCNC: 0.83 MG/DL (ref 0.5–1.4)
FASTING STATUS PATIENT QL REPORTED: NORMAL
GFR SERPLBLD CREATININE-BSD FMLA CKD-EPI: 77 ML/MIN/1.73 M 2
GLOBULIN SER CALC-MCNC: 2.1 G/DL (ref 1.9–3.5)
GLUCOSE SERPL-MCNC: 78 MG/DL (ref 65–99)
HDLC SERPL-MCNC: 65 MG/DL
LDLC SERPL CALC-MCNC: 86 MG/DL
POTASSIUM SERPL-SCNC: 4.8 MMOL/L (ref 3.6–5.5)
PROT SERPL-MCNC: 6.1 G/DL (ref 6–8.2)
SODIUM SERPL-SCNC: 141 MMOL/L (ref 135–145)
TRIGL SERPL-MCNC: 107 MG/DL (ref 0–149)
TSH SERPL-ACNC: 6.6 UIU/ML (ref 0.35–5.5)

## 2024-11-25 PROCEDURE — 36415 COLL VENOUS BLD VENIPUNCTURE: CPT

## 2024-11-25 PROCEDURE — 80053 COMPREHEN METABOLIC PANEL: CPT

## 2024-11-25 PROCEDURE — 82306 VITAMIN D 25 HYDROXY: CPT

## 2024-11-25 PROCEDURE — 80061 LIPID PANEL: CPT

## 2024-11-25 PROCEDURE — 84443 ASSAY THYROID STIM HORMONE: CPT

## 2025-01-08 ENCOUNTER — OFFICE VISIT (OUTPATIENT)
Dept: MEDICAL GROUP | Facility: PHYSICIAN GROUP | Age: 65
End: 2025-01-08
Payer: MEDICARE

## 2025-01-08 VITALS
HEART RATE: 71 BPM | WEIGHT: 124.6 LBS | DIASTOLIC BLOOD PRESSURE: 56 MMHG | OXYGEN SATURATION: 95 % | HEIGHT: 66 IN | RESPIRATION RATE: 12 BRPM | SYSTOLIC BLOOD PRESSURE: 98 MMHG | BODY MASS INDEX: 20.03 KG/M2 | TEMPERATURE: 98.1 F

## 2025-01-08 DIAGNOSIS — M25.552 CHRONIC LEFT HIP PAIN: Chronic | ICD-10-CM

## 2025-01-08 DIAGNOSIS — F11.20 UNCOMPLICATED OPIOID DEPENDENCE (HCC): ICD-10-CM

## 2025-01-08 DIAGNOSIS — R53.83 OTHER FATIGUE: ICD-10-CM

## 2025-01-08 DIAGNOSIS — F51.01 PRIMARY INSOMNIA: Chronic | ICD-10-CM

## 2025-01-08 DIAGNOSIS — E78.2 MIXED HYPERLIPIDEMIA: ICD-10-CM

## 2025-01-08 DIAGNOSIS — G89.29 CHRONIC LEFT HIP PAIN: Chronic | ICD-10-CM

## 2025-01-08 DIAGNOSIS — R79.89 ELEVATED TSH: ICD-10-CM

## 2025-01-08 PROBLEM — M00.9 CHRONIC INFECTION OF LEFT HIP, CURRENTLY ON ANTIBIOTICS (HCC): Chronic | Status: RESOLVED | Noted: 2022-04-18 | Resolved: 2025-01-08

## 2025-01-08 PROCEDURE — 99214 OFFICE O/P EST MOD 30 MIN: CPT

## 2025-01-08 PROCEDURE — 3074F SYST BP LT 130 MM HG: CPT

## 2025-01-08 PROCEDURE — 3078F DIAST BP <80 MM HG: CPT

## 2025-01-08 RX ORDER — TRAMADOL HYDROCHLORIDE 50 MG/1
50 TABLET ORAL 3 TIMES DAILY PRN
Qty: 270 TABLET | Refills: 0 | Status: SHIPPED | OUTPATIENT
Start: 2025-01-08 | End: 2025-04-08

## 2025-01-08 RX ORDER — SIMVASTATIN 40 MG
40 TABLET ORAL EVERY EVENING
Qty: 100 TABLET | Refills: 3 | Status: SHIPPED | OUTPATIENT
Start: 2025-01-08

## 2025-01-08 RX ORDER — LORAZEPAM 1 MG/1
1 TABLET ORAL NIGHTLY PRN
Qty: 30 TABLET | Refills: 0 | Status: SHIPPED | OUTPATIENT
Start: 2025-01-08 | End: 2025-02-07

## 2025-01-08 ASSESSMENT — PATIENT HEALTH QUESTIONNAIRE - PHQ9: CLINICAL INTERPRETATION OF PHQ2 SCORE: 0

## 2025-01-08 ASSESSMENT — FIBROSIS 4 INDEX: FIB4 SCORE: 1.48

## 2025-01-08 NOTE — PROGRESS NOTES
Verbal consent was acquired by the patient to use [employer] ambient listening note generation during this visit     Subjective:     HPI:   History of Present Illness  The patient is a 64-year-old female presenting to follow up on chronic pain, hypothyroidism, and anxiety.    She missed her scheduled appointment last week due to a misunderstanding regarding the date. Consequently, she was unable to secure another appointment until February. She was seen by another provider yesterday who informed her that she could only receive a one-month supply of tramadol. However, she was able to secure an appointment for today.    She has expressed interest in discussing her thyroid condition. She has been experiencing fatigue and is concerned about potential hypothyroidism. She is currently taking collagen supplements and has recently ordered a cholesterol supplement.    She has requested a refill of her Ativan prescription, which she uses intermittently, sometimes twice a week, other times not at all, depending on her life circumstances. She reports difficulty falling asleep, often resorting to watching television until she feels drowsy. Once she turns off the lights, she typically falls asleep within two hours. If sleep does not occur within this timeframe, she takes Ativan, which effectively induces sleep.    MEDICATIONS  Current: Tramadol, simvastatin, Ativan.    HCC Gap Form    Diagnosis to address: F11.20 - Uncomplicated opioid dependence (HCC)  Assessment and plan: Chronic, stable. Continue with current defined treatment plan: Tramadol 50mg TID prn, no escalation in dose. Follow-up at least annually.  Last edited 01/08/25 15:36 PST by ANDREW Riojas         Assessment & Plan:     Problem List Items Addressed This Visit       Insomnia (Chronic)    Relevant Medications    LORazepam (ATIVAN) 1 MG Tab    Hyperlipidemia    Relevant Medications    simvastatin (ZOCOR) 40 MG Tab    Chronic left hip pain (Chronic)     Relevant Medications    traMADol (ULTRAM) 50 MG Tab    Uncomplicated opioid dependence (HCC)    Relevant Medications    traMADol (ULTRAM) 50 MG Tab     Other Visit Diagnoses       Elevated TSH        Relevant Orders    TSH+FREE T4    Other fatigue        Relevant Orders    CBC WITHOUT DIFFERENTIAL              Assessment & Plan  1. Chronic hip pain  Secondary to hx of infected prosthetic joint  Managed with tramadol 50mg TID prn    I have obtained and reviewed patient utilization report from [facility] pharmacy database on 1/8/2025 3:36 PM  prior to writing prescription for controlled substance II, [facility] or [facility] . Based on assessment of the report, my physical exam, and the patient's health problem, the prescription is medically necessary.     Additionally, I have discussed the risk and benefits, treatment plan, and alternative therapies with the patient.      A prescription for a 90-day supply of tramadol will be provided.    2. Hypothyroidism.  Chronic issue, new to me.   Her TSH levels are slightly elevated at 6.6. A repeat lab test will be conducted to monitor this. She has been advised to discontinue the use of [place] 5 days prior to the lab test as biotin can affect TSH levels. A CBC will also be ordered. If the T4 levels are normal but the TSH remains slightly off, monitoring will continue. If both TSH and T4 levels are abnormal, treatment will be initiated.    3. Insomnia and anxiety  This is a chronic, stable medical condition.     A refill for Ativan will be provided. She uses it variably, sometimes twice a week and sometimes not at all, depending on her stress levels and ability to sleep.  I have obtained and reviewed patient utilization report from [facility] pharmacy database on 1/8/2025 3:37 PM  prior to writing prescription for controlled substance II, III or IV per [place] bill . Based on assessment of the report, my physical exam, and the patient's health problem, the  prescription is medically necessary.     Additionally, I have discussed the risk and benefits, treatment plan, and alternative therapies with the patient.      4. HLD   This is a chronic, stable medical condition.   Simvastatin 40mg QHS   Lipids improved after discontinuation of antibiotics      Follow-up  The patient will follow up in 3 months.      Health Maintenance: Orders placed as applicable to patient     Objective:     Exam:  Objective:  Vitals:    01/08/25 1302   BP: 98/56   Pulse: 71   Resp: 12   Temp: 36.7 °C (98.1 °F)   SpO2: 95%     Physical Exam  Physical Exam    Constitutional:       Appearance: Normal appearance.   Eyes:      Extraocular Movements: Extraocular movements intact.   Pulmonary:      Effort: Pulmonary effort is normal.   Neurological:      General: No focal deficit present.      Mental Status: She is alert and oriented to person, place, and time.   Psychiatric:         Mood and Affect: Mood normal.         Behavior: Behavior normal.       Return in about 3 months (around 4/8/2025) for controlled substance.    Please note that this dictation was created using voice recognition software. I have made every reasonable attempt to correct obvious errors, but I expect that there are errors of grammar and possibly content that I did not discover before finalizing the note.

## 2025-01-13 ENCOUNTER — HOSPITAL ENCOUNTER (OUTPATIENT)
Dept: LAB | Facility: MEDICAL CENTER | Age: 65
End: 2025-01-13
Attending: NURSE PRACTITIONER
Payer: MEDICARE

## 2025-01-13 DIAGNOSIS — R53.83 OTHER FATIGUE: ICD-10-CM

## 2025-01-13 LAB
ERYTHROCYTE [DISTWIDTH] IN BLOOD BY AUTOMATED COUNT: 47.1 FL (ref 35.9–50)
HCT VFR BLD AUTO: 40.8 % (ref 37–47)
HGB BLD-MCNC: 13.2 G/DL (ref 12–16)
MCH RBC QN AUTO: 29.1 PG (ref 27–33)
MCHC RBC AUTO-ENTMCNC: 32.4 G/DL (ref 32.2–35.5)
MCV RBC AUTO: 90.1 FL (ref 81.4–97.8)
PLATELET # BLD AUTO: 226 K/UL (ref 164–446)
PMV BLD AUTO: 11.4 FL (ref 9–12.9)
RBC # BLD AUTO: 4.53 M/UL (ref 4.2–5.4)
T4 FREE SERPL-MCNC: 1.22 NG/DL (ref 0.93–1.7)
TSH SERPL-ACNC: 2.33 UIU/ML (ref 0.35–5.5)
WBC # BLD AUTO: 6.6 K/UL (ref 4.8–10.8)

## 2025-01-13 PROCEDURE — 84443 ASSAY THYROID STIM HORMONE: CPT

## 2025-01-13 PROCEDURE — 84439 ASSAY OF FREE THYROXINE: CPT

## 2025-01-13 PROCEDURE — 36415 COLL VENOUS BLD VENIPUNCTURE: CPT

## 2025-01-13 PROCEDURE — 85027 COMPLETE CBC AUTOMATED: CPT

## 2025-02-28 ENCOUNTER — OUTPATIENT INFUSION SERVICES (OUTPATIENT)
Dept: ONCOLOGY | Facility: MEDICAL CENTER | Age: 65
End: 2025-02-28
Payer: MEDICARE

## 2025-02-28 VITALS
HEIGHT: 66 IN | OXYGEN SATURATION: 95 % | SYSTOLIC BLOOD PRESSURE: 108 MMHG | BODY MASS INDEX: 20.02 KG/M2 | DIASTOLIC BLOOD PRESSURE: 74 MMHG | RESPIRATION RATE: 18 BRPM | TEMPERATURE: 97.8 F | WEIGHT: 124.56 LBS | HEART RATE: 70 BPM

## 2025-02-28 DIAGNOSIS — M85.80 OSTEOPENIA, UNSPECIFIED LOCATION: ICD-10-CM

## 2025-02-28 LAB
CA-I BLD ISE-SCNC: 1.25 MMOL/L (ref 1.1–1.3)
CREAT BLD-MCNC: 1.1 MG/DL (ref 0.5–1.4)

## 2025-02-28 PROCEDURE — 96372 THER/PROPH/DIAG INJ SC/IM: CPT

## 2025-02-28 PROCEDURE — 36415 COLL VENOUS BLD VENIPUNCTURE: CPT

## 2025-02-28 PROCEDURE — 700111 HCHG RX REV CODE 636 W/ 250 OVERRIDE (IP): Mod: JZ,TB

## 2025-02-28 RX ADMIN — DENOSUMAB 60 MG: 60 INJECTION SUBCUTANEOUS at 10:54

## 2025-02-28 ASSESSMENT — FIBROSIS 4 INDEX: FIB4 SCORE: 1.73

## 2025-02-28 NOTE — PROGRESS NOTES
Pt presented to [facility] for Prolia injection. POC discussed and pt verbalized understanding. Pt confirms taking VitD/calcium supplements, denies recent or planned dental/oral procedures in the last month or the next month, and denies s/s of hypocalcemia or infection today. Labs drawn from [facility] without difficulty; site covered with sterile gauze/coban and pt tolerated well. Labs reviewed by pharmacy and Prolia injection administered per MAR. Band-aid applied to site and pt tolerated well. No s/s of adverse reactions or complications noted.  emailed and pt confirms MyChart access. Pt discharged to self care in [facility].

## 2025-03-24 ENCOUNTER — PATIENT MESSAGE (OUTPATIENT)
Dept: HEALTH INFORMATION MANAGEMENT | Facility: OTHER | Age: 65
End: 2025-03-24

## 2025-04-01 ENCOUNTER — APPOINTMENT (OUTPATIENT)
Dept: MEDICAL GROUP | Facility: PHYSICIAN GROUP | Age: 65
End: 2025-04-01
Payer: MEDICARE

## 2025-04-01 VITALS
RESPIRATION RATE: 14 BRPM | WEIGHT: 122.4 LBS | BODY MASS INDEX: 19.67 KG/M2 | OXYGEN SATURATION: 95 % | TEMPERATURE: 98.6 F | HEART RATE: 87 BPM | DIASTOLIC BLOOD PRESSURE: 68 MMHG | SYSTOLIC BLOOD PRESSURE: 102 MMHG | HEIGHT: 66 IN

## 2025-04-01 DIAGNOSIS — T84.52XD INFECTION ASSOCIATED WITH INTERNAL LEFT HIP PROSTHESIS, SUBSEQUENT ENCOUNTER: ICD-10-CM

## 2025-04-01 DIAGNOSIS — F11.20 UNCOMPLICATED OPIOID DEPENDENCE (HCC): ICD-10-CM

## 2025-04-01 PROCEDURE — 99213 OFFICE O/P EST LOW 20 MIN: CPT

## 2025-04-01 PROCEDURE — 3074F SYST BP LT 130 MM HG: CPT

## 2025-04-01 PROCEDURE — 3078F DIAST BP <80 MM HG: CPT

## 2025-04-01 RX ORDER — TRAMADOL HYDROCHLORIDE 50 MG/1
50 TABLET ORAL 3 TIMES DAILY
Qty: 90 TABLET | Refills: 2 | Status: SHIPPED | OUTPATIENT
Start: 2025-04-01 | End: 2025-05-01

## 2025-04-01 ASSESSMENT — PAIN SCALES - GENERAL: PAINLEVEL_OUTOF10: NO PAIN

## 2025-04-01 ASSESSMENT — FIBROSIS 4 INDEX: FIB4 SCORE: 1.76

## 2025-04-01 NOTE — PROGRESS NOTES
Verbal consent was acquired by the patient to use [employer] ambient listening note generation during this visit     Subjective:     HPI:   History of Present Illness  The patient is a 65-year-old female presenting for a follow-up regarding her medication regimen.    She reports maintaining good health since her last visit. During an 8-day cruise, she required an override for her tramadol prescription. She is scheduled to travel again in May 2025 and anticipates a similar need for a medication override. Her insurance provider has advised that she contact her physician first, who will then coordinate with the pharmacy prior to insurance intervention.    The patient has secured an appointment for a mammogram.    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   Stable.  - Prescription for 3-month supply of tramadol to be sent to pharmacy in May before trip on May 21.  - Advised to contact insurance after doctor sends prescription to pharmacy.  I have obtained and reviewed patient utilization report from [facility] pharmacy database on 4/1/2025 1:41 PM  prior to writing prescription for controlled substance II, III or IV per [place] bill . Based on assessment of the report, my physical exam, and the patient's health problem, the prescription is medically necessary.     Additionally, I have discussed the risk and benefits, treatment plan, and alternative therapies  with the patient.     UDS ordered    2. Health maintenance.  - Upcoming mammogram appointment.    Follow-up  - Schedule next appointment.      Health Maintenance: Orders placed as applicable to patient     Objective:     Exam:  Objective:  Vitals:    04/01/25 1335   BP: 102/68   Pulse: 87   Resp: 14   Temp: 37 °C (98.6 °F)   SpO2: 95%     Physical Exam  Physical Exam    Constitutional:       Appearance: Normal appearance.   Eyes:      Extraocular Movements: Extraocular movements intact.   Pulmonary:      Effort: Pulmonary effort is normal.   Neurological:      General: No focal deficit present.      Mental Status: She is alert and oriented to person, place, and time.   Psychiatric:         Mood and Affect: Mood normal.         Behavior: Behavior normal.       Return in about 3 months (around 7/1/2025) for controlled substance.    Please note that this dictation was created using voice recognition software. I have made every reasonable attempt to correct obvious errors, but I expect that there are errors of grammar and possibly content that I did not discover before finalizing the note.

## 2025-04-15 DIAGNOSIS — F51.01 PRIMARY INSOMNIA: Chronic | ICD-10-CM

## 2025-04-16 NOTE — TELEPHONE ENCOUNTER
Received request via: Pharmacy    Was the patient seen in the last year in this department? Yes    Does the patient have an active prescription (recently filled or refills available) for medication(s) requested? No    Pharmacy Name:     [facility]/pharmacy #[id number] - [place], [place] - [address] [place] [address] (Pharmacy) [phone]       Does the patient have senior living Plus and need 100-day supply? (This applies to ALL medications) Yes, quantity updated to 100 days

## 2025-04-21 RX ORDER — LORAZEPAM 1 MG/1
1 TABLET ORAL NIGHTLY PRN
Qty: 30 TABLET | Refills: 0 | Status: SHIPPED | OUTPATIENT
Start: 2025-04-21 | End: 2025-05-21

## 2025-04-28 DIAGNOSIS — F51.01 PRIMARY INSOMNIA: Chronic | ICD-10-CM

## 2025-04-28 NOTE — TELEPHONE ENCOUNTER
Received request via: Patient    Was the patient seen in the last year in this department? Yes    Does the patient have an active prescription (recently filled or refills available) for medication(s) requested? No    Pharmacy Name: [facility]     Does the patient have [facility] Plus and need 100-day supply? (This applies to ALL medications) Yes, quantity updated to 100 days

## 2025-04-29 RX ORDER — LORAZEPAM 1 MG/1
1 TABLET ORAL NIGHTLY PRN
Qty: 30 TABLET | Refills: 0 | OUTPATIENT
Start: 2025-04-29 | End: 2025-05-29

## 2025-05-12 ENCOUNTER — HOSPITAL ENCOUNTER (OUTPATIENT)
Dept: RADIOLOGY | Facility: MEDICAL CENTER | Age: 65
End: 2025-05-12
Payer: MEDICARE

## 2025-05-12 DIAGNOSIS — Z12.31 VISIT FOR SCREENING MAMMOGRAM: ICD-10-CM

## 2025-05-12 PROCEDURE — 77067 SCR MAMMO BI INCL CAD: CPT

## 2025-05-21 ENCOUNTER — RESULTS FOLLOW-UP (OUTPATIENT)
Dept: MEDICAL GROUP | Facility: PHYSICIAN GROUP | Age: 65
End: 2025-05-21

## 2025-06-11 ENCOUNTER — APPOINTMENT (OUTPATIENT)
Dept: MEDICAL GROUP | Facility: PHYSICIAN GROUP | Age: 65
End: 2025-06-11
Payer: MEDICARE

## 2025-06-11 ENCOUNTER — HOSPITAL ENCOUNTER (OUTPATIENT)
Facility: MEDICAL CENTER | Age: 65
End: 2025-06-11
Payer: MEDICARE

## 2025-06-11 VITALS
OXYGEN SATURATION: 95 % | HEIGHT: 66 IN | SYSTOLIC BLOOD PRESSURE: 100 MMHG | HEART RATE: 74 BPM | TEMPERATURE: 99.5 F | RESPIRATION RATE: 15 BRPM | WEIGHT: 122.8 LBS | DIASTOLIC BLOOD PRESSURE: 64 MMHG | BODY MASS INDEX: 19.73 KG/M2

## 2025-06-11 DIAGNOSIS — M25.511 CHRONIC RIGHT SHOULDER PAIN: ICD-10-CM

## 2025-06-11 DIAGNOSIS — G89.29 CHRONIC PAIN OF BOTH KNEES: ICD-10-CM

## 2025-06-11 DIAGNOSIS — G89.29 CHRONIC RIGHT SHOULDER PAIN: ICD-10-CM

## 2025-06-11 DIAGNOSIS — M81.0 AGE-RELATED OSTEOPOROSIS WITHOUT CURRENT PATHOLOGICAL FRACTURE: ICD-10-CM

## 2025-06-11 DIAGNOSIS — M25.561 CHRONIC PAIN OF BOTH KNEES: ICD-10-CM

## 2025-06-11 DIAGNOSIS — M25.552 CHRONIC LEFT HIP PAIN: Primary | Chronic | ICD-10-CM

## 2025-06-11 DIAGNOSIS — M85.80 OSTEOPENIA, UNSPECIFIED LOCATION: ICD-10-CM

## 2025-06-11 DIAGNOSIS — F11.20 UNCOMPLICATED OPIOID DEPENDENCE (HCC): ICD-10-CM

## 2025-06-11 DIAGNOSIS — M25.562 CHRONIC PAIN OF BOTH KNEES: ICD-10-CM

## 2025-06-11 DIAGNOSIS — G89.29 CHRONIC LEFT HIP PAIN: Primary | Chronic | ICD-10-CM

## 2025-06-11 PROCEDURE — 3078F DIAST BP <80 MM HG: CPT

## 2025-06-11 PROCEDURE — 3074F SYST BP LT 130 MM HG: CPT

## 2025-06-11 PROCEDURE — G0482 DRUG TEST DEF 15-21 CLASSES: HCPCS

## 2025-06-11 PROCEDURE — 99214 OFFICE O/P EST MOD 30 MIN: CPT

## 2025-06-11 RX ORDER — TRAMADOL HYDROCHLORIDE 50 MG/1
50 TABLET ORAL 3 TIMES DAILY PRN
Qty: 90 TABLET | Refills: 0 | Status: SHIPPED | OUTPATIENT
Start: 2025-06-11 | End: 2025-07-11

## 2025-06-11 RX ORDER — TRAMADOL HYDROCHLORIDE 50 MG/1
50 TABLET ORAL 3 TIMES DAILY PRN
Qty: 90 TABLET | Refills: 0 | Status: SHIPPED | OUTPATIENT
Start: 2025-07-11 | End: 2025-08-10

## 2025-06-11 RX ORDER — TRAMADOL HYDROCHLORIDE 50 MG/1
50 TABLET ORAL 3 TIMES DAILY PRN
Qty: 90 TABLET | Refills: 0 | Status: SHIPPED | OUTPATIENT
Start: 2025-08-11 | End: 2025-09-10

## 2025-06-11 ASSESSMENT — FIBROSIS 4 INDEX: FIB4 SCORE: 1.76

## 2025-06-11 NOTE — PROGRESS NOTES
Verbal consent was acquired by the patient to use [email or web address] ambient listening note generation during this visit     Subjective:     HPI:   History of Present Illness    The patient is a 65-year-old female with chronic left hip pain secondary to surgical complications, including abscess formation and staphylococcal infection involving orthopedic hardware. She is currently managed on tramadol 50 mg three times daily for analgesia, with a controlled substance agreement on file. There have been no changes in her prescription filling pattern, dosage, or reported adverse effects. Her urine drug screen is up to date but requires renewal.    The patient reports no significant changes since her last visit. She recently returned from a river cruise during which she experienced transient episodes of malaise but remains physically active, including participation in pickleball. She is considering intra-articular knee injections for symptomatic relief, as her mobility is limited to mini squats due to knee arthralgia, which may be of muscular origin.    She is receiving Prolia (denosumab) infusions for osteoporosis management, with the most recent administration occurring one month ago.    Her most recent mammogram results were normal.    The patient expresses concern regarding shoulder pain, which she suspects may be associated with statin therapy.    Additionally, she reports misplacing her lorazepam while traveling to [place] and has been without the medication for the past 1-2 weeks.        Assessment & Plan:     Problem List Items Addressed This Visit       Osteopenia    Relevant Orders    DS-BONE DENSITY STUDY (DEXA)    Chronic left hip pain - Primary (Chronic)    Relevant Medications    traMADol (ULTRAM) 50 MG Tab    traMADol (ULTRAM) 50 MG Tab (Start on 7/11/2025)    traMADol (ULTRAM) 50 MG Tab (Start on 8/11/2025)    Uncomplicated opioid dependence (HCC)    Relevant Medications    traMADol (ULTRAM) 50 MG Tab     traMADol (ULTRAM) 50 MG Tab (Start on 7/11/2025)    traMADol (ULTRAM) 50 MG Tab (Start on 8/11/2025)    Other Relevant Orders    PAIN MANAGEMENT SCRN, UR (Completed)    Consent for Opiate Prescription (Completed)     Other Visit Diagnoses         Chronic right shoulder pain        Relevant Medications    traMADol (ULTRAM) 50 MG Tab    traMADol (ULTRAM) 50 MG Tab (Start on 7/11/2025)    traMADol (ULTRAM) 50 MG Tab (Start on 8/11/2025)    Other Relevant Orders    Referral to Sports Medicine      Chronic pain of both knees        Relevant Medications    traMADol (ULTRAM) 50 MG Tab    traMADol (ULTRAM) 50 MG Tab (Start on 7/11/2025)    traMADol (ULTRAM) 50 MG Tab (Start on 8/11/2025)    Other Relevant Orders    Referral to Sports Medicine      Age-related osteoporosis without current pathological fracture        Relevant Orders    DS-BONE DENSITY STUDY (DEXA)            Assessment & Plan  1. Chronic left hip pain: Chronic. Secondary to surgical complications including abscess and staph infection of hardware.  - Continue tramadol 50 mg three times daily; no changes in dose or filling pattern.  - Controlled substance agreement remains on file.  - Updated urine drug screen to be conducted today.  I have obtained and reviewed patient utilization report from [facility] pharmacy database on 6/17/2025 3:13 PM  prior to writing prescription for controlled substance II, III or IV per [place] bill . Based on assessment of the report, my physical exam, and the patient's health problem, the prescription is medically necessary.     Additionally, I have discussed the risk and benefits, treatment plan, and alternative therapies with the patient.     2. Knee pain: Chronic. Difficulty with mini squats and knee discomfort possibly due to osteoarthritis or muscular issues.  - Referral to Dr. Lewis for evaluation and potential steroid injections.  - Office X-ray may be performed to assess for osteoarthritis or other structural  issues.  - Referral to Dr. Lewis for evaluation and potential steroid injections.    3. Osteoporosis: Stable.  - Continue Prolia infusions; last infusion administered one month ago.  - Follow-up infusion to be ordered.    4. Health maintenance: Stable.  - Normal mammogram results.  - Cholesterol levels to be monitored; regular check-ups to continue.    5.  Chronic shoulder pain. Hx of OA, limited ROM, pain with overhead reaching. Suspect impingement.   - Referral to Dr. Lewis for evaluation and potential steroid injections.  - Symptoms to be monitored for progression or resolution.      Follow-up  - Referral to Dr. Lewis to be sent via [employer].    Return in about 3 months (around 9/11/2025) for controlled substance.    Health Maintenance: Orders placed as applicable to patient     Objective:     Exam:  Objective:  Vitals:    06/11/25 1450   BP: 100/64   Pulse: 74   Resp: 15   Temp: 37.5 °C (99.5 °F)   SpO2: 95%     Physical Exam  Physical Exam  Musculoskeletal:      Right shoulder: Decreased range of motion.      Comments: Pain with external rotation, limited movement with overhead lifting         Constitutional:       Appearance: Normal appearance.   Eyes:      Extraocular Movements: Extraocular movements intact.   Pulmonary:      Effort: Pulmonary effort is normal.   Neurological:      General: No focal deficit present.      Mental Status: She is alert and oriented to person, place, and time.   Psychiatric:         Mood and Affect: Mood normal.         Behavior: Behavior normal.       Please note that this dictation was created using voice recognition software. I have made every reasonable attempt to correct obvious errors, but I expect that there are errors of grammar and possibly content that I did not discover before finalizing the note.

## 2025-06-13 ENCOUNTER — PATIENT MESSAGE (OUTPATIENT)
Dept: MEDICAL GROUP | Facility: PHYSICIAN GROUP | Age: 65
End: 2025-06-13
Payer: MEDICARE

## 2025-06-13 DIAGNOSIS — E78.2 MIXED HYPERLIPIDEMIA: Primary | ICD-10-CM

## 2025-06-15 LAB
1OH-MIDAZOLAM UR QL SCN: NOT DETECTED
6MAM UR QL: NOT DETECTED
7AMINOCLONAZEPAM UR QL: NOT DETECTED
A-OH ALPRAZ UR QL: NOT DETECTED
ALPRAZ UR QL: NOT DETECTED
AMPHET UR QL SCN: NOT DETECTED
ANNOTATION COMMENT IMP: NORMAL
BARBITURATES UR QL: NEGATIVE
BUPRENORPHINE UR QL: NOT DETECTED
BZE UR QL: NEGATIVE
CARBOXYTHC UR QL: NEGATIVE
CARISOPRODOL UR QL: NEGATIVE
CLONAZEPAM UR QL: NOT DETECTED
CODEINE UR QL: NOT DETECTED
CREAT UR-MCNC: 155.5 MG/DL (ref 20–400)
DIAZEPAM UR QL: NOT DETECTED
ETHYL GLUCURONIDE UR QL: NEGATIVE
FENTANYL UR QL: NOT DETECTED
GABAPENTIN UR QL CFM: NOT DETECTED
HYDROCODONE UR QL: NOT DETECTED
HYDROMORPHONE UR QL: NOT DETECTED
LORAZEPAM UR QL: PRESENT
MDA UR QL: NOT DETECTED
MDEA UR QL: NOT DETECTED
MDMA UR QL: NOT DETECTED
ME-PHENIDATE UR QL: NOT DETECTED
METHADONE UR QL: NEGATIVE
METHAMPHET UR QL: NOT DETECTED
MIDAZOLAM UR QL SCN: NOT DETECTED
MORPHINE UR QL: NOT DETECTED
NALOXONE UR QL CFM: NOT DETECTED
NORBUPRENORPHINE UR QL CFM: NOT DETECTED
NORDIAZEPAM UR QL: NOT DETECTED
NORFENTANYL UR QL: NOT DETECTED
NORHYDROCODONE UR QL CFM: NOT DETECTED
NORMEPERIDINE UR QL CFM: NOT DETECTED
NOROXYCODONE UR QL CFM: NOT DETECTED
NOROXYMORPHONE UR QL SCN: NOT DETECTED
OXAZEPAM UR QL: NOT DETECTED
OXYCODONE UR QL: NOT DETECTED
OXYMORPHONE UR QL: NOT DETECTED
PATHOLOGY STUDY: NORMAL
PCP UR QL: NEGATIVE
PHENTERMINE UR QL: NOT DETECTED
PREGABALIN UR QL CFM: NOT DETECTED
SERVICE CMNT-IMP: NORMAL
TAPENTADOL UR QL SCN: NOT DETECTED
TAPENTADOL UR QL SCN: NOT DETECTED
TEMAZEPAM UR QL: NOT DETECTED
TRAMADOL UR QL: NORMAL
ZOLPIDEM PHENYL-4-CARB UR QL SCN: NOT DETECTED
ZOLPIDEM UR QL: NOT DETECTED

## 2025-06-16 ENCOUNTER — RESULTS FOLLOW-UP (OUTPATIENT)
Dept: MEDICAL GROUP | Facility: PHYSICIAN GROUP | Age: 65
End: 2025-06-16

## 2025-06-19 ENCOUNTER — HOSPITAL ENCOUNTER (OUTPATIENT)
Dept: RADIOLOGY | Facility: MEDICAL CENTER | Age: 65
End: 2025-06-19
Payer: MEDICARE

## 2025-06-19 DIAGNOSIS — M81.0 AGE-RELATED OSTEOPOROSIS WITHOUT CURRENT PATHOLOGICAL FRACTURE: ICD-10-CM

## 2025-06-19 DIAGNOSIS — M85.80 OSTEOPENIA, UNSPECIFIED LOCATION: ICD-10-CM

## 2025-06-19 PROCEDURE — 77080 DXA BONE DENSITY AXIAL: CPT

## 2025-06-19 NOTE — Clinical Note
REFERRAL APPROVAL NOTICE         Sent on June 19, 2025                   Kemi Silva  [address]  [place] [address]                   Dear Ms. Silva,    After a careful review of the medical information and benefit coverage, [facility] has processed your referral. See below for additional details.    If applicable, you must be actively enrolled with your insurance for coverage of the authorized service. If you have any questions regarding your coverage, please contact your insurance directly.    REFERRAL INFORMATION   Referral #:  [id number]  Referred-To Department    Referred-By Provider:  Sports Medicine    ANDREW Riojas   [address]      [address]  [place] [id number] [phone] [address]  [facility] [id number] [phone]    Referral Start Date:  06/11/2025  Referral End Date:   06/11/2026             SCHEDULING  If you do not already have an appointment, please call [phone] to make an appointment.     MORE INFORMATION  If you do not already have a [facility] account, sign up at: [email or web address]  You can access your medical information, make appointments, see lab results, billing information, and more.  If you have questions regarding this referral, please contact  the [facility] Referrals department at:             [phone]. Monday - Friday 8:00AM - 5:00PM.     Sincerely,    [facility]

## 2025-06-22 SDOH — ECONOMIC STABILITY: FOOD INSECURITY: WITHIN THE PAST 12 MONTHS, YOU WORRIED THAT YOUR FOOD WOULD RUN OUT BEFORE YOU GOT MONEY TO BUY MORE.: NEVER TRUE

## 2025-06-22 SDOH — ECONOMIC STABILITY: INCOME INSECURITY: HOW HARD IS IT FOR YOU TO PAY FOR THE VERY BASICS LIKE FOOD, HOUSING, MEDICAL CARE, AND HEATING?: NOT HARD AT ALL

## 2025-06-22 SDOH — ECONOMIC STABILITY: FOOD INSECURITY: WITHIN THE PAST 12 MONTHS, THE FOOD YOU BOUGHT JUST DIDN'T LAST AND YOU DIDN'T HAVE MONEY TO GET MORE.: NEVER TRUE

## 2025-06-22 SDOH — HEALTH STABILITY: PHYSICAL HEALTH: ON AVERAGE, HOW MANY DAYS PER WEEK DO YOU ENGAGE IN MODERATE TO STRENUOUS EXERCISE (LIKE A BRISK WALK)?: 7 DAYS

## 2025-06-22 SDOH — ECONOMIC STABILITY: INCOME INSECURITY: IN THE LAST 12 MONTHS, WAS THERE A TIME WHEN YOU WERE NOT ABLE TO PAY THE MORTGAGE OR RENT ON TIME?: NO

## 2025-06-22 SDOH — HEALTH STABILITY: PHYSICAL HEALTH: ON AVERAGE, HOW MANY MINUTES DO YOU ENGAGE IN EXERCISE AT THIS LEVEL?: 60 MIN

## 2025-06-22 SDOH — HEALTH STABILITY: MENTAL HEALTH
STRESS IS WHEN SOMEONE FEELS TENSE, NERVOUS, ANXIOUS, OR CAN'T SLEEP AT NIGHT BECAUSE THEIR MIND IS TROUBLED. HOW STRESSED ARE YOU?: TO SOME EXTENT

## 2025-06-22 ASSESSMENT — SOCIAL DETERMINANTS OF HEALTH (SDOH)
WITHIN THE PAST 12 MONTHS, YOU WORRIED THAT YOUR FOOD WOULD RUN OUT BEFORE YOU GOT THE MONEY TO BUY MORE: NEVER TRUE
HOW OFTEN DO YOU GET TOGETHER WITH FRIENDS OR RELATIVES?: THREE TIMES A WEEK
IN THE PAST 12 MONTHS, HAS THE ELECTRIC, GAS, OIL, OR WATER COMPANY THREATENED TO SHUT OFF SERVICE IN YOUR HOME?: NO
HOW HARD IS IT FOR YOU TO PAY FOR THE VERY BASICS LIKE FOOD, HOUSING, MEDICAL CARE, AND HEATING?: NOT HARD AT ALL
HOW OFTEN DO YOU ATTEND CHURCH OR RELIGIOUS SERVICES?: NEVER
HOW MANY DRINKS CONTAINING ALCOHOL DO YOU HAVE ON A TYPICAL DAY WHEN YOU ARE DRINKING: PATIENT DOES NOT DRINK
HOW OFTEN DO YOU ATTENT MEETINGS OF THE CLUB OR ORGANIZATION YOU BELONG TO?: MORE THAN 4 TIMES PER YEAR
HOW OFTEN DO YOU ATTENT MEETINGS OF THE CLUB OR ORGANIZATION YOU BELONG TO?: MORE THAN 4 TIMES PER YEAR
HOW OFTEN DO YOU HAVE A DRINK CONTAINING ALCOHOL: NEVER
IN A TYPICAL WEEK, HOW MANY TIMES DO YOU TALK ON THE PHONE WITH FAMILY, FRIENDS, OR NEIGHBORS?: ONCE A WEEK
DO YOU BELONG TO ANY CLUBS OR ORGANIZATIONS SUCH AS CHURCH GROUPS UNIONS, FRATERNAL OR ATHLETIC GROUPS, OR SCHOOL GROUPS?: YES
HOW OFTEN DO YOU HAVE SIX OR MORE DRINKS ON ONE OCCASION: NEVER
DO YOU BELONG TO ANY CLUBS OR ORGANIZATIONS SUCH AS CHURCH GROUPS UNIONS, FRATERNAL OR ATHLETIC GROUPS, OR SCHOOL GROUPS?: YES
HOW OFTEN DO YOU GET TOGETHER WITH FRIENDS OR RELATIVES?: THREE TIMES A WEEK
IN A TYPICAL WEEK, HOW MANY TIMES DO YOU TALK ON THE PHONE WITH FAMILY, FRIENDS, OR NEIGHBORS?: ONCE A WEEK
HOW OFTEN DO YOU ATTEND CHURCH OR RELIGIOUS SERVICES?: NEVER

## 2025-06-22 ASSESSMENT — LIFESTYLE VARIABLES
HOW MANY STANDARD DRINKS CONTAINING ALCOHOL DO YOU HAVE ON A TYPICAL DAY: PATIENT DOES NOT DRINK
SKIP TO QUESTIONS 9-10: 1
HOW OFTEN DO YOU HAVE A DRINK CONTAINING ALCOHOL: NEVER
HOW OFTEN DO YOU HAVE SIX OR MORE DRINKS ON ONE OCCASION: NEVER
AUDIT-C TOTAL SCORE: 0

## 2025-06-23 ENCOUNTER — APPOINTMENT (OUTPATIENT)
Dept: RADIOLOGY | Facility: OTHER | Age: 65
End: 2025-06-23
Attending: FAMILY MEDICINE
Payer: MEDICARE

## 2025-06-23 ENCOUNTER — OFFICE VISIT (OUTPATIENT)
Dept: SPORTS MEDICINE | Facility: OTHER | Age: 65
End: 2025-06-23
Payer: MEDICARE

## 2025-06-23 VITALS
WEIGHT: 120 LBS | BODY MASS INDEX: 19.29 KG/M2 | DIASTOLIC BLOOD PRESSURE: 62 MMHG | SYSTOLIC BLOOD PRESSURE: 100 MMHG | HEIGHT: 66 IN | OXYGEN SATURATION: 97 % | HEART RATE: 82 BPM | TEMPERATURE: 99 F

## 2025-06-23 DIAGNOSIS — M19.011 OSTEOARTHRITIS OF GLENOHUMERAL JOINT, RIGHT: Primary | ICD-10-CM

## 2025-06-23 PROCEDURE — 3074F SYST BP LT 130 MM HG: CPT | Performed by: FAMILY MEDICINE

## 2025-06-23 PROCEDURE — 73030 X-RAY EXAM OF SHOULDER: CPT | Mod: TC,FY,RT | Performed by: FAMILY MEDICINE

## 2025-06-23 PROCEDURE — 3078F DIAST BP <80 MM HG: CPT | Performed by: FAMILY MEDICINE

## 2025-06-23 PROCEDURE — 99214 OFFICE O/P EST MOD 30 MIN: CPT | Performed by: FAMILY MEDICINE

## 2025-06-23 ASSESSMENT — ENCOUNTER SYMPTOMS
SHORTNESS OF BREATH: 0
NAUSEA: 0
VOMITING: 0
DIZZINESS: 0
FEVER: 0
CHILLS: 0

## 2025-06-23 ASSESSMENT — FIBROSIS 4 INDEX: FIB4 SCORE: 1.76

## 2025-06-23 NOTE — PROGRESS NOTES
Chief Complaint   Patient presents with    Shoulder Pain     Right shoulder pain     Subjective   Referred by FLORENCIO Riojas  for evaluation of RIGHT shoulder pain (right-handed dominant)  Symptoms began insidiously approximately a year and a half ago  No specific injury  She does notice that her symptoms are worse when playing pickle ball and particularly during her backhand  Pain is achy and throbbing and can be sharp with overhead activity  No real alleviating factors  Pain is 5 out of 10 and can be as severe as 8 out of 10  Pain tends to be pretty much the same whether she is active or not  She denies numbness tingling, or weakness  No radiation of the pain, but she does get some pain in a rotator cuff distribution of the RIGHT deltoid region anteriorly at the shoulder  Advil and icing do not really help much  POSITIVE night symptom with difficulty sleeping on the RIGHT side    Retired, works part-time at [employer]  Known history of osteoarthritis    PMH:  has a past medical history of Acute midline thoracic back pain (10/19/2016), Anesthesia (11/19/2018), Arthritis, Chronic infection of left hip, currently on antibiotics ([facility]) (04/18/2022), High cholesterol, Hip pain, History of anemia, Hyperlipidemia (06/17/2013), Insomnia, Menopause, Osteopenia, and PONV (postoperative nausea and vomiting).  MEDS: Current Medications[1]  ALLERGIES: Allergies[2]  SURGHX: Past Surgical History[3]  SOCHX:  reports that she quit smoking about 25 years ago. Her smoking use included cigarettes. She started smoking about 45 years ago. She has a 5 pack-year smoking history. She has never used smokeless tobacco. She reports that she does not drink alcohol and does not use drugs.  FH: Family history was reviewed, no pertinent findings to report    Review of Systems   Constitutional:  Negative for chills and fever.   Respiratory:  Negative for shortness of breath.    Cardiovascular:  Negative for chest pain.   Gastrointestinal:   "Negative for nausea and vomiting.   Neurological:  Negative for dizziness.     Objective   /62 (BP Location: Right arm, Patient Position: Sitting)   Pulse 82   Temp 37.2 °C (99 °F)   Ht 1.676 m (5' 6\")   Wt 54.4 kg (120 lb)   SpO2 97%   BMI 19.37 kg/m²     No acute distress    Cervical spine:  Range of motion INTACT  Spurling's testing NEGATIVE bilaterally  No cervical spine tenderness  Strength testing NORMAL deltoid, bicep, tricep, wrist extension, wrist flexion and finger abduction  DTRs: 2 out of 4 bilaterally for biceps, brachial radialis  Rodriguez's testing NEGATIVE    Shoulder exam:  Range of motion INTACT  Strength testing NORMAL with empty can, internal rotation, external rotation and lift off testing  She does have some pain with empty can and internal rotation testing  WITHOUT discernible weakness   NO tenderness of the supraspinatus, infraspinatus or biceps tendon  Apprehension testing NORMAL    /62 (BP Location: Right arm, Patient Position: Sitting)   Pulse 82   Temp 37.2 °C (99 °F)   Ht 1.676 m (5' 6\")   Wt 54.4 kg (120 lb)   SpO2 97%   BMI 19.37 kg/m²     No acute distress    Cervical spine:  Range of motion INTACT  Spurling's testing NEGATIVE bilaterally  No cervical spine tenderness  Strength testing NORMAL deltoid, bicep, tricep, wrist extension, wrist flexion and finger abduction  DTRs: 2 out of 4 bilaterally for biceps, brachial radialis  Rodriguez's testing NEGATIVE    Bilateral shoulder exam:  Range of motion DECREASED with abduction and slightly with external rotation on the RIGHT compared to left  Strength testing NORMAL with empty can, internal rotation, external rotation and lift off testing  NO tenderness of the supraspinatus, infraspinatus or biceps tendon Camarena and Neer's are POSITIVE on the RIGHT compared to left  Apprehension testing NORMAL    1. Osteoarthritis of glenohumeral joint, right  DX-SHOULDER 2+ RIGHT        Symptoms began insidiously approximately a " year and a half ago  No specific injury  She does notice that her symptoms are worse when playing pickle ball and particularly during her backhand    Provided with home exercise program    Return in about 4 weeks (around 7/21/2025).  If symptoms persist consider glenohumeral corticosteroid injection in the RIGHT shoulder        6/23/2025 2:22 PM     HISTORY/REASON FOR EXAM: .  Pain     TECHNIQUE/EXAM DESCRIPTION AND NUMBER OF VIEWS:  3 views of the RIGHT shoulder.     COMPARISON: None     FINDINGS:  There is severe glenohumeral osteoarthrosis with prominent humeral head osteophytosis, marked joint space loss, and probable intra-articular bodies in the biceps tendon sheath. There is no definite acute fracture or dislocation. There is dextroconvex   scoliotic curvature of the thoracic spine.     IMPRESSION:     Severe glenohumeral osteoarthrosis.        Exam Ended: 06/23/25  2:23 PM Last Resulted: 06/23/25  2:39 PM         Interpreted in the office today with the patient    Thank you FLORENCIO Riojas for allow me to participate in care of your patient.           [1]   Current Outpatient Medications:     traMADol (ULTRAM) 50 MG Tab, Take 1 Tablet by mouth 3 times a day as needed for Moderate Pain for up to 30 days. Indications: Pain, Disp: 90 Tablet, Rfl: 0    [START ON 7/11/2025] traMADol (ULTRAM) 50 MG Tab, Take 1 Tablet by mouth 3 times a day as needed for Moderate Pain for up to 30 days. Indications: Pain, Disp: 90 Tablet, Rfl: 0    [START ON 8/11/2025] traMADol (ULTRAM) 50 MG Tab, Take 1 Tablet by mouth 3 times a day as needed for Moderate Pain for up to 30 days. Indications: Pain, Disp: 90 Tablet, Rfl: 0    simvastatin (ZOCOR) 40 MG Tab, Take 1 Tablet by mouth every evening., Disp: 100 Tablet, Rfl: 3    traMADol (ULTRAM) 50 MG Tab, Take 50 mg by mouth every four hours as needed., Disp: , Rfl:     traZODone (DESYREL) 100 MG Tab, TAKE 2 TABLETS BY MOUTH AT BEDTIME AS NEEDED FOR SLEEP., Disp: 180 Tablet, Rfl:  2    estradiol (ESTRACE) 0.1 MG/GM vaginal cream, INSERT 1 GM VAGINALLY AT BEDTIME 1-2 TIMES WEEKLY (Patient not taking: Reported on 6/11/2025), Disp: , Rfl:     vitamin D3 (CHOLECALCIFEROL) 400 UNIT Tab, Take 1,000 Units by mouth every day., Disp: , Rfl:     triamcinolone acetonide (KENALOG) 0.025 % Cream, APPLY TO AFFECTED AREA TWICE A DAY, Disp: 80 g, Rfl: 0    acetaminophen (TYLENOL) 500 MG Tab, Take 500-1,000 mg by mouth every 6 hours as needed., Disp: , Rfl:     Coenzyme Q10 (COQ10 PO), Take 1 Tab by mouth every evening., Disp: , Rfl:     therapeutic multivitamin-minerals (THERAGRAN-M) Tab, Take 1 Tab by mouth every evening., Disp: , Rfl:     CALCIUM PO, Take 1 Tab by mouth every evening., Disp: , Rfl:     ibuprofen (MOTRIN) 200 MG Tab, Take 800 mg by mouth every 6 hours as needed., Disp: , Rfl:   [2]   Allergies  Allergen Reactions    Tape      Rash, Itchy    [3]   Past Surgical History:  Procedure Laterality Date    HIP REVISION TOTAL Left 5/25/2021    Procedure: REVISION, TOTAL ARTHROPLASTY, HIP. IRRIGATION AND DEBREIDMENT WITH LINER HEAD EXCHANGE;  Surgeon: Julian Mendes M.D.;  Location: [facility];  Service: Orthopedics    HARDWARE REMOVAL ORTHO Left 11/16/2020    Procedure: REMOVAL, HARDWARE - HIP;  Surgeon: Julian Mendes M.D.;  Location: [facility];  Service: Orthopedics    IRRIGATION & DEBRIDEMENT HIP Left 3/31/2020    Procedure: IRRIGATION AND DEBRIDEMENT, HIP;  Surgeon: Julian Mendes M.D.;  Location: [facility];  Service: Orthopedics    ORIF, HIP Left 3/5/2020    Procedure: ORIF, HIP;  Surgeon: Julian Mendes M.D.;  Location: [facility];  Service: Orthopedics    ORIF, HIP Left 11/21/2019    Procedure: ORIF, HIP greater trochanter;  Surgeon: Julian Mendes M.D.;  Location: [facility];  Service: Orthopedics    HIP ARTH ANTERIOR TOTAL Left 11/29/2018    Procedure: HIP ARTHROPLASTY ANTERIOR TOTAL;  Surgeon: Julian Mendes M.D.;   Location: SURGERY [facility];  Service: Orthopedics    SHOULDER DECOMPRESSION ARTHROSCOPIC Left 7/23/2018    Procedure: SHOULDER DECOMPRESSION ARTHROSCOPIC- SUBACROMIAL;  Surgeon: Tuan Collins M.D.;  Location: SURGERY [facility];  Service: Orthopedics    SHOULDER ARTHROSCOPY W/ ROTATOR CUFF REPAIR  7/23/2018    Procedure: SHOULDER ARTHROSCOPY W/ ROTATOR CUFF REPAIR- WITH OPEN BICEP TENODESIS AND SUBCAPULAR REPAIR;  Surgeon: Tuan Collins M.D.;  Location: SURGERY [facility];  Service: Orthopedics    ORIF, FRACTURE, ULNA Left 2010    and radius    UMBILICAL HERNIA REPAIR  10/21/2009    Performed by FLORIAN BHANDARI at SURGERY SAME DAY [facility]    OTHER ORTHOPEDIC SURGERY Bilateral 2009    [facility]

## 2025-06-25 ENCOUNTER — HOSPITAL ENCOUNTER (OUTPATIENT)
Dept: RADIOLOGY | Facility: MEDICAL CENTER | Age: 65
End: 2025-06-25
Payer: COMMERCIAL

## 2025-06-25 DIAGNOSIS — E78.2 MIXED HYPERLIPIDEMIA: ICD-10-CM

## 2025-06-25 PROCEDURE — 4410556 CT-CARDIAC SCORING (SELF PAY ONLY)

## 2025-07-21 ENCOUNTER — OFFICE VISIT (OUTPATIENT)
Dept: MEDICAL GROUP | Facility: PHYSICIAN GROUP | Age: 65
End: 2025-07-21
Payer: MEDICARE

## 2025-07-21 VITALS
RESPIRATION RATE: 12 BRPM | HEART RATE: 74 BPM | TEMPERATURE: 99 F | WEIGHT: 119.6 LBS | SYSTOLIC BLOOD PRESSURE: 110 MMHG | DIASTOLIC BLOOD PRESSURE: 64 MMHG | HEIGHT: 66 IN | OXYGEN SATURATION: 95 % | BODY MASS INDEX: 19.22 KG/M2

## 2025-07-21 DIAGNOSIS — F51.01 PRIMARY INSOMNIA: Primary | ICD-10-CM

## 2025-07-21 DIAGNOSIS — F41.9 ANXIETY: ICD-10-CM

## 2025-07-21 DIAGNOSIS — Z12.83 SKIN CANCER SCREENING: ICD-10-CM

## 2025-07-21 PROCEDURE — 3074F SYST BP LT 130 MM HG: CPT

## 2025-07-21 PROCEDURE — 3078F DIAST BP <80 MM HG: CPT

## 2025-07-21 PROCEDURE — 99214 OFFICE O/P EST MOD 30 MIN: CPT

## 2025-07-21 RX ORDER — TRAZODONE HYDROCHLORIDE 100 MG/1
200 TABLET ORAL NIGHTLY PRN
Qty: 180 TABLET | Refills: 2 | Status: SHIPPED | OUTPATIENT
Start: 2025-07-21

## 2025-07-21 RX ORDER — LORAZEPAM 1 MG/1
1 TABLET ORAL
Qty: 30 TABLET | Refills: 1 | Status: SHIPPED | OUTPATIENT
Start: 2025-07-21 | End: 2025-08-20

## 2025-07-21 ASSESSMENT — FIBROSIS 4 INDEX: FIB4 SCORE: 1.76

## 2025-07-21 NOTE — Clinical Note
REFERRAL APPROVAL NOTICE         Sent on July 21, 2025                   Kemi Silva  [address] [place] [address]                   Dear Ms. Silva,    After a careful review of the medical information and benefit coverage, [facility] has processed your referral. See below for additional details.    If applicable, you must be actively enrolled with your insurance for coverage of the authorized service. If you have any questions regarding your coverage, please contact your insurance directly.    REFERRAL INFORMATION   Referral #:  [id number]  Referred-To Provider    Referred-By Provider:  Dermatology    ANDREW Riojas KATIE      [address]  [place] [id number] [phone] [address]  [facility] [id number] [phone]    Referral Start Date:  07/21/2025  Referral End Date:   07/21/2026             SCHEDULING  If you do not already have an appointment, please call [phone] to make an appointment.     MORE INFORMATION  If you do not already have a [employer] account, sign up at: [email or web address]  You can access your medical information, make appointments, see lab results, billing information, and more.  If you have questions regarding this referral, please contact  the [facility] Referrals department at:             [phone]. Monday - Friday 8:00AM - 5:00PM.     Sincerely,    [facility]

## 2025-07-21 NOTE — PROGRESS NOTES
Verbal consent was acquired by the patient to use [employer] ambient listening note generation during this visit     Subjective:     HPI:   History of Present Illness  The patient is a 65-year-old female presenting for medication refills, including tramadol, lorazepam, and trazodone. She reports using tramadol 50 mg three times daily as needed for chronic left hip pain. Lorazepam is used sparingly, though an earlier refill was required due to family-related stress. Trazodone is utilized for sleep management, she does take the lorazepam at least 2 hours after the takes trazodone if she wakes up during the night.     The patient describes significant psychosocial stressors, including concerns regarding her ex-'s health, which has been complicated by coronary artery bypass surgery, renal failure, pneumonia, and an intensive care unit admission. Additionally, her older son has returned to live at home, while her younger son resides with his father. She plans to travel to [place] for three weeks in August 2025. She denies substance use, reports mild insomnia and anxiety, and denies suicidal ideation.     She is also considering a dermatology consultation for a comprehensive skin evaluation.    Regarding sleep, the patient reports mild primary insomnia, taking trazodone at 10 PM nightly and occasionally using lorazepam if she awakens during the night.        Assessment & Plan:     Problem List Items Addressed This Visit       Insomnia - Primary (Chronic)    Relevant Medications    LORazepam (ATIVAN) 1 MG Tab    traZODone (DESYREL) 100 MG Tab    Anxiety    Relevant Medications    LORazepam (ATIVAN) 1 MG Tab    traZODone (DESYREL) 100 MG Tab     Other Visit Diagnoses         Skin cancer screening        Relevant Orders    Referral to Dermatology              Assessment & Plan  1. Chronic left hip pain: Chronic, stable.   - On tramadol 50 mg t.i.d. p.r.n.  - Last refill April 2025, three scripts on file from June  2025.  - Renew tramadol requests through October 2025.  - Inform office of any issues.  - CS agreement on file  - UDS up to date    2. Insomnia. This is a chronic, stable medical condition.   - Uses trazodone for sleep, requesting refill, provided   - Takes trazodone 100 mg, two pills at bedtime.    3. Anxiety. This is a chronic, stable medical condition.   - Uses lorazepam sparingly, especially during stressful family situations.  - Takes lorazepam at night if wakes up.  - Provide prescription for lorazepam.  I have obtained and reviewed patient utilization report from [facility] pharmacy database on 7/21/2025 3:33 PM  prior to writing prescription for controlled substance II, III or IV per [place] bill . Based on assessment of the report, my physical exam, and the patient's health problem, the prescription is medically necessary.     Additionally, I have discussed the risk and benefits, treatment plan, and alternative therapies with the patient.     UDS up to date   CS agreement on file     4. Dermatology referral.  - Importance of skin check due to family history of skin cancer discussed.  - Referral placed     Follow-up  - October 2025.    Return in about 3 months (around 10/21/2025).    Health Maintenance: Orders placed as applicable to patient     Objective:     Exam:  Objective:  Vitals:    07/21/25 1506   BP: 110/64   Pulse: 74   Resp: 12   Temp: 37.2 °C (99 °F)   SpO2: 95%     Physical Exam  Physical Exam    Constitutional:       Appearance: Normal appearance.   Eyes:      Extraocular Movements: Extraocular movements intact.   Pulmonary:      Effort: Pulmonary effort is normal.   Neurological:      General: No focal deficit present.      Mental Status: She is alert and oriented to person, place, and time.   Psychiatric:         Mood and Affect: Mood normal.         Behavior: Behavior normal.       Please note that this dictation was created using voice recognition software. I have made every  reasonable attempt to correct obvious errors, but I expect that there are errors of grammar and possibly content that I did not discover before finalizing the note.

## 2025-08-15 ENCOUNTER — OFFICE VISIT (OUTPATIENT)
Dept: SPORTS MEDICINE | Facility: OTHER | Age: 65
End: 2025-08-15
Payer: MEDICARE

## 2025-08-15 VITALS — HEIGHT: 66 IN | WEIGHT: 119.6 LBS | BODY MASS INDEX: 19.22 KG/M2

## 2025-08-15 DIAGNOSIS — M19.011 OSTEOARTHRITIS OF GLENOHUMERAL JOINT, RIGHT: Primary | ICD-10-CM

## 2025-08-15 PROCEDURE — 20610 DRAIN/INJ JOINT/BURSA W/O US: CPT | Mod: RT | Performed by: FAMILY MEDICINE

## 2025-08-15 RX ORDER — TRIAMCINOLONE ACETONIDE 40 MG/ML
40 INJECTION, SUSPENSION INTRA-ARTICULAR; INTRAMUSCULAR ONCE
Status: COMPLETED | OUTPATIENT
Start: 2025-08-15 | End: 2025-08-15

## 2025-08-15 RX ADMIN — TRIAMCINOLONE ACETONIDE 40 MG: 40 INJECTION, SUSPENSION INTRA-ARTICULAR; INTRAMUSCULAR at 15:00

## 2025-08-15 ASSESSMENT — FIBROSIS 4 INDEX: FIB4 SCORE: 1.76

## 2025-09-15 ENCOUNTER — APPOINTMENT (OUTPATIENT)
Dept: MEDICAL GROUP | Facility: PHYSICIAN GROUP | Age: 65
End: 2025-09-15
Payer: MEDICARE

## (undated) DEVICE — WATER IRRIG. STER. 1500 ML - (9/CA)

## (undated) DEVICE — GOWN WARMING STANDARD FLEX - (30/CA)

## (undated) DEVICE — GLOVE BIOGEL PI ORTHO SZ 7.5 PF LF (40PR/BX)

## (undated) DEVICE — GLOVE BIOGEL INDICATOR SZ 8 SURGICAL PF LTX - (50/BX 4BX/CA)

## (undated) DEVICE — CHLORAPREP 26 ML APPLICATOR - ORANGE TINT(25/CA)

## (undated) DEVICE — SENSOR SPO2 NEO LNCS ADHESIVE (20/BX) SEE USER NOTES

## (undated) DEVICE — TIP INTPLS HFLO ML ORFC BTRY - (12/CS)  FOR SURGILAV

## (undated) DEVICE — KIT ROOM DECONTAMINATION

## (undated) DEVICE — SUTURE 1 VICRYL PLUS CT-1 - 18 INCH (12/BX)

## (undated) DEVICE — MASK ANESTHESIA ADULT  - (100/CA)

## (undated) DEVICE — ARTHROWAND TURBOVAC 3.5/90 SCT

## (undated) DEVICE — GLOVE BIOGEL SZ 8 SURGICAL PF LTX - (50PR/BX 4BX/CA)

## (undated) DEVICE — DRAPE SURGICAL U 77X120 - (10/CA)

## (undated) DEVICE — CANISTER SUCTION 3000ML MECHANICAL FILTER AUTO SHUTOFF MEDI-VAC NONSTERILE LF DISP  (40EA/CA)

## (undated) DEVICE — TUBING CLEARLINK DUO-VENT - C-FLO (48EA/CA)

## (undated) DEVICE — GLOVE BIOGEL INDICATOR SZ 8.5 SURGICAL PF LTX - (50/BX 4BX/CA)

## (undated) DEVICE — KIT EVACUATER 3 SPRING PVC LF 1/8 DRAIN SIZE (10EA/CA)"

## (undated) DEVICE — SUTURE 2-0 MONOCRYL CT-1

## (undated) DEVICE — DRAPE STRLE REG TOWEL 18X24 - (10/BX 4BX/CA)"

## (undated) DEVICE — WATER IRRIGATION STERILE 1000ML (12EA/CA)

## (undated) DEVICE — BLOCK

## (undated) DEVICE — GLOVE BIOGEL INDICATOR SZ 6.5 SURGICAL PF LTX - (50PR/BX 4BX/CA)

## (undated) DEVICE — SPONGE GAUZESTER 4 X 4 4PLY - (128PK/CA)

## (undated) DEVICE — SET EXTENSION WITH 2 PORTS (48EA/CA) ***PART #2C8610 IS A SUBSTITUTE*****

## (undated) DEVICE — GLOVE BIOGEL SZ 7.5 SURGICAL PF LTX - (50PR/BX 4BX/CA)

## (undated) DEVICE — TUBE E-T HI-LO CUFF 6.5MM (10EA/BX)

## (undated) DEVICE — PROTECTOR ULNA NERVE - (36PR/CA)

## (undated) DEVICE — DRESSING TEGADERM 8 X 12 - (10/BX 8BX/CA)

## (undated) DEVICE — SUTURE 1 VICRYL PLUS CTX - 8 X 18 INCH (12/BX)

## (undated) DEVICE — SET LEADWIRE 5 LEAD BEDSIDE DISPOSABLE ECG (1SET OF 5/EA)

## (undated) DEVICE — DRAIN PENROSE 1 IN X 18 IN - STERILE (25EA/BX)

## (undated) DEVICE — DRAPE C-ARM LARGE 41IN X 74 IN - (10/BX 2BX/CA)

## (undated) DEVICE — NEEDLE W/FACET TIP DULL VERSION W/STIMULATION CABLE SONOPLEX 21G X 4 (10/EA)"

## (undated) DEVICE — DRAPETIBURON SHOULDER W/POUCH - (5EA/CA)

## (undated) DEVICE — PACK TOTAL HIP - (1/CA)

## (undated) DEVICE — GOWN SURGICAL X-LARGE ULTRA - FILM-REINFORCED (20/CA)

## (undated) DEVICE — NEPTUNE 4 PORT MANIFOLD - (20/PK)

## (undated) DEVICE — ELECTRODE 850 FOAM ADHESIVE - HYDROGEL RADIOTRNSPRNT (50/PK)

## (undated) DEVICE — TRAY CATHETER FOLEY URINE METER W/STATLOCK 350ML (10EA/CA)

## (undated) DEVICE — GLOVE BIOGEL PI INDICATOR SZ 8.0 SURGICAL PF LF -(50/BX 4BX/CA)

## (undated) DEVICE — LENS/HOOD FOR SPACESUIT - (32/PK) PEEL AWAY FACE

## (undated) DEVICE — GLOVE BIOGEL ECLIPSE PF LATEX SIZE 8.0  (50PR/BX)

## (undated) DEVICE — DRAPE HIP W/POCKET - (6/CA)

## (undated) DEVICE — LACTATED RINGERS INJ 1000 ML - (14EA/CA 60CA/PF)

## (undated) DEVICE — GLOVE BIOGEL PI INDICATOR SZ 7.5 SURGICAL PF LF -(50/BX 4BX/CA)

## (undated) DEVICE — SODIUM CHL. IRRIGATION 0.9% 3000ML (4EA/CA 65CA/PF)

## (undated) DEVICE — SODIUM CHL IRRIGATION 0.9% 1000ML (12EA/CA)

## (undated) DEVICE — KIT ANESTHESIA W/CIRCUIT & 3/LT BAG W/FILTER (20EA/CA)

## (undated) DEVICE — DRAPE SRG LG BCK TBL DISP - 10/CA

## (undated) DEVICE — SUTURE 3-0 MONOCRYL PLUS PS-1 - 27 INCH (36/BX)

## (undated) DEVICE — TUBE CONNECTING SUCTION - CLEAR PLASTIC STERILE 72 IN (50EA/CA)

## (undated) DEVICE — GLOVE SZ 7.5 BIOGEL PI MICRO - PF LF (50PR/BX)

## (undated) DEVICE — SUTURE 0 SILK TIES (36PK/BX)

## (undated) DEVICE — DETERGENT RENUZYME PLUS 10 OZ PACKET (50/BX)

## (undated) DEVICE — PACK SHOULDER ARTHROSCOPY SM - (2EA/CA)

## (undated) DEVICE — ELECTRODE DUAL RETURN W/ CORD - (50/PK)

## (undated) DEVICE — PEN SKIN MARKER W/RULER - (50EA/BX)

## (undated) DEVICE — DRAPE IOBAN II 23 IN X 33 IN - (10/BX)

## (undated) DEVICE — GLOVE, LITE (PAIR)

## (undated) DEVICE — DRAPE LARGE 3 QUARTER - (20/CA)

## (undated) DEVICE — DISPOSABLE WOUND VAC PICO 10 X 30 CM - WOUND CARE (3/CA)

## (undated) DEVICE — TOWEL STOP TIMEOUT SAFETY FLAG (40EA/CA)

## (undated) DEVICE — SUTURE GENERAL

## (undated) DEVICE — TUBE E-T HI-LO CUFF 7.0MM (10EA/PK)

## (undated) DEVICE — ADHESIVE DERMABOND HVD MINI (12EA/BX)

## (undated) DEVICE — SLEEVE, SEQUENTIAL CALF REG

## (undated) DEVICE — SUCTION INSTRUMENT YANKAUER BULBOUS TIP W/O VENT (50EA/CA)

## (undated) DEVICE — SUTURE 2-0 MONOCRYL SH&UR-6 27 - (12/BX)

## (undated) DEVICE — SUCTION TIP STRAIGHT ARGYLE - 50EA/CA

## (undated) DEVICE — PENCIL ELECTSURG 10FT BTN SWH - (50/CA)

## (undated) DEVICE — NEEDLE DAVIS TONSIL 1/2 CIR - (2EA/PK20PK/BX)

## (undated) DEVICE — TUBING PATIENT W/CONNECTOR REDEUCE (1EA)

## (undated) DEVICE — SUTURE 0 VICRYL PLUS CT-1 - 8 X 18 INCH (12/BX)

## (undated) DEVICE — DRESSING TRANSPARENT FILM TEGADERM 4 X 4.75" (50EA/BX)"

## (undated) DEVICE — GOWN SURGEONS X-LARGE - DISP. (30/CA)

## (undated) DEVICE — BLADE SAGITTAL SAW DUAL CUT 75.0 X 25.0MM (1/EA)

## (undated) DEVICE — SHAVER, 5.5 RESECTOR

## (undated) DEVICE — SYRINGE DISP. 60 CC LL - (30/BX, 12BX/CA)**WHEN THESE ARE GONE ORDER #500206**

## (undated) DEVICE — CLOSURE SKIN STRIP 1/2 X 4 IN - (STERI STRIP) (50/BX 4BX/CA)

## (undated) DEVICE — SUTURE 2-0 MONOCRYL PLUS UNDYED CT-1 1 X 36 (36EA/BX)"

## (undated) DEVICE — HEAD HOLDER JUNIOR/ADULT

## (undated) DEVICE — SWAB CULTURE AMIES ESWAB (50EA/PK)

## (undated) DEVICE — SYRINGE SAFETY 3 ML 18 GA X 1 1/2 BLUNT LL (100/BX 8BX/CA)

## (undated) DEVICE — DERMABOND ADVANCED - (12EA/BX)

## (undated) DEVICE — SUTURE 5 TI-CRON HOS-14 - (36/BX)

## (undated) DEVICE — HANDPIECE 10FT INTPLS SCT PLS IRRIGATION HAND CONTROL SET (6/PK)

## (undated) DEVICE — BLADE SURGICAL #15 - (50/BX 3BX/CA)

## (undated) DEVICE — TOWELS CLOTH SURGICAL - (4/PK 20PK/CA)

## (undated) DEVICE — SOD. CHL. INJ. 0.9% 1000 ML - (14EA/CA 60CA/PF)

## (undated) DEVICE — SUTURE 3-0 PROLENE PS-1 (12PK/BX)

## (undated) DEVICE — NEEDLE EXPRESSEW III (5EA/PK)

## (undated) DEVICE — SPIDER SHOULDER HOLDER (12EA/BX)

## (undated) DEVICE — TUBING PUMP WITH CONNECTOR REDEUCE (1EA)

## (undated) DEVICE — SUTURE 2-0 VICRYL PLUS CT-1 - 8 X 18 INCH(12/BX)

## (undated) DEVICE — BLADE 90X18X1.27MM SAW SAGITTAL

## (undated) DEVICE — GLOVE BIOGEL INDICATOR SZ 7.5 SURGICAL PF LTX - (50PR/BX 4BX/CA)

## (undated) DEVICE — SPONGE GAUZE STER 4X4 8-PL - (2/PK 50PK/BX 12BX/CS)

## (undated) DEVICE — SLEEVE, VASO, THIGH, MED

## (undated) DEVICE — SYRINGE SAFETY 10 ML 18 GA X 1 1/2 BLUNT LL (100/BX 4BX/CA)

## (undated) DEVICE — NEEDLE SAFETY 18 GA X 1 1/2 IN (100EA/BX)

## (undated) DEVICE — PEROXIDE HYDROGEN 3% 32 OZ. (12EA/BX)

## (undated) DEVICE — DEVICE SKIN CLOSURE SURGICAL ZIP 16 (10EA/PK)

## (undated) DEVICE — CANISTER SUCTION RIGID RED 1500CC (40EA/CA)

## (undated) DEVICE — Device

## (undated) DEVICE — GLOVE BIOGEL INDICATOR SZ 7SURGICAL PF LTX - (50/BX 4BX/CA)

## (undated) DEVICE — DRESSING ABDOMINAL PAD STERILE 8 X 10" (360EA/CA)"

## (undated) DEVICE — GLOVE BIOGEL SZ 6.5 SURGICAL PF LTX (50PR/BX 4BX/CA)

## (undated) DEVICE — BAG SPONGE COUNT 10.25 X 32 - BLUE (250/CA)

## (undated) DEVICE — PACK MAJOR ORTHO - (2EA/CA)

## (undated) DEVICE — SYRINGE 30 ML LL (56/BX)

## (undated) DEVICE — HUMID-VENT HEAT AND MOISTURE EXCHANGE- (50/BX)

## (undated) DEVICE — DRAPE U ORTHOPEDIC - (10/BX)

## (undated) DEVICE — GOWN SURGEONS LARGE - (32/CA)

## (undated) DEVICE — CANNULA GEMINI PASSPORT 20MM - (5/BX)

## (undated) DEVICE — SHAVER4.0 AGGRESSIVE + FORMLA (5EA/BX)

## (undated) DEVICE — GLOVE BIOGEL PI ORTHO SZ 7 PF LF (40PR/BX)

## (undated) DEVICE — PAD LAP STERILE 18 X 18 - (5/PK 40PK/CA)

## (undated) DEVICE — KIT DISPOSABLE HIP 2.8MM IMPLANT INCLUDES DRILL DRILL GUIDE AND OBTURATOR

## (undated) DEVICE — DRESSING PETROLEUM GAUZE 5 X 9" (50EA/BX 4BX/CA)"